# Patient Record
Sex: FEMALE | Race: OTHER | NOT HISPANIC OR LATINO | Employment: PART TIME | URBAN - METROPOLITAN AREA
[De-identification: names, ages, dates, MRNs, and addresses within clinical notes are randomized per-mention and may not be internally consistent; named-entity substitution may affect disease eponyms.]

---

## 2017-02-13 ENCOUNTER — HOSPITAL ENCOUNTER (OUTPATIENT)
Dept: MAMMOGRAPHY | Facility: HOSPITAL | Age: 57
Discharge: HOME | End: 2017-02-13
Payer: COMMERCIAL

## 2017-02-13 DIAGNOSIS — Z12.31 OTHER SCREENING MAMMOGRAM: ICD-10-CM

## 2017-02-13 PROCEDURE — G0202 SCR MAMMO BI INCL CAD: HCPCS

## 2018-02-28 ENCOUNTER — TRANSCRIBE ORDERS (OUTPATIENT)
Dept: ADMINISTRATIVE | Facility: HOSPITAL | Age: 58
End: 2018-02-28

## 2018-02-28 DIAGNOSIS — I25.10 ATHEROSCLEROSIS OF NATIVE CORONARY ARTERY WITHOUT ANGINA PECTORIS, UNSPECIFIED WHETHER NATIVE OR TRANSPLANTED HEART: Primary | ICD-10-CM

## 2018-02-28 DIAGNOSIS — Z12.39 SCREENING BREAST EXAMINATION: ICD-10-CM

## 2018-04-18 ENCOUNTER — HOSPITAL ENCOUNTER (OUTPATIENT)
Dept: MAMMOGRAPHY | Facility: HOSPITAL | Age: 58
Discharge: HOME | End: 2018-04-18
Payer: COMMERCIAL

## 2018-04-18 DIAGNOSIS — Z12.39 SCREENING BREAST EXAMINATION: ICD-10-CM

## 2018-04-18 PROCEDURE — 77067 SCR MAMMO BI INCL CAD: CPT

## 2018-04-23 ENCOUNTER — HOSPITAL ENCOUNTER (OUTPATIENT)
Dept: ULTRASOUND IMAGING | Facility: CLINIC | Age: 58
Discharge: HOME | End: 2018-04-23

## 2018-04-23 ENCOUNTER — HOSPITAL ENCOUNTER (OUTPATIENT)
Dept: MAMMOGRAPHY | Facility: CLINIC | Age: 58
Discharge: HOME | End: 2018-04-23

## 2018-04-23 DIAGNOSIS — R92.8 ABNORMAL MAMMOGRAM: ICD-10-CM

## 2018-04-23 PROCEDURE — G0279 TOMOSYNTHESIS, MAMMO: HCPCS

## 2018-04-23 PROCEDURE — 77065 DX MAMMO INCL CAD UNI: CPT

## 2018-09-04 ENCOUNTER — HOSPITAL ENCOUNTER (OUTPATIENT)
Dept: NON INVASIVE DIAGNOSTICS | Facility: CLINIC | Age: 58
Discharge: HOME | End: 2018-09-04
Payer: COMMERCIAL

## 2018-09-04 DIAGNOSIS — I25.10 ATHEROSCLEROSIS OF NATIVE CORONARY ARTERY WITHOUT ANGINA PECTORIS, UNSPECIFIED WHETHER NATIVE OR TRANSPLANTED HEART: ICD-10-CM

## 2018-09-04 PROCEDURE — 93306 TTE W/DOPPLER COMPLETE: CPT

## 2018-09-04 PROCEDURE — 93306 TTE W/DOPPLER COMPLETE: CPT | Performed by: INTERNAL MEDICINE

## 2018-09-11 ENCOUNTER — OFFICE VISIT (OUTPATIENT)
Dept: FAMILY MEDICINE CLINIC | Facility: CLINIC | Age: 58
End: 2018-09-11
Payer: COMMERCIAL

## 2018-09-11 VITALS
HEIGHT: 60 IN | TEMPERATURE: 98.9 F | RESPIRATION RATE: 16 BRPM | OXYGEN SATURATION: 95 % | BODY MASS INDEX: 30.63 KG/M2 | SYSTOLIC BLOOD PRESSURE: 118 MMHG | DIASTOLIC BLOOD PRESSURE: 78 MMHG | HEART RATE: 62 BPM | WEIGHT: 156 LBS

## 2018-09-11 DIAGNOSIS — Z00.00 ROUTINE MEDICAL EXAM: Primary | ICD-10-CM

## 2018-09-11 DIAGNOSIS — Z28.21 IMMUNIZATION REFUSED: ICD-10-CM

## 2018-09-11 PROCEDURE — 99396 PREV VISIT EST AGE 40-64: CPT | Performed by: FAMILY MEDICINE

## 2018-09-11 RX ORDER — METOPROLOL TARTRATE 50 MG/1
50 TABLET, FILM COATED ORAL 2 TIMES DAILY WITH MEALS
Refills: 0 | COMMUNITY
Start: 2018-06-13 | End: 2019-02-14 | Stop reason: SDUPTHER

## 2018-09-11 RX ORDER — LISINOPRIL AND HYDROCHLOROTHIAZIDE 20; 12.5 MG/1; MG/1
1 TABLET ORAL DAILY
Refills: 0 | COMMUNITY
Start: 2018-06-13 | End: 2019-02-14 | Stop reason: SDUPTHER

## 2018-09-11 RX ORDER — CHOLECALCIFEROL (VITAMIN D3) 50 MCG
1 TABLET ORAL DAILY
COMMUNITY
Start: 2018-01-24 | End: 2022-04-06 | Stop reason: SDUPTHER

## 2018-09-11 RX ORDER — ASPIRIN 81 MG/1
81 TABLET, CHEWABLE ORAL DAILY
COMMUNITY
Start: 2009-10-20

## 2018-09-11 RX ORDER — LEVOTHYROXINE SODIUM 0.2 MG/1
200 TABLET ORAL DAILY
Refills: 0 | COMMUNITY
Start: 2018-06-13 | End: 2019-02-06 | Stop reason: SDUPTHER

## 2018-09-11 RX ORDER — LEVOTHYROXINE SODIUM 0.03 MG/1
25 TABLET ORAL DAILY
Refills: 0 | COMMUNITY
Start: 2018-06-13 | End: 2019-02-06 | Stop reason: SDUPTHER

## 2018-09-11 RX ORDER — ATORVASTATIN CALCIUM 40 MG/1
40 TABLET, FILM COATED ORAL DAILY
COMMUNITY
End: 2019-02-14 | Stop reason: SDUPTHER

## 2018-09-11 NOTE — PATIENT INSTRUCTIONS

## 2018-09-11 NOTE — PROGRESS NOTES
FAMILY PRACTICE HEALTH MAINTENANCE OFFICE VISIT  [facility] - [place] [facility] [place]    NAME: Lavelle Arroyo  AGE: 59 y o  SEX: female  : 1960     DATE: 2018    Assessment and Plan     Problem List Items Addressed This Visit     None      Visit Diagnoses     Routine medical exam    -  Primary    We discussed with the patient increase the activity 30 minutes a day 5 days a week discuss sunscreen will hydration    BMI 30 0-30 9,adult        Lifestyle modification including portion control and increase activity discussed with the patient    Immunization refused        The patient declined head Tdap flu shot and shingirix secondary to no medical coverage number            · Patient Counseling:   · Nutrition: Stressed importance of a well balanced diet, moderation of sodium/saturated fat, caloric balance and sufficient intake of fiber  · Exercise: Stressed the importance of regular exercise with a goal of 150 minutes per week  · Dental Health: Discussed daily flossing and brushing and regular dental visits     · Immunizations reviewed patient is due for Tdap and flu shot, shingirix also  · Discussed benefits of screening up to date with mammogram and colonoscopy  · Discussed the patient's BMI with her  The BMI is above average; BMI management plan is completed     No Follow-up on file          Chief Complaint     Chief Complaint   Patient presents with    Physical Exam     yearly physical exam        History of Present Illness     Patient here for her annual exam patient deny any chest pain short of breath no palpitation no headache no blurred vision no weakness or lateralized of the symptom no abdomen pain nausea vomiting or diarrhea no urinary problem no weight change and no rash patient does do exercise occasionally she does watch for her diet the low carb and low salt diet she is up-to-date with her mammogram and Pap smear and colonoscopy was done in  patient also does see by Ophthalmology no glaucoma or cataract        Well Adult Physical   Patient here for a comprehensive physical exam       Diet and Physical Activity  Diet: low carbohydrate diet and low salt diet  Weight concerns: Patient has class 1 obesity (BMI 30-34 9) and Patient has class 2 obesity (BMI 35 0-39  9)  Exercise: occasionally      Depression Screen  PHQ-9 Depression Screening    PHQ-9:    Frequency of the following problems over the past two weeks:       Little interest or pleasure in doing things:  0 - not at all  Feeling down, depressed, or hopeless:  0 - not at all  PHQ-2 Score:  0          General Health  Hearing: Normal:  bilateral  Vision: wears contacts  Dental: regular dental visits    Reproductive Health  G2L2,postmenapousal      The following portions of the patient's history were reviewed and updated as appropriate: allergies, current medications, past family history, past medical history, past social history, past surgical history and problem list     Review of Systems     Review of Systems   Constitutional: Negative for fatigue and fever  HENT: Negative for ear pain, sinus pain, sinus pressure and sore throat  Eyes: Negative for pain and redness  Respiratory: Negative for cough, chest tightness and shortness of breath  Cardiovascular: Negative for chest pain, palpitations and leg swelling  Gastrointestinal: Negative for abdominal pain, blood in stool, constipation, diarrhea and nausea  Genitourinary: Negative for flank pain, frequency and hematuria  Musculoskeletal: Negative for back pain and joint swelling  Skin: Negative for rash  Neurological: Negative for dizziness, numbness and headaches  Hematological: Does not bruise/bleed easily         Past Medical History     Past Medical History:   Diagnosis Date    Anemia     CAD (coronary artery disease)     Hyperlipidemia     Hypertension     Myocardial infarction ([facility] [address] ) 03/26/2002    Thyroid disease        Past Surgical History     Past Surgical History:   Procedure Laterality Date    COLONOSCOPY  2013    CORONARY ARTERY BYPASS GRAFT  2003       Social History     Social History     Social History    Marital status:      Spouse name: N/A    Number of children: N/A    Years of education: N/A     Social History Main Topics    Smoking status: Former Smoker     Packs/day: 2 00     Years: 22 00     Quit date: 1/1/1993    Smokeless tobacco: Never Used      Comment: no passive smoke exposure    Alcohol use No    Drug use: No    Sexual activity: Not Asked     Other Topics Concern    None     Social History Narrative    None       Family History     Family History   Problem Relation Age of Onset    Hypertension Mother     Stroke Mother         3 strokes    Heart attack Father        Current Medications       Current Outpatient Prescriptions:     aspirin 81 mg chewable tablet, Chew 81 mg daily, Disp: , Rfl:     atorvastatin (LIPITOR) 40 mg tablet, Take 40 mg by mouth daily, Disp: , Rfl:     Cholecalciferol (VITAMIN D) 2000 units tablet, Take 1 tablet by mouth daily, Disp: , Rfl:     levothyroxine 200 mcg tablet, Take 200 mcg by mouth daily, Disp: , Rfl: 0    levothyroxine 25 mcg tablet, Take 25 mcg by mouth daily, Disp: , Rfl: 0    lisinopril-hydrochlorothiazide (PRINZIDE,ZESTORETIC) 20-12 5 MG per tablet, Take 1 tablet by mouth daily, Disp: , Rfl: 0    metoprolol tartrate (LOPRESSOR) 50 mg tablet, Take 50 mg by mouth 2 (two) times a day with meals, Disp: , Rfl: 0     Allergies     Allergies   Allergen Reactions    No Active Allergies        Objective     /78 (BP Location: Left arm, Patient Position: Sitting, Cuff Size: Standard)   Pulse 62   Temp 98 9 °F (37 2 °C) (Oral)   Resp 16   Ht 5' (1 524 m)   Wt 70 8 kg (156 lb)   SpO2 95%   BMI 30 47 kg/m²      Physical Exam   Constitutional: She is oriented to person, place, and time  She appears well-developed and well-nourished     HENT: Head: Normocephalic  Right Ear: External ear normal    Left Ear: External ear normal    Eyes: Conjunctivae and EOM are normal  Right eye exhibits no discharge  Left eye exhibits no discharge  Neck: No JVD present  Cardiovascular: Normal rate, regular rhythm and normal heart sounds  Exam reveals no gallop  No murmur heard  Pulmonary/Chest: Effort normal  No respiratory distress  She has no wheezes  She has no rales  She exhibits no tenderness  Abdominal: She exhibits no mass  There is no tenderness  There is no rebound  Musculoskeletal: She exhibits no edema or tenderness  Neurological: She is alert and oriented to person, place, and time  Skin: No rash noted  No erythema  Health Maintenance     There are no preventive care reminders to display for this patient  There is no immunization history on file for this patient      Rajiv Dinh MD  [address] [place] [address]

## 2019-01-03 ENCOUNTER — TELEPHONE (OUTPATIENT)
Dept: OTHER | Facility: OTHER | Age: 59
End: 2019-01-03

## 2019-01-04 NOTE — TELEPHONE ENCOUNTER
Patient left message with answering service for a refill of all her medications left message on her ebonie need clarification

## 2019-01-22 DIAGNOSIS — E78.2 MIXED HYPERLIPIDEMIA: ICD-10-CM

## 2019-01-22 DIAGNOSIS — R73.01 IFG (IMPAIRED FASTING GLUCOSE): ICD-10-CM

## 2019-01-22 DIAGNOSIS — I10 HYPERTENSION, UNSPECIFIED TYPE: ICD-10-CM

## 2019-01-22 DIAGNOSIS — E78.5 DYSLIPIDEMIA: ICD-10-CM

## 2019-01-22 DIAGNOSIS — E03.9 HYPOTHYROIDISM, UNSPECIFIED TYPE: Primary | ICD-10-CM

## 2019-02-05 ENCOUNTER — APPOINTMENT (OUTPATIENT)
Dept: LAB | Facility: HOSPITAL | Age: 59
End: 2019-02-05
Payer: COMMERCIAL

## 2019-02-05 ENCOUNTER — TELEPHONE (OUTPATIENT)
Dept: FAMILY MEDICINE CLINIC | Facility: CLINIC | Age: 59
End: 2019-02-05

## 2019-02-05 DIAGNOSIS — E03.9 HYPOTHYROIDISM, UNSPECIFIED TYPE: ICD-10-CM

## 2019-02-05 DIAGNOSIS — E78.5 DYSLIPIDEMIA: ICD-10-CM

## 2019-02-05 DIAGNOSIS — I10 HYPERTENSION, UNSPECIFIED TYPE: ICD-10-CM

## 2019-02-05 DIAGNOSIS — E78.2 MIXED HYPERLIPIDEMIA: ICD-10-CM

## 2019-02-05 DIAGNOSIS — R73.01 IFG (IMPAIRED FASTING GLUCOSE): ICD-10-CM

## 2019-02-05 LAB
ALBUMIN SERPL BCP-MCNC: 3.9 G/DL (ref 3.5–5)
ALP SERPL-CCNC: 72 U/L (ref 46–116)
ALT SERPL W P-5'-P-CCNC: 29 U/L (ref 12–78)
ANION GAP SERPL CALCULATED.3IONS-SCNC: 9 MMOL/L (ref 4–13)
AST SERPL W P-5'-P-CCNC: 16 U/L (ref 5–45)
BASOPHILS # BLD AUTO: 0.04 THOUSANDS/ΜL (ref 0–0.1)
BASOPHILS NFR BLD AUTO: 1 % (ref 0–1)
BILIRUB SERPL-MCNC: 0.66 MG/DL (ref 0.2–1)
BUN SERPL-MCNC: 16 MG/DL (ref 5–25)
CALCIUM SERPL-MCNC: 9.2 MG/DL (ref 8.3–10.1)
CHLORIDE SERPL-SCNC: 103 MMOL/L (ref 100–108)
CHOLEST SERPL-MCNC: 178 MG/DL (ref 50–200)
CO2 SERPL-SCNC: 27 MMOL/L (ref 21–32)
CREAT SERPL-MCNC: 0.78 MG/DL (ref 0.6–1.3)
EOSINOPHIL # BLD AUTO: 0.18 THOUSAND/ΜL (ref 0–0.61)
EOSINOPHIL NFR BLD AUTO: 3 % (ref 0–6)
ERYTHROCYTE [DISTWIDTH] IN BLOOD BY AUTOMATED COUNT: 11.7 % (ref 11.6–15.1)
GFR SERPL CREATININE-BSD FRML MDRD: 83 ML/MIN/1.73SQ M
GLUCOSE P FAST SERPL-MCNC: 111 MG/DL (ref 65–99)
HCT VFR BLD AUTO: 40.2 % (ref 34.8–46.1)
HDLC SERPL-MCNC: 51 MG/DL (ref 40–60)
HGB BLD-MCNC: 13.3 G/DL (ref 11.5–15.4)
IMM GRANULOCYTES # BLD AUTO: 0.03 THOUSAND/UL (ref 0–0.2)
IMM GRANULOCYTES NFR BLD AUTO: 1 % (ref 0–2)
LDLC SERPL CALC-MCNC: 112 MG/DL (ref 0–100)
LYMPHOCYTES # BLD AUTO: 1.45 THOUSANDS/ΜL (ref 0.6–4.47)
LYMPHOCYTES NFR BLD AUTO: 24 % (ref 14–44)
MCH RBC QN AUTO: 31.6 PG (ref 26.8–34.3)
MCHC RBC AUTO-ENTMCNC: 33.1 G/DL (ref 31.4–37.4)
MCV RBC AUTO: 96 FL (ref 82–98)
MONOCYTES # BLD AUTO: 0.55 THOUSAND/ΜL (ref 0.17–1.22)
MONOCYTES NFR BLD AUTO: 9 % (ref 4–12)
NEUTROPHILS # BLD AUTO: 3.9 THOUSANDS/ΜL (ref 1.85–7.62)
NEUTS SEG NFR BLD AUTO: 62 % (ref 43–75)
NONHDLC SERPL-MCNC: 127 MG/DL
NRBC BLD AUTO-RTO: 0 /100 WBCS
PLATELET # BLD AUTO: 322 THOUSANDS/UL (ref 149–390)
PMV BLD AUTO: 10.2 FL (ref 8.9–12.7)
POTASSIUM SERPL-SCNC: 3.8 MMOL/L (ref 3.5–5.3)
PROT SERPL-MCNC: 7.6 G/DL (ref 6.4–8.2)
RBC # BLD AUTO: 4.21 MILLION/UL (ref 3.81–5.12)
SODIUM SERPL-SCNC: 139 MMOL/L (ref 136–145)
T4 FREE SERPL-MCNC: 0.9 NG/DL (ref 0.76–1.46)
TRIGL SERPL-MCNC: 75 MG/DL
TSH SERPL DL<=0.05 MIU/L-ACNC: 43.09 UIU/ML (ref 0.36–3.74)
WBC # BLD AUTO: 6.15 THOUSAND/UL (ref 4.31–10.16)

## 2019-02-05 PROCEDURE — 85025 COMPLETE CBC W/AUTO DIFF WBC: CPT

## 2019-02-05 PROCEDURE — 80061 LIPID PANEL: CPT

## 2019-02-05 PROCEDURE — 80053 COMPREHEN METABOLIC PANEL: CPT

## 2019-02-05 PROCEDURE — 84439 ASSAY OF FREE THYROXINE: CPT

## 2019-02-05 PROCEDURE — 36415 COLL VENOUS BLD VENIPUNCTURE: CPT

## 2019-02-05 PROCEDURE — 84443 ASSAY THYROID STIM HORMONE: CPT

## 2019-02-05 NOTE — TELEPHONE ENCOUNTER
----- Message from Lety Christian MD sent at 2/5/2019  1:06 PM EST -----  Patient TSH is not controlled is she taking her Levothyroxine 200meq and Levothyroxine 25 meq

## 2019-02-05 NOTE — TELEPHONE ENCOUNTER
Patient states  She lost her insurance back in October so she was taking her medication about every other day to every 2 days she was taking 225 mcg states the last time she took them was mid Jan and when she called for refills she was told she couldn't get refills until she gets the labs done

## 2019-02-06 DIAGNOSIS — E03.9 HYPOTHYROIDISM, UNSPECIFIED TYPE: Primary | ICD-10-CM

## 2019-02-06 RX ORDER — LEVOTHYROXINE SODIUM 0.03 MG/1
25 TABLET ORAL DAILY
Qty: 30 TABLET | Refills: 2 | Status: SHIPPED | OUTPATIENT
Start: 2019-02-06 | End: 2019-02-14 | Stop reason: SDUPTHER

## 2019-02-06 RX ORDER — LEVOTHYROXINE SODIUM 0.2 MG/1
200 TABLET ORAL DAILY
Qty: 30 TABLET | Refills: 1 | Status: SHIPPED | OUTPATIENT
Start: 2019-02-06 | End: 2019-02-14 | Stop reason: SDUPTHER

## 2019-02-12 ENCOUNTER — OFFICE VISIT (OUTPATIENT)
Dept: FAMILY MEDICINE CLINIC | Facility: CLINIC | Age: 59
End: 2019-02-12
Payer: COMMERCIAL

## 2019-02-12 VITALS
SYSTOLIC BLOOD PRESSURE: 90 MMHG | DIASTOLIC BLOOD PRESSURE: 60 MMHG | HEART RATE: 62 BPM | HEIGHT: 60 IN | WEIGHT: 159 LBS | TEMPERATURE: 99 F | OXYGEN SATURATION: 97 % | BODY MASS INDEX: 31.22 KG/M2

## 2019-02-12 DIAGNOSIS — Z00.01 ENCOUNTER FOR WELL ADULT EXAM WITH ABNORMAL FINDINGS: Primary | ICD-10-CM

## 2019-02-12 DIAGNOSIS — Z23 NEED FOR TDAP VACCINATION: ICD-10-CM

## 2019-02-12 DIAGNOSIS — R73.01 IMPAIRED FASTING GLUCOSE: ICD-10-CM

## 2019-02-12 DIAGNOSIS — Z12.31 SCREENING MAMMOGRAM, ENCOUNTER FOR: ICD-10-CM

## 2019-02-12 DIAGNOSIS — Z23 NEED FOR PNEUMOCOCCAL VACCINATION: ICD-10-CM

## 2019-02-12 DIAGNOSIS — E66.9 OBESITY (BMI 30.0-34.9): ICD-10-CM

## 2019-02-12 DIAGNOSIS — Z12.11 SCREENING FOR MALIGNANT NEOPLASM OF COLON: ICD-10-CM

## 2019-02-12 DIAGNOSIS — E78.2 MIXED HYPERLIPIDEMIA: ICD-10-CM

## 2019-02-12 DIAGNOSIS — E03.9 ACQUIRED HYPOTHYROIDISM: ICD-10-CM

## 2019-02-12 DIAGNOSIS — Z11.59 NEED FOR HEPATITIS C SCREENING TEST: ICD-10-CM

## 2019-02-12 DIAGNOSIS — Z23 NEED FOR INFLUENZA VACCINATION: ICD-10-CM

## 2019-02-12 PROBLEM — E66.811 OBESITY (BMI 30.0-34.9): Status: ACTIVE | Noted: 2019-02-12

## 2019-02-12 PROBLEM — Z95.1 POSTSURGICAL AORTOCORONARY BYPASS STATUS: Status: ACTIVE | Noted: 2019-02-12

## 2019-02-12 PROCEDURE — 90732 PPSV23 VACC 2 YRS+ SUBQ/IM: CPT | Performed by: FAMILY MEDICINE

## 2019-02-12 PROCEDURE — 90471 IMMUNIZATION ADMIN: CPT | Performed by: FAMILY MEDICINE

## 2019-02-12 PROCEDURE — 99396 PREV VISIT EST AGE 40-64: CPT | Performed by: FAMILY MEDICINE

## 2019-02-12 PROCEDURE — 90715 TDAP VACCINE 7 YRS/> IM: CPT | Performed by: FAMILY MEDICINE

## 2019-02-12 PROCEDURE — 90472 IMMUNIZATION ADMIN EACH ADD: CPT | Performed by: FAMILY MEDICINE

## 2019-02-12 NOTE — PROGRESS NOTES
[facility] HEALTH MAINTENANCE OFFICE VISIT  [facility] [place] [facility]    NAME: Otilia Powell  AGE: 59 y o  SEX: female  : 1960     DATE: 2019    Assessment and Plan     Problem List Items Addressed This Visit        Endocrine    Hypothyroidism     Uncontrolled the chronic asymptomatic secondary not taking the medication regularly patient was out of insurance and she has been not taking her medication every day the we will restart the patient on levothyroxine 200 microgram levothyroxine 25 microgram proper use of medication discussed with the patient will recheck TSH level in 6 week         Relevant Orders    TSH, 3rd generation    Impaired fasting glucose     Chronic asymptomatic uncontrolled we discussed with the patient important lose weight and the follow-up with the low carb diet            Other    Hyperlipidemia     Chronic ,fair control on current Atorvastatin 40 mg po/day  Advised to maintain a low-fat low-cholesterol diet consult regarding potential comorbidity including cardiovascular disease consult regarding important weight loss         Obesity (BMI 30 0-34  9)     The BMI is above average  BMI counseling and education was provided to the patient  Nutrition recommendations include reducing portion sizes, decreasing overall calorie intake, 3-5 servings of fruits/vegetables daily, reducing fast food intake, consuming healthier snacks, decreasing soda and/or juice intake, moderation in carbohydrate intake and reducing intake of saturated fat and trans fat  Exercise recommendations include moderate aerobic physical activity for 150 minutes/week, exercising 3-5 times per week and joining a gym           Encounter for well adult exam with abnormal findings - Primary     Advice and education were given regarding nutrition, aerobic exercises, weight bearing exercises, cardiovascular risk reduction, fall risk reduction, and age appropriate supplements  The patient was counseled regarding instructions for management, risk factor reductions, prognosis, risks and benefits of treatment options, patient and family education, and importance of compliance with treatment  Other Visit Diagnoses     Need for influenza vaccination        Relevant Orders    SYRINGE/SINGLE-DOSE VIAL: influenza vaccine, [id number], quadrivalent, 0 5 mL, preservative-free, for patients 3+ yr (FLUZONE)    Need for pneumococcal vaccination        Relevant Orders    Pneumococcal Polysaccharide Vaccine 23-Valent =>1yo SQ IM (Completed)    Need for Tdap vaccination        Relevant Orders    TDAP VACCINE GREATER THAN OR EQUAL TO 6YO IM (Completed)    Screening mammogram, encounter for        Relevant Orders    Mammo screening bilateral w cad    Need for hepatitis C screening test        Relevant Orders    Hepatitis C antibody    Screening for malignant neoplasm of colon        Relevant Orders    Occult Blood, Fecal Immunochemical            · Patient Counseling:   · Nutrition: Stressed importance of a well balanced diet, moderation of sodium/saturated fat, caloric balance and sufficient intake of fiber  · Exercise: Stressed the importance of regular exercise with a goal of 150 minutes per week  · Dental Health: Discussed daily flossing and brushing and regular dental visits     · Immunizations reviewed patient due for Tdap patient [place] Pneumovax 23 she will have the today possible side effect discussed with the patient she declined flu shot  · Discussed benefits of screening patient is due screening for colon cancer she declined colonoscopy we discussed with the fit test with the patient she is agree patient is due for mammogram will order it also she will be schedule appointment for Pap smear patient up-to-date with the blood work screen for hyperlipidemia and the diabetic  BMI Counseling: Body mass index is 31 05 kg/m²  Discussed with patient's BMI with her  Chief Complaint     Chief Complaint   Patient presents with    Physical Exam       History of Present Illness     Patient here for her annual physical exam and discussed the blood work result patient today deny any chest pain short of breath no palpitation no headache no blurred vision no weakness or lateralized of the symptom no abdomen pain nausea vomiting or diarrhea no renal problem no rash no fever no change in the weight and no change in the mood deny smoking she does do exercise infrequently she does do regular diet and patient did not have her Pap smear for more than 30 years and the no colonoscopy done no sleeping problem  Recent blood work discussed with the patient show thyroid is uncontrolled patient was without insurance for almost 1 year and she has not been taking her thyroid medication regularly      Well Adult Physical   Patient here for a comprehensive physical exam       Diet and Physical Activity  Diet: Regular diet  Weight concerns: Patient has class 1 obesity (BMI 30-34  9)  Exercise: infrequently      Depression Screen  PHQ-9 Depression Screening    PHQ-9:    Frequency of the following problems over the past two weeks:       Little interest or pleasure in doing things:  0 - not at all  Feeling down, depressed, or hopeless:  0 - not at all  PHQ-2 Score:  0          General Health  Hearing: Normal:  bilateral  Vision: most recent eye exam <1 year  Dental: regular dental visits    Reproductive Health  Last pap smear more than 3 y ago   Post menapouse      The following portions of the patient's history were reviewed and updated as appropriate: allergies, current medications, past family history, past medical history, past social history, past surgical history and problem list     Review of Systems     Review of Systems   Constitutional: Negative for fatigue and fever  HENT: Negative for ear pain, sinus pressure, sinus pain and sore throat  Eyes: Negative for pain and redness  Respiratory: Negative for cough, chest tightness and shortness of breath  Cardiovascular: Negative for chest pain, palpitations and leg swelling  Gastrointestinal: Negative for abdominal pain, blood in stool, constipation, diarrhea and nausea  Genitourinary: Negative for flank pain, frequency and hematuria  Musculoskeletal: Negative for back pain and joint swelling  Skin: Negative for rash  Neurological: Negative for dizziness, numbness and headaches  Hematological: Does not bruise/bleed easily  Psychiatric/Behavioral: Negative for agitation and behavioral problems         Past Medical History     Past Medical History:   Diagnosis Date    Acute MI ([place] [address] ) 2012    Anemia     CAD (coronary artery disease)     Hyperlipidemia     Hypertension     Myocardial infarction ([place] [address] ) 2002    Thyroid disease     Ventricular fibrillation ([place] [address] ) 2012       Past Surgical History     Past Surgical History:   Procedure Laterality Date    COLONOSCOPY      CORONARY ARTERY BYPASS GRAFT         Social History     Social History     Socioeconomic History    Marital status:      Spouse name: None    Number of children: None    Years of education: None    Highest education level: None   Occupational History    None   Social Needs    Financial resource strain: None    Food insecurity:     Worry: None     Inability: None    Transportation needs:     Medical: None     Non-medical: None   Tobacco Use    Smoking status: Former Smoker     Packs/day: 2 00     Years: 22 00     Pack years: 44 00     Last attempt to quit: 1993     Years since quittin 1    Smokeless tobacco: Former User    Tobacco comment: no passive smoke exposure   Substance and Sexual Activity    Alcohol use: No    Drug use: No    Sexual activity: Not Currently   Lifestyle    Physical activity:     Days per week: None     Minutes per session: None    Stress: None   Relationships    Social connections: Talks on phone: None     Gets together: None     Attends [employer] service: None     Active member of club or organization: None     Attends meetings of clubs or organizations: None     Relationship status: None    Intimate partner violence:     Fear of current or ex partner: None     Emotionally abused: None     Physically abused: None     Forced sexual activity: None   Other Topics Concern    None   Social History Narrative    None       Family History     Family History   Problem Relation Age of Onset    Hypertension Mother     Stroke Mother         3 strokes    Heart attack Father        Current Medications       Current Outpatient Medications:     aspirin 81 mg chewable tablet, Chew 81 mg daily, Disp: , Rfl:     atorvastatin (LIPITOR) 40 mg tablet, Take 40 mg by mouth daily, Disp: , Rfl:     Cholecalciferol (VITAMIN D) 2000 units tablet, Take 1 tablet by mouth daily, Disp: , Rfl:     levothyroxine 200 mcg tablet, Take 1 tablet (200 mcg total) by mouth daily, Disp: 30 tablet, Rfl: 1    levothyroxine 25 mcg tablet, Take 1 tablet (25 mcg total) by mouth daily, Disp: 30 tablet, Rfl: 2    lisinopril-hydrochlorothiazide (PRINZIDE,ZESTORETIC) 20-12 5 MG per tablet, Take 1 tablet by mouth daily, Disp: , Rfl: 0    metoprolol tartrate (LOPRESSOR) 50 mg tablet, Take 50 mg by mouth 2 (two) times a day with meals, Disp: , Rfl: 0     Allergies     Allergies   Allergen Reactions    No Active Allergies        Objective     BP 90/60   Pulse 62   Temp 99 °F (37 2 °C) (Oral)   Ht 5' (1 524 m)   Wt 72 1 kg (159 lb)   SpO2 97%   BMI 31 05 kg/m²      Physical Exam   Constitutional: She is oriented to person, place, and time  She appears well-developed and well-nourished  HENT:   Head: Normocephalic  Right Ear: External ear normal    Left Ear: External ear normal    Eyes: Conjunctivae and EOM are normal  Right eye exhibits no discharge  Left eye exhibits no discharge  Neck: No JVD present  Cardiovascular: Normal rate, regular rhythm and normal heart sounds  Exam reveals no gallop  No murmur heard  Pulmonary/Chest: Effort normal  No respiratory distress  She has no wheezes  She has no rales  She exhibits no tenderness  Abdominal: She exhibits no mass  There is no tenderness  There is no rebound  Musculoskeletal: She exhibits no edema or tenderness  Neurological: She is alert and oriented to person, place, and time  Skin: No rash noted  No erythema  Psychiatric: She has a normal mood and affect  Her behavior is normal            Health Maintenance     Health Maintenance   Topic Date Due    Hepatitis C Screening  1960    CRC Screening: Colonoscopy  1960    Pneumococcal PPSV23 Medium Risk Adult (1 of 1 - PPSV23) 07/06/1977    DTaP,Tdap,and Td Vaccines (1 - Tdap) 07/06/1979    INFLUENZA VACCINE  02/12/2020 (Originally 7/1/2018)    Depression Screening PHQ  09/11/2019    BMI: Followup Plan  09/11/2019    BMI: Adult  02/12/2020    HEPATITIS B VACCINES  Aged Out     Immunization History   Administered Date(s) Administered    Pneumococcal Polysaccharide PPV23 02/12/2019    Tdap 02/12/2019       Zay Ceja MD  [place] [facility]  BMI Counseling: Body mass index is 31 05 kg/m²  Discussed the patient's BMI with her  The BMI is above average  BMI counseling and education was provided to the patient  Nutrition recommendations include reducing portion sizes, decreasing overall calorie intake, 3-5 servings of fruits/vegetables daily, reducing fast food intake, consuming healthier snacks, decreasing soda and/or juice intake, moderation in carbohydrate intake and reducing intake of cholesterol  Exercise recommendations include moderate aerobic physical activity for 150 minutes/week

## 2019-02-12 NOTE — ASSESSMENT & PLAN NOTE
Chronic asymptomatic uncontrolled we discussed with the patient important lose weight and the follow-up with the low carb diet

## 2019-02-12 NOTE — ASSESSMENT & PLAN NOTE
Uncontrolled the chronic asymptomatic secondary not taking the medication regularly patient was out of insurance and she has been not taking her medication every day the we will restart the patient on levothyroxine 200 microgram levothyroxine 25 microgram proper use of medication discussed with the patient will recheck TSH level in 6 week

## 2019-02-12 NOTE — PATIENT INSTRUCTIONS
Obesity   AMBULATORY CARE:   Obesity  is when your body mass index (BMI) is greater than 30  Your healthcare provider will use your height and weight to measure your BMI  The risks of obesity include  many health problems, such as injuries or physical disability  You may need tests to check for the following:  · Diabetes     · High blood pressure or high cholesterol     · Heart disease     · Gallbladder or liver disease     · Cancer of the colon, breast, prostate, liver, or kidney     · Sleep apnea     · Arthritis or gout  Seek care immediately if:   · You have a severe headache, confusion, or difficulty speaking  · You have weakness on one side of your body  · You have chest pain, sweating, or shortness of breath  Contact your healthcare provider if:   · You have symptoms of gallbladder or liver disease, such as pain in your upper abdomen  · You have knee or hip pain and discomfort while walking  · You have symptoms of diabetes, such as intense hunger and thirst, and frequent urination  · You have symptoms of sleep apnea, such as snoring or daytime sleepiness  · You have questions or concerns about your condition or care  Treatment for obesity  focuses on helping you lose weight to improve your health  Even a small decrease in BMI can reduce the risk for many health problems  Your healthcare provider will help you set a weight-loss goal   · Lifestyle changes  are the first step in treating obesity  These include making healthy food choices and getting regular physical activity  Your healthcare provider may suggest a weight-loss program that involves coaching, education, and therapy  · Medicine  may help you lose weight when it is used with a healthy diet and physical activity  · Surgery  can help you lose weight if you are very obese and have other health problems  There are several types of weight-loss surgery  Ask your healthcare provider for more information    Be successful losing weight:   · Set small, realistic goals  An example of a small goal is to walk for 20 minutes 5 days a week  Anther goal is to lose 5% of your body weight  · Tell friends, family members, and coworkers about your goals  and ask for their support  Ask a friend to lose weight with you, or join a weight-loss support group  · Identify foods or triggers that may cause you to overeat , and find ways to avoid them  Remove tempting high-calorie foods from your home and workplace  Place a bowl of fresh fruit on your kitchen counter  If stress causes you to eat, then find other ways to cope with stress  · Keep a diary to track what you eat and drink  Also write down how many minutes of physical activity you do each day  Weigh yourself once a week and record it in your diary  Eating changes: You will need to eat 500 to 1,000 fewer calories each day than you currently eat to lose 1 to 2 pounds a week  The following changes will help you cut calories:  · Eat smaller portions  Use small plates, no larger than 9 inches in diameter  Fill your plate half full of fruits and vegetables  Measure your food using measuring cups until you know what a serving size looks like  · Eat 3 meals and 1 or 2 snacks each day  Plan your meals in advance  Eric Han and eat at home most of the time  Eat slowly  · Eat fruits and vegetables at every meal   They are low in calories and high in fiber, which makes you feel full  Do not add butter, margarine, or cream sauce to vegetables  Use herbs to season steamed vegetables  · Eat less fat and fewer fried foods  Eat more baked or grilled chicken and fish  These protein sources are lower in calories and fat than red meat  Limit fast food  Dress your salads with olive oil and vinegar instead of bottled dressing  · Limit the amount of sugar you eat  Do not drink sugary beverages  Limit alcohol  Activity changes:  Physical activity is good for your body in many ways   It helps you burn calories and build strong muscles  It decreases stress and depression, and improves your mood  It can also help you sleep better  Talk to your healthcare provider before you begin an exercise program   · Exercise for at least 30 minutes 5 days a week  Start slowly  Set aside time each day for physical activity that you enjoy and that is convenient for you  It is best to do both weight training and an activity that increases your heart rate, such as walking, bicycling, or swimming  · Find ways to be more active  Do yard work and housecleaning  Walk up the stairs instead of using elevators  Spend your leisure time going to events that require walking, such as outdoor festivals or fairs  This extra physical activity can help you lose weight and keep it off  Follow up with your healthcare provider as directed: You may need to meet with a dietitian  Write down your questions so you remember to ask them during your visits  © 2017 [employer] [employer] Information is for End User's use only and may not be sold, redistributed or otherwise used for commercial purposes  All illustrations and images included in CareNotes® are the copyrighted property of A D A M , Inc  or Marbin Garcia  The above information is an  only  It is not intended as medical advice for individual conditions or treatments  Talk to your doctor, nurse or pharmacist before following any medical regimen to see if it is safe and effective for you

## 2019-02-12 NOTE — ASSESSMENT & PLAN NOTE
Chronic ,fair control on current Atorvastatin 40 mg po/day  Advised to maintain a low-fat low-cholesterol diet consult regarding potential comorbidity including cardiovascular disease consult regarding important weight loss

## 2019-02-14 DIAGNOSIS — I10 ESSENTIAL HYPERTENSION: Primary | ICD-10-CM

## 2019-02-14 DIAGNOSIS — E03.9 HYPOTHYROIDISM, UNSPECIFIED TYPE: ICD-10-CM

## 2019-02-14 DIAGNOSIS — E78.5 HYPERLIPIDEMIA, UNSPECIFIED HYPERLIPIDEMIA TYPE: ICD-10-CM

## 2019-02-15 RX ORDER — METOPROLOL TARTRATE 50 MG/1
50 TABLET, FILM COATED ORAL 2 TIMES DAILY WITH MEALS
Qty: 60 TABLET | Refills: 2 | Status: SHIPPED | OUTPATIENT
Start: 2019-02-15 | End: 2019-04-30 | Stop reason: SDUPTHER

## 2019-02-15 RX ORDER — LEVOTHYROXINE SODIUM 0.2 MG/1
200 TABLET ORAL DAILY
Qty: 30 TABLET | Refills: 2 | Status: SHIPPED | OUTPATIENT
Start: 2019-02-15 | End: 2019-05-01 | Stop reason: SDUPTHER

## 2019-02-15 RX ORDER — LISINOPRIL AND HYDROCHLOROTHIAZIDE 20; 12.5 MG/1; MG/1
1 TABLET ORAL DAILY
Qty: 30 TABLET | Refills: 2 | Status: SHIPPED | OUTPATIENT
Start: 2019-02-15 | End: 2022-04-06 | Stop reason: ALTCHOICE

## 2019-02-15 RX ORDER — ATORVASTATIN CALCIUM 40 MG/1
40 TABLET, FILM COATED ORAL DAILY
Qty: 30 TABLET | Refills: 2 | Status: SHIPPED | OUTPATIENT
Start: 2019-02-15 | End: 2019-04-05 | Stop reason: SDUPTHER

## 2019-02-15 RX ORDER — LEVOTHYROXINE SODIUM 0.03 MG/1
25 TABLET ORAL DAILY
Qty: 30 TABLET | Refills: 2 | Status: SHIPPED | OUTPATIENT
Start: 2019-02-15 | End: 2019-04-30 | Stop reason: SDUPTHER

## 2019-02-18 DIAGNOSIS — Z12.11 SCREENING FOR MALIGNANT NEOPLASM OF COLON: Primary | ICD-10-CM

## 2019-02-26 ENCOUNTER — APPOINTMENT (OUTPATIENT)
Dept: LAB | Facility: HOSPITAL | Age: 59
End: 2019-02-26
Payer: COMMERCIAL

## 2019-02-26 DIAGNOSIS — Z12.11 SCREENING FOR MALIGNANT NEOPLASM OF COLON: ICD-10-CM

## 2019-02-26 DIAGNOSIS — Z11.59 NEED FOR HEPATITIS C SCREENING TEST: ICD-10-CM

## 2019-02-26 DIAGNOSIS — E03.9 ACQUIRED HYPOTHYROIDISM: ICD-10-CM

## 2019-02-26 LAB
HEMOCCULT STL QL IA: NEGATIVE
TSH SERPL DL<=0.05 MIU/L-ACNC: 0.77 UIU/ML (ref 0.36–3.74)

## 2019-02-26 PROCEDURE — 84443 ASSAY THYROID STIM HORMONE: CPT

## 2019-02-26 PROCEDURE — G0328 FECAL BLOOD SCRN IMMUNOASSAY: HCPCS

## 2019-02-26 PROCEDURE — 36415 COLL VENOUS BLD VENIPUNCTURE: CPT

## 2019-02-26 PROCEDURE — 86803 HEPATITIS C AB TEST: CPT

## 2019-02-27 ENCOUNTER — TELEPHONE (OUTPATIENT)
Dept: FAMILY MEDICINE CLINIC | Facility: CLINIC | Age: 59
End: 2019-02-27

## 2019-02-27 LAB — HCV AB SER QL: NORMAL

## 2019-02-27 NOTE — TELEPHONE ENCOUNTER
Patient notified     TSH in now is normal continue current dose of the levothyroxine and recommend to recheck it in 4 months will give slip at next visit to have done 06/26/2019

## 2019-04-05 DIAGNOSIS — E78.5 HYPERLIPIDEMIA, UNSPECIFIED HYPERLIPIDEMIA TYPE: ICD-10-CM

## 2019-04-05 RX ORDER — ATORVASTATIN CALCIUM 40 MG/1
40 TABLET, FILM COATED ORAL DAILY
Qty: 30 TABLET | Refills: 2 | Status: SHIPPED | OUTPATIENT
Start: 2019-04-05 | End: 2019-08-01 | Stop reason: SDUPTHER

## 2019-04-30 DIAGNOSIS — I10 ESSENTIAL HYPERTENSION: ICD-10-CM

## 2019-04-30 DIAGNOSIS — E03.9 HYPOTHYROIDISM, UNSPECIFIED TYPE: ICD-10-CM

## 2019-04-30 RX ORDER — LEVOTHYROXINE SODIUM 0.03 MG/1
25 TABLET ORAL DAILY
Qty: 90 TABLET | Refills: 1 | Status: SHIPPED | OUTPATIENT
Start: 2019-04-30 | End: 2022-03-22 | Stop reason: DRUGHIGH

## 2019-04-30 RX ORDER — METOPROLOL TARTRATE 50 MG/1
50 TABLET, FILM COATED ORAL 2 TIMES DAILY WITH MEALS
Qty: 180 TABLET | Refills: 1 | Status: SHIPPED | OUTPATIENT
Start: 2019-04-30 | End: 2019-11-14 | Stop reason: SDUPTHER

## 2019-05-01 DIAGNOSIS — E03.9 HYPOTHYROIDISM, UNSPECIFIED TYPE: ICD-10-CM

## 2019-05-01 DIAGNOSIS — E03.9 ACQUIRED HYPOTHYROIDISM: Primary | ICD-10-CM

## 2019-05-01 RX ORDER — LEVOTHYROXINE SODIUM 0.2 MG/1
200 TABLET ORAL DAILY
Qty: 30 TABLET | Refills: 2 | Status: SHIPPED | OUTPATIENT
Start: 2019-05-01 | End: 2019-08-06 | Stop reason: SDUPTHER

## 2019-05-20 ENCOUNTER — OFFICE VISIT (OUTPATIENT)
Dept: FAMILY MEDICINE CLINIC | Facility: CLINIC | Age: 59
End: 2019-05-20
Payer: COMMERCIAL

## 2019-05-20 ENCOUNTER — TELEPHONE (OUTPATIENT)
Dept: FAMILY MEDICINE CLINIC | Facility: CLINIC | Age: 59
End: 2019-05-20

## 2019-05-20 VITALS
OXYGEN SATURATION: 96 % | SYSTOLIC BLOOD PRESSURE: 110 MMHG | DIASTOLIC BLOOD PRESSURE: 80 MMHG | WEIGHT: 149 LBS | TEMPERATURE: 98.5 F | BODY MASS INDEX: 29.25 KG/M2 | HEIGHT: 60 IN | HEART RATE: 57 BPM

## 2019-05-20 DIAGNOSIS — F43.22 ADJUSTMENT DISORDER WITH ANXIETY: Primary | ICD-10-CM

## 2019-05-20 PROCEDURE — 1036F TOBACCO NON-USER: CPT | Performed by: FAMILY MEDICINE

## 2019-05-20 PROCEDURE — 3008F BODY MASS INDEX DOCD: CPT | Performed by: FAMILY MEDICINE

## 2019-05-20 PROCEDURE — 99214 OFFICE O/P EST MOD 30 MIN: CPT | Performed by: FAMILY MEDICINE

## 2019-05-20 RX ORDER — SERTRALINE HYDROCHLORIDE 25 MG/1
25 TABLET, FILM COATED ORAL DAILY
Qty: 30 TABLET | Refills: 2 | Status: SHIPPED | OUTPATIENT
Start: 2019-05-20 | End: 2019-08-05 | Stop reason: SDUPTHER

## 2019-08-01 DIAGNOSIS — E78.5 HYPERLIPIDEMIA, UNSPECIFIED HYPERLIPIDEMIA TYPE: ICD-10-CM

## 2019-08-01 RX ORDER — ATORVASTATIN CALCIUM 40 MG/1
40 TABLET, FILM COATED ORAL DAILY
Qty: 90 TABLET | Refills: 0 | Status: SHIPPED | OUTPATIENT
Start: 2019-08-01 | End: 2022-04-06 | Stop reason: SDUPTHER

## 2019-08-05 DIAGNOSIS — F43.22 ADJUSTMENT DISORDER WITH ANXIETY: ICD-10-CM

## 2019-08-05 RX ORDER — SERTRALINE HYDROCHLORIDE 25 MG/1
25 TABLET, FILM COATED ORAL DAILY
Qty: 30 TABLET | Refills: 2 | Status: SHIPPED | OUTPATIENT
Start: 2019-08-05 | End: 2019-08-07 | Stop reason: SDUPTHER

## 2019-08-06 DIAGNOSIS — E03.9 ACQUIRED HYPOTHYROIDISM: ICD-10-CM

## 2019-08-06 RX ORDER — LEVOTHYROXINE SODIUM 0.2 MG/1
200 TABLET ORAL DAILY
Qty: 30 TABLET | Refills: 2 | Status: SHIPPED | OUTPATIENT
Start: 2019-08-06 | End: 2019-10-30 | Stop reason: SDUPTHER

## 2019-08-07 DIAGNOSIS — F43.22 ADJUSTMENT DISORDER WITH ANXIETY: ICD-10-CM

## 2019-08-07 RX ORDER — SERTRALINE HYDROCHLORIDE 25 MG/1
25 TABLET, FILM COATED ORAL DAILY
Qty: 90 TABLET | Refills: 1 | Status: SHIPPED | OUTPATIENT
Start: 2019-08-07 | End: 2022-04-06

## 2019-09-17 ENCOUNTER — TELEPHONE (OUTPATIENT)
Dept: FAMILY MEDICINE CLINIC | Facility: CLINIC | Age: 59
End: 2019-09-17

## 2019-09-17 DIAGNOSIS — R73.01 IFG (IMPAIRED FASTING GLUCOSE): ICD-10-CM

## 2019-09-17 DIAGNOSIS — E55.9 VITAMIN D DEFICIENCY: ICD-10-CM

## 2019-09-17 DIAGNOSIS — I10 ESSENTIAL HYPERTENSION: ICD-10-CM

## 2019-09-17 DIAGNOSIS — E78.5 HYPERLIPIDEMIA, UNSPECIFIED HYPERLIPIDEMIA TYPE: ICD-10-CM

## 2019-09-17 DIAGNOSIS — E78.2 MIXED HYPERLIPIDEMIA: ICD-10-CM

## 2019-09-17 DIAGNOSIS — E03.9 ACQUIRED HYPOTHYROIDISM: Primary | ICD-10-CM

## 2019-09-17 DIAGNOSIS — E03.9 HYPOTHYROIDISM, UNSPECIFIED TYPE: ICD-10-CM

## 2019-09-17 NOTE — LETTER
September 17, 2019     Gonzalo Falcon   [address]  [address] [facility]      Dear Ms Briceño: In addition to helping you feel better when you are sick, we are interested in preventing illness and injury in the first place  In the spirit of maintaining your good health, our system indicates that you are due for the following:      Routine OV with labs     Please call us to make an appointment at your earliest convenience  I look forward to seeing you soon        Sincerely,         Rajiv Dinh MD

## 2019-10-30 DIAGNOSIS — E03.9 ACQUIRED HYPOTHYROIDISM: ICD-10-CM

## 2019-10-30 RX ORDER — LEVOTHYROXINE SODIUM 0.2 MG/1
200 TABLET ORAL DAILY
Qty: 30 TABLET | Refills: 0 | Status: SHIPPED | OUTPATIENT
Start: 2019-10-30 | End: 2022-03-22 | Stop reason: DRUGHIGH

## 2019-11-14 DIAGNOSIS — I10 ESSENTIAL HYPERTENSION: ICD-10-CM

## 2019-11-14 RX ORDER — METOPROLOL TARTRATE 50 MG/1
50 TABLET, FILM COATED ORAL 2 TIMES DAILY WITH MEALS
Qty: 180 TABLET | Refills: 0 | Status: SHIPPED | OUTPATIENT
Start: 2019-11-14 | End: 2022-04-06 | Stop reason: ALTCHOICE

## 2020-06-30 ENCOUNTER — TELEPHONE (OUTPATIENT)
Dept: FAMILY MEDICINE CLINIC | Facility: CLINIC | Age: 60
End: 2020-06-30

## 2020-06-30 NOTE — LETTER
June 30, 2020     [address]  [address] [place]      Dear Ms Briceño: In addition to helping you feel better when you are sick, we are interested in preventing illness and injury in the first place  In the spirit of maintaining your good health, our system indicates that you are due for the following:    Health Maintenance Due   Topic Date Due    HIV Screening  07/06/1973    BMI: Adult  07/06/1976    Cervical Cancer Screening  07/06/1979    MAMMOGRAM  04/23/2019    Depression Screening PHQ  02/12/2020    Annual Physical  02/12/2020    CRC Screening: FOBTx3/FIT  02/26/2020       Please call us to make an appointment at your earliest convenience  I look forward to seeing you soon          Sincerely,      Florinda Garcia MD

## 2020-07-01 NOTE — TELEPHONE ENCOUNTER
Patient has no insurance at this time starts work 7/6/20 8am-6pm once she gets insurance she will contact us to set up appointment

## 2020-09-11 ENCOUNTER — TELEPHONE (OUTPATIENT)
Dept: FAMILY MEDICINE CLINIC | Facility: CLINIC | Age: 60
End: 2020-09-11

## 2020-12-15 ENCOUNTER — TELEPHONE (OUTPATIENT)
Dept: FAMILY MEDICINE CLINIC | Facility: CLINIC | Age: 60
End: 2020-12-15

## 2021-01-22 ENCOUNTER — TELEPHONE (OUTPATIENT)
Dept: FAMILY MEDICINE CLINIC | Facility: CLINIC | Age: 61
End: 2021-01-22

## 2021-02-19 ENCOUNTER — TELEPHONE (OUTPATIENT)
Dept: FAMILY MEDICINE CLINIC | Facility: CLINIC | Age: 61
End: 2021-02-19

## 2022-03-11 ENCOUNTER — RA CDI HCC (OUTPATIENT)
Dept: OTHER | Facility: HOSPITAL | Age: 62
End: 2022-03-11

## 2022-03-11 NOTE — PROGRESS NOTES
[facility]  coding opportunities       Chart reviewed, no opportunity found: CHART REVIEWED, NO OPPORTUNITY FOUND        Patients Insurance        Commercial Insurance: [address] [place] [address]

## 2022-03-18 ENCOUNTER — OFFICE VISIT (OUTPATIENT)
Dept: FAMILY MEDICINE CLINIC | Facility: CLINIC | Age: 62
End: 2022-03-18
Payer: MEDICARE

## 2022-03-18 VITALS
HEART RATE: 77 BPM | DIASTOLIC BLOOD PRESSURE: 84 MMHG | RESPIRATION RATE: 16 BRPM | HEIGHT: 60 IN | BODY MASS INDEX: 31.18 KG/M2 | TEMPERATURE: 99.7 F | WEIGHT: 158.8 LBS | SYSTOLIC BLOOD PRESSURE: 140 MMHG | OXYGEN SATURATION: 97 %

## 2022-03-18 DIAGNOSIS — E66.09 CLASS 1 OBESITY DUE TO EXCESS CALORIES WITH SERIOUS COMORBIDITY AND BODY MASS INDEX (BMI) OF 31.0 TO 31.9 IN ADULT: ICD-10-CM

## 2022-03-18 DIAGNOSIS — Z13.820 SCREENING FOR OSTEOPOROSIS: ICD-10-CM

## 2022-03-18 DIAGNOSIS — E78.2 MIXED HYPERLIPIDEMIA: ICD-10-CM

## 2022-03-18 DIAGNOSIS — I25.10 CHRONIC CORONARY ARTERY DISEASE: ICD-10-CM

## 2022-03-18 DIAGNOSIS — Z12.31 ENCOUNTER FOR SCREENING MAMMOGRAM FOR BREAST CANCER: ICD-10-CM

## 2022-03-18 DIAGNOSIS — Z00.01 ENCOUNTER FOR WELL ADULT EXAM WITH ABNORMAL FINDINGS: Primary | ICD-10-CM

## 2022-03-18 DIAGNOSIS — Z12.11 SCREENING FOR COLON CANCER: ICD-10-CM

## 2022-03-18 DIAGNOSIS — Z78.0 POST-MENOPAUSAL: ICD-10-CM

## 2022-03-18 DIAGNOSIS — E55.9 VITAMIN D DEFICIENCY: ICD-10-CM

## 2022-03-18 DIAGNOSIS — I10 PRIMARY HYPERTENSION: ICD-10-CM

## 2022-03-18 DIAGNOSIS — E03.9 ACQUIRED HYPOTHYROIDISM: ICD-10-CM

## 2022-03-18 DIAGNOSIS — R73.01 IMPAIRED FASTING GLUCOSE: ICD-10-CM

## 2022-03-18 PROCEDURE — 99386 PREV VISIT NEW AGE 40-64: CPT | Performed by: FAMILY MEDICINE

## 2022-03-18 RX ORDER — FOLIC ACID/MULTIVIT,IRON,MINER .4-18-35
1 TABLET,CHEWABLE ORAL DAILY
COMMUNITY

## 2022-03-18 RX ORDER — MULTIVIT WITH MINERALS/LUTEIN
1000 TABLET ORAL DAILY
COMMUNITY

## 2022-03-18 NOTE — PATIENT INSTRUCTIONS

## 2022-03-18 NOTE — PROGRESS NOTES
[address] [place] [address] PRIMARY CARE [facility]    NAME: Tanika Mercer  AGE: 59 y o  SEX: female  : 1960     DATE: 3/20/2022     Assessment and Plan:     Problem List Items Addressed This Visit        Endocrine    Hypothyroidism    Relevant Orders    CBC and differential    Comprehensive metabolic panel    Lipid Panel with Direct LDL reflex    TSH, 3rd generation with Free T4 reflex    Vitamin D 25 hydroxy    Impaired fasting glucose    Relevant Orders    CBC and differential    Comprehensive metabolic panel    Lipid Panel with Direct LDL reflex    TSH, 3rd generation with Free T4 reflex    Vitamin D 25 hydroxy       Cardiovascular and Mediastinum    Chronic coronary artery disease    Relevant Orders    Echo complete w/ contrast if indicated    Hypertension    Relevant Orders    CBC and differential    Comprehensive metabolic panel    Lipid Panel with Direct LDL reflex    TSH, 3rd generation with Free T4 reflex    Vitamin D 25 hydroxy       Other    Hyperlipidemia    Relevant Orders    CBC and differential    Comprehensive metabolic panel    Lipid Panel with Direct LDL reflex    TSH, 3rd generation with Free T4 reflex    Vitamin D 25 hydroxy    Vitamin D deficiency    Relevant Orders    CBC and differential    Comprehensive metabolic panel    Lipid Panel with Direct LDL reflex    TSH, 3rd generation with Free T4 reflex    Vitamin D 25 hydroxy    Class 1 obesity due to excess calories with serious comorbidity and body mass index (BMI) of 31 0 to 31 9 in adult     The BMI is above average  BMI counseling and education was provided to the patient   Nutrition recommendations include reducing portion sizes, decreasing overall calorie intake, 3-5 servings of fruits/vegetables daily, reducing fast food intake, consuming healthier snacks, decreasing soda and/or juice intake, moderation in carbohydrate intake and reducing intake of saturated fat and trans fat  Exercise recommendations include moderate aerobic physical activity for 150 minutes/week, exercising 3-5 times per week and joining a gym  Relevant Orders    CBC and differential    Comprehensive metabolic panel    Lipid Panel with Direct LDL reflex    TSH, 3rd generation with Free T4 reflex    Vitamin D 25 hydroxy    Encounter for well adult exam with abnormal findings - Primary     Advice and education were given regarding nutrition, aerobic exercises, weight bearing exercises, cardiovascular risk reduction, fall risk reduction, and age appropriate supplements  The patient was counseled regarding instructions for management, risk factor reductions, prognosis, risks and benefits of treatment options, patient and family education, and importance of compliance with treatment  A patient above age 48 post menopause and obesity multiple risk factor for osteoporosis discussed the DEXA scan she is agree also she is due for screening for breast cancer we order mammogram I discussed the patient colon cancer screening she will hold on the colonoscopy for now we discussed alternative including Cologuard and she agree             Other Visit Diagnoses     Encounter for screening mammogram for breast cancer        Relevant Orders    Mammo screening bilateral w cad    Screening for colon cancer        Relevant Orders    Cologuard    Post-menopausal        Relevant Orders    DXA bone density spine hip and pelvis    Screening for osteoporosis        Relevant Orders    DXA bone density spine hip and pelvis          Immunizations and preventive care screenings were discussed with patient today  Appropriate education was printed on patient's after visit summary  Counseling:  Alcohol/drug use: discussed moderation in alcohol intake, the recommendations for healthy alcohol use, and avoidance of illicit drug use  Dental Health: discussed importance of regular tooth brushing, flossing, and dental visits    Injury prevention: discussed safety/seat belts, safety helmets, smoke detectors, carbon dioxide detectors, and smoking near bedding or upholstery  Sexual health: discussed sexually transmitted diseases, partner selection, use of condoms, avoidance of unintended pregnancy, and contraceptive alternatives  · Exercise: the importance of regular exercise/physical activity was discussed  Recommend exercise 3-5 times per week for at least 30 minutes  BMI Counseling: Body mass index is 31 01 kg/m²  The BMI is above normal  Nutrition recommendations include decreasing portion sizes, decreasing fast food intake and limiting drinks that contain sugar  Exercise recommendations include exercising 3-5 times per week  No pharmacotherapy was ordered  Rationale for BMI follow-up plan is due to patient being overweight or obese  Depression Screening and Follow-up Plan: Patient was screened for depression during today's encounter  They screened negative with a PHQ-2 score of 0  No follow-ups on file  Chief Complaint:     Chief Complaint   Patient presents with    Physical Exam     annual visit       History of Present Illness:     Adult Annual Physical   Patient here for a comprehensive physical exam  The patient reports no problems  Patient here to reestablish care and she used to be our patient last seen 2019 she lost her insurance and now she get insurance back and looked like to establish care with us    Diet and Physical Activity  · Diet/Nutrition:  no special diet  · Exercise: 3-4 times a week on average  Depression Screening  PHQ-2/9 Depression Screening    Little interest or pleasure in doing things: 0 - not at all  Feeling down, depressed, or hopeless: 0 - not at all  PHQ-2 Score: 0  PHQ-2 Interpretation: Negative depression screen       General Health  · Sleep: sleeps well  · Hearing: normal - bilateral   · Vision: wears contacts  · Dental: no dental visits for >1 year         /GYN Health  · Patient is: postmenopausal       Review of Systems:     Review of Systems   Constitutional: Negative for activity change, appetite change, fatigue and fever  HENT: Negative for congestion, ear pain, sinus pressure, sinus pain and sore throat  Eyes: Negative for pain, discharge, redness and itching  Respiratory: Negative for cough, chest tightness, shortness of breath and stridor  Cardiovascular: Negative for chest pain, palpitations and leg swelling  Gastrointestinal: Negative for abdominal pain, blood in stool, constipation, diarrhea and nausea  Genitourinary: Negative for dysuria, flank pain, frequency and hematuria  Musculoskeletal: Negative for back pain, joint swelling and neck pain  Skin: Negative for pallor and rash  Neurological: Negative for dizziness, tremors, weakness, numbness and headaches  Hematological: Does not bruise/bleed easily        Past Medical History:     Past Medical History:   Diagnosis Date    Acute MI ([place] [id number] ) 2012    Anemia     CAD (coronary artery disease)     Hyperlipidemia     Hypertension     Myocardial infarction ([place] [address] ) 2002    Thyroid disease     Ventricular fibrillation ([place] [address] ) 2012      Past Surgical History:     Past Surgical History:   Procedure Laterality Date    COLONOSCOPY  2013    CORONARY ARTERY BYPASS GRAFT        Social History:     Social History     Socioeconomic History    Marital status:      Spouse name: None    Number of children: None    Years of education: None    Highest education level: None   Occupational History    Occupation:  worker ;  to infant ages     Employer: OTHER     Comment: parttime    Tobacco Use    Smoking status: Former Smoker     Packs/day: 2 00     Years: 22 00     Pack years: 44 00     Quit date: 1993     Years since quittin 2    Smokeless tobacco: Former User    Tobacco comment: no passive smoke exposure   Vaping Use    Vaping Use: Never used Substance and Sexual Activity    Alcohol use: No    Drug use: No    Sexual activity: Not Currently   Other Topics Concern    None   Social History Narrative    None     Social Determinants of Health     Financial Resource Strain: Low Risk     Difficulty of Paying Living Expenses: Not very hard   Food Insecurity: No Food Insecurity    Worried About Running Out of Food in the Last Year: Never true    Melo of Food in the Last Year: Never true   Transportation Needs: No Transportation Needs    Lack of Transportation (Medical): No    Lack of Transportation (Non-Medical): No   Physical Activity: Insufficiently Active    Days of Exercise per Week: 3 days    Minutes of Exercise per Session: 30 min   Stress: No Stress Concern Present    Feeling of Stress : Not at all   Social Connections: Moderately Integrated    Frequency of Communication with Friends and Family: More than three times a week    Frequency of Social Gatherings with Friends and Family: More than three times a week    Attends Catholic Services: More than 4 times per year    Active Member of [employer] or Organizations:  Yes    Attends Club or Organization Meetings: More than 4 times per year    Marital Status:    Intimate Partner Violence: Not At Risk    Fear of Current or Ex-Partner: No    Emotionally Abused: No    Physically Abused: No    Sexually Abused: No   Housing Stability: Unknown    Unable to Pay for Housing in the Last Year: No    Number of [place] in the Last Year: Not on file    Unstable Housing in the Last Year: No      Family History:     Family History   Problem Relation Age of Onset    Hypertension Mother     Stroke Mother         3 strokes    Heart attack Father       Current Medications:     Current Outpatient Medications   Medication Sig Dispense Refill    Ascorbic Acid (vitamin C) 1000 MG tablet Take 1,000 mg by mouth daily      aspirin 81 mg chewable tablet Chew 81 mg daily      Cholecalciferol (VITAMIN D) 2000 units tablet Take 1 tablet by mouth daily      multivitamin-iron-minerals-folic acid (CENTRUM) chewable tablet Chew 1 tablet daily      atorvastatin (LIPITOR) 40 mg tablet Take 1 tablet (40 mg total) by mouth daily (Patient not taking: Reported on 3/18/2022 ) 90 tablet 0    levothyroxine 200 mcg tablet Take 1 tablet (200 mcg total) by mouth daily (Patient not taking: Reported on 3/18/2022 ) 30 tablet 0    levothyroxine 25 mcg tablet Take 1 tablet (25 mcg total) by mouth daily (Patient not taking: Reported on 3/18/2022 ) 90 tablet 1    lisinopril-hydrochlorothiazide (PRINZIDE,ZESTORETIC) 20-12 5 MG per tablet Take 1 tablet by mouth daily (Patient not taking: Reported on 3/18/2022 ) 30 tablet 2    metoprolol tartrate (LOPRESSOR) 50 mg tablet Take 1 tablet (50 mg total) by mouth 2 (two) times a day with meals (Patient not taking: Reported on 3/18/2022 ) 180 tablet 0    sertraline (ZOLOFT) 25 mg tablet Take 1 tablet (25 mg total) by mouth daily for 30 days 90 tablet 1     No current facility-administered medications for this visit  Allergies: Allergies   Allergen Reactions    Cat Hair Extract Sneezing      Physical Exam:     /84 (BP Location: Left arm, Patient Position: Sitting, Cuff Size: Large)   Pulse 77   Temp 99 7 °F (37 6 °C) (Tympanic)   Resp 16   Ht 5' (1 524 m)   Wt 72 kg (158 lb 12 8 oz)   LMP  (LMP Unknown)   SpO2 97%   BMI 31 01 kg/m²     Physical Exam  Constitutional:       General: She is not in acute distress  Appearance: She is well-developed and normal weight  She is not toxic-appearing or diaphoretic  HENT:      Head: Normocephalic and atraumatic  Right Ear: Tympanic membrane, ear canal and external ear normal  There is no impacted cerumen  Left Ear: Tympanic membrane, ear canal and external ear normal  There is no impacted cerumen  Nose: Nose normal  No congestion or rhinorrhea        Mouth/Throat:      Mouth: Mucous membranes are moist       Pharynx: Oropharynx is clear  No oropharyngeal exudate or posterior oropharyngeal erythema  Eyes:      General: No scleral icterus  Right eye: No discharge  Left eye: No discharge  Conjunctiva/sclera: Conjunctivae normal       Pupils: Pupils are equal, round, and reactive to light  Neck:      Thyroid: No thyromegaly  Cardiovascular:      Rate and Rhythm: Normal rate and regular rhythm  Pulses: Normal pulses  Heart sounds: Normal heart sounds  No murmur heard  No friction rub  Pulmonary:      Effort: Pulmonary effort is normal  No respiratory distress  Breath sounds: Normal breath sounds  No wheezing or rales  Chest:      Chest wall: No tenderness  Abdominal:      General: There is no distension  Palpations: Abdomen is soft  There is no mass  Tenderness: There is no abdominal tenderness  Hernia: No hernia is present  There is no hernia in the left inguinal area  Genitourinary:     Labia:         Right: No rash  Left: No rash  Vagina: No foreign body  No vaginal discharge, tenderness or bleeding  Cervix: No cervical motion tenderness  Adnexa:         Right: No mass or tenderness  Left: No mass or tenderness  Musculoskeletal:         General: Normal range of motion  Cervical back: Normal range of motion  No rigidity  Lymphadenopathy:      Cervical: No cervical adenopathy  Skin:     General: Skin is warm  Coloration: Skin is not pale  Findings: No erythema or rash  Neurological:      Mental Status: She is alert and oriented to person, place, and time  Sensory: No sensory deficit  Motor: No weakness or abnormal muscle tone        Gait: Gait normal       Deep Tendon Reflexes: Reflexes normal    Psychiatric:         Mood and Affect: Mood normal          Behavior: Behavior normal           Radha Osei MD  [address] [place] [address]

## 2022-03-20 NOTE — ASSESSMENT & PLAN NOTE
Advice and education were given regarding nutrition, aerobic exercises, weight bearing exercises, cardiovascular risk reduction, fall risk reduction, and age appropriate supplements  The patient was counseled regarding instructions for management, risk factor reductions, prognosis, risks and benefits of treatment options, patient and family education, and importance of compliance with treatment     A patient above age 48 post menopause and obesity multiple risk factor for osteoporosis discussed the DEXA scan she is agree also she is due for screening for breast cancer we order mammogram I discussed the patient colon cancer screening she will hold on the colonoscopy for now we discussed alternative including Cologuard and she agree

## 2022-03-21 ENCOUNTER — APPOINTMENT (OUTPATIENT)
Dept: LAB | Facility: HOSPITAL | Age: 62
End: 2022-03-21
Payer: MEDICARE

## 2022-03-21 DIAGNOSIS — E78.2 MIXED HYPERLIPIDEMIA: ICD-10-CM

## 2022-03-21 DIAGNOSIS — E55.9 VITAMIN D DEFICIENCY: ICD-10-CM

## 2022-03-21 DIAGNOSIS — E66.09 CLASS 1 OBESITY DUE TO EXCESS CALORIES WITH SERIOUS COMORBIDITY AND BODY MASS INDEX (BMI) OF 31.0 TO 31.9 IN ADULT: ICD-10-CM

## 2022-03-21 DIAGNOSIS — R73.01 IMPAIRED FASTING GLUCOSE: ICD-10-CM

## 2022-03-21 DIAGNOSIS — E03.9 ACQUIRED HYPOTHYROIDISM: ICD-10-CM

## 2022-03-21 DIAGNOSIS — I10 PRIMARY HYPERTENSION: ICD-10-CM

## 2022-03-21 LAB
25(OH)D3 SERPL-MCNC: 27 NG/ML (ref 30–100)
ALBUMIN SERPL BCP-MCNC: 4.1 G/DL (ref 3.5–5)
ALP SERPL-CCNC: 71 U/L (ref 46–116)
ALT SERPL W P-5'-P-CCNC: 35 U/L (ref 12–78)
ANION GAP SERPL CALCULATED.3IONS-SCNC: 11 MMOL/L (ref 4–13)
AST SERPL W P-5'-P-CCNC: 20 U/L (ref 5–45)
BASOPHILS # BLD AUTO: 0.05 THOUSANDS/ΜL (ref 0–0.1)
BASOPHILS NFR BLD AUTO: 1 % (ref 0–1)
BILIRUB SERPL-MCNC: 0.29 MG/DL (ref 0.2–1)
BUN SERPL-MCNC: 14 MG/DL (ref 5–25)
CALCIUM SERPL-MCNC: 9.3 MG/DL (ref 8.3–10.1)
CHLORIDE SERPL-SCNC: 108 MMOL/L (ref 100–108)
CHOLEST SERPL-MCNC: 261 MG/DL
CO2 SERPL-SCNC: 26 MMOL/L (ref 21–32)
CREAT SERPL-MCNC: 0.66 MG/DL (ref 0.6–1.3)
EOSINOPHIL # BLD AUTO: 0.22 THOUSAND/ΜL (ref 0–0.61)
EOSINOPHIL NFR BLD AUTO: 4 % (ref 0–6)
ERYTHROCYTE [DISTWIDTH] IN BLOOD BY AUTOMATED COUNT: 12 % (ref 11.6–15.1)
GFR SERPL CREATININE-BSD FRML MDRD: 94 ML/MIN/1.73SQ M
GLUCOSE P FAST SERPL-MCNC: 115 MG/DL (ref 65–99)
HCT VFR BLD AUTO: 38.2 % (ref 34.8–46.1)
HDLC SERPL-MCNC: 50 MG/DL
HGB BLD-MCNC: 13.4 G/DL (ref 11.5–15.4)
IMM GRANULOCYTES # BLD AUTO: 0.03 THOUSAND/UL (ref 0–0.2)
IMM GRANULOCYTES NFR BLD AUTO: 1 % (ref 0–2)
LDLC SERPL CALC-MCNC: 192 MG/DL (ref 0–100)
LYMPHOCYTES # BLD AUTO: 1.34 THOUSANDS/ΜL (ref 0.6–4.47)
LYMPHOCYTES NFR BLD AUTO: 24 % (ref 14–44)
MCH RBC QN AUTO: 32.6 PG (ref 26.8–34.3)
MCHC RBC AUTO-ENTMCNC: 35.1 G/DL (ref 31.4–37.4)
MCV RBC AUTO: 93 FL (ref 82–98)
MONOCYTES # BLD AUTO: 0.46 THOUSAND/ΜL (ref 0.17–1.22)
MONOCYTES NFR BLD AUTO: 8 % (ref 4–12)
NEUTROPHILS # BLD AUTO: 3.58 THOUSANDS/ΜL (ref 1.85–7.62)
NEUTS SEG NFR BLD AUTO: 62 % (ref 43–75)
NRBC BLD AUTO-RTO: 0 /100 WBCS
PLATELET # BLD AUTO: 293 THOUSANDS/UL (ref 149–390)
PMV BLD AUTO: 9.7 FL (ref 8.9–12.7)
POTASSIUM SERPL-SCNC: 4.4 MMOL/L (ref 3.5–5.3)
PROT SERPL-MCNC: 7.9 G/DL (ref 6.4–8.2)
RBC # BLD AUTO: 4.11 MILLION/UL (ref 3.81–5.12)
SODIUM SERPL-SCNC: 145 MMOL/L (ref 136–145)
T4 FREE SERPL-MCNC: 0.51 NG/DL (ref 0.76–1.46)
TRIGL SERPL-MCNC: 93 MG/DL
TSH SERPL DL<=0.05 MIU/L-ACNC: 58.02 UIU/ML (ref 0.36–3.74)
WBC # BLD AUTO: 5.68 THOUSAND/UL (ref 4.31–10.16)

## 2022-03-21 PROCEDURE — 82306 VITAMIN D 25 HYDROXY: CPT

## 2022-03-21 PROCEDURE — 36415 COLL VENOUS BLD VENIPUNCTURE: CPT

## 2022-03-21 PROCEDURE — 85025 COMPLETE CBC W/AUTO DIFF WBC: CPT

## 2022-03-21 PROCEDURE — 84439 ASSAY OF FREE THYROXINE: CPT

## 2022-03-21 PROCEDURE — 80053 COMPREHEN METABOLIC PANEL: CPT

## 2022-03-21 PROCEDURE — 84443 ASSAY THYROID STIM HORMONE: CPT

## 2022-03-21 PROCEDURE — 80061 LIPID PANEL: CPT

## 2022-03-22 ENCOUNTER — TELEPHONE (OUTPATIENT)
Dept: FAMILY MEDICINE CLINIC | Facility: CLINIC | Age: 62
End: 2022-03-22

## 2022-03-22 DIAGNOSIS — E03.9 ACQUIRED HYPOTHYROIDISM: Primary | ICD-10-CM

## 2022-03-22 RX ORDER — LEVOTHYROXINE SODIUM 0.1 MG/1
100 TABLET ORAL DAILY
Qty: 30 TABLET | Refills: 2 | Status: SHIPPED | OUTPATIENT
Start: 2022-03-22 | End: 2022-05-02 | Stop reason: SDUPTHER

## 2022-03-22 NOTE — TELEPHONE ENCOUNTER
----- Message from Zay Ceja MD sent at 3/21/2022  7:28 PM EDT -----  Patient currently not taking med just restart her insurance   To stop Levothyroxine 200 q and 25 meq from med list  To start Levothyroxine 100 meq po/day  To contact patient

## 2022-03-22 NOTE — TELEPHONE ENCOUNTER
left message for patient per dr Josephine John and took 200 and 25mcg of levothyroxine off med list and sent levothyroxine 100mcg to pharmacy

## 2022-04-04 LAB — COLOGUARD RESULT REPORTABLE: NEGATIVE

## 2022-04-06 ENCOUNTER — OFFICE VISIT (OUTPATIENT)
Dept: FAMILY MEDICINE CLINIC | Facility: CLINIC | Age: 62
End: 2022-04-06
Payer: MEDICARE

## 2022-04-06 VITALS
HEIGHT: 60 IN | OXYGEN SATURATION: 96 % | TEMPERATURE: 99.2 F | BODY MASS INDEX: 31.41 KG/M2 | HEART RATE: 74 BPM | SYSTOLIC BLOOD PRESSURE: 130 MMHG | DIASTOLIC BLOOD PRESSURE: 80 MMHG | WEIGHT: 160 LBS

## 2022-04-06 DIAGNOSIS — E78.2 MIXED HYPERLIPIDEMIA: Primary | ICD-10-CM

## 2022-04-06 DIAGNOSIS — E03.9 ACQUIRED HYPOTHYROIDISM: ICD-10-CM

## 2022-04-06 DIAGNOSIS — E55.9 VITAMIN D DEFICIENCY: ICD-10-CM

## 2022-04-06 DIAGNOSIS — R73.01 IMPAIRED FASTING GLUCOSE: ICD-10-CM

## 2022-04-06 PROCEDURE — 99214 OFFICE O/P EST MOD 30 MIN: CPT | Performed by: FAMILY MEDICINE

## 2022-04-06 RX ORDER — ATORVASTATIN CALCIUM 40 MG/1
40 TABLET, FILM COATED ORAL DAILY
Qty: 90 TABLET | Refills: 0 | Status: SHIPPED | OUTPATIENT
Start: 2022-04-06 | End: 2022-05-02

## 2022-04-06 RX ORDER — CHOLECALCIFEROL (VITAMIN D3) 50 MCG
2000 TABLET ORAL DAILY
Qty: 30 TABLET | Refills: 2 | Status: SHIPPED | OUTPATIENT
Start: 2022-04-06 | End: 2022-07-05

## 2022-04-06 NOTE — PROGRESS NOTES
Subjective:   Chief Complaint   Patient presents with    Follow-up     chronic conditions        Patient ID: Minna Gonsalez is a 59 y o  female  Patient here follow-up with a chronic condition including hypothyroidism impaired fasting glucos and hyperlipidemia the patient has not been taking medication secondary did not have a insurance recently she get new insurance and she reestablish her care the recent blood work review with the patient      The following portions of the patient's history were reviewed and updated as appropriate: allergies, current medications, past family history, past medical history, past social history, past surgical history and problem list     Review of Systems   Constitutional: Negative for activity change, appetite change, fatigue and fever  HENT: Negative for congestion, ear pain, sinus pressure, sinus pain and sore throat  Eyes: Negative for pain, discharge, redness and itching  Respiratory: Negative for cough, chest tightness, shortness of breath and stridor  Cardiovascular: Negative for chest pain, palpitations and leg swelling  Gastrointestinal: Negative for abdominal pain, blood in stool, constipation, diarrhea and nausea  Genitourinary: Negative for dysuria, flank pain, frequency and hematuria  Musculoskeletal: Negative for back pain, joint swelling and neck pain  Skin: Negative for pallor and rash  Neurological: Negative for dizziness, tremors, weakness, numbness and headaches  Hematological: Does not bruise/bleed easily  Objective:  Vitals:    04/06/22 1321   BP: 130/80   Pulse: 74   Temp: 99 2 °F (37 3 °C)   TempSrc: Tympanic   SpO2: 96%   Weight: 72 6 kg (160 lb)   Height: 5' (1 524 m)      Physical Exam  Vitals and nursing note reviewed  Constitutional:       General: She is not in acute distress  Appearance: She is well-developed  She is not diaphoretic  HENT:      Head: Normocephalic        Right Ear: Tympanic membrane, ear canal and external ear normal       Left Ear: Tympanic membrane, ear canal and external ear normal       Nose: Nose normal  No congestion or rhinorrhea  Mouth/Throat:      Mouth: Mucous membranes are moist       Pharynx: Oropharynx is clear  No oropharyngeal exudate or posterior oropharyngeal erythema  Eyes:      General:         Right eye: No discharge  Left eye: No discharge  Conjunctiva/sclera: Conjunctivae normal       Pupils: Pupils are equal, round, and reactive to light  Neck:      Vascular: No JVD  Cardiovascular:      Rate and Rhythm: Normal rate and regular rhythm  Heart sounds: Normal heart sounds  No murmur heard  No gallop  Pulmonary:      Effort: Pulmonary effort is normal  No respiratory distress  Breath sounds: Normal breath sounds  No stridor  No wheezing or rales  Chest:      Chest wall: No tenderness  Abdominal:      General: There is no distension  Palpations: Abdomen is soft  There is no mass  Tenderness: There is no abdominal tenderness  There is no rebound  Musculoskeletal:         General: No tenderness  Cervical back: Normal range of motion and neck supple  Lymphadenopathy:      Cervical: No cervical adenopathy  Skin:     General: Skin is warm  Findings: No erythema or rash  Neurological:      Mental Status: She is alert and oriented to person, place, and time  Motor: No weakness        Gait: Gait normal            Assessment/Plan:    Hyperlipidemia  Chronic uncontrolled patient has history of coronary artery disease recommend the a to start atorvastatin 40 mg once a day proper use and possible side effect discussed the with the patient low fat diet review with the patient    Hypothyroidism  A chronic asymptomatic uncontrolled secondary patient has not been taking her medication patient started on levothyroxine 100 mcg once a day proper use discussed the patient recommend to check TSH in 6 week    Impaired fasting glucose  Chronic asymptomatic uncontrolled encouraged patient to watch for the low carb diet and important lose weight    Vitamin D deficiency  Finding on recent blood work a low vitamin-D recommend to start vitamin-D 2000 International Units once a day vitamin-D rich diet review with the patient       Diagnoses and all orders for this visit:    Mixed hyperlipidemia  -     atorvastatin (LIPITOR) 40 mg tablet; Take 1 tablet (40 mg total) by mouth daily    Vitamin D deficiency  -     Cholecalciferol (Vitamin D) 50 MCG (2000 UT) tablet; Take 1 tablet (2,000 Units total) by mouth daily    Acquired hypothyroidism  -     TSH, 3rd generation with Free T4 reflex;  Future    Impaired fasting glucose

## 2022-04-07 NOTE — ASSESSMENT & PLAN NOTE
Finding on recent blood work a low vitamin-D recommend to start vitamin-D 2000 International Units once a day vitamin-D rich diet review with the patient

## 2022-04-07 NOTE — ASSESSMENT & PLAN NOTE
Chronic asymptomatic uncontrolled encouraged patient to watch for the low carb diet and important lose weight

## 2022-04-07 NOTE — ASSESSMENT & PLAN NOTE
Chronic uncontrolled patient has history of coronary artery disease recommend the a to start atorvastatin 40 mg once a day proper use and possible side effect discussed the with the patient low fat diet review with the patient

## 2022-04-07 NOTE — ASSESSMENT & PLAN NOTE
A chronic asymptomatic uncontrolled secondary patient has not been taking her medication patient started on levothyroxine 100 mcg once a day proper use discussed the patient recommend to check TSH in 6 week

## 2022-04-13 ENCOUNTER — HOSPITAL ENCOUNTER (OUTPATIENT)
Dept: MAMMOGRAPHY | Facility: MEDICAL CENTER | Age: 62
Discharge: HOME | End: 2022-04-13
Payer: MEDICARE

## 2022-04-13 ENCOUNTER — HOSPITAL ENCOUNTER (OUTPATIENT)
Dept: BONE DENSITY | Facility: MEDICAL CENTER | Age: 62
Discharge: HOME | End: 2022-04-13
Payer: MEDICARE

## 2022-04-13 VITALS — WEIGHT: 160 LBS | BODY MASS INDEX: 30.21 KG/M2 | HEIGHT: 61 IN

## 2022-04-13 DIAGNOSIS — Z13.820 SCREENING FOR OSTEOPOROSIS: ICD-10-CM

## 2022-04-13 DIAGNOSIS — Z12.31 ENCOUNTER FOR SCREENING MAMMOGRAM FOR BREAST CANCER: ICD-10-CM

## 2022-04-13 DIAGNOSIS — Z78.0 POST-MENOPAUSAL: ICD-10-CM

## 2022-04-13 PROCEDURE — 77080 DXA BONE DENSITY AXIAL: CPT

## 2022-04-13 PROCEDURE — 77063 BREAST TOMOSYNTHESIS BI: CPT

## 2022-04-13 PROCEDURE — 77067 SCR MAMMO BI INCL CAD: CPT

## 2022-04-19 ENCOUNTER — TELEPHONE (OUTPATIENT)
Dept: FAMILY MEDICINE CLINIC | Facility: CLINIC | Age: 62
End: 2022-04-19

## 2022-04-19 DIAGNOSIS — Z12.31 ENCOUNTER FOR SCREENING MAMMOGRAM FOR BREAST CANCER: Primary | ICD-10-CM

## 2022-04-19 NOTE — TELEPHONE ENCOUNTER
----- Message from Bonny Gonzalez MD sent at 4/15/2022 10:08 AM EDT -----  Normal mammogram repeat in 1 year

## 2022-04-22 NOTE — TELEPHONE ENCOUNTER
Changed [facility] to [facility] [place] [address] per patient request

## 2022-05-02 DIAGNOSIS — E03.9 ACQUIRED HYPOTHYROIDISM: ICD-10-CM

## 2022-05-02 DIAGNOSIS — E78.2 MIXED HYPERLIPIDEMIA: ICD-10-CM

## 2022-05-02 RX ORDER — LEVOTHYROXINE SODIUM 0.1 MG/1
100 TABLET ORAL DAILY
Qty: 30 TABLET | Refills: 2 | Status: SHIPPED | OUTPATIENT
Start: 2022-05-02 | End: 2022-07-25

## 2022-05-02 RX ORDER — ATORVASTATIN CALCIUM 40 MG/1
TABLET, FILM COATED ORAL
Qty: 90 TABLET | Refills: 0 | Status: SHIPPED | OUTPATIENT
Start: 2022-05-02 | End: 2022-07-27

## 2022-05-27 ENCOUNTER — HOSPITAL ENCOUNTER (OUTPATIENT)
Dept: NON INVASIVE DIAGNOSTICS | Facility: HOSPITAL | Age: 62
Discharge: HOME | End: 2022-05-27
Payer: MEDICARE

## 2022-05-27 VITALS
HEART RATE: 72 BPM | WEIGHT: 160 LBS | HEIGHT: 60 IN | DIASTOLIC BLOOD PRESSURE: 80 MMHG | BODY MASS INDEX: 31.41 KG/M2 | SYSTOLIC BLOOD PRESSURE: 130 MMHG

## 2022-05-27 DIAGNOSIS — I25.10 CHRONIC CORONARY ARTERY DISEASE: ICD-10-CM

## 2022-05-27 LAB
AORTIC ROOT: 3.3 CM
AORTIC VALVE MEAN VELOCITY: 7.3 M/S
APICAL FOUR CHAMBER EJECTION FRACTION: 53 %
ASCENDING AORTA: 3.4 CM
AV LVOT MEAN GRADIENT: 2 MMHG
AV LVOT PEAK GRADIENT: 4 MMHG
AV MEAN GRADIENT: 3 MMHG
AV PEAK GRADIENT: 6 MMHG
AV VELOCITY RATIO: 0.77
DOP CALC AO PEAK VEL: 1.23 M/S
DOP CALC AO VTI: 25.17 CM
DOP CALC LVOT PEAK VEL VTI: 19.93 CM
DOP CALC LVOT PEAK VEL: 0.95 M/S
E WAVE DECELERATION TIME: 255 MS
FRACTIONAL SHORTENING: 29 % (ref 28–44)
INTERVENTRICULAR SEPTUM IN DIASTOLE (PARASTERNAL SHORT AXIS VIEW): 1.4 CM
INTERVENTRICULAR SEPTUM: 1.4 CM (ref 0.6–1.1)
LAAS-AP2: 19.4 CM2
LAAS-AP4: 15 CM2
LEFT ATRIUM SIZE: 3.7 CM
LEFT INTERNAL DIMENSION IN SYSTOLE: 2.7 CM (ref 2.1–4)
LEFT VENTRICULAR INTERNAL DIMENSION IN DIASTOLE: 3.8 CM (ref 3.5–6)
LEFT VENTRICULAR POSTERIOR WALL IN END DIASTOLE: 1.2 CM
LEFT VENTRICULAR STROKE VOLUME: 36 ML
LVSV (TEICH): 36 ML
MV E'TISSUE VEL-LAT: 8 CM/S
MV E'TISSUE VEL-SEP: 5 CM/S
MV PEAK A VEL: 0.84 M/S
MV PEAK E VEL: 57 CM/S
MV STENOSIS PRESSURE HALF TIME: 74 MS
MV VALVE AREA P 1/2 METHOD: 2.97 CM2
RIGHT ATRIAL 2D VOLUME: 28 ML
RIGHT ATRIUM AREA SYSTOLE A4C: 12.5 CM2
RIGHT VENTRICLE ID DIMENSION: 3.3 CM
SL CV LEFT ATRIUM LENGTH A2C: 5.5 CM
SL CV LV EF: 52
SL CV PED ECHO LEFT VENTRICLE DIASTOLIC VOLUME (MOD BIPLANE) 2D: 62 ML
SL CV PED ECHO LEFT VENTRICLE SYSTOLIC VOLUME (MOD BIPLANE) 2D: 26 ML
TR MAX PG: 16 MMHG
TR PEAK VELOCITY: 2 M/S
TRICUSPID VALVE PEAK REGURGITATION VELOCITY: 1.97 M/S

## 2022-05-27 PROCEDURE — 93306 TTE W/DOPPLER COMPLETE: CPT

## 2022-06-01 ENCOUNTER — OFFICE VISIT (OUTPATIENT)
Dept: FAMILY MEDICINE CLINIC | Facility: CLINIC | Age: 62
End: 2022-06-01
Payer: MEDICARE

## 2022-06-01 VITALS
SYSTOLIC BLOOD PRESSURE: 130 MMHG | TEMPERATURE: 99.2 F | OXYGEN SATURATION: 98 % | HEIGHT: 61 IN | DIASTOLIC BLOOD PRESSURE: 80 MMHG | BODY MASS INDEX: 29.45 KG/M2 | WEIGHT: 156 LBS | HEART RATE: 68 BPM

## 2022-06-01 DIAGNOSIS — I10 PRIMARY HYPERTENSION: ICD-10-CM

## 2022-06-01 DIAGNOSIS — I25.10 CHRONIC CORONARY ARTERY DISEASE: ICD-10-CM

## 2022-06-01 DIAGNOSIS — E78.2 MIXED HYPERLIPIDEMIA: ICD-10-CM

## 2022-06-01 DIAGNOSIS — I51.7 LVH (LEFT VENTRICULAR HYPERTROPHY): Primary | ICD-10-CM

## 2022-06-01 PROCEDURE — 99214 OFFICE O/P EST MOD 30 MIN: CPT | Performed by: FAMILY MEDICINE

## 2022-06-01 RX ORDER — METOPROLOL SUCCINATE 25 MG/1
25 TABLET, EXTENDED RELEASE ORAL DAILY
Qty: 30 TABLET | Refills: 2 | Status: SHIPPED | OUTPATIENT
Start: 2022-06-01 | End: 2022-06-27

## 2022-06-01 NOTE — ASSESSMENT & PLAN NOTE
A chronic asymptomatic patient already on statin start her today on beta-blocker she is on aspirin her ejection fraction is normal recommend to the patient to follow up with the Cardiology

## 2022-06-01 NOTE — ASSESSMENT & PLAN NOTE
Finding on recent echocardiogram patient with history of the hypertension history of coronary artery disease recommend to start patient on beta-blocker the atenolol 25 milligram once a day proper use and possible side effect discussed the patient

## 2022-06-01 NOTE — PROGRESS NOTES
Subjective:   Chief Complaint   Patient presents with    Follow-up     Chronic conditions        Patient ID: Davina Carlos is a 59 y o  female  Patient's history of for coronary artery disease status post stent the patient been out of medication for while secondary to no insurance recently the restart her medication patient had echocardiogram recently result review with the patient M her medication review with her      The following portions of the patient's history were reviewed and updated as appropriate: allergies, current medications, past family history, past medical history, past social history, past surgical history and problem list     Review of Systems   Constitutional: Negative for activity change, appetite change, fatigue and fever  HENT: Negative for congestion, ear pain, sinus pressure, sinus pain and sore throat  Eyes: Negative for pain, discharge, redness and itching  Respiratory: Negative for cough, chest tightness, shortness of breath and stridor  Cardiovascular: Negative for chest pain, palpitations and leg swelling  Gastrointestinal: Negative for abdominal pain, blood in stool, constipation, diarrhea and nausea  Genitourinary: Negative for dysuria, flank pain, frequency and hematuria  Musculoskeletal: Negative for back pain, joint swelling and neck pain  Skin: Negative for pallor and rash  Neurological: Negative for dizziness, tremors, weakness, numbness and headaches  Hematological: Does not bruise/bleed easily  Objective:  Vitals:    06/01/22 1310   BP: 130/80   Pulse: 68   Temp: 99 2 °F (37 3 °C)   TempSrc: Tympanic   SpO2: 98%   Weight: 70 8 kg (156 lb)   Height: 5' 0 5" (1 537 m)      Physical Exam  Vitals and nursing note reviewed  Constitutional:       General: She is not in acute distress  Appearance: She is well-developed  HENT:      Head: Normocephalic and atraumatic        Mouth/Throat:      Pharynx: No oropharyngeal exudate or posterior oropharyngeal erythema  Eyes:      General:         Right eye: No discharge  Left eye: No discharge  Neck:      Vascular: No JVD  Cardiovascular:      Rate and Rhythm: Normal rate and regular rhythm  Heart sounds: Normal heart sounds  No murmur heard  No friction rub  No gallop  Pulmonary:      Effort: Pulmonary effort is normal       Breath sounds: Normal breath sounds  Abdominal:      General: Bowel sounds are normal       Palpations: Abdomen is soft  Tenderness: There is no abdominal tenderness  Neurological:      Mental Status: She is oriented to person, place, and time  Assessment/Plan:    LVH (left ventricular hypertrophy)  Finding on recent echocardiogram patient with history of the hypertension history of coronary artery disease recommend to start patient on beta-blocker the atenolol 25 milligram once a day proper use and possible side effect discussed the patient    Chronic coronary artery disease  A chronic asymptomatic patient already on statin start her today on beta-blocker she is on aspirin her ejection fraction is normal recommend to the patient to follow up with the Cardiology       Diagnoses and all orders for this visit:    LVH (left ventricular hypertrophy)  -     metoprolol succinate (Toprol XL) 25 mg 24 hr tablet; Take 1 tablet (25 mg total) by mouth daily  -     Ambulatory Referral to Cardiology; Future    Chronic coronary artery disease  -     Ambulatory Referral to Cardiology; Future  -     Comprehensive metabolic panel; Future  -     Lipid Panel with Direct LDL reflex; Future    Mixed hyperlipidemia  -     Comprehensive metabolic panel; Future  -     Lipid Panel with Direct LDL reflex; Future    Primary hypertension  -     Comprehensive metabolic panel; Future  -     Lipid Panel with Direct LDL reflex;  Future

## 2022-07-04 DIAGNOSIS — E55.9 VITAMIN D DEFICIENCY: ICD-10-CM

## 2022-07-05 RX ORDER — CHOLECALCIFEROL (VITAMIN D3) 125 MCG
CAPSULE ORAL
Qty: 30 TABLET | Refills: 2 | Status: SHIPPED | OUTPATIENT
Start: 2022-07-05 | End: 2022-07-29

## 2022-07-08 ENCOUNTER — CONSULT (OUTPATIENT)
Dept: CARDIOLOGY CLINIC | Facility: CLINIC | Age: 62
End: 2022-07-08
Payer: MEDICARE

## 2022-07-08 VITALS
HEART RATE: 62 BPM | WEIGHT: 156.4 LBS | BODY MASS INDEX: 30.7 KG/M2 | SYSTOLIC BLOOD PRESSURE: 146 MMHG | DIASTOLIC BLOOD PRESSURE: 90 MMHG | HEIGHT: 60 IN

## 2022-07-08 DIAGNOSIS — I25.10 ATHEROSCLEROSIS OF NATIVE CORONARY ARTERY OF NATIVE HEART WITHOUT ANGINA PECTORIS: ICD-10-CM

## 2022-07-08 DIAGNOSIS — I25.10 CHRONIC CORONARY ARTERY DISEASE: ICD-10-CM

## 2022-07-08 DIAGNOSIS — Z95.1 POSTSURGICAL AORTOCORONARY BYPASS STATUS: ICD-10-CM

## 2022-07-08 DIAGNOSIS — I25.10 ATHEROSCLEROSIS OF NATIVE CORONARY ARTERY OF NATIVE HEART WITHOUT ANGINA PECTORIS: Primary | ICD-10-CM

## 2022-07-08 DIAGNOSIS — I51.7 LVH (LEFT VENTRICULAR HYPERTROPHY): Primary | ICD-10-CM

## 2022-07-08 PROCEDURE — 99244 OFF/OP CNSLTJ NEW/EST MOD 40: CPT | Performed by: INTERNAL MEDICINE

## 2022-07-08 PROCEDURE — 93000 ELECTROCARDIOGRAM COMPLETE: CPT | Performed by: INTERNAL MEDICINE

## 2022-07-08 RX ORDER — BLOOD PRESSURE TEST KIT
KIT MISCELLANEOUS DAILY
Qty: 1 KIT | Refills: 0 | Status: SHIPPED | OUTPATIENT
Start: 2022-07-08

## 2022-07-08 RX ORDER — BENAZEPRIL HYDROCHLORIDE AND HYDROCHLOROTHIAZIDE 20; 12.5 MG/1; MG/1
1 TABLET ORAL DAILY
Qty: 90 TABLET | Refills: 3 | Status: SHIPPED | OUTPATIENT
Start: 2022-07-08

## 2022-07-08 NOTE — ASSESSMENT & PLAN NOTE
Ms  Victor M Kruger is establishing care with us regarding her cardiac comorbidities which include history of single-vessel bypass surgery in 2001  It is noted that she had been of of medicines for some time due to lack of insurance before reestablishing in the few months back  She previously saw a cardiologist at [facility] in 2015  At that time she was also on ACE-inhibitor diuretic combination pill in addition to metoprolol which was at higher dose  Her blood pressure today is elevated  Her examination benign  She had cardiac tests done in 2015 were overall       --at this time I am adding benazepril HCTZ 20-12 5 mg once daily to her regimen  I am advising to continue other medications including metoprolol succinate at 25 mg daily, atorvastatin 40 mg daily and aspirin 81 mg daily  -- I do want her to get a basic metabolic panel in about 2 weeks after initiating benazepril to monitor renal function  -- she is encouraged to continue normal activities, exercise regularly, maintain healthy weight and adopt healthy eating habits  -- I would like to see her back in 3 months time wants to ensure that blood pressure is well controlled  We will consider doing an echocardiogram a to that visit  -- I advised her to consider investing in a blood pressure monitor and monitor her vital signs at home  Will reinforce this at next visit  HEALTHY LIFESTYLE RECOMMENDATIONS         Lifestyle recommendations  SMOKING CESSATION-- Use pharmacological and behavioural strategies to help patients quit smoking  Avoid passive smoking  HEALTHY DIET-- Diet high in vegetables, fruit, and wholegrains  Limit saturated fat to <10% of total intake  Limit alcohol to <100 g/week or 15 g/day    PHYSICAL ACTIVITY-- 30 - 60 min moderate physical activity most days, but even irregular activity is beneficial   HEALTHY WEIGHT-- Obtain and maintain a healthy weight (<25 kg/m2), or reduce weight through recommended energy intake and increased  physical activity  OTHER-- Take medications as prescribed  Healthy diet characteristics  Choose a dietary pattern that emphasizes intake of vegetables, fruit, whole grains, low-fat dairy products, poultry, fish, legumes, nontropical vegetable oils, and nuts and limits the intake of sugar-sweetened beverages and red meats:     1  Increase consumption of fruits and vegetables (>_200 g each per day)  2  35-45 g of fibre per day, preferably from wholegrains  3  Moderate consumption of nuts (30 g per day, unsalted)  4  Consume fish at least twice a week, one of which should be an oily fish   5  Limited lean meat, low-fat dairy products, and liquid vegetable oils  6  Saturated FAs should account for less than 5-10% of total energy intake; replace with polyunsaturated fats  7  As little intake of trans unsaturated fats as possible, preferably no intake from processed food, and <1% of total energy intake  <_56 g of salt per day  8  If alcohol is consumed, limiting intake to <_100 g/week or <15 g/day is recommended  9  Avoid energy-dense foods such as sugar-sweetened soft drinks  Recommendation for Physical Activity   All adults should avoid inactivity:    Some physical activity is better than none, and any amount of physical activity results in some health benefits  For substantial health benefits:    150 min per week of moderate-intensity aerobic activity, or    75 min per week of vigorous-intensity aerobic physical activity, or    an equivalent combination of moderate- and vigorous-intensity aerobic activity   Activity episodes should be at least 10 min in duration and spread throughout the week  Combine with moderate- or high-intensity muscle-strengthening activities that involve all major muscle groups on 2 or more days per week  Four principles of weight loss  1  Decrease total energy intake (TEI)  2  Maintain a balanced deficit diet  3  Increase physical activity  4  Adjust energy balance to prevent weight regain

## 2022-07-08 NOTE — PATIENT INSTRUCTIONS
CARDIOLOGY ASSESSMENT & PLAN:   Diagnosis ICD-10-CM Associated Orders   1  LVH (left ventricular hypertrophy)  I51 7 Ambulatory Referral to Cardiology     POCT ECG   2  Chronic coronary artery disease  I25 10 Ambulatory Referral to Cardiology     POCT ECG   3  Atherosclerosis of native coronary artery of native heart without angina pectoris  I25 10 benazepril-hydrochlorthiazide (LOTENSIN HCT) 20-12 5 MG per tablet   4  Postsurgical aortocoronary bypass status  Z95 1      Chronic coronary artery disease  Ms Autumn Velasquez is establishing care with us regarding her cardiac comorbidities which include history of single-vessel bypass surgery in 2001  It is noted that she had been of of medicines for some time due to lack of insurance before reestablishing in the few months back  She previously saw a cardiologist at [facility] in 2015  At that time she was also on ACE-inhibitor diuretic combination pill in addition to metoprolol which was at higher dose  Her blood pressure today is elevated  Her examination benign  She had cardiac tests done in 2015 were overall       --at this time I am adding benazepril HCTZ 20-12 5 mg once daily to her regimen  I am advising to continue other medications including metoprolol succinate at 25 mg daily, atorvastatin 40 mg daily and aspirin 81 mg daily  -- I do want her to get a basic metabolic panel in about 2 weeks after initiating benazepril to monitor renal function  -- she is encouraged to continue normal activities, exercise regularly, maintain healthy weight and adopt healthy eating habits  -- I would like to see her back in 3 months time wants to ensure that blood pressure is well controlled  We will consider doing an echocardiogram a to that visit  -- I advised her to consider investing in a blood pressure monitor and monitor her vital signs at home  Will reinforce this at next visit      HEALTHY LIFESTYLE RECOMMENDATIONS         Lifestyle recommendations  SMOKING CESSATION-- Use pharmacological and behavioural strategies to help patients quit smoking  Avoid passive smoking  HEALTHY DIET-- Diet high in vegetables, fruit, and wholegrains  Limit saturated fat to <10% of total intake  Limit alcohol to <100 g/week or 15 g/day  PHYSICAL ACTIVITY-- 30 - 60 min moderate physical activity most days, but even irregular activity is beneficial   HEALTHY WEIGHT-- Obtain and maintain a healthy weight (<25 kg/m2), or reduce weight through recommended energy intake and increased  physical activity  OTHER-- Take medications as prescribed  Healthy diet characteristics  Choose a dietary pattern that emphasizes intake of vegetables, fruit, whole grains, low-fat dairy products, poultry, fish, legumes, nontropical vegetable oils, and nuts and limits the intake of sugar-sweetened beverages and red meats:     1  Increase consumption of fruits and vegetables (>_200 g each per day)  2  35-45 g of fibre per day, preferably from wholegrains  3  Moderate consumption of nuts (30 g per day, unsalted)  4  Consume fish at least twice a week, one of which should be an oily fish   5  Limited lean meat, low-fat dairy products, and liquid vegetable oils  6  Saturated FAs should account for less than 5-10% of total energy intake; replace with polyunsaturated fats  7  As little intake of trans unsaturated fats as possible, preferably no intake from processed food, and <1% of total energy intake  <_56 g of salt per day  8  If alcohol is consumed, limiting intake to <_100 g/week or <15 g/day is recommended  9  Avoid energy-dense foods such as sugar-sweetened soft drinks  Recommendation for Physical Activity   All adults should avoid inactivity:    Some physical activity is better than none, and any amount of physical activity results in some health benefits       For substantial health benefits:    150 min per week of moderate-intensity aerobic activity, or    75 min per week of vigorous-intensity aerobic physical activity, or    an equivalent combination of moderate- and vigorous-intensity aerobic activity  Activity episodes should be at least 10 min in duration and spread throughout the week  Combine with moderate- or high-intensity muscle-strengthening activities that involve all major muscle groups on 2 or more days per week  Four principles of weight loss  1  Decrease total energy intake (TEI)  2  Maintain a balanced deficit diet  3  Increase physical activity  4   Adjust energy balance to prevent weight regain

## 2022-07-08 NOTE — PROGRESS NOTES
[facility]  [address] [facility], [place] [address] [address]  Phone#  [phone]  Fax#  [phone]  *-*-*-*-*-*-*-*-*-*-*-*-*-*-*-*-*-*-*-*-*-*-*-*-*-*-*-*-*-*-*-*-*-*-*-*-*-*-*-*-*-*-*-*-*-*-*-*-*-*-*-*-*-*  ENCOUNTER DATE: 07/08/22 11:33 AM  PATIENT NAME: Philomena Adams   1960    [id number]  Age: 57 y o  Sex: female  AUTHOR: Glen Flower MD  [facility] PHYSICIAN: Vandana Bartlett MD  REFERRING PHYSICIAN: Vandana Bartlett MD  [address] [facility],  [address] [address] Ida Riggs MD   *-*-*-*-*-*-*-*-*-*-*-*-*-*-*-*-*-*-*-*-*-*-*-*-*-*-*-*-*-*-*-*-*-*-*-*-*-*-*-*-*-*-*-*-*-*-*-*-*-*-*-*-*-*-  REASON FOR REFERRAL:  Establishment of care for cardiac comorbidities    *-*-*-*-*-*-*-*-*-*-*-*-*-*-*-*-*-*-*-*-*-*-*-*-*-*-*-*-*-*-*-*-*-*-*-*-*-*-*-*-*-*-*-*-*-*-*-*-*-*-*-*-*-*-  CARDIOLOGY ASSESSMENT & PLAN:   Diagnosis ICD-10-CM Associated Orders   1  LVH (left ventricular hypertrophy)  I51 7 Ambulatory Referral to Cardiology     POCT ECG   2  Chronic coronary artery disease  I25 10 Ambulatory Referral to Cardiology     POCT ECG   3  Atherosclerosis of native coronary artery of native heart without angina pectoris  I25 10 benazepril-hydrochlorthiazide (LOTENSIN HCT) 20-12 5 MG per tablet   4  Postsurgical aortocoronary bypass status  Z95 1      Chronic coronary artery disease  Ms Philomena Adams is establishing care with us regarding her cardiac comorbidities which include history of single-vessel bypass surgery in 2001  It is noted that she had been of of medicines for some time due to lack of insurance before reestablishing in the few months back  She previously saw a cardiologist at [facility] in 2015  At that time she was also on ACE-inhibitor diuretic combination pill in addition to metoprolol which was at higher dose  Her blood pressure today is elevated  Her examination benign    She had cardiac tests done in 2015 were overall       --at this time I am adding benazepril HCTZ 20-12 5 mg once daily to her regimen  I am advising to continue other medications including metoprolol succinate at 25 mg daily, atorvastatin 40 mg daily and aspirin 81 mg daily  -- I do want her to get a basic metabolic panel in about 2 weeks after initiating benazepril to monitor renal function  -- she is encouraged to continue normal activities, exercise regularly, maintain healthy weight and adopt healthy eating habits  -- I would like to see her back in 3 months time wants to ensure that blood pressure is well controlled  We will consider doing an echocardiogram a to that visit  -- I advised her to consider investing in a blood pressure monitor and monitor her vital signs at home  Will reinforce this at next visit  HEALTHY LIFESTYLE RECOMMENDATIONS         Lifestyle recommendations  SMOKING CESSATION-- Use pharmacological and behavioural strategies to help patients quit smoking  Avoid passive smoking  HEALTHY DIET-- Diet high in vegetables, fruit, and wholegrains  Limit saturated fat to <10% of total intake  Limit alcohol to <100 g/week or 15 g/day  PHYSICAL ACTIVITY-- 30 - 60 min moderate physical activity most days, but even irregular activity is beneficial   HEALTHY WEIGHT-- Obtain and maintain a healthy weight (<25 kg/m2), or reduce weight through recommended energy intake and increased  physical activity  OTHER-- Take medications as prescribed  Healthy diet characteristics  Choose a dietary pattern that emphasizes intake of vegetables, fruit, whole grains, low-fat dairy products, poultry, fish, legumes, nontropical vegetable oils, and nuts and limits the intake of sugar-sweetened beverages and red meats:     1  Increase consumption of fruits and vegetables (>_200 g each per day)  2  35-45 g of fibre per day, preferably from wholegrains  3  Moderate consumption of nuts (30 g per day, unsalted)    4  Consume fish at least twice a week, one of which should be an oily fish 5  Limited lean meat, low-fat dairy products, and liquid vegetable oils  6  Saturated FAs should account for less than 5-10% of total energy intake; replace with polyunsaturated fats  7  As little intake of trans unsaturated fats as possible, preferably no intake from processed food, and <1% of total energy intake  <_56 g of salt per day  8  If alcohol is consumed, limiting intake to <_100 g/week or <15 g/day is recommended  9  Avoid energy-dense foods such as sugar-sweetened soft drinks  Recommendation for Physical Activity   All adults should avoid inactivity:    Some physical activity is better than none, and any amount of physical activity results in some health benefits  For substantial health benefits:    150 min per week of moderate-intensity aerobic activity, or    75 min per week of vigorous-intensity aerobic physical activity, or    an equivalent combination of moderate- and vigorous-intensity aerobic activity   Activity episodes should be at least 10 min in duration and spread throughout the week  Combine with moderate- or high-intensity muscle-strengthening activities that involve all major muscle groups on 2 or more days per week  Four principles of weight loss  1  Decrease total energy intake (TEI)  2  Maintain a balanced deficit diet  3  Increase physical activity  4  Adjust energy balance to prevent weight regain      *-*-*-*-*-*-*-*-*-*-*-*-*-*-*-*-*-*-*-*-*-*-*-*-*-*-*-*-*-*-*-*-*-*-*-*-*-*-*-*-*-*-*-*-*-*-*-*-*-*-*-*-*-*-  CURRENT ECG:  Results for orders placed or performed in visit on 07/08/22   POCT ECG    Narrative    Sinus rhythm with nonspecific ST-T wave abnormalities  No evidence of prior infarction, or chamber enlargement or hypertrophy  HR 62 beats per minute  Normal axis and intervals       *-*-*-*-*-*-*-*-*-*-*-*-*-*-*-*-*-*-*-*-*-*-*-*-*-*-*-*-*-*-*-*-*-*-*-*-*-*-*-*-*-*-*-*-*-*-*-*-*-*-*-*-*-*-  HISTORY OF PRESENT ILLNESS:  Patient is a pleasant 25-year-old female of [place] background  Her medical history is significant for:  1  Coronary artery disease with history of CABG in 2001 of unspecified vessel  2  Primary hypertension  3  Dyslipidemia  4  Hypothyroidism  5  Anemia  6  Hypertensive heart disease  7  Adjustment disorder with anxiety  8  Mild obesity    Patient previously [facility] with cardi[facility]reen [facility] and was last seen by him in December 2015  Over the years she had lost her medical insurance so she was not following with specialist care  Now she has recently regained insurance and she is establishing care with [facility]  Now from a symptom perspective she reports that she has been overall well over the years  She has had no chest pains or shortness of breath or dizziness or lightheadedness or palpitations or passing out or near passing out experience  Though she does not exercise regularly she does walk regularly  She has not experienced any decline in her exercise tolerance  She has had no recent hospitalizations or other illnesses  She used to smoke in her younger years but quit over 25 years back  Day she used to drink significantly in the past but quit along with smoking  Family history significant for coronary artery disease in her dad in his 46s and passed away at age 61 years  Mother had history of hypertension hypertensive heart disease and she passed away at age 71  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  Her cardiac medications include metoprolol succinate 25 mg daily, atorvastatin 40 mg daily, aspirin 81 mg daily  Her last blood work is from March 2022 and indicates normal renal function and electrolytes  Her lipids were abnormal with total cholesterol 261, triglyceride 93, HDL 50, calculated   Her vitamin-D level was low at 27 0  Her TSH was elevated to 3 in March 2022, free T4 was 0 51    Last hemoglobin A1c was several years back and it was normal     Her last cardiac evaluation was nuclear stress test in 2015 at [place] both which demonstrated normal myocardial perfusion scan and normal left ventricular function  Ejection fraction was normal   She had an echocardiogram in December 2015 that also demonstrated normal left ventricular size and function, normal right ventricular size and systolic function, LVEF 91%, normal left and right atrial size, normal aortic mitral and tricuspid valve with no significant stenosis or regurgitation, no pulmonary hypertension, no pericardial effusion and normal by diastolic function      *-*-*-*-*-*-*-*-*-*-*-*-*-*-*-*-*-*-*-*-*-*-*-*-*-*-*-*-*-*-*-*-*-*-*-*-*-*-*-*-*-*-*-*-*-*-*-*-*-*-*-*-*-*  PAST MEDICAL HISTORY:  Past Medical History:   Diagnosis Date    Acute MI ([facility] ) 8/12/2012    Anemia     CAD (coronary artery disease)     Hyperlipidemia     Hypertension     Myocardial infarction ([facility] ) 03/26/2002    Thyroid disease     Ventricular fibrillation ([facility] ) 8/12/2012    PAST SURGICAL HISTORY:   Past Surgical History:   Procedure Laterality Date    COLONOSCOPY 2013    CORONARY ARTERY BYPASS GRAFT  2003         FAMILY HISTORY:  Family History   Problem Relation Age of Onset    Hypertension Mother     Stroke Mother         3 strokes    Heart attack Father     No Known Problems Sister     No Known Problems Daughter     No Known Problems Maternal Grandmother     No Known Problems Maternal Grandfather     No Known Problems Paternal Grandmother     No Known Problems Paternal Grandfather     No Known Problems Maternal Aunt     No Known Problems Maternal Aunt     No Known Problems Maternal Aunt     No Known Problems Paternal Aunt     No Known Problems Paternal Aunt     SOCIAL HISTORY:  Social History     Tobacco Use   Smoking Status Former Smoker    Packs/day: 0 25    Years: 22 00    Pack years: 5 50    Types: Cigarettes    Start date: 4/6/1971   [facility] Quit date: 1/1/1993    Years since quitting: 29 5   Smokeless Tobacco Never Used   Tobacco Comment    no passive smoke exposure      Social History     Substance and Sexual Activity   Alcohol Use No     Social History     Substance and Sexual Activity   Drug Use No    [id number]     *-*-*-*-*-*-*-*-*-*-*-*-*-*-*-*-*-*-*-*-*-*-*-*-*-*-*-*-*-*-*-*-*-*-*-*-*-*-*-*-*-*-*-*-*-*-*-*-*-*-*-*-*-*  ALLERGIES:  Allergies   Allergen Reactions    Cat Hair Extract Sneezing    CURRENT SCHEDULED MEDICATIONS:    Current Outpatient Medications:     aspirin 81 mg chewable tablet, Chew 81 mg daily, Disp: , Rfl:     atorvastatin (LIPITOR) 40 mg tablet, TAKE 1 TABLET BY MOUTH EVERY DAY, Disp: 90 tablet, Rfl: 0    benazepril-hydrochlorthiazide (LOTENSIN HCT) 20-12 5 MG per tablet, Take 1 tablet by mouth daily, Disp: 90 tablet, Rfl: 3    Cholecalciferol (Vitamin D3) 50 MCG (2000 UT) TABS, TAKE 1 TABLET BY MOUTH EVERY DAY, Disp: 30 tablet, Rfl: 2    levothyroxine 100 mcg tablet, Take 1 tablet (100 mcg total) by mouth daily, Disp: 30 tablet, Rfl: 2    metoprolol succinate (TOPROL-XL) 25 mg 24 hr tablet, TAKE 1 TABLET (25 MG TOTAL) BY MOUTH DAILY  , Disp: 90 tablet, Rfl: 0    multivitamin-iron-minerals-folic acid (CENTRUM) chewable tablet, Chew 1 tablet daily, Disp: , Rfl:     Ascorbic Acid (vitamin C) 1000 MG tablet, Take 1,000 mg by mouth daily (Patient not taking: Reported on 7/8/2022), Disp: , Rfl:      *-*-*-*-*-*-*-*-*-*-*-*-*-*-*-*-*-*-*-*-*-*-*-*-*-*-*-*-*-*-*-*-*-*-*-*-*-*-*-*-*-*-*-*-*-*-*-*-*-*-*-*-*-*  REVIEW OF SYMPTOMS:    Positive for:  As noted above in HPI  Negative for: All remaining as reviewed below and in HPI   SYSTEM SYMPTOMS REVIEWED:  General--weight change, fever, night sweats  Respiratoryl-- Wheezing, shortness of breath, cough, URI symptoms, sputum, blood  Cardiovascular--chest pain, syncope, dyspnea on exertion, edema, decline in exercise tolerance, claudication   Gastrointestinal--persistent vomiting, diarrhea, abdominal distention, blood in stool Muscular or skeletal--joint pain or swelling   Neurologic--headaches, syncope, abnormal movement  Hematologic--history of easy bruising and bleeding   Endocrine--thyroid enlargement, heat or cold intolerance, polyuria   Psychiatric--anxiety, depression      *-*-*-*-*-*-*-*-*-*-*-*-*-*-*-*-*-*-*-*-*-*-*-*-*-*-*-*-*-*-*-*-*-*-*-*-*-*-*-*-*-*-*-*-*-*-*-*-*-*-*-*-*-*  CURRENT OUTPATIENT MEDICATIONS:     Current Outpatient Medications:     aspirin 81 mg chewable tablet, Chew 81 mg daily, Disp: , Rfl:     atorvastatin (LIPITOR) 40 mg tablet, TAKE 1 TABLET BY MOUTH EVERY DAY, Disp: 90 tablet, Rfl: 0    benazepril-hydrochlorthiazide (LOTENSIN HCT) 20-12 5 MG per tablet, Take 1 tablet by mouth daily, Disp: 90 tablet, Rfl: 3    Cholecalciferol (Vitamin D3) 50 MCG (2000 UT) TABS, TAKE 1 TABLET BY MOUTH EVERY DAY, Disp: 30 tablet, Rfl: 2    levothyroxine 100 mcg tablet, Take 1 tablet (100 mcg total) by mouth daily, Disp: 30 tablet, Rfl: 2    metoprolol succinate (TOPROL-XL) 25 mg 24 hr tablet, TAKE 1 TABLET (25 MG TOTAL) BY MOUTH DAILY  , Disp: 90 tablet, Rfl: 0    multivitamin-iron-minerals-folic acid (CENTRUM) chewable tablet, Chew 1 tablet daily, Disp: , Rfl:     Ascorbic Acid (vitamin C) 1000 MG tablet, Take 1,000 mg by mouth daily (Patient not taking: Reported on 7/8/2022), Disp: , Rfl:     *-*-*-*-*-*-*-*-*-*-*-*-*-*-*-*-*-*-*-*-*-*-*-*-*-*-*-*-*-*-*-*-*-*-*-*-*-*-*-*-*-*-*-*-*-*-*-*-*-*-*-*-*-*  VITAL SIGNS:  Vitals:    07/08/22 1101   BP: 146/90   Pulse: 62   Weight: 70 9 kg (156 lb 6 4 oz)   Height: 5' (1 524 m)       BMI: Body mass index is 30 54 kg/m²      WEIGHTS:   Wt Readings from Last 25 Encounters:   07/08/22 70 9 kg (156 lb 6 4 oz)   06/01/22 70 8 kg (156 lb)   05/27/22 72 6 kg (160 lb)   04/13/22 72 6 kg (160 lb)   04/06/22 72 6 kg (160 lb)   03/18/22 72 kg (158 lb 12 8 oz)   05/20/19 67 6 kg (149 lb)   02/12/19 72 1 kg (159 lb)   09/11/18 70 8 kg (156 lb) *-*-*-*-*-*-*-*-*-*-*-*-*-*-*-*-*-*-*-*-*-*-*-*-*-*-*-*-*-*-*-*-*-*-*-*-*-*-*-*-*-*-*-*-*-*-*-*-*-*-*-*-*-*-  PHYSICAL EXAM:  General Appearance:    Alert, cooperative, no distress, appears stated age, slightly overweight   Head, Eyes, ENT:    No obvious abnormality, moist mucous mebranes  Neck:   Supple, no carotid bruit or JVD   Back:     Symmetric, no curvature  Lungs:     Respirations unlabored  Clear to auscultation bilaterally,    Chest wall:    No tenderness or deformity   Heart:    Regular rate and rhythm, S1 and S2 normal, no murmur, rub  or gallop  Abdomen:     Soft, non-tender, No obvious masses, or organomegaly   Extremities:   Extremities warm, no cyanosis or edema    Skin:   no venostatic changes in lower extremities  Normal skin color, texture, and turgor  No rashes or lesions     *-*-*-*-*-*-*-*-*-*-*-*-*-*-*-*-*-*-*-*-*-*-*-*-*-*-*-*-*-*-*-*-*-*-*-*-*-*-*-*-*-*-*-*-*-*-*-*-*-*-*-*-*-*-  LABORATORY DATA: I have personally reviewed the available laboratory data          Potassium   Date Value Ref Range Status   03/21/2022 4 4 3 5 - 5 3 mmol/L Final   02/05/2019 3 8 3 5 - 5 3 mmol/L Final   06/01/2016 4 3 3 5 - 5 3 mmol/L Final   12/21/2015 3 7 3 5 - 5 3 mmol/L Final   03/16/2015 3 9 3 6 - 5 0 mmol/L Final   06/04/2014 3 9 3 6 - 5 0 mmol/L Final     Chloride   Date Value Ref Range Status   03/21/2022 108 100 - 108 mmol/L Final   02/05/2019 103 100 - 108 mmol/L Final   06/01/2016 105 100 - 108 mmol/L Final   12/21/2015 104 98 - 108 mmol/L Final   03/16/2015 103 101 - 111 mmol/L Final   06/04/2014 103 101 - 111 mmol/L Final     CO2   Date Value Ref Range Status   03/21/2022 26 21 - 32 mmol/L Final   02/05/2019 27 21 - 32 mmol/L Final   06/01/2016 27 21 - 32 mmol/L Final   12/21/2015 28 2 21 0 - 32 0 mmol/L Final   03/16/2015 27 21 - 31 mmol/L Final   06/04/2014 28 21 - 31 mmol/L Final     Anion Gap   Date Value Ref Range Status   12/21/2015 8 4 - 13 mmol/L Final   03/16/2015 9 4 - 13 mmol/L Final   06/04/2014 9 4 - 13 mmol/L Final     BUN   Date Value Ref Range Status   03/21/2022 14 5 - 25 mg/dL Final   02/05/2019 16 5 - 25 mg/dL Final   06/01/2016 9 5 - 25 mg/dL Final   12/21/2015 11 5 - 25 mg/dL Final   03/16/2015 12 5 - 27 mg/dL Final   06/04/2014 11 5 - 27 mg/dL Final     Creatinine   Date Value Ref Range Status   03/21/2022 0 66 0 60 - 1 30 mg/dL Final     Comment:     Standardized to IDMS reference method   02/05/2019 0 78 0 60 - 1 30 mg/dL Final     Comment:     Standardized to IDMS reference method   06/01/2016 0 63 0 60 - 1 30 mg/dL Final     Comment:     Standardized to IDMS reference method   12/21/2015 0 65 0 60 - 1 30 mg/dL Final     Comment:     Standardized to IDMS reference method   03/16/2015 0 69 0 60 - 1 30 mg/dL Final     Comment:     Standardized to IDMS reference method   06/04/2014 0 54 (L) 0 60 - 1 30 mg/dL Final     Comment:     Standardized to IDMS reference method     eGFR   Date Value Ref Range Status   03/21/2022 94 ml/min/1 73sq m Final   02/05/2019 83 ml/min/1 73sq m Final   06/01/2016 >60 0 ml/min/1 73sq m Final     Glucose   Date Value Ref Range Status   12/21/2015 119 65 - 140 mg/dL Final     Comment:     If patient is fasting, the ADA then defines impaired fasting glucose as  >100 mg/dl and diabetes as  >or equal to 126 mg/dl  03/16/2015 97 65 - 140 mg/dL Final     Comment:     If patient is fasting, the ADA then defines impaired fasting glucose as  >100 mg/dl and diabetes as  >or equal to 126 mg/dl  06/04/2014 111 65 - 140 mg/dL Final     Comment:     If patient is fasting, the ADA then defines impaired fasting glucose as  >100 mg/dl and diabetes as  >or equal to 126 mg/dl         Calcium   Date Value Ref Range Status   03/21/2022 9 3 8 3 - 10 1 mg/dL Final   02/05/2019 9 2 8 3 - 10 1 mg/dL Final   06/01/2016 9 2 8 3 - 10 1 mg/dL Final   12/21/2015 8 7 8 3 - 10 1 mg/dL Final   03/16/2015 9 3 8 3 - 10 1 mg/dL Final   06/04/2014 9 6 8 3 - 10 1 mg/dL Final AST   Date Value Ref Range Status   03/21/2022 20 5 - 45 U/L Final     Comment:     Specimen collection should occur prior to Sulfasalazine administration due to the potential for falsely depressed results  02/05/2019 16 5 - 45 U/L Final     Comment:       Specimen collection should occur prior to Sulfasalazine administration due to the potential for falsely depressed results  07/05/2016 19 5 - 45 U/L Final   12/21/2015 16 5 - 45 U/L Final   03/16/2015 20 10 - 42 U/L Final   06/04/2014 17 10 - 42 U/L Final     ALT   Date Value Ref Range Status   03/21/2022 35 12 - 78 U/L Final     Comment:     Specimen collection should occur prior to Sulfasalazine administration due to the potential for falsely depressed results  02/05/2019 29 12 - 78 U/L Final     Comment:       Specimen collection should occur prior to Sulfasalazine administration due to the potential for falsely depressed results      07/05/2016 35 12 - 78 U/L Final   12/21/2015 32 12 - 78 U/L Final   03/16/2015 34 6 - 78 U/L Final   06/04/2014 32 6 - 78 U/L Final     Alkaline Phosphatase   Date Value Ref Range Status   03/21/2022 71 46 - 116 U/L Final   02/05/2019 72 46 - 116 U/L Final   07/05/2016 88 46 - 116 U/L Final   12/21/2015 86 46 - 116 U/L Final   03/16/2015 90 46 - 116 U/L Final     Comment:     New Reference Range   06/04/2014 104 42 - 121 U/L Final     Total Protein   Date Value Ref Range Status   12/21/2015 7 7 6 4 - 8 2 g/dL Final   03/16/2015 8 2 6 4 - 8 2 g/dL Final   06/04/2014 7 9 6 4 - 8 2 g/dL Final     Total Bilirubin   Date Value Ref Range Status   12/21/2015 0 58 0 20 - 1 00 mg/dL Final   03/16/2015 0 7 0 2 - 1 0 mg/dL Final   06/04/2014 0 5 0 2 - 1 0 mg/dL Final     WBC   Date Value Ref Range Status   03/21/2022 5 68 4 31 - 10 16 Thousand/uL Final   02/05/2019 6 15 4 31 - 10 16 Thousand/uL Final   06/01/2016 5 82 4 31 - 10 16 Thousand/uL Final   12/21/2015 5 57 4 31 - 10 16 Thousand/uL Final   06/04/2014 4 83 4 31 - 10 16 Thousand/uL Final     Hemoglobin   Date Value Ref Range Status   03/21/2022 13 4 11 5 - 15 4 g/dL Final   02/05/2019 13 3 11 5 - 15 4 g/dL Final   06/01/2016 12 9 11 5 - 15 4 g/dL Final   12/21/2015 13 2 11 5 - 15 4 g/dL Final   06/04/2014 13 5 11 5 - 15 4 g/dL Final     Platelets   Date Value Ref Range Status   03/21/2022 293 149 - 390 Thousands/uL Final   02/05/2019 322 149 - 390 Thousands/uL Final   06/01/2016 314 149 - 390 Thousands/uL Final   12/21/2015 306 149 - 390 Thousand/uL Final     Comment:     The above 10 analytes were performed by J[facility]  [address] [place] [id number]     06/04/2014 285 149 - 390 Thousand/uL Final     Comment:     The above 10 analytes were performed by T[facility]  [address] [place] [id number]       No results found for: PT, PTT, INR  No results found for: CKMB, DIGOXIN  No results found for: TSH  Cholesterol   Date Value Ref Range Status   12/21/2015 154 mg/dL Final     Comment:     CHOLESTEROL:       Desirable        <200 mg/dl       Borderline High  200-239 mg/dl       High             >239 mg/dl  ____________________________________       HDL   Date Value Ref Range Status   12/21/2015 54 mg/dL Final     Comment:     HDL:       High       >59 mg/dl       Low        <41 mg/dl  ______________________________       HDL, Direct   Date Value Ref Range Status   03/21/2022 50 >=50 mg/dL Final     Comment:     Specimen collection should occur prior to Metamizole administration due to the potential for falsley depressed results       Triglycerides   Date Value Ref Range Status   03/21/2022 93 See Comment mg/dL Final     Comment:     Triglyceride:     0-9Y            <75mg/dL     10Y-17Y         <90 mg/dL       >=18Y     Normal          <150 mg/dL     Borderline High 150-199 mg/dL     High            200-499 mg/dL        Very High       >499 mg/dL    Specimen collection should occur prior to N-Acetylcysteine or Metamizole administration due to the potential for falsely depressed results  2015 101 mg/dL Final     Comment:     TRIGLYCERIDE:       Normal              <150 mg/dl       Borderline High    150-199 mg/dl       High               200-499 mg/dl       Very High          >499 mg/dl  _______________________________________        Hemoglobin A1C   Date Value Ref Range Status   2018 5 6 <5 7 % Final     Comment:     Reference Range  Non-diabetic                     <5 7  Pre-diabetic                     5 7-6 4  Diabetic                         >=6 5  ADA target for diabetic control  <=7     No results found for: Kacie Rm, GRAMSTAIN, URINECX, WOUNDCULT, BODYFLUIDCUL, MRSACULTURE, INFLUAPCR, INFLUBPCR, RSVPCR, LEGIONELLAUR, CDIFFTOXINB    *-*-*-*-*-*-*-*-*-*-*-*-*-*-*-*-*-*-*-*-*-*-*-*-*-*-*-*-*-*-*-*-*-*-*-*-*-*-*-*-*-*-*-*-*-*-*-*-*-*-*-*-*-*-  RADIOLOGY RESULTS:  No results found  *-*-*-*-*-*-*-*-*-*-*-*-*-*-*-*-*-*-*-*-*-*-*-*-*-*-*-*-*-*-*-*-*-*-*-*-*-*-*-*-*-*-*-*-*-*-*-*-*-*-*-*-*-*-  LAST ECHOCARDIOGRAM AND OTHER CARDIOLOGY RESULTS:  Results for orders placed during the hospital encounter of 18    Echo complete with contrast if indicated    Narrative  Pernell Burch [address]  [place], [address]  [phone]    Transthoracic Echocardiogram  2D, M-mode, Doppler, and Color Doppler    Study date:  04-Sep-2018    Patient: Sesar Cough  MR number: [id number]  Account number: [de-identified]  : 1960  Age: 59 years  Gender: Female  Status: Outpatient  Location: [facility]  Height: 60 in  Weight: 150 lb  BP: 120/ 68 mmHg    Indications: Known coronary artery disease      Diagnoses: I25 83 - Coronary atherosclerosis due to lipid rich plaque    Sonographer:  FRANCESCA Montaño  Primary Physician:  Katya Juarez  Referring Physician:  Katya Juarez  Group:  [facility]  Interpreting Physician:  Joaquim Pettit DO    SUMMARY    LEFT VENTRICLE:  Systolic function was normal  Ejection fraction was estimated to be 55 %  There were no regional wall motion abnormalities  Wall thickness was mildly increased  Doppler parameters were consistent with abnormal left ventricular relaxation (grade 1 diastolic dysfunction)  MITRAL VALVE:  There was mild annular calcification  HISTORY: PRIOR HISTORY: hypothyroid  Risk factors: hypertension and medication-treated hypercholesterolemia  PRIOR PROCEDURES: Stent  Coronary bypass  PROCEDURE: The study was performed in the [address] [place] [address]  This was a routine study  The transthoracic approach was used  The study included complete 2D imaging, M-mode, complete spectral Doppler, and color Doppler  The  heart rate was 53 bpm, at the start of the study  Image quality was adequate  LEFT VENTRICLE: Size was normal  Systolic function was normal  Ejection fraction was estimated to be 55 %  There were no regional wall motion abnormalities  Wall thickness was mildly increased  DOPPLER: Doppler parameters were consistent  with abnormal left ventricular relaxation (grade 1 diastolic dysfunction)  RIGHT VENTRICLE: The size was normal  Systolic function was normal  Wall thickness was normal     LEFT ATRIUM: Size was normal     RIGHT ATRIUM: Size was normal     MITRAL VALVE: There was mild annular calcification  DOPPLER: There was no evidence for stenosis  There was no regurgitation  AORTIC VALVE: The valve was trileaflet  Leaflets exhibited normal thickness and normal cuspal separation  DOPPLER: Transaortic velocity was within the normal range  There was no evidence for stenosis  There was no regurgitation  TRICUSPID VALVE: The valve structure was normal  There was normal leaflet separation  DOPPLER: The transtricuspid velocity was within the normal range  There was no evidence for stenosis  There was no regurgitation  PULMONIC VALVE: Leaflets exhibited normal thickness, no calcification, and normal cuspal separation  DOPPLER: The transpulmonic velocity was within the normal range  There was no regurgitation  PERICARDIUM: There was no pericardial effusion  The pericardium was normal in appearance  AORTA: The root exhibited normal size  SYSTEMIC VEINS: IVC: The inferior vena cava was normal in size and course  Respirophasic changes were normal     PULMONARY ARTERY: DOPPLER: The tricuspid jet envelope definition was inadequate for estimation of RV systolic pressure  SYSTEM MEASUREMENT TABLES    2D  %FS: 29 8 %  Ao Diam: 3 4 cm  EDV(Teich): 73 7 ml  EF(Teich): 57 4 %  ESV(Teich): 31 4 ml  IVSd: 1 2 cm  LA Area: 12 9 cm2  LA Diam: 3 5 cm  LVEDV MOD A4C: 55 ml  LVEF MOD A4C: 63 6 %  LVESV MOD A4C: 20 ml  LVIDd: 4 1 cm  LVIDs: 2 9 cm  LVLd A4C: 7 2 cm  LVLs A4C: 6 4 cm  LVPWd: 1 2 cm  RA Area: 12 9 cm2  RVIDd: 2 5 cm  SV MOD A4C: 34 9 ml  SV(Teich): 42 3 ml    MM  TAPSE: 1 7 cm    PW  E': 0 1 m/s  E/E': 10 7  MV A Rogerio: 0 7 m/s  MV Dec Briscoe: 2 9 m/s2  MV DecT: 234 2 ms  MV E Rogerio: 0 7 m/s  MV E/A Ratio: 0 9  MV PHT: 67 9 ms  MVA By PHT: 3 2 cm2    [facility] Accredited Echocardiography Laboratory    Prepared and electronically signed by    Shirley Sams DO  Signed 04-Sep-2018 17:02:35    No results found for this or any previous visit  No results found for this or any previous visit  No results found for this or any previous visit  *-*-*-*-*-*-*-*-*-*-*-*-*-*-*-*-*-*-*-*-*-*-*-*-*-*-*-*-*-*-*-*-*-*-*-*-*-*-*-*-*-*-*-*-*-*-*-*-*-*-*-*-*-*-  RADIOLOGY RESULTS:  No results found  *-*-*-*-*-*-*-*-*-*-*-*-*-*-*-*-*-*-*-*-*-*-*-*-*-*-*-*-*-*-*-*-*-*-*-*-*-*-*-*-*-*-*-*-*-*-*-*-*-*-*-*-*-*-  ECHOCARDIOGRAM AND OTHER CARDIOLOGY RESULTS:  Results for orders placed during the hospital encounter of 09/04/18    Echo complete with contrast if indicated    Narrative  [address] [place] [address]    [place], [address]  [phone]    Transthoracic Echocardiogram  2D, M-mode, Doppler, and Color Doppler    Study date:  04-Sep-2018    Patient: Marty Perez  MR number: [id number]  Account number: [de-identified]  : 1960  Age: 59 years  Gender: Female  Status: Outpatient  Location: [address] [place] [address]  Height: 60 in  Weight: 150 lb  BP: 120/ 68 mmHg    Indications: Known coronary artery disease  Diagnoses: I25 83 - Coronary atherosclerosis due to lipid rich plaque    Sonographer:  FRANCESCA Harden  Primary Physician:  Coleman Avina  Referring Physician:  Coleman Avina  Group:  [facility]  Interpreting Physician:  Lety Hendrix DO    SUMMARY    LEFT VENTRICLE:  Systolic function was normal  Ejection fraction was estimated to be 55 %  There were no regional wall motion abnormalities  Wall thickness was mildly increased  Doppler parameters were consistent with abnormal left ventricular relaxation (grade 1 diastolic dysfunction)  MITRAL VALVE:  There was mild annular calcification  HISTORY: PRIOR HISTORY: hypothyroid  Risk factors: hypertension and medication-treated hypercholesterolemia  PRIOR PROCEDURES: Stent  Coronary bypass  PROCEDURE: The study was performed in the [address] [place] [address]  This was a routine study  The transthoracic approach was used  The study included complete 2D imaging, M-mode, complete spectral Doppler, and color Doppler  The  heart rate was 53 bpm, at the start of the study  Image quality was adequate  LEFT VENTRICLE: Size was normal  Systolic function was normal  Ejection fraction was estimated to be 55 %  There were no regional wall motion abnormalities  Wall thickness was mildly increased  DOPPLER: Doppler parameters were consistent  with abnormal left ventricular relaxation (grade 1 diastolic dysfunction)      RIGHT VENTRICLE: The size was normal  Systolic function was normal  Wall thickness was normal     LEFT ATRIUM: Size was normal     RIGHT ATRIUM: Size was normal     MITRAL VALVE: There was mild annular calcification  DOPPLER: There was no evidence for stenosis  There was no regurgitation  AORTIC VALVE: The valve was trileaflet  Leaflets exhibited normal thickness and normal cuspal separation  DOPPLER: Transaortic velocity was within the normal range  There was no evidence for stenosis  There was no regurgitation  TRICUSPID VALVE: The valve structure was normal  There was normal leaflet separation  DOPPLER: The transtricuspid velocity was within the normal range  There was no evidence for stenosis  There was no regurgitation  PULMONIC VALVE: Leaflets exhibited normal thickness, no calcification, and normal cuspal separation  DOPPLER: The transpulmonic velocity was within the normal range  There was no regurgitation  PERICARDIUM: There was no pericardial effusion  The pericardium was normal in appearance  AORTA: The root exhibited normal size  SYSTEMIC VEINS: IVC: The inferior vena cava was normal in size and course  Respirophasic changes were normal     PULMONARY ARTERY: DOPPLER: The tricuspid jet envelope definition was inadequate for estimation of RV systolic pressure  SYSTEM MEASUREMENT TABLES    2D  %FS: 29 8 %  Ao Diam: 3 4 cm  EDV(Teich): 73 7 ml  EF(Teich): 57 4 %  ESV(Teich): 31 4 ml  IVSd: 1 2 cm  LA Area: 12 9 cm2  LA Diam: 3 5 cm  LVEDV MOD A4C: 55 ml  LVEF MOD A4C: 63 6 %  LVESV MOD A4C: 20 ml  LVIDd: 4 1 cm  LVIDs: 2 9 cm  LVLd A4C: 7 2 cm  LVLs A4C: 6 4 cm  LVPWd: 1 2 cm  RA Area: 12 9 cm2  RVIDd: 2 5 cm  SV MOD A4C: 34 9 ml  SV(Teich): 42 3 ml    MM  TAPSE: 1 7 cm    PW  E': 0 1 m/s  E/E': 10 7  MV A Rogerio: 0 7 m/s  MV Dec [place]ler: 2 9 m/s2  MV DecT: 234 2 ms  MV E Rogerio: 0 7 m/s  MV E/A Ratio: 0 9  MV PHT: 67 9 ms  MVA By PHT: 3 2 cm2    [facility] Accredited Echocardiography Laboratory    Prepared and electronically signed by    Tonja Strong DO  Signed 04-Sep-2018 17:02:35    No results found for this or any previous visit      No results found for this or any previous visit  No results found for this or any previous visit         *-*-*-*-*-*-*-*-*-*-*-*-*-*-*-*-*-*-*-*-*-*-*-*-*-*-*-*-*-*-*-*-*-*-*-*-*-*-*-*-*-*-*-*-*-*-*-*-*-*-*-*-*-*-  SIGNATURES:   @MSU@   Sana Steven MD     CC:   MD Jaclyn Moseley MD

## 2022-07-27 DIAGNOSIS — I51.7 LVH (LEFT VENTRICULAR HYPERTROPHY): ICD-10-CM

## 2022-07-27 DIAGNOSIS — E78.2 MIXED HYPERLIPIDEMIA: ICD-10-CM

## 2022-07-27 RX ORDER — METOPROLOL SUCCINATE 25 MG/1
25 TABLET, EXTENDED RELEASE ORAL DAILY
Qty: 90 TABLET | Refills: 0 | Status: SHIPPED | OUTPATIENT
Start: 2022-07-27 | End: 2022-08-03 | Stop reason: SDUPTHER

## 2022-07-27 RX ORDER — ATORVASTATIN CALCIUM 40 MG/1
TABLET, FILM COATED ORAL
Qty: 90 TABLET | Refills: 0 | Status: SHIPPED | OUTPATIENT
Start: 2022-07-27

## 2022-08-03 ENCOUNTER — OFFICE VISIT (OUTPATIENT)
Dept: FAMILY MEDICINE CLINIC | Facility: CLINIC | Age: 62
End: 2022-08-03
Payer: MEDICARE

## 2022-08-03 VITALS
HEART RATE: 65 BPM | OXYGEN SATURATION: 97 % | DIASTOLIC BLOOD PRESSURE: 60 MMHG | SYSTOLIC BLOOD PRESSURE: 100 MMHG | TEMPERATURE: 99.2 F | WEIGHT: 155 LBS | BODY MASS INDEX: 30.43 KG/M2 | HEIGHT: 60 IN

## 2022-08-03 DIAGNOSIS — I51.7 LVH (LEFT VENTRICULAR HYPERTROPHY): ICD-10-CM

## 2022-08-03 DIAGNOSIS — I10 PRIMARY HYPERTENSION: ICD-10-CM

## 2022-08-03 DIAGNOSIS — E78.2 MIXED HYPERLIPIDEMIA: Primary | ICD-10-CM

## 2022-08-03 DIAGNOSIS — R73.01 IMPAIRED FASTING GLUCOSE: ICD-10-CM

## 2022-08-03 PROCEDURE — 99214 OFFICE O/P EST MOD 30 MIN: CPT | Performed by: FAMILY MEDICINE

## 2022-08-03 RX ORDER — METOPROLOL SUCCINATE 25 MG/1
12.5 TABLET, EXTENDED RELEASE ORAL DAILY
Qty: 90 TABLET | Refills: 0
Start: 2022-08-03

## 2022-08-04 NOTE — ASSESSMENT & PLAN NOTE
Chronic asymptomatic encouraged patient to continue with the low carb diet and discussed the important lose weight

## 2022-08-04 NOTE — ASSESSMENT & PLAN NOTE
Chronic asymptomatic blood pressure 100/60 per patient her blood pressure at home and has been running low and sometimes she feel dizzy and tired  The plan continue the been sick will hydrochlorothiazide 20-12 5 mg once a day decrease the metoprolol succinate 25 mg to half tablet a day recommend patient to continue monitor blood pressure if persistent to be low to call the office

## 2022-08-04 NOTE — PROGRESS NOTES
Subjective:   Chief Complaint   Patient presents with    Follow-up     Chronic conditions        Patient ID: Philomena Adams is a 57 y o  female  Patient here follow-up with a chronic condition patient compliant with the medication tolerated well without side effect the recently been evaluated by Cardiology a restart the been  HCT and the patient was taking metoprolol succinate 25 mg once a day patient blood pressure has been running low and sometimes she feel dizzy and tired forgot to do her blood work      The following portions of the patient's history were reviewed and updated as appropriate: allergies, current medications, past family history, past medical history, past social history, past surgical history and problem list     Review of Systems   Constitutional: Negative for chills and fever  HENT: Negative for ear pain and sore throat  Eyes: Negative for pain and visual disturbance  Respiratory: Negative for cough and shortness of breath  Cardiovascular: Negative for chest pain and palpitations  Gastrointestinal: Negative for abdominal pain and vomiting  Genitourinary: Negative for dysuria and hematuria  Musculoskeletal: Negative for arthralgias and back pain  Skin: Negative for color change and rash  Neurological: Negative for seizures and syncope  All other systems reviewed and are negative  Objective:  Vitals:    08/03/22 1310   BP: 100/60   Pulse: 65   Temp: 99 2 °F (37 3 °C)   TempSrc: Tympanic   SpO2: 97%   Weight: 70 3 kg (155 lb)   Height: 4' 11 75" (1 518 m)      Physical Exam  Vitals and nursing note reviewed  Constitutional:       General: She is not in acute distress  Appearance: She is well-developed  HENT:      Head: Normocephalic and atraumatic  Nose: Nose normal       Mouth/Throat:      Pharynx: Oropharynx is clear  Eyes:      Conjunctiva/sclera: Conjunctivae normal    Neck:      Vascular: No JVD     Cardiovascular:      Rate and Rhythm: Normal rate and regular rhythm  Heart sounds: Normal heart sounds  No murmur heard  No friction rub  No gallop  Pulmonary:      Effort: Pulmonary effort is normal       Breath sounds: Normal breath sounds  Abdominal:      General: Bowel sounds are normal       Palpations: Abdomen is soft  Tenderness: There is no abdominal tenderness  Musculoskeletal:      Right lower leg: No edema  Left lower leg: No edema  Skin:     Findings: No erythema or rash  Neurological:      Mental Status: She is oriented to person, place, and time  Assessment/Plan:    Hyperlipidemia  A chronic asymptomatic patient previous lipid panel was uncontrolled secondary not taking the medication start her on atorvastatin tolerated well without side effect forgot to do her blood work recommend to continue current medication will follow-up with the result of low blood work of the uncontrolled we adjust the dose the continue low-fat diet    Hypertension  Chronic asymptomatic blood pressure 100/60 per patient her blood pressure at home and has been running low and sometimes she feel dizzy and tired  The plan continue the been sick will hydrochlorothiazide 20-12 5 mg once a day decrease the metoprolol succinate 25 mg to half tablet a day recommend patient to continue monitor blood pressure if persistent to be low to call the office    LVH (left ventricular hypertrophy)  Chronic asymptomatic will decrease metoprolol 25 mg to half tablet a day secondary to low blood pressure and patient feeling fatigue    Impaired fasting glucose  Chronic asymptomatic encouraged patient to continue with the low carb diet and discussed the important lose weight       Diagnoses and all orders for this visit:    Mixed hyperlipidemia    LVH (left ventricular hypertrophy)  -     metoprolol succinate (TOPROL-XL) 25 mg 24 hr tablet;  Take 0 5 tablets (12 5 mg total) by mouth daily    Primary hypertension    Impaired fasting glucose

## 2022-08-04 NOTE — ASSESSMENT & PLAN NOTE
A chronic asymptomatic patient previous lipid panel was uncontrolled secondary not taking the medication start her on atorvastatin tolerated well without side effect forgot to do her blood work recommend to continue current medication will follow-up with the result of low blood work of the uncontrolled we adjust the dose the continue low-fat diet

## 2022-08-04 NOTE — ASSESSMENT & PLAN NOTE
Chronic asymptomatic will decrease metoprolol 25 mg to half tablet a day secondary to low blood pressure and patient feeling fatigue

## 2022-08-08 ENCOUNTER — APPOINTMENT (OUTPATIENT)
Dept: LAB | Facility: HOSPITAL | Age: 62
End: 2022-08-08
Payer: MEDICARE

## 2022-08-08 DIAGNOSIS — E03.9 ACQUIRED HYPOTHYROIDISM: ICD-10-CM

## 2022-08-08 DIAGNOSIS — E78.2 MIXED HYPERLIPIDEMIA: ICD-10-CM

## 2022-08-08 DIAGNOSIS — I10 PRIMARY HYPERTENSION: ICD-10-CM

## 2022-08-08 DIAGNOSIS — I25.10 CHRONIC CORONARY ARTERY DISEASE: ICD-10-CM

## 2022-08-08 LAB
ALBUMIN SERPL BCP-MCNC: 3.9 G/DL (ref 3.5–5)
ALP SERPL-CCNC: 80 U/L (ref 46–116)
ALT SERPL W P-5'-P-CCNC: 31 U/L (ref 12–78)
ANION GAP SERPL CALCULATED.3IONS-SCNC: 10 MMOL/L (ref 4–13)
AST SERPL W P-5'-P-CCNC: 16 U/L (ref 5–45)
BILIRUB SERPL-MCNC: 0.42 MG/DL (ref 0.2–1)
BUN SERPL-MCNC: 13 MG/DL (ref 5–25)
CALCIUM SERPL-MCNC: 9.7 MG/DL (ref 8.3–10.1)
CHLORIDE SERPL-SCNC: 106 MMOL/L (ref 96–108)
CHOLEST SERPL-MCNC: 211 MG/DL
CO2 SERPL-SCNC: 26 MMOL/L (ref 21–32)
CREAT SERPL-MCNC: 0.63 MG/DL (ref 0.6–1.3)
GFR SERPL CREATININE-BSD FRML MDRD: 95 ML/MIN/1.73SQ M
GLUCOSE P FAST SERPL-MCNC: 107 MG/DL (ref 65–99)
HDLC SERPL-MCNC: 57 MG/DL
LDLC SERPL CALC-MCNC: 132 MG/DL (ref 0–100)
POTASSIUM SERPL-SCNC: 4.1 MMOL/L (ref 3.5–5.3)
PROT SERPL-MCNC: 7.7 G/DL (ref 6.4–8.4)
SODIUM SERPL-SCNC: 142 MMOL/L (ref 135–147)
T4 FREE SERPL-MCNC: 0.71 NG/DL (ref 0.76–1.46)
TRIGL SERPL-MCNC: 108 MG/DL
TSH SERPL DL<=0.05 MIU/L-ACNC: 32.97 UIU/ML (ref 0.45–4.5)

## 2022-08-08 PROCEDURE — 80061 LIPID PANEL: CPT

## 2022-08-08 PROCEDURE — 36415 COLL VENOUS BLD VENIPUNCTURE: CPT

## 2022-08-08 PROCEDURE — 84439 ASSAY OF FREE THYROXINE: CPT

## 2022-08-08 PROCEDURE — 80053 COMPREHEN METABOLIC PANEL: CPT

## 2022-08-08 PROCEDURE — 84443 ASSAY THYROID STIM HORMONE: CPT

## 2022-08-10 ENCOUNTER — TELEPHONE (OUTPATIENT)
Dept: FAMILY MEDICINE CLINIC | Facility: CLINIC | Age: 62
End: 2022-08-10

## 2022-08-10 NOTE — TELEPHONE ENCOUNTER
----- Message from Sae Jamison MA sent at 8/9/2022  1:30 PM EDT -----  Left message to call the office for lab results

## 2022-08-10 NOTE — TELEPHONE ENCOUNTER
To increase the levothyroxine to 125 mcg once a day recheck TSH in 8 week  Improve in the lipid panel still and control continue atorvastatin 40 mg once a day started Zetia 10 mg once a day    PER DR URBNIA NOTE,

## 2022-10-22 DIAGNOSIS — E03.9 ACQUIRED HYPOTHYROIDISM: ICD-10-CM

## 2022-10-24 RX ORDER — LEVOTHYROXINE SODIUM 0.1 MG/1
TABLET ORAL
Qty: 90 TABLET | Refills: 0 | Status: SHIPPED | OUTPATIENT
Start: 2022-10-24

## 2022-12-10 DIAGNOSIS — E78.2 MIXED HYPERLIPIDEMIA: ICD-10-CM

## 2022-12-12 DIAGNOSIS — I51.7 LVH (LEFT VENTRICULAR HYPERTROPHY): ICD-10-CM

## 2022-12-12 RX ORDER — METOPROLOL SUCCINATE 25 MG/1
TABLET, EXTENDED RELEASE ORAL
Qty: 90 TABLET | Refills: 0 | Status: SHIPPED | OUTPATIENT
Start: 2022-12-12

## 2022-12-12 RX ORDER — ATORVASTATIN CALCIUM 40 MG/1
TABLET, FILM COATED ORAL
Qty: 90 TABLET | Refills: 0 | Status: SHIPPED | OUTPATIENT
Start: 2022-12-12

## 2022-12-16 ENCOUNTER — TELEPHONE (OUTPATIENT)
Dept: OTHER | Facility: OTHER | Age: 62
End: 2022-12-16

## 2022-12-16 NOTE — TELEPHONE ENCOUNTER
Patient called to reschedule her appointment at [phone] today due to icy roads  Appointment changed to 12/19/22 at 1500

## 2022-12-19 ENCOUNTER — OFFICE VISIT (OUTPATIENT)
Dept: FAMILY MEDICINE CLINIC | Facility: CLINIC | Age: 62
End: 2022-12-19

## 2022-12-19 VITALS
BODY MASS INDEX: 31.02 KG/M2 | DIASTOLIC BLOOD PRESSURE: 68 MMHG | WEIGHT: 158 LBS | HEART RATE: 63 BPM | SYSTOLIC BLOOD PRESSURE: 118 MMHG | RESPIRATION RATE: 16 BRPM | HEIGHT: 60 IN | TEMPERATURE: 99.6 F | OXYGEN SATURATION: 98 %

## 2022-12-19 DIAGNOSIS — E55.9 VITAMIN D DEFICIENCY: ICD-10-CM

## 2022-12-19 DIAGNOSIS — R73.01 IMPAIRED FASTING GLUCOSE: Primary | ICD-10-CM

## 2022-12-19 DIAGNOSIS — I10 PRIMARY HYPERTENSION: ICD-10-CM

## 2022-12-19 DIAGNOSIS — E03.9 ACQUIRED HYPOTHYROIDISM: ICD-10-CM

## 2022-12-19 NOTE — PROGRESS NOTES
Name: Wilber Chou      : 1960      MRN: [id number]  Encounter Provider: Gregorio Christensen MD  Encounter Date: 2022   Encounter department: [place] [address]  Impaired fasting glucose  Assessment & Plan:  Chronic asymptomatic encourage patient to continue with a low-carb die    Orders:  -     CBC and differential; Future  -     Comprehensive metabolic panel; Future  -     Lipid Panel with Direct LDL reflex; Future  -     TSH, 3rd generation with Free T4 reflex; Future  -     Vitamin D 25 hydroxy; Future    2  Vitamin D deficiency  Assessment & Plan:  tChronic asymptomatic continue vitamin D supplements vitamin D rich diet reviewed with the patient    Orders:  -     CBC and differential; Future  -     Comprehensive metabolic panel; Future  -     Lipid Panel with Direct LDL reflex; Future  -     TSH, 3rd generation with Free T4 reflex; Future  -     Vitamin D 25 hydroxy; Future    3  Primary hypertension  Assessment & Plan:  Chronic asymptomatic fair control continue current management including benazepril HCT 20-12 5 mg a day metoprolol succinate 25 mg half tablet once a day low-salt diet reviewed with the patient    Orders:  -     CBC and differential; Future  -     Comprehensive metabolic panel; Future  -     Lipid Panel with Direct LDL reflex; Future  -     TSH, 3rd generation with Free T4 reflex; Future  -     Vitamin D 25 hydroxy; Future    4  Acquired hypothyroidism  Assessment & Plan:  Chronic asymptomatic fair control continue with the levothyroxine 100 mcg a day             Subjective      Patient follow-up with a chronic condition patient compliant with medication tolerated well without side effect no new concern    Review of Systems   Constitutional: Negative for chills and fever  HENT: Negative for ear pain and sore throat  Eyes: Negative for pain and visual disturbance  Respiratory: Negative for cough and shortness of breath  Cardiovascular: Negative for chest pain and palpitations  Gastrointestinal: Negative for abdominal pain, constipation, diarrhea and vomiting  Genitourinary: Negative for dysuria and hematuria  Musculoskeletal: Negative for arthralgias and back pain  Skin: Negative for color change and rash  Neurological: Negative for seizures and syncope  All other systems reviewed and are negative  Current Outpatient Medications on File Prior to Visit   Medication Sig   • Ascorbic Acid (vitamin C) 1000 MG tablet Take 1,000 mg by mouth daily   • aspirin 81 mg chewable tablet Chew 81 mg daily   • atorvastatin (LIPITOR) 40 mg tablet TAKE 1 TABLET BY MOUTH EVERY DAY   • benazepril-hydrochlorthiazide (LOTENSIN HCT) 20-12 5 MG per tablet Take 1 tablet by mouth daily   • Blood Pressure KIT Use in the morning   • Cholecalciferol (Vitamin D3) 50 MCG (2000 UT) capsule TAKE 1 CAPSULE BY MOUTH EVERY DAY   • levothyroxine 100 mcg tablet TAKE 1 TABLET BY MOUTH EVERY DAY   • metoprolol succinate (TOPROL-XL) 25 mg 24 hr tablet TAKE 1 TABLET (25 MG TOTAL) BY MOUTH DAILY  (Patient taking differently: Take 12 5 mg by mouth daily)   • multivitamin-iron-minerals-folic acid (CENTRUM) chewable tablet Chew 1 tablet daily       Objective     /68 (BP Location: Left arm, Patient Position: Sitting, Cuff Size: Adult)   Pulse 63   Temp 99 6 °F (37 6 °C) (Tympanic)   Resp 16   Ht 4' 11 75" (1 518 m)   Wt 71 7 kg (158 lb)   LMP  (LMP Unknown)   SpO2 98%   BMI 31 12 kg/m²     Physical Exam  Vitals and nursing note reviewed  Constitutional:       General: She is not in acute distress  Appearance: She is well-developed  She is not diaphoretic  HENT:      Head: Normocephalic  Right Ear: External ear normal       Left Ear: External ear normal    Eyes:      General:         Right eye: No discharge  Left eye: No discharge  Conjunctiva/sclera: Conjunctivae normal    Neck:      Vascular: No JVD     Cardiovascular: Rate and Rhythm: Normal rate and regular rhythm  Heart sounds: Murmur heard  No gallop  Pulmonary:      Effort: Pulmonary effort is normal  No respiratory distress  Breath sounds: Normal breath sounds  No stridor  No wheezing or rales  Chest:      Chest wall: No tenderness  Abdominal:      General: There is no distension  Palpations: Abdomen is soft  There is no mass  Tenderness: There is no abdominal tenderness  There is no rebound  Musculoskeletal:         General: No tenderness  Cervical back: Normal range of motion and neck supple  Lymphadenopathy:      Cervical: No cervical adenopathy  Skin:     General: Skin is warm  Findings: No erythema or rash  Neurological:      Mental Status: She is alert and oriented to person, place, and time         Gregorio Christensen MD

## 2022-12-20 NOTE — ASSESSMENT & PLAN NOTE
Chronic asymptomatic fair control continue current management including benazepril HCT 20-12 5 mg a day metoprolol succinate 25 mg half tablet once a day low-salt diet reviewed with the patient

## 2023-01-11 ENCOUNTER — LAB (OUTPATIENT)
Dept: LAB | Facility: HOSPITAL | Age: 63
End: 2023-01-11

## 2023-01-11 DIAGNOSIS — R73.01 IMPAIRED FASTING GLUCOSE: ICD-10-CM

## 2023-01-11 DIAGNOSIS — E55.9 VITAMIN D DEFICIENCY: ICD-10-CM

## 2023-01-11 DIAGNOSIS — I10 PRIMARY HYPERTENSION: ICD-10-CM

## 2023-01-11 LAB
25(OH)D3 SERPL-MCNC: 19 NG/ML (ref 30–100)
ALBUMIN SERPL BCP-MCNC: 4 G/DL (ref 3.5–5)
ALP SERPL-CCNC: 92 U/L (ref 46–116)
ALT SERPL W P-5'-P-CCNC: 29 U/L (ref 12–78)
ANION GAP SERPL CALCULATED.3IONS-SCNC: 9 MMOL/L (ref 4–13)
AST SERPL W P-5'-P-CCNC: 19 U/L (ref 5–45)
BASOPHILS # BLD AUTO: 0.04 THOUSANDS/ÂΜL (ref 0–0.1)
BASOPHILS NFR BLD AUTO: 1 % (ref 0–1)
BILIRUB SERPL-MCNC: 0.52 MG/DL (ref 0.2–1)
BUN SERPL-MCNC: 8 MG/DL (ref 5–25)
CALCIUM SERPL-MCNC: 9.8 MG/DL (ref 8.3–10.1)
CHLORIDE SERPL-SCNC: 104 MMOL/L (ref 96–108)
CHOLEST SERPL-MCNC: 245 MG/DL
CO2 SERPL-SCNC: 29 MMOL/L (ref 21–32)
CREAT SERPL-MCNC: 0.65 MG/DL (ref 0.6–1.3)
EOSINOPHIL # BLD AUTO: 0.27 THOUSAND/ÂΜL (ref 0–0.61)
EOSINOPHIL NFR BLD AUTO: 5 % (ref 0–6)
ERYTHROCYTE [DISTWIDTH] IN BLOOD BY AUTOMATED COUNT: 11.8 % (ref 11.6–15.1)
GFR SERPL CREATININE-BSD FRML MDRD: 94 ML/MIN/1.73SQ M
GLUCOSE P FAST SERPL-MCNC: 104 MG/DL (ref 65–99)
HCT VFR BLD AUTO: 40.5 % (ref 34.8–46.1)
HDLC SERPL-MCNC: 52 MG/DL
HGB BLD-MCNC: 13.9 G/DL (ref 11.5–15.4)
IMM GRANULOCYTES # BLD AUTO: 0.02 THOUSAND/UL (ref 0–0.2)
IMM GRANULOCYTES NFR BLD AUTO: 0 % (ref 0–2)
LDLC SERPL CALC-MCNC: 159 MG/DL (ref 0–100)
LYMPHOCYTES # BLD AUTO: 1.22 THOUSANDS/ÂΜL (ref 0.6–4.47)
LYMPHOCYTES NFR BLD AUTO: 23 % (ref 14–44)
MCH RBC QN AUTO: 31.9 PG (ref 26.8–34.3)
MCHC RBC AUTO-ENTMCNC: 34.3 G/DL (ref 31.4–37.4)
MCV RBC AUTO: 93 FL (ref 82–98)
MONOCYTES # BLD AUTO: 0.49 THOUSAND/ÂΜL (ref 0.17–1.22)
MONOCYTES NFR BLD AUTO: 9 % (ref 4–12)
NEUTROPHILS # BLD AUTO: 3.23 THOUSANDS/ÂΜL (ref 1.85–7.62)
NEUTS SEG NFR BLD AUTO: 62 % (ref 43–75)
NRBC BLD AUTO-RTO: 0 /100 WBCS
PLATELET # BLD AUTO: 307 THOUSANDS/UL (ref 149–390)
PMV BLD AUTO: 9.9 FL (ref 8.9–12.7)
POTASSIUM SERPL-SCNC: 3.9 MMOL/L (ref 3.5–5.3)
PROT SERPL-MCNC: 8 G/DL (ref 6.4–8.4)
RBC # BLD AUTO: 4.36 MILLION/UL (ref 3.81–5.12)
SODIUM SERPL-SCNC: 142 MMOL/L (ref 135–147)
T4 FREE SERPL-MCNC: 0.63 NG/DL (ref 0.76–1.46)
TRIGL SERPL-MCNC: 172 MG/DL
TSH SERPL DL<=0.05 MIU/L-ACNC: 52.31 UIU/ML (ref 0.45–4.5)
WBC # BLD AUTO: 5.27 THOUSAND/UL (ref 4.31–10.16)

## 2023-01-11 NOTE — RESULT ENCOUNTER NOTE
Thyroid uncontrol ,patient currently on Levothyroxine 100 Meq po/day to increase it to 150 meq po/day   Recheck TSH in 8 w

## 2023-01-12 DIAGNOSIS — E03.9 ACQUIRED HYPOTHYROIDISM: Primary | ICD-10-CM

## 2023-01-12 DIAGNOSIS — E55.9 VITAMIN D DEFICIENCY: Primary | ICD-10-CM

## 2023-01-12 RX ORDER — LEVOTHYROXINE SODIUM 0.15 MG/1
150 TABLET ORAL
Qty: 30 TABLET | Refills: 2 | Status: SHIPPED | OUTPATIENT
Start: 2023-01-12

## 2023-01-12 RX ORDER — ERGOCALCIFEROL 1.25 MG/1
50000 CAPSULE ORAL WEEKLY
Qty: 12 CAPSULE | Refills: 0 | Status: SHIPPED | OUTPATIENT
Start: 2023-01-12

## 2023-01-12 NOTE — TELEPHONE ENCOUNTER
----- Message from Bebe Lane MD sent at 1/12/2023 10:51 AM EST -----  Vit D  def ,start Vit D 84424 iu/w for 12 w

## 2023-01-12 NOTE — TELEPHONE ENCOUNTER
----- Message from Zay Ceja MD sent at 1/11/2023  4:25 PM EST -----  Thyroid uncontrol ,patient currently on Levothyroxine 100 Meq po/day to increase it to 150 meq po/day   Recheck TSH in 8 w

## 2023-03-20 ENCOUNTER — OFFICE VISIT (OUTPATIENT)
Dept: FAMILY MEDICINE CLINIC | Facility: CLINIC | Age: 63
End: 2023-03-20

## 2023-03-20 VITALS
RESPIRATION RATE: 16 BRPM | WEIGHT: 153 LBS | HEIGHT: 60 IN | OXYGEN SATURATION: 96 % | HEART RATE: 63 BPM | BODY MASS INDEX: 30.04 KG/M2 | TEMPERATURE: 98.6 F | SYSTOLIC BLOOD PRESSURE: 118 MMHG | DIASTOLIC BLOOD PRESSURE: 82 MMHG

## 2023-03-20 DIAGNOSIS — E03.9 ACQUIRED HYPOTHYROIDISM: ICD-10-CM

## 2023-03-20 DIAGNOSIS — E78.2 MIXED HYPERLIPIDEMIA: ICD-10-CM

## 2023-03-20 DIAGNOSIS — E66.09 CLASS 1 OBESITY DUE TO EXCESS CALORIES WITH SERIOUS COMORBIDITY AND BODY MASS INDEX (BMI) OF 30.0 TO 30.9 IN ADULT: ICD-10-CM

## 2023-03-20 DIAGNOSIS — I51.7 LVH (LEFT VENTRICULAR HYPERTROPHY): ICD-10-CM

## 2023-03-20 DIAGNOSIS — Z00.01 ENCOUNTER FOR WELL ADULT EXAM WITH ABNORMAL FINDINGS: Primary | ICD-10-CM

## 2023-03-20 DIAGNOSIS — I10 PRIMARY HYPERTENSION: ICD-10-CM

## 2023-03-20 DIAGNOSIS — E55.9 VITAMIN D DEFICIENCY: ICD-10-CM

## 2023-03-20 DIAGNOSIS — Z28.21 IMMUNIZATION REFUSED: ICD-10-CM

## 2023-03-20 RX ORDER — METOPROLOL SUCCINATE 25 MG/1
12.5 TABLET, EXTENDED RELEASE ORAL DAILY
Qty: 90 TABLET | Refills: 0
Start: 2023-03-20

## 2023-03-20 NOTE — PROGRESS NOTES
[address]  PRIMARY CARE [facility]    NAME: Sheran Collet  AGE: 57 y o  SEX: female  : 1960     DATE: 3/23/2023     Assessment and Plan:     Problem List Items Addressed This Visit        Endocrine    Hypothyroidism    Relevant Medications    metoprolol succinate (TOPROL-XL) 25 mg 24 hr tablet    Other Relevant Orders    TSH, 3rd generation with Free T4 reflex       Cardiovascular and Mediastinum    Hypertension     Chronic asymptomatic fair control continue current medication low-salt diet reviewed with patient important lose weight         Relevant Medications    metoprolol succinate (TOPROL-XL) 25 mg 24 hr tablet    LVH (left ventricular hypertrophy)     Chronic asymptomatic patient already on statin and she is on beta-blocke she follow-up with cardiology  Likely         Relevant Medications    metoprolol succinate (TOPROL-XL) 25 mg 24 hr tablet       Other    Hyperlipidemia     Chronic asymptomatic uncontrolled patient has not been taking her medication regularly recommend to continue atorvastatin 40 mg once a day         Relevant Orders    Lipid Panel with Direct LDL reflex    Vitamin D deficiency     Chronic uncontrolled patient on vitamin D 50,000 IU once a week for 12-week         Class 1 obesity due to excess calories with serious comorbidity and body mass index (BMI) of 30 0 to 30 9 in adult     BMI today 30 13 discussed patient portion control low-carb low-fat diet and increase physical activity         Encounter for well adult exam with abnormal findings - Primary     Advice and education were given regarding nutrition, aerobic exercises, weight-bearing exercises, cardiovascular risk reduction, fall risk reduction, and age-appropriate supplements       The patient was counseled regarding instructions for management, risk factor reductions, prognosis, risks and benefits of treatment options, patient and family education, and importance of compliance with treatment  Relevant Orders    Basic metabolic panel    Immunization refused       Immunizations and preventive care screenings were discussed with patient today  Appropriate education was printed on patient's after visit summary  Counseling:  Alcohol/drug use: discussed moderation in alcohol intake, the recommendations for healthy alcohol use, and avoidance of illicit drug use  Dental Health: discussed importance of regular tooth brushing, flossing, and dental visits  Injury prevention: discussed safety/seat belts, safety helmets, smoke detectors, carbon dioxide detectors, and smoking near bedding or upholstery  Sexual health: discussed sexually transmitted diseases, partner selection, use of condoms, avoidance of unintended pregnancy, and contraceptive alternatives  Exercise: the importance of regular exercise/physical activity was discussed  Recommend exercise 3-5 times per week for at least 30 minutes  BMI Counseling: Body mass index is 30 13 kg/m²  The BMI is above normal  Nutrition recommendations include decreasing portion sizes, decreasing fast food intake and limiting drinks that contain sugar  Exercise recommendations include exercising 3-5 times per week  No pharmacotherapy was ordered  Rationale for BMI follow-up plan is due to patient being overweight or obese  Depression Screening and Follow-up Plan: Patient was screened for depression during today's encounter  They screened negative with a PHQ-2 score of 0  Return in about 1 year (around 3/20/2024)  Chief Complaint:     Chief Complaint   Patient presents with   • Physical Exam     Patient is here today for her routine yearly physical exam       History of Present Illness:     Adult Annual Physical   Patient here for a comprehensive physical exam  The patient reports And patient follow-up with a chronic condition no new concerns recent blood work discussed with the patient      Diet and Physical Activity  Diet/Nutrition: no special diet  Exercise: walking  Depression Screening  PHQ-2/9 Depression Screening    Little interest or pleasure in doing things: 0 - not at all  Feeling down, depressed, or hopeless: 0 - not at all  PHQ-2 Score: 0  PHQ-2 Interpretation: Negative depression screen       General Health  Sleep: sleeps well  Hearing: normal - bilateral   Vision: wears glasses and contacts  Dental: regular dental visits  /GYN Health  Patient is: postmenopausal     Review of Systems:     Review of Systems   Constitutional: Negative for chills and fever  HENT: Negative for ear pain and sore throat  Eyes: Negative for pain and visual disturbance  Respiratory: Negative for cough and shortness of breath  Cardiovascular: Negative for chest pain and palpitations  Gastrointestinal: Negative for abdominal pain, blood in stool, constipation, diarrhea, nausea and vomiting  Genitourinary: Negative for dysuria and hematuria  Musculoskeletal: Negative for arthralgias and back pain  Skin: Negative for color change and rash  Neurological: Negative for seizures and syncope  Hematological: Does not bruise/bleed easily  Psychiatric/Behavioral: Negative for agitation and behavioral problems  All other systems reviewed and are negative       Past Medical History:     Past Medical History:   Diagnosis Date   • Acute MI ([facility] ) 8/12/2012   • Anemia    • CAD (coronary artery disease)    • Hyperlipidemia    • Hypertension    • Myocardial infarction ([place] [address] ) 03/26/2002   • Thyroid disease    • Ventricular fibrillation ([place] [address] ) 8/12/2012      Past Surgical History:     Past Surgical History:   Procedure Laterality Date   • COLONOSCOPY  2013   • CORONARY ARTERY BYPASS GRAFT  2003      Social History:     Social History     Socioeconomic History   • Marital status:      Spouse name: None   • Number of children: None   • Years of education: None   • Highest education level: None Occupational History   • Occupation:  worker ;  to infant ages     Employer: OTHER     Comment: parttime    Tobacco Use   • Smoking status: Former     Packs/day: 0 25     Years: 22 00     Pack years: 5 50     Types: Cigarettes     Start date: 1971     Quit date: 1993     Years since quittin 2     Passive exposure: Never   • Smokeless tobacco: Never   • Tobacco comments:     no passive smoke exposure   Vaping Use   • Vaping Use: Never used   Substance and Sexual Activity   • Alcohol use: No   • Drug use: No   • Sexual activity: Not Currently   Other Topics Concern   • None   Social History Narrative   • None     Social Determinants of Health     Financial Resource Strain: Not on file   Food Insecurity: Not on file   Transportation Needs: Not on file   Physical Activity: Insufficiently Active   • Days of Exercise per Week: 3 days   • Minutes of Exercise per Session: 30 min   Stress: Not on file   Social Connections: Not on file   Intimate Partner Violence: Not on file   Housing Stability: Not on file      Family History:     Family History   Problem Relation Age of Onset   • Hypertension Mother    • Stroke Mother         3 strokes   • Heart attack Father    • No Known Problems Sister    • No Known Problems Daughter    • No Known Problems Maternal Grandmother    • No Known Problems Maternal Grandfather    • No Known Problems Paternal Grandmother    • No Known Problems Paternal Grandfather    • No Known Problems Maternal Aunt    • No Known Problems Maternal Aunt    • No Known Problems Maternal Aunt    • No Known Problems Paternal Aunt    • No Known Problems Paternal Aunt       Current Medications:     Current Outpatient Medications   Medication Sig Dispense Refill   • Ascorbic Acid (vitamin C) 1000 MG tablet Take 1,000 mg by mouth daily     • aspirin 81 mg chewable tablet Chew 81 mg daily     • atorvastatin (LIPITOR) 40 mg tablet TAKE 1 TABLET BY MOUTH EVERY DAY 90 tablet 1   • benazepril-hydrochlorthiazide (LOTENSIN HCT) 20-12 5 MG per tablet Take 1 tablet by mouth daily 90 tablet 3   • Blood Pressure KIT Use in the morning 1 kit 0   • Cholecalciferol (Vitamin D3) 50 MCG (2000 UT) capsule TAKE 1 CAPSULE BY MOUTH EVERY DAY 90 capsule 1   • ergocalciferol (VITAMIN D2) 50,000 units Take 1 capsule (50,000 Units total) by mouth once a week 12 capsule 0   • levothyroxine 150 mcg tablet TAKE 1 TABLET (150 MCG TOTAL) BY MOUTH DAILY IN THE EARLY MORNING 90 tablet 0   • metoprolol succinate (TOPROL-XL) 25 mg 24 hr tablet Take 0 5 tablets (12 5 mg total) by mouth daily 90 tablet 0   • multivitamin-iron-minerals-folic acid (CENTRUM) chewable tablet Chew 1 tablet daily       No current facility-administered medications for this visit  Allergies: Allergies   Allergen Reactions   • Cat Hair Extract Sneezing      Physical Exam:     /82 (BP Location: Left arm, Patient Position: Sitting, Cuff Size: Adult)   Pulse 63   Temp 98 6 °F (37 °C) (Tympanic)   Resp 16   Ht 4' 11 75" (1 518 m)   Wt 69 4 kg (153 lb)   LMP  (LMP Unknown)   SpO2 96%   BMI 30 13 kg/m²     Physical Exam  Vitals and nursing note reviewed  Constitutional:       General: She is not in acute distress  Appearance: She is well-developed  HENT:      Head: Normocephalic and atraumatic  Right Ear: Tympanic membrane normal  There is no impacted cerumen  Left Ear: Tympanic membrane normal  There is no impacted cerumen  Nose: No congestion  Mouth/Throat:      Pharynx: No oropharyngeal exudate  Eyes:      Conjunctiva/sclera: Conjunctivae normal    Cardiovascular:      Rate and Rhythm: Normal rate and regular rhythm  Heart sounds: No murmur heard  Pulmonary:      Effort: Pulmonary effort is normal  No respiratory distress  Breath sounds: Normal breath sounds  Abdominal:      Palpations: Abdomen is soft  Tenderness: There is no abdominal tenderness     Musculoskeletal: General: No swelling  Cervical back: Neck supple  Skin:     General: Skin is warm and dry  Capillary Refill: Capillary refill takes less than 2 seconds  Neurological:      Mental Status: She is alert and oriented to person, place, and time     Psychiatric:         Mood and Affect: Mood normal           Tiffanie Diaz MD  [address] [place] [address]

## 2023-04-19 DIAGNOSIS — Z12.31 ENCOUNTER FOR SCREENING MAMMOGRAM FOR BREAST CANCER: Primary | ICD-10-CM

## 2023-04-29 DIAGNOSIS — E55.9 VITAMIN D DEFICIENCY: ICD-10-CM

## 2023-05-22 ENCOUNTER — LAB (OUTPATIENT)
Dept: LAB | Facility: CLINIC | Age: 63
End: 2023-05-22

## 2023-05-22 ENCOUNTER — OFFICE VISIT (OUTPATIENT)
Dept: FAMILY MEDICINE CLINIC | Facility: CLINIC | Age: 63
End: 2023-05-22

## 2023-05-22 VITALS
WEIGHT: 135 LBS | DIASTOLIC BLOOD PRESSURE: 60 MMHG | SYSTOLIC BLOOD PRESSURE: 110 MMHG | BODY MASS INDEX: 26.5 KG/M2 | HEART RATE: 60 BPM | OXYGEN SATURATION: 97 % | TEMPERATURE: 97.6 F | HEIGHT: 60 IN

## 2023-05-22 DIAGNOSIS — E03.9 ACQUIRED HYPOTHYROIDISM: ICD-10-CM

## 2023-05-22 DIAGNOSIS — E78.2 MIXED HYPERLIPIDEMIA: ICD-10-CM

## 2023-05-22 DIAGNOSIS — Z00.01 ENCOUNTER FOR WELL ADULT EXAM WITH ABNORMAL FINDINGS: ICD-10-CM

## 2023-05-22 DIAGNOSIS — R73.01 IMPAIRED FASTING GLUCOSE: ICD-10-CM

## 2023-05-22 DIAGNOSIS — I25.10 CHRONIC CORONARY ARTERY DISEASE: ICD-10-CM

## 2023-05-22 LAB
ANION GAP SERPL CALCULATED.3IONS-SCNC: 2 MMOL/L (ref 4–13)
BUN SERPL-MCNC: 8 MG/DL (ref 5–25)
CALCIUM SERPL-MCNC: 9.8 MG/DL (ref 8.3–10.1)
CHLORIDE SERPL-SCNC: 111 MMOL/L (ref 96–108)
CHOLEST SERPL-MCNC: 233 MG/DL
CO2 SERPL-SCNC: 25 MMOL/L (ref 21–32)
CREAT SERPL-MCNC: 0.66 MG/DL (ref 0.6–1.3)
GFR SERPL CREATININE-BSD FRML MDRD: 93 ML/MIN/1.73SQ M
GLUCOSE P FAST SERPL-MCNC: 93 MG/DL (ref 65–99)
HDLC SERPL-MCNC: 43 MG/DL
LDLC SERPL CALC-MCNC: 170 MG/DL (ref 0–100)
POTASSIUM SERPL-SCNC: 3.5 MMOL/L (ref 3.5–5.3)
SODIUM SERPL-SCNC: 138 MMOL/L (ref 135–147)
T4 FREE SERPL-MCNC: 0.63 NG/DL (ref 0.61–1.12)
TRIGL SERPL-MCNC: 99 MG/DL
TSH SERPL DL<=0.05 MIU/L-ACNC: 30.72 UIU/ML (ref 0.45–4.5)

## 2023-05-22 NOTE — PROGRESS NOTES
"Name: Epi Irby      : 1960      MRN: [id number]  Encounter Provider: Franklin Frote MD  Encounter Date: 2023   Encounter department: [place] [address]  Acquired hypothyroidism  Assessment & Plan:  Chronic asymptomatic TSH recent blood work 2023 was uncontrolled secondary patient has not been taking her medication patient restarted on the 150 mcg a day she is overdue for check TSH      2  Impaired fasting glucose  Assessment & Plan:  Chronic asymptomatic fair control low-carb diet discussed with patient \"weight      3  Chronic coronary artery disease  Assessment & Plan:  Chronic asymptomatic patient on statin ,metoprolol and ASA   patient due to see Cardiology      4  Mixed hyperlipidemia           Subjective      Patient is here f/up with chronic condition patient has not been compliant with her atorvastatin discussed with patient pertinent compliant with the medication she denies any chest pain short of breath no palpitation forgot to do blood work    Review of Systems   Constitutional: Negative for chills and fever  HENT: Negative for ear pain and sore throat  Eyes: Negative for pain and visual disturbance  Respiratory: Negative for cough and shortness of breath  Cardiovascular: Negative for chest pain and palpitations  Gastrointestinal: Negative for abdominal pain, constipation, diarrhea and vomiting  Genitourinary: Negative for dysuria and hematuria  Musculoskeletal: Negative for arthralgias and back pain  Skin: Negative for color change and rash  Neurological: Negative for seizures and syncope  All other systems reviewed and are negative        Current Outpatient Medications on File Prior to Visit   Medication Sig   • Ascorbic Acid (vitamin C) 1000 MG tablet Take 1,000 mg by mouth daily   • aspirin 81 mg chewable tablet Chew 81 mg daily   • atorvastatin (LIPITOR) 40 mg tablet TAKE 1 TABLET BY MOUTH EVERY DAY " • benazepril-hydrochlorthiazide (LOTENSIN HCT) 20-12 5 MG per tablet Take 1 tablet by mouth daily   • Blood Pressure KIT Use in the morning   • Cholecalciferol 50 MCG (2000 UT) CAPS TAKE 1 CAPSULE BY MOUTH EVERY DAY   • levothyroxine 150 mcg tablet TAKE 1 TABLET (150 MCG TOTAL) BY MOUTH DAILY IN THE EARLY MORNING   • metoprolol succinate (TOPROL-XL) 25 mg 24 hr tablet Take 0 5 tablets (12 5 mg total) by mouth daily   • multivitamin-iron-minerals-folic acid (CENTRUM) chewable tablet Chew 1 tablet daily   • [DISCONTINUED] ergocalciferol (VITAMIN D2) 50,000 units Take 1 capsule (50,000 Units total) by mouth once a week       Objective     /60 (BP Location: Left arm)   Pulse 60   Temp 97 6 °F (36 4 °C) (Tympanic)   Ht 5' (1 524 m)   Wt 61 2 kg (135 lb)   LMP  (LMP Unknown)   SpO2 97%   Breastfeeding No   BMI 26 37 kg/m²     Physical Exam  Vitals and nursing note reviewed  Constitutional:       General: She is not in acute distress  Appearance: She is well-developed  She is not diaphoretic  HENT:      Head: Normocephalic and atraumatic  Right Ear: External ear normal       Left Ear: External ear normal       Nose: No congestion  Mouth/Throat:      Pharynx: No oropharyngeal exudate  Eyes:      General:         Right eye: No discharge  Left eye: No discharge  Conjunctiva/sclera: Conjunctivae normal    Neck:      Vascular: No JVD  Cardiovascular:      Rate and Rhythm: Normal rate and regular rhythm  Heart sounds: Normal heart sounds  No murmur heard  No friction rub  No gallop  Pulmonary:      Effort: Pulmonary effort is normal  No respiratory distress  Breath sounds: Normal breath sounds  No stridor  No wheezing or rales  Chest:      Chest wall: No tenderness  Abdominal:      General: Bowel sounds are normal  There is no distension  Palpations: Abdomen is soft  There is no mass  Tenderness: There is no abdominal tenderness  There is no rebound  Musculoskeletal:         General: No tenderness  Cervical back: Normal range of motion and neck supple  Lymphadenopathy:      Cervical: No cervical adenopathy  Skin:     General: Skin is warm  Findings: No erythema or rash  Neurological:      Mental Status: She is alert and oriented to person, place, and time         Cherelle Aaron MD

## 2023-05-22 NOTE — ASSESSMENT & PLAN NOTE
Chronic asymptomatic TSH recent blood work January 2023 was uncontrolled secondary patient has not been taking her medication patient restarted on the 150 mcg a day she is overdue for check TSH

## 2023-05-23 ENCOUNTER — TELEPHONE (OUTPATIENT)
Dept: FAMILY MEDICINE CLINIC | Facility: CLINIC | Age: 63
End: 2023-05-23

## 2023-05-23 DIAGNOSIS — E78.2 MIXED HYPERLIPIDEMIA: ICD-10-CM

## 2023-05-23 DIAGNOSIS — E03.9 ACQUIRED HYPOTHYROIDISM: Primary | ICD-10-CM

## 2023-05-23 RX ORDER — LEVOTHYROXINE SODIUM 175 UG/1
175 TABLET ORAL
Qty: 90 TABLET | Refills: 0 | Status: SHIPPED | OUTPATIENT
Start: 2023-05-23

## 2023-05-23 RX ORDER — EZETIMIBE 10 MG/1
10 TABLET ORAL DAILY
Qty: 90 TABLET | Refills: 0 | Status: SHIPPED | OUTPATIENT
Start: 2023-05-23

## 2023-05-23 NOTE — TELEPHONE ENCOUNTER
Left message for patient to return call to the office to discuss lab results  Prescriptions sent to patients pharmacy  Lab orders placed to be completed in 4 months

## 2023-05-23 NOTE — TELEPHONE ENCOUNTER
----- Message from Geoffrey Fraire MD sent at 5/23/2023 12:39 PM EDT -----  Stop Levothyroxine 150 meq   Start Levothyroxine 175 meq po/day #90    Lipid uncontrol ,continue Atorvastatin 40 mg po/day start Zetia 10 mg po/day    Repeat BW in 4 month Lipid and TSH

## 2023-05-23 NOTE — RESULT ENCOUNTER NOTE
Stop Levothyroxine 150 meq   Start Levothyroxine 175 meq po/day #90    Lipid uncontrol ,continue Atorvastatin 40 mg po/day start Zetia 10 mg po/day    Repeat BW in 4 month Lipid and TSH

## 2023-07-08 DIAGNOSIS — I25.10 ATHEROSCLEROSIS OF NATIVE CORONARY ARTERY OF NATIVE HEART WITHOUT ANGINA PECTORIS: ICD-10-CM

## 2023-07-10 RX ORDER — BENAZEPRIL HYDROCHLORIDE AND HYDROCHLOROTHIAZIDE 20; 12.5 MG/1; MG/1
TABLET ORAL
Qty: 90 TABLET | Refills: 3 | Status: SHIPPED | OUTPATIENT
Start: 2023-07-10

## 2023-07-21 ENCOUNTER — OFFICE VISIT (OUTPATIENT)
Dept: FAMILY MEDICINE CLINIC | Facility: CLINIC | Age: 63
End: 2023-07-21
Payer: MEDICARE

## 2023-07-21 VITALS
WEIGHT: 119 LBS | BODY MASS INDEX: 23.36 KG/M2 | TEMPERATURE: 98.3 F | OXYGEN SATURATION: 97 % | HEART RATE: 64 BPM | SYSTOLIC BLOOD PRESSURE: 120 MMHG | HEIGHT: 60 IN | DIASTOLIC BLOOD PRESSURE: 80 MMHG

## 2023-07-21 DIAGNOSIS — F41.9 ANXIETY: ICD-10-CM

## 2023-07-21 DIAGNOSIS — F33.9 DEPRESSION, RECURRENT (HCC): Primary | ICD-10-CM

## 2023-07-21 PROBLEM — F32.A ANXIETY AND DEPRESSION: Status: ACTIVE | Noted: 2023-07-21

## 2023-07-21 PROCEDURE — 99214 OFFICE O/P EST MOD 30 MIN: CPT | Performed by: FAMILY MEDICINE

## 2023-07-21 RX ORDER — CITALOPRAM HYDROBROMIDE 10 MG/1
10 TABLET ORAL DAILY
Qty: 30 TABLET | Refills: 1 | Status: ON HOLD | OUTPATIENT
Start: 2023-07-21

## 2023-07-21 NOTE — PROGRESS NOTES
Name: Quintin Dean      : 1960      MRN: [id number]  Encounter Provider: Ge Ames MD  Encounter Date: 2023   Encounter department: [facility]     1. Depression, recurrent ([address] [facility])  Assessment & Plan:  Symptomatic PHQ-9 =25 patient decreased appetite lost weight patient currently lives with the son and no suicide attempt but there is suicidal ideation  recommend start escitalopram 10 mg once a day proper use and possible side effect discussed with patient we will reevaluate in 4-week crisis number has been given to the daughter and the sister in case it gets worse to call the office or go to the emergency room also recommend the patient to follow-up with psychotherapist      Orders:  -     Ambulatory Referral to Psychology; Future    2. Anxiety  Assessment & Plan:  Symptomatic JOANNA-7 equal to 21 recommend start citalopram 10 mg once a day proper use and possible side effects discussed with patient    Orders:  -     citalopram (CeleXA) 10 mg tablet; Take 1 tablet (10 mg total) by mouth daily  -     Ambulatory Referral to Psychology; Future           Subjective      Patient here in the office with her daughter and sister concerned about her depression and anxiety patient has been having GERD symptoms currently mild but recently has been the last 2 months is getting worse she no appetite to eat still with well crying most of the time and she feels looseness and she is under lots of stress financial stress she sold her home and moved to live with this is her daughter and recently moved to live with her son she feels she burden on her family    Review of Systems   Constitutional: Negative for chills and fever. HENT: Negative for ear pain and sore throat. Eyes: Negative for pain and visual disturbance. Respiratory: Negative for cough and shortness of breath. Cardiovascular: Negative for chest pain and palpitations.    Gastrointestinal: Negative for abdominal pain and vomiting. Genitourinary: Negative for dysuria and hematuria. Musculoskeletal: Negative for arthralgias and back pain. Skin: Negative for color change and rash. Neurological: Negative for seizures and syncope. Psychiatric/Behavioral: Positive for behavioral problems, decreased concentration and sleep disturbance. Negative for self-injury. The patient is nervous/anxious. All other systems reviewed and are negative. Current Outpatient Medications on File Prior to Visit   Medication Sig   • Ascorbic Acid (vitamin C) 1000 MG tablet Take 1,000 mg by mouth daily   • aspirin 81 mg chewable tablet Chew 81 mg daily   • atorvastatin (LIPITOR) 40 mg tablet TAKE 1 TABLET BY MOUTH EVERY DAY   • benazepril-hydrochlorthiazide (LOTENSIN HCT) 20-12.5 MG per tablet TAKE 1 TABLET BY MOUTH EVERY DAY   • Blood Pressure KIT Use in the morning   • Cholecalciferol 50 MCG (2000 UT) CAPS TAKE 1 CAPSULE BY MOUTH EVERY DAY   • ezetimibe (ZETIA) 10 mg tablet Take 1 tablet (10 mg total) by mouth daily   • levothyroxine (Euthyrox) 175 mcg tablet Take 1 tablet (175 mcg total) by mouth daily in the early morning   • metoprolol succinate (TOPROL-XL) 25 mg 24 hr tablet Take 0.5 tablets (12.5 mg total) by mouth daily   • multivitamin-iron-minerals-folic acid (CENTRUM) chewable tablet Chew 1 tablet daily       Objective     /80 (BP Location: Left arm, Patient Position: Sitting)   Pulse 64   Temp 98.3 °F (36.8 °C) (Tympanic)   Ht 4' 11.5" (1.511 m)   Wt 54 kg (119 lb)   LMP  (LMP Unknown)   SpO2 97%   Breastfeeding No   BMI 23.63 kg/m²     Physical Exam  Vitals and nursing note reviewed. Constitutional:       General: She is not in acute distress. Appearance: She is well-developed. She is not diaphoretic. HENT:      Head: Normocephalic. Right Ear: External ear normal.      Left Ear: External ear normal.      Nose: No rhinorrhea.       Mouth/Throat:      Pharynx: No posterior oropharyngeal erythema. Eyes:      General:         Right eye: No discharge. Left eye: No discharge. Conjunctiva/sclera: Conjunctivae normal.   Neck:      Vascular: No JVD. Cardiovascular:      Rate and Rhythm: Normal rate and regular rhythm. Heart sounds: Normal heart sounds. No murmur heard. No gallop. Pulmonary:      Effort: Pulmonary effort is normal. No respiratory distress. Breath sounds: Normal breath sounds. No stridor. No wheezing or rales. Chest:      Chest wall: No tenderness. Abdominal:      General: There is no distension. Palpations: Abdomen is soft. There is no mass. Tenderness: There is no abdominal tenderness. There is no rebound. Musculoskeletal:         General: No tenderness. Cervical back: Normal range of motion and neck supple. Lymphadenopathy:      Cervical: No cervical adenopathy. Skin:     General: Skin is warm. Findings: No erythema or rash. Neurological:      Mental Status: She is alert and oriented to person, place, and time.        Marisol Bardales MD

## 2023-07-23 NOTE — ASSESSMENT & PLAN NOTE
Symptomatic PHQ-9 =25 patient decreased appetite lost weight patient currently lives with the son and no suicide attempt but there is suicidal ideation  recommend start escitalopram 10 mg once a day proper use and possible side effect discussed with patient we will reevaluate in 4-week crisis number has been given to the daughter and the sister in case it gets worse to call the office or go to the emergency room also recommend the patient to follow-up with psychotherapist

## 2023-07-23 NOTE — ASSESSMENT & PLAN NOTE
Symptomatic JOANNA-7 equal to 21 recommend start citalopram 10 mg once a day proper use and possible side effects discussed with patient

## 2023-07-28 ENCOUNTER — HOSPITAL ENCOUNTER (INPATIENT)
Facility: HOSPITAL | Age: 63
LOS: 3 days | End: 2023-07-31
Attending: EMERGENCY MEDICINE | Admitting: INTERNAL MEDICINE
Payer: MEDICARE

## 2023-07-28 ENCOUNTER — APPOINTMENT (EMERGENCY)
Dept: CT IMAGING | Facility: HOSPITAL | Age: 63
End: 2023-07-28
Payer: MEDICARE

## 2023-07-28 DIAGNOSIS — R41.82 ALTERED MENTAL STATUS: ICD-10-CM

## 2023-07-28 DIAGNOSIS — F43.22 ADJUSTMENT DISORDER WITH ANXIETY: ICD-10-CM

## 2023-07-28 DIAGNOSIS — N39.0 UTI (URINARY TRACT INFECTION): Primary | ICD-10-CM

## 2023-07-28 DIAGNOSIS — R41.81 AGE-RELATED COGNITIVE DECLINE: ICD-10-CM

## 2023-07-28 DIAGNOSIS — E03.9 ACQUIRED HYPOTHYROIDISM: ICD-10-CM

## 2023-07-28 DIAGNOSIS — E78.2 MIXED HYPERLIPIDEMIA: ICD-10-CM

## 2023-07-28 DIAGNOSIS — I10 PRIMARY HYPERTENSION: ICD-10-CM

## 2023-07-28 PROBLEM — G93.41 ACUTE METABOLIC ENCEPHALOPATHY: Status: ACTIVE | Noted: 2023-07-28

## 2023-07-28 LAB
ALBUMIN SERPL BCP-MCNC: 4.5 G/DL (ref 3.5–5)
ALP SERPL-CCNC: 57 U/L (ref 34–104)
ALT SERPL W P-5'-P-CCNC: 37 U/L (ref 7–52)
AMMONIA PLAS-SCNC: 15 UMOL/L (ref 18–72)
ANION GAP SERPL CALCULATED.3IONS-SCNC: 9 MMOL/L
AST SERPL W P-5'-P-CCNC: 35 U/L (ref 13–39)
BACTERIA UR QL AUTO: ABNORMAL /HPF
BASOPHILS # BLD AUTO: 0.01 THOUSANDS/ÂΜL (ref 0–0.1)
BASOPHILS NFR BLD AUTO: 0 % (ref 0–1)
BILIRUB SERPL-MCNC: 1.11 MG/DL (ref 0.2–1)
BILIRUB UR QL STRIP: NEGATIVE
BUN SERPL-MCNC: 17 MG/DL (ref 5–25)
CALCIUM SERPL-MCNC: 9.9 MG/DL (ref 8.4–10.2)
CHLORIDE SERPL-SCNC: 103 MMOL/L (ref 96–108)
CLARITY UR: ABNORMAL
CO2 SERPL-SCNC: 26 MMOL/L (ref 21–32)
COLOR UR: YELLOW
CREAT SERPL-MCNC: 0.63 MG/DL (ref 0.6–1.3)
EOSINOPHIL # BLD AUTO: 0.04 THOUSAND/ÂΜL (ref 0–0.61)
EOSINOPHIL NFR BLD AUTO: 1 % (ref 0–6)
ERYTHROCYTE [DISTWIDTH] IN BLOOD BY AUTOMATED COUNT: 12.1 % (ref 11.6–15.1)
GFR SERPL CREATININE-BSD FRML MDRD: 94 ML/MIN/1.73SQ M
GLUCOSE SERPL-MCNC: 125 MG/DL (ref 65–140)
GLUCOSE UR STRIP-MCNC: NEGATIVE MG/DL
HCT VFR BLD AUTO: 42.4 % (ref 34.8–46.1)
HGB BLD-MCNC: 14.5 G/DL (ref 11.5–15.4)
HGB UR QL STRIP.AUTO: NEGATIVE
IMM GRANULOCYTES # BLD AUTO: 0.02 THOUSAND/UL (ref 0–0.2)
IMM GRANULOCYTES NFR BLD AUTO: 0 % (ref 0–2)
KETONES UR STRIP-MCNC: ABNORMAL MG/DL
LEUKOCYTE ESTERASE UR QL STRIP: ABNORMAL
LYMPHOCYTES # BLD AUTO: 1.12 THOUSANDS/ÂΜL (ref 0.6–4.47)
LYMPHOCYTES NFR BLD AUTO: 21 % (ref 14–44)
MCH RBC QN AUTO: 31.7 PG (ref 26.8–34.3)
MCHC RBC AUTO-ENTMCNC: 34.2 G/DL (ref 31.4–37.4)
MCV RBC AUTO: 93 FL (ref 82–98)
MONOCYTES # BLD AUTO: 0.61 THOUSAND/ÂΜL (ref 0.17–1.22)
MONOCYTES NFR BLD AUTO: 11 % (ref 4–12)
MUCOUS THREADS UR QL AUTO: ABNORMAL
NEUTROPHILS # BLD AUTO: 3.62 THOUSANDS/ÂΜL (ref 1.85–7.62)
NEUTS SEG NFR BLD AUTO: 67 % (ref 43–75)
NITRITE UR QL STRIP: POSITIVE
NON-SQ EPI CELLS URNS QL MICRO: ABNORMAL /HPF
NRBC BLD AUTO-RTO: 0 /100 WBCS
PH UR STRIP.AUTO: 5.5 [PH]
PLATELET # BLD AUTO: 295 THOUSANDS/UL (ref 149–390)
PMV BLD AUTO: 9.7 FL (ref 8.9–12.7)
POTASSIUM SERPL-SCNC: 3.8 MMOL/L (ref 3.5–5.3)
PROT SERPL-MCNC: 7.3 G/DL (ref 6.4–8.4)
PROT UR STRIP-MCNC: ABNORMAL MG/DL
RBC # BLD AUTO: 4.57 MILLION/UL (ref 3.81–5.12)
RBC #/AREA URNS AUTO: ABNORMAL /HPF
RENAL EPI CELLS #/AREA URNS HPF: PRESENT /[HPF]
SODIUM SERPL-SCNC: 138 MMOL/L (ref 135–147)
SP GR UR STRIP.AUTO: 1.03 (ref 1–1.03)
T4 FREE SERPL-MCNC: 1.06 NG/DL (ref 0.61–1.12)
TSH SERPL DL<=0.05 MIU/L-ACNC: 10.27 UIU/ML (ref 0.45–4.5)
UROBILINOGEN UR STRIP-ACNC: <2 MG/DL
WBC # BLD AUTO: 5.42 THOUSAND/UL (ref 4.31–10.16)
WBC #/AREA URNS AUTO: ABNORMAL /HPF
WBC CLUMPS # UR AUTO: PRESENT /UL

## 2023-07-28 PROCEDURE — 81001 URINALYSIS AUTO W/SCOPE: CPT | Performed by: EMERGENCY MEDICINE

## 2023-07-28 PROCEDURE — 96365 THER/PROPH/DIAG IV INF INIT: CPT

## 2023-07-28 PROCEDURE — 84443 ASSAY THYROID STIM HORMONE: CPT | Performed by: EMERGENCY MEDICINE

## 2023-07-28 PROCEDURE — 85025 COMPLETE CBC W/AUTO DIFF WBC: CPT | Performed by: EMERGENCY MEDICINE

## 2023-07-28 PROCEDURE — 99223 1ST HOSP IP/OBS HIGH 75: CPT

## 2023-07-28 PROCEDURE — 99285 EMERGENCY DEPT VISIT HI MDM: CPT | Performed by: EMERGENCY MEDICINE

## 2023-07-28 PROCEDURE — 87077 CULTURE AEROBIC IDENTIFY: CPT | Performed by: EMERGENCY MEDICINE

## 2023-07-28 PROCEDURE — 82140 ASSAY OF AMMONIA: CPT | Performed by: EMERGENCY MEDICINE

## 2023-07-28 PROCEDURE — G1004 CDSM NDSC: HCPCS

## 2023-07-28 PROCEDURE — 80053 COMPREHEN METABOLIC PANEL: CPT | Performed by: EMERGENCY MEDICINE

## 2023-07-28 PROCEDURE — 99285 EMERGENCY DEPT VISIT HI MDM: CPT

## 2023-07-28 PROCEDURE — 70450 CT HEAD/BRAIN W/O DYE: CPT

## 2023-07-28 PROCEDURE — 36415 COLL VENOUS BLD VENIPUNCTURE: CPT | Performed by: EMERGENCY MEDICINE

## 2023-07-28 PROCEDURE — 84439 ASSAY OF FREE THYROXINE: CPT | Performed by: EMERGENCY MEDICINE

## 2023-07-28 PROCEDURE — G0425 INPT/ED TELECONSULT30: HCPCS | Performed by: GENERAL PRACTICE

## 2023-07-28 PROCEDURE — 99223 1ST HOSP IP/OBS HIGH 75: CPT | Performed by: INTERNAL MEDICINE

## 2023-07-28 PROCEDURE — 87186 SC STD MICRODIL/AGAR DIL: CPT | Performed by: EMERGENCY MEDICINE

## 2023-07-28 PROCEDURE — 87086 URINE CULTURE/COLONY COUNT: CPT | Performed by: EMERGENCY MEDICINE

## 2023-07-28 RX ORDER — LISINOPRIL 20 MG/1
20 TABLET ORAL DAILY
Status: DISCONTINUED | OUTPATIENT
Start: 2023-07-29 | End: 2023-07-31 | Stop reason: HOSPADM

## 2023-07-28 RX ORDER — ASCORBIC ACID 500 MG
1000 TABLET ORAL DAILY
Status: DISCONTINUED | OUTPATIENT
Start: 2023-07-28 | End: 2023-07-31 | Stop reason: HOSPADM

## 2023-07-28 RX ORDER — ENOXAPARIN SODIUM 100 MG/ML
40 INJECTION SUBCUTANEOUS DAILY
Status: DISCONTINUED | OUTPATIENT
Start: 2023-07-28 | End: 2023-07-31 | Stop reason: HOSPADM

## 2023-07-28 RX ORDER — ASPIRIN 81 MG/1
81 TABLET, CHEWABLE ORAL DAILY
Status: DISCONTINUED | OUTPATIENT
Start: 2023-07-28 | End: 2023-07-31 | Stop reason: HOSPADM

## 2023-07-28 RX ORDER — DEXTROSE AND SODIUM CHLORIDE 5; .9 G/100ML; G/100ML
75 INJECTION, SOLUTION INTRAVENOUS CONTINUOUS
Status: DISCONTINUED | OUTPATIENT
Start: 2023-07-28 | End: 2023-07-29

## 2023-07-28 RX ORDER — CEFTRIAXONE 1 G/50ML
1000 INJECTION, SOLUTION INTRAVENOUS EVERY 24 HOURS
Status: DISCONTINUED | OUTPATIENT
Start: 2023-07-29 | End: 2023-07-30

## 2023-07-28 RX ORDER — CITALOPRAM 20 MG/1
10 TABLET ORAL DAILY
Status: DISCONTINUED | OUTPATIENT
Start: 2023-07-28 | End: 2023-07-31 | Stop reason: HOSPADM

## 2023-07-28 RX ORDER — EZETIMIBE 10 MG/1
10 TABLET ORAL DAILY
Status: DISCONTINUED | OUTPATIENT
Start: 2023-07-28 | End: 2023-07-31 | Stop reason: HOSPADM

## 2023-07-28 RX ORDER — HYDROCHLOROTHIAZIDE 12.5 MG/1
12.5 TABLET ORAL DAILY
Status: DISCONTINUED | OUTPATIENT
Start: 2023-07-29 | End: 2023-07-29

## 2023-07-28 RX ORDER — CEFTRIAXONE 1 G/50ML
1000 INJECTION, SOLUTION INTRAVENOUS ONCE
Status: COMPLETED | OUTPATIENT
Start: 2023-07-28 | End: 2023-07-28

## 2023-07-28 RX ORDER — MELATONIN
1000 DAILY
Status: DISCONTINUED | OUTPATIENT
Start: 2023-07-28 | End: 2023-07-31 | Stop reason: HOSPADM

## 2023-07-28 RX ORDER — ATORVASTATIN CALCIUM 40 MG/1
40 TABLET, FILM COATED ORAL
Status: DISCONTINUED | OUTPATIENT
Start: 2023-07-28 | End: 2023-07-31 | Stop reason: HOSPADM

## 2023-07-28 RX ORDER — ACETAMINOPHEN 325 MG/1
650 TABLET ORAL EVERY 6 HOURS PRN
Status: DISCONTINUED | OUTPATIENT
Start: 2023-07-28 | End: 2023-07-31 | Stop reason: HOSPADM

## 2023-07-28 RX ORDER — METOPROLOL SUCCINATE 25 MG/1
12.5 TABLET, EXTENDED RELEASE ORAL DAILY
Status: DISCONTINUED | OUTPATIENT
Start: 2023-07-28 | End: 2023-07-31 | Stop reason: HOSPADM

## 2023-07-28 RX ADMIN — EZETIMIBE 10 MG: 10 TABLET ORAL at 12:57

## 2023-07-28 RX ADMIN — Medication 1000 UNITS: at 12:57

## 2023-07-28 RX ADMIN — ASPIRIN 81 MG 81 MG: 81 TABLET ORAL at 12:57

## 2023-07-28 RX ADMIN — CITALOPRAM HYDROBROMIDE 10 MG: 20 TABLET ORAL at 12:57

## 2023-07-28 RX ADMIN — CEFTRIAXONE 1000 MG: 1 INJECTION, SOLUTION INTRAVENOUS at 08:37

## 2023-07-28 RX ADMIN — OXYCODONE HYDROCHLORIDE AND ACETAMINOPHEN 1000 MG: 500 TABLET ORAL at 12:57

## 2023-07-28 RX ADMIN — DEXTROSE AND SODIUM CHLORIDE 75 ML/HR: 5; .9 INJECTION, SOLUTION INTRAVENOUS at 12:56

## 2023-07-28 RX ADMIN — LEVOTHYROXINE SODIUM 175 MCG: 125 TABLET ORAL at 12:56

## 2023-07-28 RX ADMIN — ENOXAPARIN SODIUM 40 MG: 40 INJECTION SUBCUTANEOUS at 12:56

## 2023-07-28 RX ADMIN — METOPROLOL SUCCINATE 12.5 MG: 25 TABLET, EXTENDED RELEASE ORAL at 12:56

## 2023-07-28 RX ADMIN — ACETAMINOPHEN 325MG 650 MG: 325 TABLET ORAL at 20:54

## 2023-07-28 NOTE — CONSULTS
Consultation - Geriatrics   [facility] 70 y.o. female MRN: [id number]  Unit/Bed#: E5 -01 Encounter: [id number]      Assessment/Plan    Dementia  • Patient with no known history of dementia  • Most recent TSH on 07/28/2023 noted to be 10.272  • No recent vitamin B12 level  • Most recent vitamin D, 25-hydroxy on 01/11/2023 noted to be 19.0  • Recommend checking vitamin b12, vitamin D, 25-hydroxy  • CT of the head on 07/28/2023 revealed   o Parenchyma: no intracranial mass, decreased attenuation is noted in periventricular and subcortical white matter demonstrating an appearance that is statistically most likely to represent mild microangiopathic change. No acute intracranial abnormality.   • No MOCA charted in epic  • Recommend OT check MOCA once patient more medically stable to obtain a baseline  • Keep physically, mentally, and socially active     Cognitive Screening   • No history of prior memory issues or cognitive impairment   • Most recent TSH on 07/28/2023 noted to be 10.272  • No recent vitamin b-12 level  • Recommend checking vitamin b-12 and vitamin d, 25-hydroxy   • No MOCA charted in epic   • Keep physically, mentally, and socially active      Delirium   • Baseline mentation: alert and oriented x 3  • Current mentation: alert and oriented x 3, oriented to person, place, time  • Patient is at high risk secondary to age, hospitalization, loss of appetite, UTI requiring antibiotics  • Maintain delirium precautions   • Provide redirection, reorientation, and distraction techniques  • Maintain fall and safety precautions   • Assist with ADLs/IADLs  • Avoid deliriogenic medications such as tramadol, benzodiazepines, anticholinergics, benadryl  • Treat pain using geriatric pain protocol   • Encourage oral hydration and nutrition   • Monitor for constipation and urinary retention   • Implement sleep hygiene and limit night time interuptions   • Maintain sleep-wake cycle   • Encourage early and frequent mobilization   • Encourage participation in group activities  • No recent EKG documented  • Would avoid benzodiazepines such as Ativan as these can worsen delirium     Deconditioning   • Baseline function: ADLs and IADLs  • Patient is at increased risk for deconditioning secondary to hospitalization, weakness, infection, anxiety, depression, loss of appetite, recent weight loss  • Family reporting patient has lost 15 lbs   • Continue to optimize diet, hydration, and mobility for healing   • GFR 94 on 07/28/23  • Encourage increased PO intake  • Monitor for signs of interactions with IV antibiotics to treat UTI infection  • Monitor for signs and symptoms of infection, dehydration, DVT, and skin breakdown    Frailty   • Most recent albumin on 07/28/2023 noted to be 4.5  • Consider nutrition consult  • Encourage protein supplementation d/t poor appetite and recent weight loss secondary to depression    Ambulatory Dysfunction/Falls  • No recent falls   • Ambulates independently at baseline   • Assess patient frequently for physical needs, encourage use of assistant devices as needed and directed by PT/OT  • Identify cognitive and physical deficits and behaviors that affect risk of falls  • Consider moving patient closer to nursing station to monitor more closely for impulsive behavior which may increase risk of falls  • [place] fall and safety precautions   • Educate patient/family on patient safety including physical limitations and importance of using call bell for assistance   • Modify environment to reduce risk of injury including disconnecting from pole when not in use, ensuring adequate lighting in room and restroom, ensuring that path to restroom is clear and free of trip hazards  • PT/OT consulted to assist with strengthening/mobility and assist with discharge planning to appropriate level of care  • Out of bed as tolerated    Impaired Vision   • Patient does/does not have vision impairment  • Patient does/does not wear eyeglasses  • Recommend use of corrective lenses at all appropriate times  • Encourage adequate lighting and encourage use of assistance with ambulation  • Keep personal belongings close to avoid reaching  • Encourage appropriate footwear at all times  • Recommend large font for printed materials provided to patient    Impaired Hearing   • Patient does not have hearing impairment   • Patient does not have hearing aids  • Hearing impairment strongly correlated with depression, cognitive impairment, delirium and falls in the older adult  • Speak face to face  • Use clear dictation and enunciation of words    Loss of Appetite   · Family reports recent 15 lb weight loss  · Recent reports at PCP of GERD symptoms  · Not currently on PPI  · Recent depression   • Ensure meal consistency is appropriate for all abilities   • Consider nutrition consult   • Consider protein supplementation    Elimination   • Patient continent of bowel and bladder at baseline  • Patient does not have difficulty voiding   • No documented bowel movement since admission  • Monitor for constipation and urinary retention   • Recommend bowel regimen  Miralax daily prn  Senna-S 8.6-50 mg po daily at bedtime    Insomnia   • Patient notes to not be sleeping well at home    • Does not take any medication for sleep at baseline   • First line is behavioral therapy   • Avoid sedative hypnotics including benzodiazepines and benadryl  • Encourage staying awake during the day   • Encourage daytime activities and morning exercise   • Decrease or eliminate daytime naps   • Avoid caffeine especially during late afternoon and evening hours  • Establish a nighttime routine  • Implement sleep hygiene and limit nighttime interruptions  • Can consider melatonin 3 mg daily at bedtime for sleep if needed     Anxiety/Depression  • Patient has history of anxiety/depression   • PHQ-9=25  • JOANNA-7=21  • Does take medication   • Mood appears scared, anxious, depressed on exam today   • Family reports patient has not left house  • Continue current medication regimen  o Citalopram 10 mg po daily  o Patient recently started on medication by PCP on 07/21/23  • Continue supportive care   • Recommend checking vitamin b-12, vitamin b-6, vitamin b-1, and folate    Hypothyroidism  · Recent TSH on 07/28/2023 noted to be 10.272  · PCP notes uncontrolled secondary to medication non-adherence   · Patient currently taking levothyroxine 150 mcg po daily    UTI  · Found to have UTI in ED  · UA showing positive nitrites, large leukocytes  · Culture results in process  · Patient was started on ceftriaxone IV 1000 mg Q24  · Monitor for signs of changes in bowel movements  · Patient with elevated T-bili-1.11    Home Safety  · Currently lives with son  · Recent PCP appointment on 07/21/23 reveals patient with suicidal ideation no suicide attempt  · Was started on citalopram 10 mg po daily  · Ambulatory referral to psychology       Home Medication Review   [facility] [address] [place] [address], [phone]  Ascorbic acid 1000 mg po daily  Aspirin 81 mg po daily  Atorvastatin 40 mg po daily after dinner  Benazepril-hydrochlorothiazide 20-12.5 mg po daily  Cholecalciferol 50 mcg (2000 units) po daily  Citalopram 10 mg po daily  Ezetimibe 10 mg po daily  Levothyroxine 175 mcg po daily  Metoprolol succinate 12.5 mg po daily  Multiple vitamins-minerals 1 tablet po daily    I have personally reviewed this medication list with the patients pharmacy listed above. History of Present Illness   Physician Requesting Consult: Halie Aleman DO  Reason for Consult / Principal Problem: age-related cognitive decline  Hx and PE limited by: poor-historian, decreased concentration  HPI: Mary Meza is a 70y.o. year old female who presents with worsening confusion and psychomotor decline. Patient's family reports patient's depression and anxiety have been worse.   She was recently seen by her PCP on 07/21/23, family then concerned about her depression and anxiety. PCP starting citalopram 10 mg po daily. Patient with medical history to include, not limited to, hypothyroidism, hyperlipidemia, hypertension, adjustment disorder with anxiety, depression, and acute metabolism encephalopathy. Upon entering patient's room, she is resting comfortably in bed. She is alert and oriented x 3. Able to answer questions. Slow to answer and seems withdrawn. She has periods of tearfulness throughout our conversation. She states she would like to go home. States she should not be here. Understands she is at the hospital.  States she does work at a . Conversation is limited due to patient being tearful with decreased concentration. Spoke with nursing staff, Nacho Gonzalez regarding patient, able to provide patient information. Inpatient consult to Gerontology  Consult performed by: SOLO Majano  Consult ordered by: Mag Guzman DO          Review of Systems   Constitutional: Positive for activity change and appetite change. Negative for fatigue, fever and unexpected weight change. HENT: Negative for congestion. Eyes: Negative for pain, discharge and visual disturbance. Respiratory: Negative for cough and shortness of breath. Cardiovascular: Negative for chest pain and palpitations. Gastrointestinal: Negative for abdominal pain, constipation and diarrhea. Genitourinary: Negative for difficulty urinating, dysuria, hematuria and urgency. Musculoskeletal: Negative for arthralgias. Skin: Negative for color change. Neurological: Negative for syncope and weakness. Psychiatric/Behavioral: Positive for confusion, decreased concentration, dysphoric mood and sleep disturbance. The patient is nervous/anxious. All other systems reviewed and are negative.       Historical Information   Past Medical History:   Diagnosis Date   • Acute MI ([address]) 08/12/2012   • Anemia    • Anxiety 07/21/2023 • CAD (coronary artery disease)    • Hyperlipidemia    • Hypertension    • Myocardial infarction ([address]) 2002   • Thyroid disease    • Ventricular fibrillation ([address]) 2012     Past Surgical History:   Procedure Laterality Date   • COLONOSCOPY     • CORONARY ARTERY BYPASS GRAFT       Social History   Social History     Substance and Sexual Activity   Alcohol Use No     Social History     Substance and Sexual Activity   Drug Use No     Social History     Tobacco Use   Smoking Status Former   • Packs/day: 0.25   • Years: 22.00   • Total pack years: 5.50   • Types: Cigarettes   • Start date: 1971   • Quit date: 1993   • Years since quittin.5   • Passive exposure: Never   Smokeless Tobacco Never   Tobacco Comments    no passive smoke exposure         Family History: non-contributory    Meds/Allergies   Current meds:   Current Facility-Administered Medications   Medication Dose Route Frequency   • ascorbic acid (VITAMIN C) tablet 1,000 mg  1,000 mg Oral Daily   • aspirin chewable tablet 81 mg  81 mg Oral Daily   • atorvastatin (LIPITOR) tablet 40 mg  40 mg Oral Daily With Dinner   • [START ON 2023] cefTRIAXone (ROCEPHIN) IVPB (premix in dextrose) 1,000 mg 50 mL  1,000 mg Intravenous Q24H   • cholecalciferol (VITAMIN D3) tablet 1,000 Units  1,000 Units Oral Daily   • citalopram (CeleXA) tablet 10 mg  10 mg Oral Daily   • dextrose 5 % and sodium chloride 0.9 % infusion  75 mL/hr Intravenous Continuous   • enoxaparin (LOVENOX) subcutaneous injection 40 mg  40 mg Subcutaneous Daily   • ezetimibe (ZETIA) tablet 10 mg  10 mg Oral Daily   • [START ON 2023] lisinopril (ZESTRIL) tablet 20 mg  20 mg Oral Daily    And   • [START ON 2023] hydrochlorothiazide (HYDRODIURIL) tablet 12.5 mg  12.5 mg Oral Daily   • levothyroxine tablet 175 mcg  175 mcg Oral Early Morning   • metoprolol succinate (TOPROL-XL) 24 hr tablet 12.5 mg  12.5 mg Oral Daily   • [START ON 2023] multivitamin stress formula tablet 1 tablet  1 tablet Oral Daily        Allergies   Allergen Reactions   • Cat Hair Extract Sneezing       Objective   Vitals: Blood pressure 113/62, pulse 55, temperature 97.5 °F (36.4 °C), temperature source Oral, resp. rate 19, height 4' 11.5" (1.511 m), weight 52.2 kg (115 lb 1.3 oz), SpO2 97 %, not currently breastfeeding. ,Body mass index is 22.85 kg/m². Physical Exam  Vitals reviewed. Constitutional:       General: She is not in acute distress. Appearance: Normal appearance. She is not ill-appearing. HENT:      Head: Normocephalic. Right Ear: Tympanic membrane normal.      Left Ear: Tympanic membrane normal.      Nose: Nose normal. No congestion. Mouth/Throat:      Mouth: Mucous membranes are moist.      Pharynx: Oropharynx is clear. Eyes:      General:         Right eye: No discharge. Left eye: No discharge. Extraocular Movements: Extraocular movements intact. Conjunctiva/sclera: Conjunctivae normal.      Pupils: Pupils are equal, round, and reactive to light. Cardiovascular:      Rate and Rhythm: Normal rate and regular rhythm. Pulses: Normal pulses. Heart sounds: Normal heart sounds. Pulmonary:      Effort: Pulmonary effort is normal. No respiratory distress. Breath sounds: Normal breath sounds. Chest:      Chest wall: No tenderness. Abdominal:      General: Bowel sounds are normal.      Palpations: Abdomen is soft. Tenderness: There is no abdominal tenderness. Musculoskeletal:         General: No swelling. Normal range of motion. Cervical back: Normal range of motion. Right lower leg: No edema. Left lower leg: No edema. Skin:     General: Skin is warm and dry. Neurological:      Mental Status: She is alert and oriented to person, place, and time. Mental status is at baseline. Motor: No weakness.    Psychiatric:         Behavior: Behavior normal.         Lab Results:   Results from last 7 days   Lab Units 07/28/23  0821   WBC Thousand/uL 5.42   HEMOGLOBIN g/dL 14.5   HEMATOCRIT % 42.4   PLATELETS Thousands/uL 295        Results from last 7 days   Lab Units 07/28/23  0821   POTASSIUM mmol/L 3.8   CHLORIDE mmol/L 103   CO2 mmol/L 26   BUN mg/dL 17   CREATININE mg/dL 0.63   CALCIUM mg/dL 9.9   ALK PHOS U/L 57   ALT U/L 37   AST U/L 35       Imaging Studies: I have personally reviewed pertinent reports. EKG, Pathology, and Other Studies: I have personally reviewed pertinent reports. VTE Prophylaxis: Enoxaparin (Lovenox)    Code Status: Level 1 - Full Code      Please note:  Voice-recognition software may have been used in the preparation of this document. Occasional wrong word or "sound-alike" substitutions may have occurred due to the inherent limitations of voice recognition software. Interpretation should be guided by context.

## 2023-07-28 NOTE — ASSESSMENT & PLAN NOTE
· Started on citalopram without much improvement yet.   Profound anxiety and has not really left the house  · Has had weight loss since May due to increased anxiety and poor intake   · Consult to psychiatry placed

## 2023-07-28 NOTE — PLAN OF CARE
Problem: Potential for Falls  Goal: Patient will remain free of falls  Description: INTERVENTIONS:  - Educate patient/family on patient safety including physical limitations  - Instruct patient to call for assistance with activity   - Consult OT/PT to assist with strengthening/mobility   - Keep Call bell within reach  - Keep bed low and locked with side rails adjusted as appropriate  - Keep care items and personal belongings within reach  - Initiate and maintain comfort rounds  - Make Fall Risk Sign visible to staff  - Offer Toileting every  Hours, in advance of need  - Initiate/Maintain alarm  - Obtain necessary fall risk management equipment:   - Apply yellow socks and bracelet for high fall risk patients  - Consider moving patient to room near nurses station  Outcome: Progressing     Problem: PAIN - ADULT  Goal: Verbalizes/displays adequate comfort level or baseline comfort level  Description: Interventions:  - Encourage patient to monitor pain and request assistance  - Assess pain using appropriate pain scale  - Administer analgesics based on type and severity of pain and evaluate response  - Implement non-pharmacological measures as appropriate and evaluate response  - Consider cultural and social influences on pain and pain management  - Notify physician/advanced practitioner if interventions unsuccessful or patient reports new pain  Outcome: Progressing     Problem: INFECTION - ADULT  Goal: Absence or prevention of progression during hospitalization  Description: INTERVENTIONS:  - Assess and monitor for signs and symptoms of infection  - Monitor lab/diagnostic results  - Monitor all insertion sites, i.e. indwelling lines, tubes, and drains  - Monitor endotracheal if appropriate and nasal secretions for changes in amount and color  - [place] appropriate cooling/warming therapies per order  - Administer medications as ordered  - Instruct and encourage patient and family to use good hand hygiene technique  - Identify and instruct in appropriate isolation precautions for identified infection/condition  Outcome: Progressing  Goal: Absence of fever/infection during neutropenic period  Description: INTERVENTIONS:  - Monitor WBC    Outcome: Progressing     Problem: SAFETY ADULT  Goal: Patient will remain free of falls  Description: INTERVENTIONS:  - Educate patient/family on patient safety including physical limitations  - Instruct patient to call for assistance with activity   - Consult OT/PT to assist with strengthening/mobility   - Keep Call bell within reach  - Keep bed low and locked with side rails adjusted as appropriate  - Keep care items and personal belongings within reach  - Initiate and maintain comfort rounds  - Make Fall Risk Sign visible to staff  - Offer Toileting every  Hours, in advance of need  - Initiate/Maintain alarm  - Obtain necessary fall risk management equipment:   - Apply yellow socks and bracelet for high fall risk patients  - Consider moving patient to room near nurses station  Outcome: Progressing  Goal: Maintain or return to baseline ADL function  Description: INTERVENTIONS:  -  Assess patient's ability to carry out ADLs; assess patient's baseline for ADL function and identify physical deficits which impact ability to perform ADLs (bathing, care of mouth/teeth, toileting, grooming, dressing, etc.)  - Assess/evaluate cause of self-care deficits   - Assess range of motion  - Assess patient's mobility; develop plan if impaired  - Assess patient's need for assistive devices and provide as appropriate  - Encourage maximum independence but intervene and supervise when necessary  - Involve family in performance of ADLs  - Assess for home care needs following discharge   - Consider OT consult to assist with ADL evaluation and planning for discharge  - Provide patient education as appropriate  Outcome: Progressing  Goal: Maintains/Returns to pre admission functional level  Description: INTERVENTIONS:  - Perform BMAT or MOVE assessment daily.   - Set and communicate daily mobility goal to care team and patient/family/caregiver. - Collaborate with rehabilitation services on mobility goals if consulted  - Perform Range of Motion  times a day. - Reposition patient every  hours. - Dangle patient  times a day  - Stand patient  times a day  - Ambulate patient  times a day  - Out of bed to chair  times a day   - Out of bed for meals times a day  - Out of bed for toileting  - Record patient progress and toleration of activity level   Outcome: Progressing     Problem: DISCHARGE PLANNING  Goal: Discharge to home or other facility with appropriate resources  Description: INTERVENTIONS:  - Identify barriers to discharge w/patient and caregiver  - Arrange for needed discharge resources and transportation as appropriate  - Identify discharge learning needs (meds, wound care, etc.)  - Arrange for interpretive services to assist at discharge as needed  - Refer to Case Management Department for coordinating discharge planning if the patient needs post-hospital services based on physician/advanced practitioner order or complex needs related to functional status, cognitive ability, or social support system  Outcome: Progressing     Problem: Knowledge Deficit  Goal: Patient/family/caregiver demonstrates understanding of disease process, treatment plan, medications, and discharge instructions  Description: Complete learning assessment and assess knowledge base.   Interventions:  - Provide teaching at level of understanding  - Provide teaching via preferred learning methods  Outcome: Progressing

## 2023-07-28 NOTE — ASSESSMENT & PLAN NOTE
History of CAD status post CABG hypertension and hypothyroidism presents with worsening confusion and psychomotor decline/retardation  · Has had worsening depression over the past several weeks. Started on citalopram last week without improvement  · Found to have UTI in ED. Continue IV fluids and ceftriaxone.   · Consult psychiatry, gerontology, and neurology as patient has profound depression with evidence of pseudodementia

## 2023-07-28 NOTE — H&P
[address] [facility]  H&P  Name: Ava Lema 70 y.o. female I MRN: [id number]  Unit/Bed#: ED-18 I Date of Admission: 7/28/2023   Date of Service: 7/28/2023 I Hospital Day: 0      Assessment/Plan   * Acute metabolic encephalopathy  Assessment & Plan  History of CAD status post CABG hypertension and hypothyroidism presents with worsening confusion and psychomotor decline/retardation  · Has had worsening depression over the past several weeks. Started on citalopram last week without improvement  · Found to have UTI in ED. Continue IV fluids and ceftriaxone. · Consult psychiatry, gerontology, and neurology as patient has profound depression with evidence of pseudodementia    Adjustment disorder with anxiety  Assessment & Plan  · Started on citalopram without much improvement yet. Profound anxiety and has not really left the house  · Has had weight loss since May due to increased anxiety and poor intake   · Consult to psychiatry placed    Hypertension  Assessment & Plan  · Stable continue benazepril/HCT and metoprolol    Hyperlipidemia  Assessment & Plan  · Continue atorvastatin    Hypothyroidism  Assessment & Plan  · TSH elevated. Continue current dose levothyroxine. Chronic coronary artery disease  Assessment & Plan  · CAD with history of CABG. No chest pain. · Follows with Dr. Elliott Pinedo. · Continue aspirin and atorvastatin    VTE Pharmacologic Prophylaxis: VTE Score: 4 Moderate Risk (Score 3-4) - Pharmacological DVT Prophylaxis Ordered: enoxaparin (Lovenox). Code Status: Level 1 - Full Code  Discussion with family: Updated  (daughter) at bedside. Anticipated Length of Stay: Patient will be admitted on an inpatient basis with an anticipated length of stay of greater than 2 midnights secondary to Encephalopathy. Total Time Spent on Date of Encounter in care of patient:  This time was spent on one or more of the following: performing physical exam; counseling and coordination of care; obtaining or reviewing history; documenting in the medical record; reviewing/ordering tests, medications or procedures; communicating with other healthcare professionals and discussing with patient's family/caregivers. Chief Complaint:     Altered Mental Status (For past 10 days, patient has been more confused. Confusion has been getting progressively worse. Pt's depression and anxiety have been worse too. Was seen at dr recently and they started an antidepressant .)    History of Present Illness:    Marques Roblero is a 70 y.o. female with a past medical history of CAD status post CABG hypothyroidism hypertension hyperlipidemia and depression who presents with worsening confusion. Both daughters are at bedside to help provide history. During her well visit with PCP in May she was doing well. Over the past 10 to 14 days she has had a mental decline where she has become slowed with drawn and lacked interest.  She has had poor oral intake as well and has lost 15 pounds. She was seen by PCP and started on citalopram which does not appear to have made an impact yet. The patient otherwise denies any chest pain shortness of breath nausea vomiting or diarrhea. She does have dysuria and was found to have UTI in ED. She recently picked up a part-time job doing  work which she does enjoy but otherwise is semiretired. Prior to this event, the patient was completely independent of all ADLs including driving and grocery shopping. She now just days at home    Review of Systems:  Review of Systems   Constitutional: Positive for fatigue and unexpected weight change (15 pound weight loss since between May and July). Negative for chills and fever. HENT: Negative for facial swelling and trouble swallowing. Eyes: Negative for photophobia, pain and visual disturbance. Respiratory: Negative for shortness of breath. Cardiovascular: Negative for chest pain and palpitations. Gastrointestinal: Negative for abdominal distention, abdominal pain, diarrhea, nausea and vomiting. Genitourinary: Positive for dysuria and urgency. Negative for hematuria. Musculoskeletal: Negative for back pain and myalgias. Skin: Negative for rash. Neurological: Negative for seizures, speech difficulty and numbness. Psychiatric/Behavioral: Positive for confusion, decreased concentration, dysphoric mood and sleep disturbance. Negative for agitation, hallucinations, self-injury and suicidal ideas. The patient is nervous/anxious. All other systems reviewed and are negative. Past Medical and Surgical History:   Past Medical History:   Diagnosis Date   • Acute MI ([address]) 08/12/2012   • Anemia    • Anxiety 07/21/2023   • CAD (coronary artery disease)    • Hyperlipidemia    • Hypertension    • Myocardial infarction ([address] [facility]) 03/26/2002   • Thyroid disease    • Ventricular fibrillation ([address] [facility]) 08/12/2012     Past Surgical History:   Procedure Laterality Date   • COLONOSCOPY  2013   • CORONARY ARTERY BYPASS GRAFT  2003     Meds/Allergies: Allergies: Allergies   Allergen Reactions   • Cat Hair Extract Sneezing     Prior to Admission Medications   Prescriptions Last Dose Informant Patient Reported? Taking?    Ascorbic Acid (vitamin C) 1000 MG tablet Past Week Self Yes Yes   Sig: Take 1,000 mg by mouth daily   Blood Pressure KIT  Self No No   Sig: Use in the morning   Cholecalciferol 50 MCG (2000 UT) CAPS Past Week Self No Yes   Sig: TAKE 1 CAPSULE BY MOUTH EVERY DAY   aspirin 81 mg chewable tablet Past Week Self Yes Yes   Sig: Chew 81 mg daily   atorvastatin (LIPITOR) 40 mg tablet Past Week Self No Yes   Sig: TAKE 1 TABLET BY MOUTH EVERY DAY   benazepril-hydrochlorthiazide (LOTENSIN HCT) 20-12.5 MG per tablet Unknown Self No No   Sig: TAKE 1 TABLET BY MOUTH EVERY DAY   citalopram (CeleXA) 10 mg tablet Past Week  No Yes   Sig: Take 1 tablet (10 mg total) by mouth daily   ezetimibe (ZETIA) 10 mg tablet Past Week Self No Yes   Sig: Take 1 tablet (10 mg total) by mouth daily   levothyroxine (Euthyrox) 175 mcg tablet Past Week Self No Yes   Sig: Take 1 tablet (175 mcg total) by mouth daily in the early morning   metoprolol succinate (TOPROL-XL) 25 mg 24 hr tablet Past Week Self No Yes   Sig: Take 0.5 tablets (12.5 mg total) by mouth daily   multivitamin-iron-minerals-folic acid (CENTRUM) chewable tablet Past Week Self Yes Yes   Sig: Chew 1 tablet daily      Facility-Administered Medications: None     Social History:     Social History     Socioeconomic History   • Marital status:      Spouse name: Not on file   • Number of children: Not on file   • Years of education: Not on file   • Highest education level: Not on file   Occupational History   • Occupation:  worker ;  to infant ages     Employer: OTHER     Comment: parttime    Tobacco Use   • Smoking status: Former     Packs/day: 0.25     Years: 22.00     Total pack years: 5.50     Types: Cigarettes     Start date: 1971     Quit date: 1993     Years since quittin.5     Passive exposure: Never   • Smokeless tobacco: Never   • Tobacco comments:     no passive smoke exposure   Vaping Use   • Vaping Use: Never used   Substance and Sexual Activity   • Alcohol use: No   • Drug use: No   • Sexual activity: Not Currently   Other Topics Concern   • Not on file   Social History Narrative   • Not on file     Social Determinants of Health     Financial Resource Strain: Low Risk  (3/18/2022)    Overall Financial Resource Strain (CARDIA)    • Difficulty of Paying Living Expenses: Not very hard   Food Insecurity: No Food Insecurity (3/18/2022)    Hunger Vital Sign    • Worried About Running Out of Food in the Last Year: Never true    • Ran Out of Food in the Last Year: Never true   Transportation Needs: No Transportation Needs (3/18/2022)    PRAPARE - Transportation    • Lack of Transportation (Medical):  No    • Lack of Transportation (Non-Medical): No   Physical Activity: Insufficiently Active (3/20/2023)    Exercise Vital Sign    • Days of Exercise per Week: 3 days    • Minutes of Exercise per Session: 30 min   Stress: No Stress Concern Present (3/18/2022)    [facility]    • Feeling of Stress : Not at all   Social Connections: Moderately Integrated (3/18/2022)    Social Connection and Isolation Panel [NHANES]    • Frequency of Communication with Friends and Family: More than three times a week    • Frequency of Social Gatherings with Friends and Family: More than three times a week    • Attends Islam Services: More than 4 times per year    • Active Member of Clubs or Organizations:  Yes    • Attends Club or Organization Meetings: More than 4 times per year    • Marital Status:    Intimate Partner Violence: Not At Risk (3/18/2022)    Humiliation, Afraid, Rape, and Kick questionnaire    • Fear of Current or Ex-Partner: No    • Emotionally Abused: No    • Physically Abused: No    • Sexually Abused: No   Housing Stability: Unknown (3/18/2022)    Housing Stability Vital Sign    • Unable to Pay for Housing in the Last Year: No    • Number of [address] in the Last Year: Not on file    • Unstable Housing in the Last Year: No     Patient Pre-hospital Living Situation: Lives with son  Patient Pre-hospital Level of Mobility:   Patient Pre-hospital Diet Restrictions:     Family History:  Family History   Problem Relation Age of Onset   • Hypertension Mother    • Stroke Mother         3 strokes   • Heart attack Father    • No Known Problems Sister    • No Known Problems Daughter    • No Known Problems Maternal Grandmother    • No Known Problems Maternal Grandfather    • No Known Problems Paternal Grandmother    • No Known Problems Paternal Grandfather    • No Known Problems Maternal Aunt    • No Known Problems Maternal Aunt    • No Known Problems Maternal Aunt    • Breast cancer Paternal Aunt 52   • No Known Problems Paternal Aunt      Physical Exam:   Vitals:   Blood Pressure: 113/62 (07/28/23 1051)  Pulse: 55 (07/28/23 1051)  Temperature: 97.5 °F (36.4 °C) (07/28/23 1051)  Temp Source: Oral (07/28/23 1051)  Respirations: 19 (07/28/23 1051)  Height: 4' 11.5" (151.1 cm) (07/28/23 1051)  Weight - Scale: 52.2 kg (115 lb 1.3 oz) (07/28/23 1051)  SpO2: 97 % (07/28/23 1051)    Physical Exam  Vitals reviewed. Constitutional:       General: She is not in acute distress. Appearance: Normal appearance. HENT:      Head: Atraumatic. Mouth/Throat:      Mouth: Mucous membranes are moist.   Eyes:      General: No scleral icterus. Extraocular Movements: Extraocular movements intact. Pupils: Pupils are equal, round, and reactive to light. Cardiovascular:      Rate and Rhythm: Regular rhythm. Heart sounds: Normal heart sounds. Pulmonary:      Breath sounds: Normal breath sounds. No wheezing. Abdominal:      General: Bowel sounds are normal.      Palpations: Abdomen is soft. Tenderness: There is no guarding or rebound. Musculoskeletal:         General: No swelling. Cervical back: Normal range of motion. Skin:     General: Skin is warm. Neurological:      Mental Status: She is alert. Psychiatric:         Attention and Perception: She is inattentive. Mood and Affect: Mood is depressed. Affect is flat. Behavior: Behavior is slowed and withdrawn. Cognition and Memory: She exhibits impaired recent memory. Lab Results: I have personally reviewed pertinent reports.     Results from last 7 days   Lab Units 07/28/23  0821   WBC Thousand/uL 5.42   HEMOGLOBIN g/dL 14.5   HEMATOCRIT % 42.4   PLATELETS Thousands/uL 295   NEUTROS PCT % 67   LYMPHS PCT % 21   MONOS PCT % 11   EOS PCT % 1     Results from last 7 days   Lab Units 07/28/23  0821   SODIUM mmol/L 138   POTASSIUM mmol/L 3.8   CHLORIDE mmol/L 103   CO2 mmol/L 26   ANION GAP mmol/L 9   BUN mg/dL 17 CREATININE mg/dL 0.63   CALCIUM mg/dL 9.9   ALBUMIN g/dL 4.5   TOTAL BILIRUBIN mg/dL 1.11*   ALK PHOS U/L 57   ALT U/L 37   AST U/L 35   EGFR ml/min/1.73sq m 94   GLUCOSE RANDOM mg/dL 125          Results from last 7 days   Lab Units 07/28/23  0823   COLOR UA  Yellow   CLARITY UA  Turbid   SPEC GRAV UA  1.027   PH UA  5.5   LEUKOCYTES UA  Large*   NITRITE UA  Positive*   GLUCOSE UA mg/dl Negative   KETONES UA mg/dl 10 (1+)*   BILIRUBIN UA  Negative   BLOOD UA  Negative      Results from last 7 days   Lab Units 07/28/23  0823   RBC UA /hpf 4-10*   WBC UA /hpf Innumerable*   EPITHELIAL CELLS WET PREP /hpf Moderate*   BACTERIA UA /hpf Innumerable*            Lines/Drains  Invasive Devices     Peripheral Intravenous Line  Duration           Peripheral IV 07/28/23 Left Antecubital <1 day                Imaging: I have personally reviewed pertinent films in PACS  CT head without contrast    Result Date: 7/28/2023  Impression: No acute intracranial abnormality. Workstation performed: [id number]       EKG, Pathology, and Other Studies Reviewed on Admission:       ** Please Note: This note has been constructed using a voice recognition system.  **

## 2023-07-28 NOTE — ASSESSMENT & PLAN NOTE
· CAD with history of CABG. No chest pain. · Follows with Dr. Elliott Pinedo.   · Continue aspirin and atorvastatin

## 2023-07-28 NOTE — ED PROVIDER NOTES
History  Chief Complaint   Patient presents with   • Altered Mental Status     For past 10 days, patient has been more confused. Confusion has been getting progressively worse. Pt's depression and anxiety have been worse too. Was seen at  recently and they started an antidepressant . 29-year-old female with a past medical history of hypertension, hyperlipidemia, CAD, hypothyroidism, and depression brought in for evaluation after 10 days of altered mental status. Per patient's family, she has been confused over the past 10 days, and has had some strange behaviors. They have also noticed that the patient's depression has been worse than usual recently. The states that the patient has complained of headaches daily over this time as well. Patient has not been sleeping as well as she normally does, and she has also not been eating as much as usual.  Patient's depression medications were recently changed, otherwise no new medication changes. Patient does endorse some recent dysuria, but denies any difficulties with urination. She denies any focal numbness, tingling, or weakness. She has not had any known fevers, cough, shortness of breath, chest pain, abdominal pain, nausea, vomiting, diarrhea, or other associated symptoms. Patient's mother did reportedly have memory issues which started when she was in her 46s, otherwise no known family history of dementia or other memory issues. Prior to Admission Medications   Prescriptions Last Dose Informant Patient Reported? Taking?    Ascorbic Acid (vitamin C) 1000 MG tablet Past Week Self Yes Yes   Sig: Take 1,000 mg by mouth daily   Blood Pressure KIT  Self No No   Sig: Use in the morning   Cholecalciferol 50 MCG (2000 UT) CAPS Past Week Self No Yes   Sig: TAKE 1 CAPSULE BY MOUTH EVERY DAY   aspirin 81 mg chewable tablet Past Week Self Yes Yes   Sig: Chew 81 mg daily   atorvastatin (LIPITOR) 40 mg tablet Past Week Self No Yes   Sig: TAKE 1 TABLET BY MOUTH EVERY DAY   benazepril-hydrochlorthiazide (LOTENSIN HCT) 20-12.5 MG per tablet Unknown Self No No   Sig: TAKE 1 TABLET BY MOUTH EVERY DAY   citalopram (CeleXA) 10 mg tablet Past Week  No Yes   Sig: Take 1 tablet (10 mg total) by mouth daily   ezetimibe (ZETIA) 10 mg tablet Past Week Self No Yes   Sig: Take 1 tablet (10 mg total) by mouth daily   levothyroxine (Euthyrox) 175 mcg tablet Past Week Self No Yes   Sig: Take 1 tablet (175 mcg total) by mouth daily in the early morning   metoprolol succinate (TOPROL-XL) 25 mg 24 hr tablet Past Week Self No Yes   Sig: Take 0.5 tablets (12.5 mg total) by mouth daily   multivitamin-iron-minerals-folic acid (CENTRUM) chewable tablet Past Week Self Yes Yes   Sig: Chew 1 tablet daily      Facility-Administered Medications: None       Past Medical History:   Diagnosis Date   • Acute MI ([address]) 8/12/2012   • Anemia    • CAD (coronary artery disease)    • Hyperlipidemia    • Hypertension    • Myocardial infarction ([address]) 03/26/2002   • Thyroid disease    • Ventricular fibrillation ([address]) 8/12/2012       Past Surgical History:   Procedure Laterality Date   • COLONOSCOPY  2013   • CORONARY ARTERY BYPASS GRAFT  2003       Family History   Problem Relation Age of Onset   • Hypertension Mother    • Stroke Mother         3 strokes   • Heart attack Father    • No Known Problems Sister    • No Known Problems Daughter    • No Known Problems Maternal Grandmother    • No Known Problems Maternal Grandfather    • No Known Problems Paternal Grandmother    • No Known Problems Paternal Grandfather    • No Known Problems Maternal Aunt    • No Known Problems Maternal Aunt    • No Known Problems Maternal Aunt    • Breast cancer Paternal Aunt 53   • No Known Problems Paternal Aunt      I have reviewed and agree with the history as documented.     E-Cigarette/Vaping   • E-Cigarette Use Never User      E-Cigarette/Vaping Substances   • Nicotine No    • THC No    • CBD No    • Flavoring No    • Other No    • Unknown No      Social History     Tobacco Use   • Smoking status: Former     Packs/day: 0.25     Years: 22.00     Total pack years: 5.50     Types: Cigarettes     Start date: 1971     Quit date: 1993     Years since quittin.5     Passive exposure: Never   • Smokeless tobacco: Never   • Tobacco comments:     no passive smoke exposure   Vaping Use   • Vaping Use: Never used   Substance Use Topics   • Alcohol use: No   • Drug use: No       Review of Systems   Constitutional: Negative for fever. Respiratory: Negative for cough and shortness of breath. Cardiovascular: Negative for chest pain. Gastrointestinal: Negative for abdominal pain, diarrhea, nausea and vomiting. Genitourinary: Positive for dysuria. Negative for difficulty urinating. Neurological: Positive for headaches. Negative for weakness and numbness. Psychiatric/Behavioral: Positive for confusion and dysphoric mood. Negative for suicidal ideas. All other systems reviewed and are negative. Physical Exam  Physical Exam  Vitals and nursing note reviewed. Constitutional:       General: She is awake. She is not in acute distress. Appearance: She is not toxic-appearing. HENT:      Head: Normocephalic and atraumatic. Eyes:      General: Vision grossly intact. Gaze aligned appropriately. Cardiovascular:      Rate and Rhythm: Normal rate and regular rhythm. Pulmonary:      Effort: Pulmonary effort is normal. No respiratory distress. Musculoskeletal:      Cervical back: Full passive range of motion without pain and neck supple. Skin:     General: Skin is warm and dry. Neurological:      General: No focal deficit present. Mental Status: She is alert and oriented to person, place, and time. Comments: Patient seems slightly confused but is fully oriented. Psychiatric:         Mood and Affect: Mood is depressed. Behavior: Behavior is withdrawn.          Vital Signs  ED Triage Vitals [23 0745] Temperature Pulse Respirations Blood Pressure SpO2   97.9 °F (36.6 °C) 81 16 114/77 97 %      Temp Source Heart Rate Source Patient Position - Orthostatic VS BP Location FiO2 (%)   Oral Monitor Sitting Right arm --      Pain Score       No Pain           Vitals:    07/28/23 0745   BP: 114/77   Pulse: 81   Patient Position - Orthostatic VS: Sitting         Visual Acuity      ED Medications  Medications   cefTRIAXone (ROCEPHIN) IVPB (premix in dextrose) 1,000 mg 50 mL (0 mg Intravenous Stopped 7/28/23 0901)       Diagnostic Studies  Results Reviewed     Procedure Component Value Units Date/Time    TSH, 3rd generation with Free T4 reflex [[id number]]  (Abnormal) Collected: 07/28/23 0821    Lab Status: Final result Specimen: Blood from Arm, Right Updated: 07/28/23 0904     TSH 3RD GENERATON 10.272 uIU/mL     T4, free [[id number]] Collected: 07/28/23 0821    Lab Status: In process Specimen: Blood from Arm, Right Updated: 07/28/23 0904    Urine Microscopic [[id number]]  (Abnormal) Collected: 07/28/23 0823    Lab Status: Final result Specimen: Urine, Clean Catch Updated: 07/28/23 0854     RBC, UA 4-10 /hpf      WBC, UA Innumerable /hpf      Epithelial Cells Moderate /hpf      Bacteria, UA Innumerable /hpf      MUCUS THREADS Innumerable     WBC Clumps Present     Renal Epithelial Cells Present    Urine culture [[id number]] Collected: 07/28/23 0823    Lab Status:  In process Specimen: Urine, Clean Catch Updated: 07/28/23 0854    Comprehensive metabolic panel [[id number]]  (Abnormal) Collected: 07/28/23 0821    Lab Status: Final result Specimen: Blood from Arm, Right Updated: 07/28/23 0848     Sodium 138 mmol/L      Potassium 3.8 mmol/L      Chloride 103 mmol/L      CO2 26 mmol/L      ANION GAP 9 mmol/L      BUN 17 mg/dL      Creatinine 0.63 mg/dL      Glucose 125 mg/dL      Calcium 9.9 mg/dL      AST 35 U/L      ALT 37 U/L      Alkaline Phosphatase 57 U/L      Total Protein 7.3 g/dL      Albumin 4.5 g/dL      Total Bilirubin 1.11 mg/dL      eGFR 94 ml/min/1.73sq m     Narrative:      National Kid[employer] Disease Foundation guidelines for Chronic Kidney Disease (CKD):   •  Stage 1 with normal or high GFR (GFR > 90 mL/min/1.73 square meters)  •  Stage 2 Mild CKD (GFR = 60-89 mL/min/1.73 square meters)  •  Stage 3A Moderate CKD (GFR = 45-59 mL/min/1.73 square meters)  •  Stage 3B Moderate CKD (GFR = 30-44 mL/min/1.73 square meters)  •  Stage 4 Severe CKD (GFR = 15-29 mL/min/1.73 square meters)  •  Stage 5 End Stage CKD (GFR <15 mL/min/1.73 square meters)  Note: GFR calculation is accurate only with a steady state creatinine    Ammonia [[id number]]  (Abnormal) Collected: 07/28/23 0821    Lab Status: Final result Specimen: Blood from Arm, Right Updated: 07/28/23 0847     Ammonia 15 umol/L     UA w Reflex to Microscopic w Reflex to Culture [[id number]]  (Abnormal) Collected: 07/28/23 0823    Lab Status: Final result Specimen: Urine, Clean Catch Updated: 07/28/23 0833     Color, UA Yellow     Clarity, UA Turbid     Specific Gravity, UA 1.027     pH, UA 5.5     Leukocytes, UA Large     Nitrite, UA Positive     Protein, UA 30 (1+) mg/dl      Glucose, UA Negative mg/dl      Ketones, UA 10 (1+) mg/dl      Urobilinogen, UA <2.0 mg/dl      Bilirubin, UA Negative     Occult Blood, UA Negative    CBC and differential [[id number]] Collected: 07/28/23 0821    Lab Status: Final result Specimen: Blood from Arm, Right Updated: 07/28/23 0831     WBC 5.42 Thousand/uL      RBC 4.57 Million/uL      Hemoglobin 14.5 g/dL      Hematocrit 42.4 %      MCV 93 fL      MCH 31.7 pg      MCHC 34.2 g/dL      RDW 12.1 %      MPV 9.7 fL      Platelets 070 Thousands/uL      nRBC 0 /100 WBCs      Neutrophils Relative 67 %      Immat GRANS % 0 %      Lymphocytes Relative 21 %      Monocytes Relative 11 %      Eosinophils Relative 1 %      Basophils Relative 0 %      Neutrophils Absolute 3.62 Thousands/µL      Immature Grans Absolute 0.02 Thousand/uL      Lymphocytes Absolute 1.12 Thousands/µL      Monocytes Absolute 0.61 Thousand/µL      Eosinophils Absolute 0.04 Thousand/µL      Basophils Absolute 0.01 Thousands/µL                  CT head without contrast   Final Result by Joshua Kline MD (07/28 3092)      No acute intracranial abnormality. Workstation performed: [id number]                    Procedures  Procedures         ED Course  ED Course as of 07/28/23 0930 Fri Jul 28, 2023   0833 Leukocytes, UA(!): Large   0833 Nitrite, UA(!): Positive   0851 TOTAL BILIRUBIN(!): 1.11  Mildly elevated, but LFTs normal.    0855 WBC, UA(!): Innumerable   0855 Bacteria, UA(!): Innumerable   0911 TSH 3RD GENERATON(!): 10.272                                             Medical Decision Making  70year old female brought in for evaluation with 10 days of worsening confusion and anxiety/depression. On exam, patient with normal vitals, in no acute distress. Patient oriented x3 but seems to be slightly confused, also noted to be withdrawn with a depressed mood. Exam otherwise unremarkable. Differential diagnosis includes, but is not limited to, intracranial pathology, infectious etiology, metabolic disturbances, hypothyroidism, undiagnosed dementia, or other acute process. Patient evaluated with CT of the head, CBC, CMP, TSH, and UA. Patient's CT scan of the head negative for acute intracranial pathology. Patient's UA concerning for a UTI with large leukocytes, positive nitrites, and innumerable WBCs and bacteria. IV rocephin ordered for treatment. Patient's TSH also noted to be high, but T4 results pending. Other labs unremarkable. UTI and possible thyroid dysfunction may be contributing to patient's neurologic symptoms, but cannot rule out early onset dementia or other chronic conditions. Spoke with medicine attending who agreed to admit patient for further evaluation and treatment. Patient admitted in stable condition.      UTI (urinary tract infection): acute illness or injury  Amount and/or Complexity of Data Reviewed  Labs: ordered. Decision-making details documented in ED Course. Radiology: ordered. Risk  Prescription drug management. Decision regarding hospitalization. Disposition  Final diagnoses: Altered mental status   UTI (urinary tract infection)     Time reflects when diagnosis was documented in both MDM as applicable and the Disposition within this note     Time User Action Codes Description Comment    7/28/2023  9:28 AM Claudia Spittle Add [R41.82] Altered mental status     7/28/2023  9:29 AM Lorraine Caballero [place] Add [N39.0] UTI (urinary tract infection)     7/28/2023  9:29 AM Claudia Spittle Modify [R41.82] Altered mental status     7/28/2023  9:29 AM Claudia Spittle Modify [N39.0] UTI (urinary tract infection)       ED Disposition     ED Disposition   Admit    Condition   Stable    Date/Time   Fri Jul 28, 2023  9:29 AM    Comment   Case was discussed with RILEY and the patient's admission status was agreed to be Admission Status: inpatient status to the service of Dr. Firman Goodpasture. Follow-up Information    None         Patient's Medications   Discharge Prescriptions    No medications on file       No discharge procedures on file.     PDMP Review     None          ED Provider  Electronically Signed by           Yosi Chan DO  08/03/23 5456

## 2023-07-29 DIAGNOSIS — E55.9 VITAMIN D DEFICIENCY: ICD-10-CM

## 2023-07-29 LAB
25(OH)D3 SERPL-MCNC: 24.5 NG/ML (ref 30–100)
ALBUMIN SERPL BCP-MCNC: 3.7 G/DL (ref 3.5–5)
ALP SERPL-CCNC: 45 U/L (ref 34–104)
ALT SERPL W P-5'-P-CCNC: 27 U/L (ref 7–52)
ANION GAP SERPL CALCULATED.3IONS-SCNC: 9 MMOL/L
AST SERPL W P-5'-P-CCNC: 22 U/L (ref 13–39)
BILIRUB SERPL-MCNC: 0.8 MG/DL (ref 0.2–1)
BUN SERPL-MCNC: 10 MG/DL (ref 5–25)
CALCIUM SERPL-MCNC: 8.5 MG/DL (ref 8.4–10.2)
CHLORIDE SERPL-SCNC: 106 MMOL/L (ref 96–108)
CO2 SERPL-SCNC: 23 MMOL/L (ref 21–32)
CREAT SERPL-MCNC: 0.46 MG/DL (ref 0.6–1.3)
ERYTHROCYTE [DISTWIDTH] IN BLOOD BY AUTOMATED COUNT: 11.9 % (ref 11.6–15.1)
GFR SERPL CREATININE-BSD FRML MDRD: 104 ML/MIN/1.73SQ M
GLUCOSE SERPL-MCNC: 144 MG/DL (ref 65–140)
HCT VFR BLD AUTO: 36.7 % (ref 34.8–46.1)
HGB BLD-MCNC: 12.5 G/DL (ref 11.5–15.4)
MCH RBC QN AUTO: 31.5 PG (ref 26.8–34.3)
MCHC RBC AUTO-ENTMCNC: 34.1 G/DL (ref 31.4–37.4)
MCV RBC AUTO: 92 FL (ref 82–98)
PLATELET # BLD AUTO: 243 THOUSANDS/UL (ref 149–390)
PMV BLD AUTO: 10.1 FL (ref 8.9–12.7)
POTASSIUM SERPL-SCNC: 3.3 MMOL/L (ref 3.5–5.3)
PROT SERPL-MCNC: 5.8 G/DL (ref 6.4–8.4)
RBC # BLD AUTO: 3.97 MILLION/UL (ref 3.81–5.12)
SODIUM SERPL-SCNC: 138 MMOL/L (ref 135–147)
VIT B12 SERPL-MCNC: 335 PG/ML (ref 180–914)
WBC # BLD AUTO: 4.28 THOUSAND/UL (ref 4.31–10.16)

## 2023-07-29 PROCEDURE — 99232 SBSQ HOSP IP/OBS MODERATE 35: CPT | Performed by: INTERNAL MEDICINE

## 2023-07-29 PROCEDURE — G0407 INPT/TELE FOLLOW UP 25: HCPCS | Performed by: GENERAL PRACTICE

## 2023-07-29 PROCEDURE — 85027 COMPLETE CBC AUTOMATED: CPT | Performed by: INTERNAL MEDICINE

## 2023-07-29 PROCEDURE — 82607 VITAMIN B-12: CPT | Performed by: INTERNAL MEDICINE

## 2023-07-29 PROCEDURE — 80053 COMPREHEN METABOLIC PANEL: CPT | Performed by: INTERNAL MEDICINE

## 2023-07-29 PROCEDURE — 82306 VITAMIN D 25 HYDROXY: CPT | Performed by: INTERNAL MEDICINE

## 2023-07-29 RX ORDER — POTASSIUM CHLORIDE 20 MEQ/1
40 TABLET, EXTENDED RELEASE ORAL ONCE
Status: COMPLETED | OUTPATIENT
Start: 2023-07-29 | End: 2023-07-29

## 2023-07-29 RX ORDER — LANOLIN ALCOHOL/MO/W.PET/CERES
6 CREAM (GRAM) TOPICAL
Status: DISCONTINUED | OUTPATIENT
Start: 2023-07-29 | End: 2023-07-31 | Stop reason: HOSPADM

## 2023-07-29 RX ADMIN — B-COMPLEX W/ C & FOLIC ACID TAB 1 TABLET: TAB at 09:35

## 2023-07-29 RX ADMIN — CEFTRIAXONE 1000 MG: 1 INJECTION, SOLUTION INTRAVENOUS at 09:35

## 2023-07-29 RX ADMIN — Medication 6 MG: at 02:15

## 2023-07-29 RX ADMIN — HYDROCHLOROTHIAZIDE 12.5 MG: 12.5 TABLET ORAL at 09:35

## 2023-07-29 RX ADMIN — Medication 1000 UNITS: at 09:35

## 2023-07-29 RX ADMIN — EZETIMIBE 10 MG: 10 TABLET ORAL at 09:35

## 2023-07-29 RX ADMIN — ASPIRIN 81 MG 81 MG: 81 TABLET ORAL at 09:35

## 2023-07-29 RX ADMIN — CITALOPRAM HYDROBROMIDE 10 MG: 20 TABLET ORAL at 09:35

## 2023-07-29 RX ADMIN — METOPROLOL SUCCINATE 12.5 MG: 25 TABLET, EXTENDED RELEASE ORAL at 09:35

## 2023-07-29 RX ADMIN — POTASSIUM CHLORIDE 40 MEQ: 1500 TABLET, EXTENDED RELEASE ORAL at 20:28

## 2023-07-29 RX ADMIN — DEXTROSE AND SODIUM CHLORIDE 75 ML/HR: 5; .9 INJECTION, SOLUTION INTRAVENOUS at 09:34

## 2023-07-29 RX ADMIN — LISINOPRIL 20 MG: 20 TABLET ORAL at 09:36

## 2023-07-29 RX ADMIN — ATORVASTATIN CALCIUM 40 MG: 40 TABLET, FILM COATED ORAL at 16:39

## 2023-07-29 RX ADMIN — OXYCODONE HYDROCHLORIDE AND ACETAMINOPHEN 1000 MG: 500 TABLET ORAL at 09:35

## 2023-07-29 RX ADMIN — ENOXAPARIN SODIUM 40 MG: 40 INJECTION SUBCUTANEOUS at 09:34

## 2023-07-29 RX ADMIN — Medication 6 MG: at 23:41

## 2023-07-29 RX ADMIN — LEVOTHYROXINE SODIUM 175 MCG: 125 TABLET ORAL at 05:59

## 2023-07-29 NOTE — PLAN OF CARE
Problem: Potential for Falls  Goal: Patient will remain free of falls  Description: INTERVENTIONS:  - Educate patient/family on patient safety including physical limitations  - Instruct patient to call for assistance with activity   - Consult OT/PT to assist with strengthening/mobility   - Keep Call bell within reach  - Keep bed low and locked with side rails adjusted as appropriate  - Keep care items and personal belongings within reach  - Initiate and maintain comfort rounds  - Make Fall Risk Sign visible to staff  - Offer Toileting every 2 Hours, in advance of need  - Initiate/Maintain bed/chair alarm  - Obtain necessary fall risk management equipment: walker  - Apply yellow socks and bracelet for high fall risk patients  - Consider moving patient to room near nurses station  Outcome: Progressing     Problem: PAIN - ADULT  Goal: Verbalizes/displays adequate comfort level or baseline comfort level  Description: Interventions:  - Encourage patient to monitor pain and request assistance  - Assess pain using appropriate pain scale  - Administer analgesics based on type and severity of pain and evaluate response  - Implement non-pharmacological measures as appropriate and evaluate response  - Consider cultural and social influences on pain and pain management  - Notify physician/advanced practitioner if interventions unsuccessful or patient reports new pain  Outcome: Progressing     Problem: DISCHARGE PLANNING  Goal: Discharge to home or other facility with appropriate resources  Description: INTERVENTIONS:  - Identify barriers to discharge w/patient and caregiver  - Arrange for needed discharge resources and transportation as appropriate  - Identify discharge learning needs (meds, wound care, etc.)  - Arrange for interpretive services to assist at discharge as needed  - Refer to Case Management Department for coordinating discharge planning if the patient needs post-hospital services based on physician/advanced practitioner order or complex needs related to functional status, cognitive ability, or social support system  Outcome: Progressing

## 2023-07-29 NOTE — ASSESSMENT & PLAN NOTE
History of CAD status post CABG hypertension and hypothyroidism presents with worsening confusion and psychomotor decline/retardation  · Has had worsening depression over the past several weeks. Started on citalopram last week without improvement  · Found to have UTI in ED. · Tolerating ceftriaxone. Follow-up on urine culture. · Appreciate gerontology evaluation. Will check MRI of brain  · Psychiatry recommended reevaluation after medical stability.   Due to severe anxiety/depression will see if psychiatry recommends inpatient treatment

## 2023-07-29 NOTE — ASSESSMENT & PLAN NOTE
· Started on citalopram without much improvement yet.   Profound anxiety   · Has had weight loss since May due to increased anxiety and poor intake   · Psychiatry consulted and recommended reevaluation for possible inpatient psychiatric placement

## 2023-07-29 NOTE — PROGRESS NOTES
[facility]  Progress Note  Name: Lorena Florian  MRN: [id number]  Unit/Bed#: E5 -01 I Date of Admission: 7/28/2023   Date of Service: 7/29/2023 I Hospital Day: 1    Assessment/Plan   * Acute metabolic encephalopathy  Assessment & Plan  History of CAD status post CABG hypertension and hypothyroidism presents with worsening confusion and psychomotor decline/retardation  · Has had worsening depression over the past several weeks. Started on citalopram last week without improvement  · Found to have UTI in ED. · Tolerating ceftriaxone. Follow-up on urine culture. · Appreciate gerontology evaluation. Will check MRI of brain  · Psychiatry recommended reevaluation after medical stability. Due to severe anxiety/depression will see if psychiatry recommends inpatient treatment    Adjustment disorder with anxiety  Assessment & Plan  · Started on citalopram without much improvement yet. Profound anxiety   · Has had weight loss since May due to increased anxiety and poor intake   · Psychiatry consulted and recommended reevaluation for possible inpatient psychiatric placement    Hypertension  Assessment & Plan  · Blood pressure is low normal.  · Stop HCTZ  · Continue lisinopril and metoprolol. Hyperlipidemia  Assessment & Plan  · Continue atorvastatin    Hypothyroidism  Assessment & Plan  · TSH elevated but free T4 within limits. · Continue current dose levothyroxine. Chronic coronary artery disease  Assessment & Plan  · CAD with history of CABG. No chest pain. · Follows with Dr. Sanjiv Pitts. · Continue aspirin and atorvastatin    VTE Pharmacologic Prophylaxis: VTE Score: 4 Moderate Risk (Score 3-4) - Pharmacological DVT Prophylaxis Ordered: enoxaparin (Lovenox). Patient Centered Rounds: I have performed bedside rounds with nursing staff today.   Discussions with Specialists or Other Care Team Provider:     Education and Discussions with Family / Patient: Updated contact person (daughter) at bedside. Time Spent for Care: This time was spent on one or more of the following: performing physical exam; counseling and coordination of care; obtaining or reviewing history; documenting in the medical record; reviewing/ordering tests, medications or procedures; communicating with other healthcare professionals and discussing with patient's family/caregivers. Current Length of Stay: 1 day(s)  Current Patient Status: Inpatient   Certification Statement: The patient will continue to require additional inpatient hospital stay due to Psychiatric reevaluation and urine cultures  Discharge Plan: Anticipate discharge in 24-48 hrs to Home versus inpatient psychiatric treatment    Code Status: Level 1 - Full Code      Subjective:   Patient seen and examined. Patient still very depressed and does not feel right. Denies any SI    Objective:   Vitals: Blood pressure 96/53, pulse 75, temperature 98.7 °F (37.1 °C), temperature source Oral, resp. rate 17, height 4' 11.5" (1.511 m), weight 52.2 kg (115 lb 1.3 oz), SpO2 96 %, not currently breastfeeding. Intake/Output Summary (Last 24 hours) at 7/29/2023 1819  Last data filed at 7/29/2023 0906  Gross per 24 hour   Intake 420 ml   Output --   Net 420 ml       Physical Exam  Vitals reviewed. Constitutional:       General: She is not in acute distress. Appearance: Normal appearance. HENT:      Head: Atraumatic. Eyes:      General: No scleral icterus. Extraocular Movements: Extraocular movements intact. Cardiovascular:      Rate and Rhythm: Regular rhythm. Heart sounds: Normal heart sounds. Pulmonary:      Breath sounds: Decreased breath sounds present. No wheezing. Abdominal:      General: Bowel sounds are normal.      Palpations: Abdomen is soft. Tenderness: There is no guarding or rebound. Musculoskeletal:         General: No swelling. Skin:     General: Skin is warm.    Neurological:      Mental Status: She is alert and oriented to person, place, and time. Mental status is at baseline. Psychiatric:         Mood and Affect: Mood normal.       Additional Data:   Labs:  Results from last 7 days   Lab Units 07/29/23  0504 07/28/23  0821   WBC Thousand/uL 4.28* 5.42   HEMOGLOBIN g/dL 12.5 14.5   PLATELETS Thousands/uL 243 295   MCV fL 92 93     Results from last 7 days   Lab Units 07/29/23  0504 07/28/23  0821   SODIUM mmol/L 138 138   POTASSIUM mmol/L 3.3* 3.8   CHLORIDE mmol/L 106 103   CO2 mmol/L 23 26   ANION GAP mmol/L 9 9   BUN mg/dL 10 17   CREATININE mg/dL 0.46* 0.63   CALCIUM mg/dL 8.5 9.9   ALBUMIN g/dL 3.7 4.5   TOTAL BILIRUBIN mg/dL 0.80 1.11*   ALK PHOS U/L 45 57   ALT U/L 27 37   AST U/L 22 35   EGFR ml/min/1.73sq m 104 94   GLUCOSE RANDOM mg/dL 144* 125                                  Results from last 7 days   Lab Units 07/28/23  0821   TSH 3RD GENERATON uIU/mL 10.272*   FREE T4 ng/dL 1.06     * I Have Reviewed All Lab Data Listed Above. Cultures:   Results from last 7 days   Lab Units 07/28/23  0823   URINE CULTURE  70,000-79,000 cfu/ml Gram Negative Ramos Enteric Like*                 Lines/Drains:  Invasive Devices     Peripheral Intravenous Line  Duration           Peripheral IV 07/28/23 Right Antecubital 1 day    Peripheral IV 07/29/23 Left;Ventral (anterior) Forearm <1 day              Telemetry:      Imaging:  Imaging Reports Reviewed Today Include:   CT head without contrast    Result Date: 7/28/2023  Impression: No acute intracranial abnormality.  Workstation performed: [id number]       Scheduled Meds:  Current Facility-Administered Medications   Medication Dose Route Frequency Provider Last Rate   • acetaminophen  650 mg Oral Q6H PRN Behzad Johns PA-C     • vitamin C  1,000 mg Oral Daily Jose Manuel Doe DO     • aspirin  81 mg Oral Daily Jose Manuel Doe DO     • atorvastatin  40 mg Oral Daily With [email or web address], DO     • cefTRIAXone  1,000 mg Intravenous Q24H Bakari Eisenberg DO 1,000 mg (07/29/23 0935)   • cholecalciferol  1,000 Units Oral Daily Bakari Eisenberg, DO     • citalopram  10 mg Oral Daily Bakari Eisenberg, DO     • enoxaparin  40 mg Subcutaneous Daily Celia Kirby DO     • ezetimibe  10 mg Oral Daily Bakari Eisenberg, DO     • lisinopril  20 mg Oral Daily Celia Kirby DO      And   • hydrochlorothiazide  12.5 mg Oral Daily Celia Kirby DO     • levothyroxine  175 mcg Oral Early Morning Bakari Eisenberg DO     • melatonin  6 mg Oral HS PRN Amanda Phillips PA-C     • metoprolol succinate  12.5 mg Oral Daily Celia Kirby DO     • multivitamin stress formula  1 tablet Oral Daily Celia Kirby DO         Today, Patient Was Seen By: Celia Kirby DO    ** Please Note: Dictation voice to text software may have been used in the creation of this document.  **

## 2023-07-29 NOTE — CONSULTS
TeleConsultation - [facility] 70 y.o. female MRN: [id number]  Unit/Bed#: E5 -01 Encounter: [id number]        REQUIRED DOCUMENTATION:     1. This service was provided via Telemedicine. 2. Provider located at [facility].  3. TeleMed provider: Irene Alva MD.  4. Identify all parties in room with patient during tele consult: Patient   5. Patient was then informed that this was a Telemedicine visit and that the exam was being conducted confidentially over secure lines. My office door was closed. No one else was in the room. Patient acknowledged consent and understanding of privacy and security of the Telemedicine visit, and gave us permission to have the assistant stay in the room in order to assist with the history and to conduct the exam.  I informed the patient that I have reviewed their record in Epic and presented the opportunity for them to ask any questions regarding the visit today. The patient agreed to participate. Discussed with Cande Meyers D.O     Assessment/Plan     Present on Admission:  • Adjustment disorder with anxiety  • Chronic coronary artery disease  • Depression, recurrent ([address])  • Hypothyroidism  • Hyperlipidemia  • Hypertension  • Acute metabolic encephalopathy    Assessment:    Unspecified Depressive Disorder     Patient presents with notable psychomotor slowing and depressive symptoms concerning for differential of dementia versus pseudodementia. Patient recently started on citalopram 10 mg daily given recent initiation we will hold off on further dose increases. Recommend vitamin b-12, vitamin b-6, vitamin b-1, and folate as per geriatrics consult. Patient's hypothyroid can also cause similar symptoms and given TSH may be a component of presentation as well as urinary tract infection.   Patient requires further medical stabilization and recommend repeat evaluation pending further medical treatment for consideration for inpatient psychiatric admission given functional impairment and risk for decompensation. Treatment Plan:    Planned Medication Changes:    -None     Current Medications:     Current Facility-Administered Medications   Medication Dose Route Frequency Provider Last Rate   • acetaminophen  650 mg Oral Q6H PRN Afshin Puga PA-C     • vitamin C  1,000 mg Oral Daily Nobie Printers, DO     • aspirin  81 mg Oral Daily Nobie Printers, DO     • atorvastatin  40 mg Oral Daily With PresenceID, DO     • [START ON 7/29/2023] cefTRIAXone  1,000 mg Intravenous Q24H Nobie Printers, DO     • cholecalciferol  1,000 Units Oral Daily Nobie Printers, DO     • citalopram  10 mg Oral Daily Bakari Elgin, DO     • dextrose 5 % and sodium chloride 0.9 %  75 mL/hr Intravenous Continuous Bakari Eisenberg, DO 75 mL/hr (07/28/23 1256)   • enoxaparin  40 mg Subcutaneous Daily Bakari Eisenberg, DO     • ezetimibe  10 mg Oral Daily Bakari Eisenberg, DO     • [START ON 7/29/2023] lisinopril  20 mg Oral Daily Bakari Eisenberg, DO      And   • [START ON 7/29/2023] hydrochlorothiazide  12.5 mg Oral Daily Nobie Printers, DO     • levothyroxine  175 mcg Oral Early Morning Nobie Printers, DO     • metoprolol succinate  12.5 mg Oral Daily Nobie Printers, DO     • [START ON 7/29/2023] multivitamin stress formula  1 tablet Oral Daily Nogayatrie Printers, DO         Risks / Benefits of Treatment:    Risks, benefits, and possible side effects of medications explained to patient and patient verbalizes understanding. Other treatment modalities recommended as indicated:    · psychotherapy      Inpatient consult to Psychiatry  Consult performed by: Anila Porras MD  Consult ordered by: Len Montero DO        Physician Requesting Consult: Len Montero DO  Principal Problem:Acute metabolic encephalopathy    Reason for Consult: Psych Evaluation       History of Present Illness      Patient states that she is in the hospital because she was forgetting things.  Patient is AxOx3 on exam but is a limited historian. Patient states that her thoughts feel slow and notable latency in responding. Patient states that she does not deal with SI no any history of SAs. Patient states that she has not been eating how she should be and has no appetite. Psychiatric Review Of Systems:    sleep: yes  appetite changes: yes  weight changes: no  energy/anergy: no  interest/pleasure/anhedonia: yes  somatic symptoms: no  anxiety/panic: no  lenny: no  guilty/hopeless: no  self injurious behavior/risky behavior: no    Historical Information     Past Psychiatric History:     Psychiatric Hospitalizations:   • No history of past inpatient psychiatric admissions  Outpatient Treatment History:   • Denied  Suicide Attempts:   • None  History of self-harm:   • None  Violence History:   • no  Past Psychiatric medication trials: Denied     Substance Abuse History: Denied         Family Psychiatric History: Denied      Mother: Alzheimer's       Social History:    Education: high school diploma/GED  Learning Disabilities: Denied  Marital history: single  Living arrangement, social support: Family. Occupational History: retired  Functioning Relationships: good support system.   Other Pertinent History: None    Traumatic History:     Abuse: None  Other Traumatic Events: none    Past Medical History:   Diagnosis Date   • Acute MI ([address]) 08/12/2012   • Anemia    • Anxiety 07/21/2023   • CAD (coronary artery disease)    • Hyperlipidemia    • Hypertension    • Myocardial infarction ([address]) 03/26/2002   • Thyroid disease    • Ventricular fibrillation ([address]) 08/12/2012       Medical Review Of Systems:    Review of Systems    Meds/Allergies     all current active meds have been reviewed  Allergies   Allergen Reactions   • Cat Hair Extract Sneezing       Objective     Vital signs in last 24 hours:  Temp:  [97.5 °F (36.4 °C)-97.9 °F (36.6 °C)] 97.8 °F (36.6 °C)  HR:  [55-81] 79  Resp:  [16-19] 19  BP: (105-114)/(60-77) 105/60      Intake/Output Summary (Last 24 hours) at 7/28/2023 2230  Last data filed at 7/28/2023 1830  Gross per 24 hour   Intake 647.5 ml   Output --   Net 647.5 ml       Mental Status Evaluation:    Appearance:  age appropriate   Behavior:  psychomotor retardation   Speech:  soft   Mood:  constricted   Affect:  constricted   Language: naming objects   Thought Process:  logical   Associations intact associations   Thought Content:  normal   Perceptual Disturbances: None   Risk Potential: Suicidal Ideations none  Homicidal Ideations none  Potential for Aggression No   Sensorium:  person, place and time/date   Cognition:  recent memory moderately impaired   Consciousness:  alert    Attention: attention span and concentration were age appropriate   Intellect: within normal limits   Fund of Knowledge: awareness of current events: President   Insight:  limited   Judgment: limited   Muscle Strength:  Muscle Tone: normal NFT  normal   Gait/Station: normal gait/station   Motor Activity: no abnormal movements       Lab Results: I have personally reviewed all pertinent laboratory/tests results.      Most Recent Labs:   Lab Results   Component Value Date    WBC 5.42 07/28/2023    RBC 4.57 07/28/2023    HGB 14.5 07/28/2023    HCT 42.4 07/28/2023     07/28/2023    RDW 12.1 07/28/2023    NEUTROABS 3.62 07/28/2023    SODIUM 138 07/28/2023    K 3.8 07/28/2023     07/28/2023    CO2 26 07/28/2023    BUN 17 07/28/2023    CREATININE 0.63 07/28/2023    GLUC 125 07/28/2023    GLUF 93 05/22/2023    CALCIUM 9.9 07/28/2023    AST 35 07/28/2023    ALT 37 07/28/2023    ALKPHOS 57 07/28/2023    TP 7.3 07/28/2023    ALB 4.5 07/28/2023    TBILI 1.11 (H) 07/28/2023    CHOLESTEROL 233 (H) 05/22/2023    HDL 43 (L) 05/22/2023    TRIG 99 05/22/2023    LDLCALC 170 (H) 05/22/2023    NONHDLC 127 02/05/2019    AMMONIA 15 (L) 07/28/2023    [id number] 10.272 (H) 07/28/2023    FREET4 1.06 07/28/2023    HGBA1C 5.6 05/02/2018     05/02/2018       Imaging Studies: CT head without contrast    Result Date: 7/28/2023  Narrative: CT BRAIN - WITHOUT CONTRAST INDICATION:   Mental status change, persistent or worsening confusion, worsening depression, eval for frontal lobe abnormalities. COMPARISON:  None. TECHNIQUE:  CT examination of the brain was performed. Multiplanar 2D reformatted images were created from the source data. Radiation dose length product (DLP) for this visit:  867 mGy-cm . This examination, like all CT scans performed in the [facility], was performed utilizing techniques to minimize radiation dose exposure, including the use of iterative reconstruction and automated exposure control. IMAGE QUALITY:  Diagnostic. FINDINGS: PARENCHYMA:  No intracranial mass, decreased attenuation is noted in periventricular and subcortical white matter demonstrating an appearance that is statistically most likely to represent mild microangiopathic change. No CT signs of acute infarction. No intracranial mass, mass effect or midline shift. No acute parenchymal hemorrhage. ass effect or midline shift. No CT signs of acute infarction. No acute parenchymal hemorrhage. VENTRICLES AND EXTRA-AXIAL SPACES:  Ventricles and extra-axial CSF spaces are prominent commensurate with the degree of volume loss. No hydrocephalus. No acute extra-axial hemorrhage. VISUALIZED ORBITS: Normal visualized orbits. PARANASAL SINUSES: Normal visualized paranasal sinuses. CALVARIUM AND EXTRACRANIAL SOFT TISSUES:  Normal.     Impression: No acute intracranial abnormality. Workstation performed: [id number]     EKG/Pathology/Other Studies: No results found for: "VENTRATE", "Kristi Backers", "PRINT", "QRSDINT", "QTINT", "QTCINT", "PAXIS", "QRSAXIS", "TWAVEAXIS"     Code Status: Level 1 - Full Code  Advance Directive and Living Will:      Power of :    POLST:      Screenings:    1.  Nutrition Screening  Nutrition Assessment (completed by Staff):      2. Pain Screening  Pain Screening: Pain Assessment  Pain Assessment Tool: 0-10  Pain Score: 5  Pain Location/Orientation: Location: Head    3. Suicide Screening  ED Crisis Suicide Risk Assessment:        Counseling / Coordination of Care: Total floor / unit time spent today 30 minutes. Greater than 50% of total time was spent with the patient and / or family counseling and / or coordination of care. A description of the counseling / coordination of care: Direct Patient Care, Chart Review, and Documentation.

## 2023-07-29 NOTE — CONSULTS
TeleConsultation - [facility] 70 y.o. female MRN: [id number]  Unit/Bed#: E5 -01 Encounter: [id number]        REQUIRED DOCUMENTATION:     1. This service was provided via Telemedicine. 2. Provider located at [facility].  3. TeleMed provider: Susanne Rodas MD.  4. Identify all parties in room with patient during tele consult: Patient   5. Patient was then informed that this was a Telemedicine visit and that the exam was being conducted confidentially over secure lines. My office door was closed. No one else was in the room. Patient acknowledged consent and understanding of privacy and security of the Telemedicine visit, and gave us permission to have the assistant stay in the room in order to assist with the history and to conduct the exam.  I informed the patient that I have reviewed their record in Epic and presented the opportunity for them to ask any questions regarding the visit today. The patient agreed to participate. Discussed with Talisha Srivastava D.O     Assessment/Plan     Present on Admission:  • Adjustment disorder with anxiety  • Chronic coronary artery disease  • Depression, recurrent ([address])  • Hypothyroidism  • Hyperlipidemia  • Hypertension  • Acute metabolic encephalopathy    Assessment:    Unspecified Depressive Disorder     Given patient's persistent depressive symptoms recommend voluntary inpatient psychiatric admission for further evaluation and treatment upon medical stabilization and clearance.     Treatment Plan:    Planned Medication Changes:    -None     Current Medications:     Current Facility-Administered Medications   Medication Dose Route Frequency Provider Last Rate   • acetaminophen  650 mg Oral Q6H PRN Julius Mathias PA-C     • vitamin C  1,000 mg Oral Daily Talisha Srivastava DO     • aspirin  81 mg Oral Daily Talisha Srivastava DO     • atorvastatin  40 mg Oral Daily With 3Touch, DO     • cefTRIAXone  1,000 mg Intravenous Q24H Bakari Eisenberg DO 1,000 mg (07/29/23 0935)   • cholecalciferol  1,000 Units Oral Daily Cande Meyers, DO     • citalopram  10 mg Oral Daily Bakari Eisenberg, DO     • enoxaparin  40 mg Subcutaneous Daily Cande Meyers DO     • ezetimibe  10 mg Oral Daily Bakari Eisenberg, DO     • lisinopril  20 mg Oral Daily Cande Meyers DO      And   • hydrochlorothiazide  12.5 mg Oral Daily Cande Meyers DO     • levothyroxine  175 mcg Oral Early Morning Bakrai Eisenberg DO     • melatonin  6 mg Oral HS PRN Meliton Palmer PA-C     • metoprolol succinate  12.5 mg Oral Daily Cande Meyers, DO     • multivitamin stress formula  1 tablet Oral Daily Cande Meyers DO         Risks / Benefits of Treatment:    Risks, benefits, and possible side effects of medications explained to patient and patient verbalizes understanding. Other treatment modalities recommended as indicated:    · psychotherapy      Consults  Physician Requesting Consult: Cande Meyers DO  Principal Problem:Acute metabolic encephalopathy    Reason for Consult: Psych Evaluation      History of Present Illness      Per Initial Consult  Patient states that she is in the hospital because she was forgetting things. Patient is AxOx3 on exam but is a limited historian. Patient states that her thoughts feel slow and notable latency in responding. Patient states that she does not deal with SI no any history of SAs. Patient states that she has not been eating how she should be and has no appetite. Patient remains a limited historian and reports being told that she needs inpatient psychiatric admission. Patient states she does not feel happy but is unsure what she wants to do but knows she needs help. Patient does report that she feels somewhat improved since initial presentation.       Psychiatric Review Of Systems:    sleep: yes  appetite changes: yes  weight changes: no  energy/anergy: no  interest/pleasure/anhedonia: yes  somatic symptoms: no  anxiety/panic: no  lenny: no  guilty/hopeless: no  self injurious behavior/risky behavior: no     Historical Information     Past Psychiatric History:   Psychiatric Hospitalizations:   • No history of past inpatient psychiatric admissions  Outpatient Treatment History:   • Denied  Suicide Attempts:   • None  History of self-harm:   • None  Violence History:   • no  Past Psychiatric medication trials: Denied      Substance Abuse History: Denied            Family Psychiatric History: Denied      Mother: Alzheimer's         Social History:     Education: high school diploma/GED  Learning Disabilities: Denied  Marital history: single  Living arrangement, social support: Family. Occupational History: retired  Functioning Relationships: good support system.   Other Pertinent History: None     Traumatic History:      Abuse: None  Other Traumatic Events: none    Past Medical History:   Diagnosis Date   • Acute MI ([address]) 08/12/2012   • Anemia    • Anxiety 07/21/2023   • CAD (coronary artery disease)    • Hyperlipidemia    • Hypertension    • Myocardial infarction ([address]) 03/26/2002   • Thyroid disease    • Ventricular fibrillation ([address]) 08/12/2012       Medical Review Of Systems:    Review of Systems    Meds/Allergies     all current active meds have been reviewed  Allergies   Allergen Reactions   • Cat Hair Extract Sneezing       Objective     Vital signs in last 24 hours:  Temp:  [97.7 °F (36.5 °C)-98.7 °F (37.1 °C)] 98.7 °F (37.1 °C)  HR:  [60-75] 75  Resp:  [17-19] 17  BP: ()/(53-85) 96/53      Intake/Output Summary (Last 24 hours) at 7/29/2023 1725  Last data filed at 7/29/2023 5685  Gross per 24 hour   Intake 420 ml   Output --   Net 420 ml       Mental Status Evaluation:  Mental Status Evaluation:     Appearance:  age appropriate   Behavior:  psychomotor retardation   Speech:  soft   Mood:  constricted   Affect:  constricted   Language: naming objects   Thought Process:  logical   Associations intact associations   Thought Content:  normal   Perceptual Disturbances: None   Risk Potential: Suicidal Ideations none  Homicidal Ideations none  Potential for Aggression No   Sensorium:  person, place and time/date   Cognition:  recent memory moderately impaired   Consciousness:  alert    Attention: attention span and concentration were age appropriate   Intellect: within normal limits   Fund of Knowledge: awareness of current events: President   Insight:  limited   Judgment: limited   Muscle Strength:  Muscle Tone: normal NFT  normal   Gait/Station: normal gait/station   Motor Activity: no abnormal movements         Lab Results: I have personally reviewed all pertinent laboratory/tests results. Most Recent Labs:   Lab Results   Component Value Date    WBC 4.28 (L) 07/29/2023    RBC 3.97 07/29/2023    HGB 12.5 07/29/2023    HCT 36.7 07/29/2023     07/29/2023    RDW 11.9 07/29/2023    NEUTROABS 3.62 07/28/2023    SODIUM 138 07/29/2023    K 3.3 (L) 07/29/2023     07/29/2023    CO2 23 07/29/2023    BUN 10 07/29/2023    CREATININE 0.46 (L) 07/29/2023    GLUC 144 (H) 07/29/2023    GLUF 93 05/22/2023    CALCIUM 8.5 07/29/2023    AST 22 07/29/2023    ALT 27 07/29/2023    ALKPHOS 45 07/29/2023    TP 5.8 (L) 07/29/2023    ALB 3.7 07/29/2023    TBILI 0.80 07/29/2023    CHOLESTEROL 233 (H) 05/22/2023    HDL 43 (L) 05/22/2023    TRIG 99 05/22/2023    LDLCALC 170 (H) 05/22/2023    [address] 127 02/05/2019    AMMONIA 15 (L) 07/28/2023    [id number] 10.272 (H) 07/28/2023    FREET4 1.06 07/28/2023    HGBA1C 5.6 05/02/2018     05/02/2018       Imaging Studies: CT head without contrast    Result Date: 7/28/2023  Narrative: CT BRAIN - WITHOUT CONTRAST INDICATION:   Mental status change, persistent or worsening confusion, worsening depression, eval for frontal lobe abnormalities. COMPARISON:  None. TECHNIQUE:  CT examination of the brain was performed. Multiplanar 2D reformatted images were created from the source data.  Radiation dose length product (DLP) for this visit:  867 mGy-cm . This examination, like all CT scans performed in the [facility], was performed utilizing techniques to minimize radiation dose exposure, including the use of iterative reconstruction and automated exposure control. IMAGE QUALITY:  Diagnostic. FINDINGS: PARENCHYMA:  No intracranial mass, decreased attenuation is noted in periventricular and subcortical white matter demonstrating an appearance that is statistically most likely to represent mild microangiopathic change. No CT signs of acute infarction. No intracranial mass, mass effect or midline shift. No acute parenchymal hemorrhage. ass effect or midline shift. No CT signs of acute infarction. No acute parenchymal hemorrhage. VENTRICLES AND EXTRA-AXIAL SPACES:  Ventricles and extra-axial CSF spaces are prominent commensurate with the degree of volume loss. No hydrocephalus. No acute extra-axial hemorrhage. VISUALIZED ORBITS: Normal visualized orbits. PARANASAL SINUSES: Normal visualized paranasal sinuses. CALVARIUM AND EXTRACRANIAL SOFT TISSUES:  Normal.     Impression: No acute intracranial abnormality. Workstation performed: [id number]     EKG/Pathology/Other Studies: No results found for: "VENTRATE", "Selestine Narciso", "PRINT", "QRSDINT", "QTINT", "QTCINT", "PAXIS", "QRSAXIS", "TWAVEAXIS"     Code Status: Level 1 - Full Code  Advance Directive and Living Will:      Power of :    POLST:      Screenings:    1. Nutrition Screening  Nutrition Assessment (completed by Staff):      2. Pain Screening  Pain Screening: Pain Assessment  Pain Assessment Tool: 0-10  Pain Score: 0  Pain Location/Orientation: Location: Head  Hospital Pain Intervention(s): Medication (See MAR)    3. Suicide Screening  ED Crisis Suicide Risk Assessment:        Counseling / Coordination of Care: Total floor / unit time spent today 30 minutes.  Greater than 50% of total time was spent with the patient and / or family counseling and / or coordination of care. A description of the counseling / coordination of care: Direct Patient Care, Chart Review, and Documentation.

## 2023-07-29 NOTE — PLAN OF CARE
Problem: Potential for Falls  Goal: Patient will remain free of falls  Description: INTERVENTIONS:  - Educate patient/family on patient safety including physical limitations  - Instruct patient to call for assistance with activity   - Consult OT/PT to assist with strengthening/mobility   - Keep Call bell within reach  - Keep bed low and locked with side rails adjusted as appropriate  - Keep care items and personal belongings within reach  - Initiate and maintain comfort rounds  - Make Fall Risk Sign visible to staff  - Offer Toileting every  Hours, in advance of need  - Initiate/Maintain alarm  - Obtain necessary fall risk management equipment:   - Apply yellow socks and bracelet for high fall risk patients  - Consider moving patient to room near nurses station  7/29/2023 1948 by Shan Mckeon, RN  Outcome: Progressing  7/29/2023 1947 by Shan Mckeon, RN  Outcome: Progressing

## 2023-07-29 NOTE — ASSESSMENT & PLAN NOTE
· CAD with history of CABG. No chest pain. · Follows with Dr. Susi Panad.   · Continue aspirin and atorvastatin

## 2023-07-29 NOTE — PLAN OF CARE
Problem: Potential for Falls  Goal: Patient will remain free of falls  Description: INTERVENTIONS:  - Educate patient/family on patient safety including physical limitations  - Instruct patient to call for assistance with activity   - Consult OT/PT to assist with strengthening/mobility   - Keep Call bell within reach  - Keep bed low and locked with side rails adjusted as appropriate  - Keep care items and personal belongings within reach  - Initiate and maintain comfort rounds  - Make Fall Risk Sign visible to staff  - Offer Toileting every  Hours, in advance of need  - Initiate/Maintain alarm  - Obtain necessary fall risk management equipment:   - Apply yellow socks and bracelet for high fall risk patients  - Consider moving patient to room near nurses station  Outcome: Progressing     Problem: PAIN - ADULT  Goal: Verbalizes/displays adequate comfort level or baseline comfort level  Description: Interventions:  - Encourage patient to monitor pain and request assistance  - Assess pain using appropriate pain scale  - Administer analgesics based on type and severity of pain and evaluate response  - Implement non-pharmacological measures as appropriate and evaluate response  - Consider cultural and social influences on pain and pain management  - Notify physician/advanced practitioner if interventions unsuccessful or patient reports new pain  Outcome: Progressing     Problem: INFECTION - ADULT  Goal: Absence or prevention of progression during hospitalization  Description: INTERVENTIONS:  - Assess and monitor for signs and symptoms of infection  - Monitor lab/diagnostic results  - Monitor all insertion sites, i.e. indwelling lines, tubes, and drains  - Monitor endotracheal if appropriate and nasal secretions for changes in amount and color  - [place] appropriate cooling/warming therapies per order  - Administer medications as ordered  - Instruct and encourage patient and family to use good hand hygiene technique  - Identify and instruct in appropriate isolation precautions for identified infection/condition  Outcome: Progressing  Goal: Absence of fever/infection during neutropenic period  Description: INTERVENTIONS:  - Monitor WBC    Outcome: Progressing     Problem: SAFETY ADULT  Goal: Patient will remain free of falls  Description: INTERVENTIONS:  - Educate patient/family on patient safety including physical limitations  - Instruct patient to call for assistance with activity   - Consult OT/PT to assist with strengthening/mobility   - Keep Call bell within reach  - Keep bed low and locked with side rails adjusted as appropriate  - Keep care items and personal belongings within reach  - Initiate and maintain comfort rounds  - Make Fall Risk Sign visible to staff  - Offer Toileting every  Hours, in advance of need  - Initiate/Maintain alarm  - Obtain necessary fall risk management equipment:   - Apply yellow socks and bracelet for high fall risk patients  - Consider moving patient to room near nurses station  Outcome: Progressing  Goal: Maintain or return to baseline ADL function  Description: INTERVENTIONS:  -  Assess patient's ability to carry out ADLs; assess patient's baseline for ADL function and identify physical deficits which impact ability to perform ADLs (bathing, care of mouth/teeth, toileting, grooming, dressing, etc.)  - Assess/evaluate cause of self-care deficits   - Assess range of motion  - Assess patient's mobility; develop plan if impaired  - Assess patient's need for assistive devices and provide as appropriate  - Encourage maximum independence but intervene and supervise when necessary  - Involve family in performance of ADLs  - Assess for home care needs following discharge   - Consider OT consult to assist with ADL evaluation and planning for discharge  - Provide patient education as appropriate  Outcome: Progressing  Goal: Maintains/Returns to pre admission functional level  Description: INTERVENTIONS:  - Perform BMAT or MOVE assessment daily.   - Set and communicate daily mobility goal to care team and patient/family/caregiver. - Collaborate with rehabilitation services on mobility goals if consulted  - Perform Range of Motion  times a day. - Reposition patient every  hours. - Dangle patient  times a day  - Stand patient  times a day  - Ambulate patient  times a day  - Out of bed to chair  times a day   - Out of bed for meals times a day  - Out of bed for toileting  - Record patient progress and toleration of activity level   Outcome: Progressing     Problem: DISCHARGE PLANNING  Goal: Discharge to home or other facility with appropriate resources  Description: INTERVENTIONS:  - Identify barriers to discharge w/patient and caregiver  - Arrange for needed discharge resources and transportation as appropriate  - Identify discharge learning needs (meds, wound care, etc.)  - Arrange for interpretive services to assist at discharge as needed  - Refer to Case Management Department for coordinating discharge planning if the patient needs post-hospital services based on physician/advanced practitioner order or complex needs related to functional status, cognitive ability, or social support system  Outcome: Progressing     Problem: Knowledge Deficit  Goal: Patient/family/caregiver demonstrates understanding of disease process, treatment plan, medications, and discharge instructions  Description: Complete learning assessment and assess knowledge base.   Interventions:  - Provide teaching at level of understanding  - Provide teaching via preferred learning methods  Outcome: Progressing

## 2023-07-30 ENCOUNTER — APPOINTMENT (INPATIENT)
Dept: MRI IMAGING | Facility: HOSPITAL | Age: 63
End: 2023-07-30
Payer: MEDICARE

## 2023-07-30 PROBLEM — R90.82 WHITE MATTER ABNORMALITY ON MRI OF BRAIN: Status: ACTIVE | Noted: 2023-07-30

## 2023-07-30 LAB
ANION GAP SERPL CALCULATED.3IONS-SCNC: 6 MMOL/L
BACTERIA UR CULT: ABNORMAL
BUN SERPL-MCNC: 8 MG/DL (ref 5–25)
CALCIUM SERPL-MCNC: 9.5 MG/DL (ref 8.4–10.2)
CHLORIDE SERPL-SCNC: 109 MMOL/L (ref 96–108)
CO2 SERPL-SCNC: 25 MMOL/L (ref 21–32)
CREAT SERPL-MCNC: 0.53 MG/DL (ref 0.6–1.3)
GFR SERPL CREATININE-BSD FRML MDRD: 99 ML/MIN/1.73SQ M
GLUCOSE SERPL-MCNC: 141 MG/DL (ref 65–140)
POTASSIUM SERPL-SCNC: 3.9 MMOL/L (ref 3.5–5.3)
SODIUM SERPL-SCNC: 140 MMOL/L (ref 135–147)

## 2023-07-30 PROCEDURE — 70551 MRI BRAIN STEM W/O DYE: CPT

## 2023-07-30 PROCEDURE — 80048 BASIC METABOLIC PNL TOTAL CA: CPT | Performed by: INTERNAL MEDICINE

## 2023-07-30 PROCEDURE — 99232 SBSQ HOSP IP/OBS MODERATE 35: CPT | Performed by: INTERNAL MEDICINE

## 2023-07-30 RX ORDER — CEPHALEXIN 500 MG/1
500 CAPSULE ORAL 4 TIMES DAILY
Status: DISCONTINUED | OUTPATIENT
Start: 2023-07-31 | End: 2023-07-31 | Stop reason: HOSPADM

## 2023-07-30 RX ORDER — QUETIAPINE FUMARATE 25 MG/1
25 TABLET, FILM COATED ORAL
Status: DISCONTINUED | OUTPATIENT
Start: 2023-07-30 | End: 2023-07-31 | Stop reason: HOSPADM

## 2023-07-30 RX ADMIN — Medication 1000 UNITS: at 10:53

## 2023-07-30 RX ADMIN — B-COMPLEX W/ C & FOLIC ACID TAB 1 TABLET: TAB at 10:53

## 2023-07-30 RX ADMIN — CEFTRIAXONE 1000 MG: 1 INJECTION, SOLUTION INTRAVENOUS at 10:54

## 2023-07-30 RX ADMIN — ATORVASTATIN CALCIUM 40 MG: 40 TABLET, FILM COATED ORAL at 16:37

## 2023-07-30 RX ADMIN — LISINOPRIL 20 MG: 20 TABLET ORAL at 10:53

## 2023-07-30 RX ADMIN — EZETIMIBE 10 MG: 10 TABLET ORAL at 10:53

## 2023-07-30 RX ADMIN — METOPROLOL SUCCINATE 12.5 MG: 25 TABLET, EXTENDED RELEASE ORAL at 10:53

## 2023-07-30 RX ADMIN — OXYCODONE HYDROCHLORIDE AND ACETAMINOPHEN 1000 MG: 500 TABLET ORAL at 10:53

## 2023-07-30 RX ADMIN — Medication 6 MG: at 20:44

## 2023-07-30 RX ADMIN — QUETIAPINE FUMARATE 25 MG: 25 TABLET ORAL at 20:42

## 2023-07-30 RX ADMIN — ASPIRIN 81 MG 81 MG: 81 TABLET ORAL at 10:53

## 2023-07-30 RX ADMIN — LEVOTHYROXINE SODIUM 175 MCG: 125 TABLET ORAL at 05:15

## 2023-07-30 RX ADMIN — CITALOPRAM HYDROBROMIDE 10 MG: 20 TABLET ORAL at 10:53

## 2023-07-30 NOTE — CASE MANAGEMENT
Case Management Discharge Planning Note    Patient name Chantelle Farley  Location [facility] [address] [address] [address] MRN [id number]  : 1960 Date 2023       Current Admission Date: 2023  Current Admission Diagnosis:Acute metabolic encephalopathy   Patient Active Problem List    Diagnosis Date Noted   • Acute metabolic encephalopathy    • Anxiety 2023   • Depression, recurrent ([address]) 2023   • Immunization refused 2023   • LVH (left ventricular hypertrophy) 2022   • Adjustment disorder with anxiety 2019   • Class 1 obesity due to excess calories with serious comorbidity and body mass index (BMI) of 30.0 to 30.9 in adult 2019   • Encounter for well adult exam with abnormal findings 2019   • Postsurgical aortocoronary bypass status 2019   • Impaired fasting glucose 2016   • Vitamin D deficiency 2016   • Anemia 2012   • Chronic coronary artery disease 2012   • Hypothyroidism 2012   • Hyperlipidemia 2012   • Hypertension 2012      LOS (days): 2  Geometric Mean LOS (GMLOS) (days): 3.80  Days to GMLOS:1.7     OBJECTIVE:  Risk of Unplanned Readmission Score: 8.14         Current admission status: Inpatient   Preferred Pharmacy:   [address], [address] [place] [address]  Phone: [phone] Fax: [phone]    Primary Care Provider: Melly Holcomb MD    Primary Insurance: MEDICARE  Secondary Insurance: [place] [employer] AND [employer] [facility]    DISCHARGE DETAILS:     CM communicated with [facility]. Patient has profound depression and is agreeable to inpatient psych, per psychiatry rec. Patient signed a 12. CM faxed the 201 to [facility] for review. There are no beds available today. CM dept will continue to follow.

## 2023-07-30 NOTE — PLAN OF CARE
Problem: Potential for Falls  Goal: Patient will remain free of falls  Description: INTERVENTIONS:  - Educate patient/family on patient safety including physical limitations  - Instruct patient to call for assistance with activity   - Consult OT/PT to assist with strengthening/mobility   - Keep Call bell within reach  - Keep bed low and locked with side rails adjusted as appropriate  - Keep care items and personal belongings within reach  - Initiate and maintain comfort rounds  - Apply yellow socks and bracelet for high fall risk patients  - Consider moving patient to room near nurses station  Outcome: Progressing     Problem: PAIN - ADULT  Goal: Verbalizes/displays adequate comfort level or baseline comfort level  Description: Interventions:  - Encourage patient to monitor pain and request assistance  - Assess pain using appropriate pain scale  - Administer analgesics based on type and severity of pain and evaluate response  - Implement non-pharmacological measures as appropriate and evaluate response  - Consider cultural and social influences on pain and pain management  - Notify physician/advanced practitioner if interventions unsuccessful or patient reports new pain  Outcome: Progressing     Problem: INFECTION - ADULT  Goal: Absence or prevention of progression during hospitalization  Description: INTERVENTIONS:  - Assess and monitor for signs and symptoms of infection  - Monitor lab/diagnostic results  - Monitor all insertion sites, i.e. indwelling lines, tubes, and drains  - Monitor endotracheal if appropriate and nasal secretions for changes in amount and color  - [place] appropriate cooling/warming therapies per order  - Administer medications as ordered  - Instruct and encourage patient and family to use good hand hygiene technique  - Identify and instruct in appropriate isolation precautions for identified infection/condition  Outcome: Progressing  Goal: Absence of fever/infection during neutropenic period  Description: INTERVENTIONS:  - Monitor WBC    Outcome: Progressing     Problem: SAFETY ADULT  Goal: Patient will remain free of falls  Description: INTERVENTIONS:  - Educate patient/family on patient safety including physical limitations  - Instruct patient to call for assistance with activity   - Consult OT/PT to assist with strengthening/mobility   - Keep Call bell within reach  - Keep bed low and locked with side rails adjusted as appropriate  - Keep care items and personal belongings within reach  - Initiate and maintain comfort rounds  - Make Fall Risk Sign visible to staff  - Apply yellow socks and bracelet for high fall risk patients  - Consider moving patient to room near nurses station  Outcome: Progressing  Goal: Maintain or return to baseline ADL function  Description: INTERVENTIONS:  -  Assess patient's ability to carry out ADLs; assess patient's baseline for ADL function and identify physical deficits which impact ability to perform ADLs (bathing, care of mouth/teeth, toileting, grooming, dressing, etc.)  - Assess/evaluate cause of self-care deficits   - Assess range of motion  - Assess patient's mobility; develop plan if impaired  - Assess patient's need for assistive devices and provide as appropriate  - Encourage maximum independence but intervene and supervise when necessary  - Involve family in performance of ADLs  - Assess for home care needs following discharge   - Consider OT consult to assist with ADL evaluation and planning for discharge  - Provide patient education as appropriate  Outcome: Progressing  Goal: Maintains/Returns to pre admission functional level  Description: INTERVENTIONS:  - Perform BMAT or MOVE assessment daily.   - Set and communicate daily mobility goal to care team and patient/family/caregiver.    - Collaborate with rehabilitation services on mobility goals if consulted  - Out of bed for toileting  - Record patient progress and toleration of activity level Outcome: Progressing     Problem: DISCHARGE PLANNING  Goal: Discharge to home or other facility with appropriate resources  Description: INTERVENTIONS:  - Identify barriers to discharge w/patient and caregiver  - Arrange for needed discharge resources and transportation as appropriate  - Identify discharge learning needs (meds, wound care, etc.)  - Arrange for interpretive services to assist at discharge as needed  - Refer to Case Management Department for coordinating discharge planning if the patient needs post-hospital services based on physician/advanced practitioner order or complex needs related to functional status, cognitive ability, or social support system  Outcome: Progressing     Problem: Knowledge Deficit  Goal: Patient/family/caregiver demonstrates understanding of disease process, treatment plan, medications, and discharge instructions  Description: Complete learning assessment and assess knowledge base.   Interventions:  - Provide teaching at level of understanding  - Provide teaching via preferred learning methods  Outcome: Progressing

## 2023-07-30 NOTE — ASSESSMENT & PLAN NOTE
· Started on citalopram without much improvement yet. Profound anxiety   · Has had weight loss since May due to increased anxiety and poor intake   · Psychiatry consulted and recommended inpatient psychiatric placement  · 201 signed. Patient agreeable. · Of note is having insomnia.   Will add quetiapine to help maintain sleep/wake cycle

## 2023-07-30 NOTE — ASSESSMENT & PLAN NOTE
· Blood pressure is low normal due to poor intake  · Stopped HCTZ  · Continue lisinopril and metoprolol.

## 2023-07-30 NOTE — ASSESSMENT & PLAN NOTE
History of CAD status post CABG hypertension and hypothyroidism presents with worsening confusion and psychomotor decline/retardation  · Has had worsening depression over the past several weeks. Started on citalopram last week without improvement  · Found to have UTI in ED. · Currently on ceftriaxone. Urine culture with multiple sensitivities. Will transition to cephalexin for 2 more days to complete a 5-day course. · Appreciate gerontology evaluation. MRI brain did demonstrate old infarcts which patient was not aware of. · Psychiatry on reevaluation recommended inpatient psychiatry placement. Patient and family agreeable signed 12. At this time medically stable for transfer to psychiatric facility when bed available.

## 2023-07-30 NOTE — PROGRESS NOTES
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   · Continue aspirin and atorvastatin as prescribed for CAD  · Discussed with family and advised blood pressure control

## 2023-07-30 NOTE — PROGRESS NOTES
[facility]  Progress Note  Name: Elie Collet  MRN: [id number]  Unit/Bed#: E5 -01 I Date of Admission: 7/28/2023   Date of Service: 7/30/2023 I Hospital Day: 2    Assessment/Plan   * Acute metabolic encephalopathy  Assessment & Plan  History of CAD status post CABG hypertension and hypothyroidism presents with worsening confusion and psychomotor decline/retardation  · Has had worsening depression over the past several weeks. Started on citalopram last week without improvement  · Found to have UTI in ED. · Currently on ceftriaxone. Urine culture with multiple sensitivities. Will transition to cephalexin for 2 more days to complete a 5-day course. · Appreciate gerontology evaluation. MRI brain did demonstrate old infarcts which patient was not aware of. · Psychiatry on reevaluation recommended inpatient psychiatry placement. Patient and family agreeable signed 12. At this time medically stable for transfer to psychiatric facility when bed available. Adjustment disorder with anxiety  Assessment & Plan  · Started on citalopram without much improvement yet. Profound anxiety   · Has had weight loss since May due to increased anxiety and poor intake   · Psychiatry consulted and recommended inpatient psychiatric placement  · 201 signed. Patient agreeable. · Of note is having insomnia. Will add quetiapine to help maintain sleep/wake cycle    Hypertension  Assessment & Plan  · Blood pressure is low normal due to poor intake  · Stopped HCTZ  · Continue lisinopril and metoprolol. Hyperlipidemia  Assessment & Plan  · Continue atorvastatin    Hypothyroidism  Assessment & Plan  · TSH elevated but free T4 within limits. · Continue current dose levothyroxine. Chronic coronary artery disease  Assessment & Plan  · CAD with history of CABG. No chest pain. · Follows with Dr. Beni Santana.   · Continue aspirin and atorvastatin    VTE Pharmacologic Prophylaxis: VTE Score: 4 Moderate Risk (Score 3-4) - Pharmacological DVT Prophylaxis Ordered: enoxaparin (Lovenox). Patient Centered Rounds: I have performed bedside rounds with nursing staff today. Discussions with Specialists or Other Care Team Provider: Case management    Education and Discussions with Family / Patient: Updated  (daughter) at bedside. Time Spent for Care: This time was spent on one or more of the following: performing physical exam; counseling and coordination of care; obtaining or reviewing history; documenting in the medical record; reviewing/ordering tests, medications or procedures; communicating with other healthcare professionals and discussing with patient's family/caregivers. Current Length of Stay: 2 day(s)  Current Patient Status: Inpatient   Certification Statement: The patient will continue to require additional inpatient hospital stay due to Psychiatric placement  Discharge Plan: Medically stable awaiting psychiatric placement    Code Status: Level 1 - Full Code      Subjective:   Patient seen and examined. Anxious and worried about different things. Family reports insomnia. Objective:   Vitals: Blood pressure 143/79, pulse 73, temperature 97.9 °F (36.6 °C), temperature source Oral, resp. rate 16, height 4' 11.5" (1.511 m), weight 52.2 kg (115 lb 1.3 oz), SpO2 99 %, not currently breastfeeding. No intake or output data in the 24 hours ending 07/30/23 1703    Physical Exam  Vitals reviewed. Constitutional:       General: She is not in acute distress. HENT:      Head: Atraumatic. Cardiovascular:      Rate and Rhythm: Regular rhythm. Heart sounds: Normal heart sounds. Pulmonary:      Breath sounds: Normal breath sounds. No wheezing. Abdominal:      General: Bowel sounds are normal.      Palpations: Abdomen is soft. Tenderness: There is no guarding or rebound. Musculoskeletal:         General: No swelling. Skin:     General: Skin is warm.    Psychiatric: Attention and Perception: She is inattentive. Mood and Affect: Mood is anxious and depressed. Affect is flat and tearful. Behavior: Behavior is withdrawn. Additional Data:   Labs:  Results from last 7 days   Lab Units 07/29/23  0504 07/28/23  0821   WBC Thousand/uL 4.28* 5.42   HEMOGLOBIN g/dL 12.5 14.5   PLATELETS Thousands/uL 243 295   MCV fL 92 93     Results from last 7 days   Lab Units 07/30/23  0453 07/29/23  0504 07/28/23  0821   SODIUM mmol/L 140 138 138   POTASSIUM mmol/L 3.9 3.3* 3.8   CHLORIDE mmol/L 109* 106 103   CO2 mmol/L 25 23 26   ANION GAP mmol/L 6 9 9   BUN mg/dL 8 10 17   CREATININE mg/dL 0.53* 0.46* 0.63   CALCIUM mg/dL 9.5 8.5 9.9   ALBUMIN g/dL  --  3.7 4.5   TOTAL BILIRUBIN mg/dL  --  0.80 1.11*   ALK PHOS U/L  --  45 57   ALT U/L  --  27 37   AST U/L  --  22 35   EGFR ml/min/1.73sq m 99 104 94   GLUCOSE RANDOM mg/dL 141* 144* 125                                  Results from last 7 days   Lab Units 07/28/23  0821   TSH 3RD GENERATON uIU/mL 10.272*   FREE T4 ng/dL 1.06     * I Have Reviewed All Lab Data Listed Above. Cultures:   Results from last 7 days   Lab Units 07/28/23  0823   URINE CULTURE  70,000-79,000 cfu/ml Escherichia coli*                 Lines/Drains:  Invasive Devices     Peripheral Intravenous Line  Duration           Peripheral IV 07/28/23 Right Antecubital 2 days    Peripheral IV 07/29/23 Left;Ventral (anterior) Forearm 1 day              Telemetry:      Imaging:  Imaging Reports Reviewed Today Include:   MRI brain wo contrast    Result Date: 7/30/2023  Impression: No acute intracranial pathology. Old left cerebellar infarct. . Chronic microangiopathy and old lacunar infarcts. Workstation performed: [id number]     CT head without contrast    Result Date: 7/28/2023  Impression: No acute intracranial abnormality.  Workstation performed: [id number]       Scheduled Meds:  Current Facility-Administered Medications   Medication Dose Route Frequency Provider Last Rate   • acetaminophen  650 mg Oral Q6H PRN Vandana Fontana PA-C     • vitamin C  1,000 mg Oral Daily Tete Higuera, DO     • aspirin  81 mg Oral Daily Tete Higuera, DO     • atorvastatin  40 mg Oral Daily With Jade Magnet, DO     • cefTRIAXone  1,000 mg Intravenous Q24H Tete Higuera, DO 1,000 mg (07/30/23 1054)   • cholecalciferol  1,000 Units Oral Daily Tete Higuera, DO     • citalopram  10 mg Oral Daily Bakari Eisenberg, DO     • enoxaparin  40 mg Subcutaneous Daily Tete Carrier, DO     • ezetimibe  10 mg Oral Daily Tete Higuera, DO     • levothyroxine  175 mcg Oral Early Morning Bakari Eisenberg, DO     • lisinopril  20 mg Oral Daily Bakari Eisenberg, DO     • melatonin  6 mg Oral HS PRN Vandana Fontana PA-C     • metoprolol succinate  12.5 mg Oral Daily Tete Higuera, DO     • multivitamin stress formula  1 tablet Oral Daily Tete Higuera,          Today, Patient Was Seen By: Tete Higuera DO    ** Please Note: Dictation voice to text software may have been used in the creation of this document.  **

## 2023-07-30 NOTE — ASSESSMENT & PLAN NOTE
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   · Continue aspirin and atorvastatin as prescribed for CAD  · Family was notified of the findings by previous provider

## 2023-07-30 NOTE — ASSESSMENT & PLAN NOTE
· CAD with history of CABG. No chest pain. · Follows with Dr. Sanjiv Pitts.   · Continue aspirin and atorvastatin

## 2023-07-31 ENCOUNTER — HOSPITAL ENCOUNTER (INPATIENT)
Facility: HOSPITAL | Age: 63
LOS: 11 days | Discharge: HOME | DRG: 885 | End: 2023-08-11
Attending: PSYCHIATRY & NEUROLOGY | Admitting: PSYCHIATRY & NEUROLOGY
Payer: MEDICARE

## 2023-07-31 VITALS
HEART RATE: 62 BPM | WEIGHT: 115.08 LBS | RESPIRATION RATE: 18 BRPM | DIASTOLIC BLOOD PRESSURE: 64 MMHG | TEMPERATURE: 98.3 F | SYSTOLIC BLOOD PRESSURE: 128 MMHG | BODY MASS INDEX: 22.59 KG/M2 | OXYGEN SATURATION: 98 % | HEIGHT: 60 IN

## 2023-07-31 DIAGNOSIS — Z86.73 H/O: CVA (CEREBROVASCULAR ACCIDENT): ICD-10-CM

## 2023-07-31 DIAGNOSIS — K59.00 CONSTIPATION: ICD-10-CM

## 2023-07-31 DIAGNOSIS — E78.2 MIXED HYPERLIPIDEMIA: ICD-10-CM

## 2023-07-31 DIAGNOSIS — F39 MOOD DISORDER (HCC): Primary | ICD-10-CM

## 2023-07-31 DIAGNOSIS — E55.9 VITAMIN D DEFICIENCY: ICD-10-CM

## 2023-07-31 DIAGNOSIS — I25.10 ATHEROSCLEROSIS OF NATIVE CORONARY ARTERY OF NATIVE HEART WITHOUT ANGINA PECTORIS: ICD-10-CM

## 2023-07-31 DIAGNOSIS — I10 PRIMARY HYPERTENSION: ICD-10-CM

## 2023-07-31 DIAGNOSIS — N39.0 UTI (URINARY TRACT INFECTION): ICD-10-CM

## 2023-07-31 DIAGNOSIS — E53.8 VITAMIN B12 DEFICIENCY: ICD-10-CM

## 2023-07-31 DIAGNOSIS — E03.9 ACQUIRED HYPOTHYROIDISM: ICD-10-CM

## 2023-07-31 DIAGNOSIS — F41.9 ANXIETY: ICD-10-CM

## 2023-07-31 DIAGNOSIS — I51.7 LVH (LEFT VENTRICULAR HYPERTROPHY): ICD-10-CM

## 2023-07-31 LAB
ANION GAP SERPL CALCULATED.3IONS-SCNC: 6 MMOL/L
BUN SERPL-MCNC: 10 MG/DL (ref 5–25)
CALCIUM SERPL-MCNC: 9.4 MG/DL (ref 8.4–10.2)
CHLORIDE SERPL-SCNC: 108 MMOL/L (ref 96–108)
CO2 SERPL-SCNC: 25 MMOL/L (ref 21–32)
CREAT SERPL-MCNC: 0.52 MG/DL (ref 0.6–1.3)
ERYTHROCYTE [DISTWIDTH] IN BLOOD BY AUTOMATED COUNT: 12.2 % (ref 11.6–15.1)
GFR SERPL CREATININE-BSD FRML MDRD: 100 ML/MIN/1.73SQ M
GLUCOSE SERPL-MCNC: 109 MG/DL (ref 65–140)
HCT VFR BLD AUTO: 37.9 % (ref 34.8–46.1)
HGB BLD-MCNC: 13 G/DL (ref 11.5–15.4)
MCH RBC QN AUTO: 31.6 PG (ref 26.8–34.3)
MCHC RBC AUTO-ENTMCNC: 34.3 G/DL (ref 31.4–37.4)
MCV RBC AUTO: 92 FL (ref 82–98)
PLATELET # BLD AUTO: 298 THOUSANDS/UL (ref 149–390)
PMV BLD AUTO: 9.8 FL (ref 8.9–12.7)
POTASSIUM SERPL-SCNC: 4.2 MMOL/L (ref 3.5–5.3)
RBC # BLD AUTO: 4.12 MILLION/UL (ref 3.81–5.12)
SODIUM SERPL-SCNC: 139 MMOL/L (ref 135–147)
WBC # BLD AUTO: 6.58 THOUSAND/UL (ref 4.31–10.16)

## 2023-07-31 PROCEDURE — 99239 HOSP IP/OBS DSCHRG MGMT >30: CPT | Performed by: FAMILY MEDICINE

## 2023-07-31 PROCEDURE — 80048 BASIC METABOLIC PNL TOTAL CA: CPT | Performed by: INTERNAL MEDICINE

## 2023-07-31 PROCEDURE — 85027 COMPLETE CBC AUTOMATED: CPT | Performed by: INTERNAL MEDICINE

## 2023-07-31 RX ORDER — CEPHALEXIN 500 MG/1
500 CAPSULE ORAL 4 TIMES DAILY
Status: CANCELLED | OUTPATIENT
Start: 2023-07-31 | End: 2023-08-02

## 2023-07-31 RX ORDER — LORAZEPAM 2 MG/ML
1 INJECTION INTRAMUSCULAR
Status: DISCONTINUED | OUTPATIENT
Start: 2023-07-31 | End: 2023-08-11 | Stop reason: HOSPADM

## 2023-07-31 RX ORDER — ATORVASTATIN CALCIUM 40 MG/1
40 TABLET, FILM COATED ORAL
Status: DISCONTINUED | OUTPATIENT
Start: 2023-07-31 | End: 2023-08-11 | Stop reason: HOSPADM

## 2023-07-31 RX ORDER — OLANZAPINE 5 MG/1
5 TABLET ORAL
Status: DISCONTINUED | OUTPATIENT
Start: 2023-07-31 | End: 2023-08-11 | Stop reason: HOSPADM

## 2023-07-31 RX ORDER — LORAZEPAM 2 MG/ML
1 INJECTION INTRAMUSCULAR
Status: CANCELLED | OUTPATIENT
Start: 2023-07-31

## 2023-07-31 RX ORDER — LORAZEPAM 0.5 MG/1
0.5 TABLET ORAL
Status: DISCONTINUED | OUTPATIENT
Start: 2023-07-31 | End: 2023-08-11 | Stop reason: HOSPADM

## 2023-07-31 RX ORDER — OLANZAPINE 5 MG/1
5 TABLET ORAL
Status: CANCELLED | OUTPATIENT
Start: 2023-07-31

## 2023-07-31 RX ORDER — LORAZEPAM 1 MG/1
1 TABLET ORAL
Status: DISCONTINUED | OUTPATIENT
Start: 2023-07-31 | End: 2023-08-11 | Stop reason: HOSPADM

## 2023-07-31 RX ORDER — ENOXAPARIN SODIUM 100 MG/ML
40 INJECTION SUBCUTANEOUS DAILY
Status: CANCELLED | OUTPATIENT
Start: 2023-08-01

## 2023-07-31 RX ORDER — LANOLIN ALCOHOL/MO/W.PET/CERES
6 CREAM (GRAM) TOPICAL
Status: CANCELLED | OUTPATIENT
Start: 2023-07-31

## 2023-07-31 RX ORDER — ACETAMINOPHEN 325 MG/1
650 TABLET ORAL EVERY 4 HOURS PRN
Status: CANCELLED | OUTPATIENT
Start: 2023-07-31

## 2023-07-31 RX ORDER — EZETIMIBE 10 MG/1
10 TABLET ORAL DAILY
Status: CANCELLED | OUTPATIENT
Start: 2023-08-01

## 2023-07-31 RX ORDER — OLANZAPINE 10 MG/1
5 INJECTION, POWDER, LYOPHILIZED, FOR SOLUTION INTRAMUSCULAR
Status: DISCONTINUED | OUTPATIENT
Start: 2023-07-31 | End: 2023-08-11 | Stop reason: HOSPADM

## 2023-07-31 RX ORDER — QUETIAPINE FUMARATE 25 MG/1
25 TABLET, FILM COATED ORAL
Status: CANCELLED | OUTPATIENT
Start: 2023-07-31

## 2023-07-31 RX ORDER — LISINOPRIL 20 MG/1
20 TABLET ORAL DAILY
Status: DISCONTINUED | OUTPATIENT
Start: 2023-08-01 | End: 2023-08-11 | Stop reason: HOSPADM

## 2023-07-31 RX ORDER — OLANZAPINE 2.5 MG/1
2.5 TABLET ORAL
Status: DISCONTINUED | OUTPATIENT
Start: 2023-07-31 | End: 2023-08-11 | Stop reason: HOSPADM

## 2023-07-31 RX ORDER — BENZTROPINE MESYLATE 0.5 MG/1
0.5 TABLET ORAL
Status: DISCONTINUED | OUTPATIENT
Start: 2023-07-31 | End: 2023-08-11 | Stop reason: HOSPADM

## 2023-07-31 RX ORDER — METOPROLOL SUCCINATE 25 MG/1
12.5 TABLET, EXTENDED RELEASE ORAL DAILY
Status: DISCONTINUED | OUTPATIENT
Start: 2023-08-01 | End: 2023-08-11 | Stop reason: HOSPADM

## 2023-07-31 RX ORDER — LORAZEPAM 0.5 MG/1
0.5 TABLET ORAL
Status: CANCELLED | OUTPATIENT
Start: 2023-07-31

## 2023-07-31 RX ORDER — ATORVASTATIN CALCIUM 40 MG/1
40 TABLET, FILM COATED ORAL
Status: CANCELLED | OUTPATIENT
Start: 2023-07-31

## 2023-07-31 RX ORDER — METOPROLOL SUCCINATE 25 MG/1
12.5 TABLET, EXTENDED RELEASE ORAL DAILY
Status: CANCELLED | OUTPATIENT
Start: 2023-08-01

## 2023-07-31 RX ORDER — BENZTROPINE MESYLATE 1 MG/1
0.5 TABLET ORAL
Status: CANCELLED | OUTPATIENT
Start: 2023-07-31

## 2023-07-31 RX ORDER — MELATONIN
1000 DAILY
Status: DISCONTINUED | OUTPATIENT
Start: 2023-08-01 | End: 2023-08-11 | Stop reason: HOSPADM

## 2023-07-31 RX ORDER — ENOXAPARIN SODIUM 100 MG/ML
40 INJECTION SUBCUTANEOUS DAILY
Status: DISCONTINUED | OUTPATIENT
Start: 2023-08-01 | End: 2023-08-01

## 2023-07-31 RX ORDER — ACETAMINOPHEN 325 MG/1
650 TABLET ORAL EVERY 4 HOURS PRN
Status: DISCONTINUED | OUTPATIENT
Start: 2023-07-31 | End: 2023-08-11 | Stop reason: HOSPADM

## 2023-07-31 RX ORDER — ASPIRIN 81 MG/1
81 TABLET, CHEWABLE ORAL DAILY
Status: CANCELLED | OUTPATIENT
Start: 2023-08-01

## 2023-07-31 RX ORDER — HYDROXYZINE HYDROCHLORIDE 25 MG/1
25 TABLET, FILM COATED ORAL
Status: DISCONTINUED | OUTPATIENT
Start: 2023-07-31 | End: 2023-08-11 | Stop reason: HOSPADM

## 2023-07-31 RX ORDER — ASCORBIC ACID 500 MG
1000 TABLET ORAL DAILY
Status: DISCONTINUED | OUTPATIENT
Start: 2023-08-01 | End: 2023-08-11 | Stop reason: HOSPADM

## 2023-07-31 RX ORDER — MELATONIN
1000 DAILY
Status: CANCELLED | OUTPATIENT
Start: 2023-08-01

## 2023-07-31 RX ORDER — ACETAMINOPHEN 160 MG
2000 TABLET,DISINTEGRATING ORAL DAILY
Qty: 90 CAPSULE | Refills: 0 | Status: SHIPPED | OUTPATIENT
Start: 2023-07-31 | End: 2023-08-11

## 2023-07-31 RX ORDER — LANOLIN ALCOHOL/MO/W.PET/CERES
6 CREAM (GRAM) TOPICAL
Status: DISCONTINUED | OUTPATIENT
Start: 2023-07-31 | End: 2023-08-11 | Stop reason: HOSPADM

## 2023-07-31 RX ORDER — CITALOPRAM HYDROBROMIDE 10 MG/1
10 TABLET ORAL DAILY
Status: DISCONTINUED | OUTPATIENT
Start: 2023-08-01 | End: 2023-08-11 | Stop reason: HOSPADM

## 2023-07-31 RX ORDER — OLANZAPINE 5 MG/1
2.5 TABLET ORAL
Status: CANCELLED | OUTPATIENT
Start: 2023-07-31

## 2023-07-31 RX ORDER — LISINOPRIL 20 MG/1
20 TABLET ORAL DAILY
Status: CANCELLED | OUTPATIENT
Start: 2023-08-01

## 2023-07-31 RX ORDER — OLANZAPINE 10 MG/1
5 INJECTION, POWDER, LYOPHILIZED, FOR SOLUTION INTRAMUSCULAR
Status: CANCELLED | OUTPATIENT
Start: 2023-07-31

## 2023-07-31 RX ORDER — HYDROXYZINE HYDROCHLORIDE 25 MG/1
25 TABLET, FILM COATED ORAL
Status: CANCELLED | OUTPATIENT
Start: 2023-07-31

## 2023-07-31 RX ORDER — ACETAMINOPHEN 325 MG/1
975 TABLET ORAL EVERY 6 HOURS PRN
Status: CANCELLED | OUTPATIENT
Start: 2023-07-31

## 2023-07-31 RX ORDER — ASPIRIN 81 MG/1
81 TABLET, CHEWABLE ORAL DAILY
Status: DISCONTINUED | OUTPATIENT
Start: 2023-08-01 | End: 2023-08-11 | Stop reason: HOSPADM

## 2023-07-31 RX ORDER — CEPHALEXIN 250 MG/1
500 CAPSULE ORAL 4 TIMES DAILY
Status: DISCONTINUED | OUTPATIENT
Start: 2023-07-31 | End: 2023-08-01

## 2023-07-31 RX ORDER — QUETIAPINE FUMARATE 25 MG/1
25 TABLET, FILM COATED ORAL
Status: DISCONTINUED | OUTPATIENT
Start: 2023-07-31 | End: 2023-08-11 | Stop reason: HOSPADM

## 2023-07-31 RX ORDER — LORAZEPAM 1 MG/1
1 TABLET ORAL
Status: CANCELLED | OUTPATIENT
Start: 2023-07-31

## 2023-07-31 RX ORDER — EZETIMIBE 10 MG/1
10 TABLET ORAL DAILY
Status: DISCONTINUED | OUTPATIENT
Start: 2023-08-01 | End: 2023-08-11 | Stop reason: HOSPADM

## 2023-07-31 RX ORDER — CITALOPRAM 20 MG/1
10 TABLET ORAL DAILY
Status: CANCELLED | OUTPATIENT
Start: 2023-08-01

## 2023-07-31 RX ORDER — ACETAMINOPHEN 325 MG/1
975 TABLET ORAL EVERY 6 HOURS PRN
Status: DISCONTINUED | OUTPATIENT
Start: 2023-07-31 | End: 2023-08-11 | Stop reason: HOSPADM

## 2023-07-31 RX ORDER — ASCORBIC ACID 500 MG
1000 TABLET ORAL DAILY
Status: CANCELLED | OUTPATIENT
Start: 2023-08-01

## 2023-07-31 RX ADMIN — LEVOTHYROXINE SODIUM 175 MCG: 125 TABLET ORAL at 05:36

## 2023-07-31 RX ADMIN — ATORVASTATIN CALCIUM 40 MG: 40 TABLET, FILM COATED ORAL at 17:45

## 2023-07-31 RX ADMIN — QUETIAPINE FUMARATE 25 MG: 25 TABLET ORAL at 20:58

## 2023-07-31 RX ADMIN — LORAZEPAM 0.5 MG: 0.5 TABLET ORAL at 18:55

## 2023-07-31 RX ADMIN — CEPHALEXIN 500 MG: 250 CAPSULE ORAL at 17:45

## 2023-07-31 RX ADMIN — CEPHALEXIN 500 MG: 250 CAPSULE ORAL at 20:58

## 2023-07-31 NOTE — ASSESSMENT & PLAN NOTE
History of CAD status post CABG hypertension and hypothyroidism presents with worsening confusion and psychomotor decline/retardation  · Has had worsening depression over the past several weeks. Started on citalopram last week without improvement  · Found to have UTI in ED and completed 3-day course of ceftriaxone  · Appreciate gerontology evaluation. MRI brain did demonstrate old infarcts which patient was not aware of. · Psychiatry on reevaluation recommended inpatient psychiatry placement. Patient and family agreeable signed 12. At this time medically stable for transfer to psychiatric facility. Accepted at [facility]

## 2023-07-31 NOTE — CASE MANAGEMENT
Case Management Discharge Planning Note    Patient name Heather Cook  Location [address] [facility] [address] [address] [address]-* MRN [id number]  : 1960 Date 2023       Current Admission Date: 2023  Current Admission Diagnosis:Acute metabolic encephalopathy   Patient Active Problem List    Diagnosis Date Noted   • White matter abnormality on MRI of brain 2023   • Acute metabolic encephalopathy    • Anxiety 2023   • Depression, recurrent ([address]) 2023   • Immunization refused 2023   • LVH (left ventricular hypertrophy) 2022   • Adjustment disorder with anxiety 2019   • Class 1 obesity due to excess calories with serious comorbidity and body mass index (BMI) of 30.0 to 30.9 in adult 2019   • Encounter for well adult exam with abnormal findings 2019   • Postsurgical aortocoronary bypass status 2019   • Impaired fasting glucose 2016   • Vitamin D deficiency 2016   • Anemia 2012   • Chronic coronary artery disease 2012   • Hypothyroidism 2012   • Hyperlipidemia 2012   • Hypertension 2012      LOS (days): 3  Geometric Mean LOS (GMLOS) (days): 3.80  Days to GMLOS:0.6     OBJECTIVE:  Risk of Unplanned Readmission Score: 9.69         Current admission status: Inpatient   Preferred Pharmacy:   [address], [address] [place] [address]  Phone: [phone] Fax: [phone]    Primary Care Provider: Sumi Villeda MD    Primary Insurance: MEDICARE  Secondary Insurance: [place]    DISCHARGE DETAILS:       Other Referral/Resources/Interventions Provided:  Interventions: Psych Rehab  Referral Comments: Patient signed a 12 and has been accepted to [facility]/ [facility]    Treatment Team Recommendation: Other (in patient psych)  Discharge Destination Plan[de-identified] Inpatient [address] [place], [place] [address]y Geoffrey at Discharge :  Other (Comment) (CTS)  Dispatcher Contacted: Yes  Number/Name of Dispatcher: Estella Martinez [phone]     NEN DO JNHHYVIHL (Date): 07/31/23  ETA of Transport (Time):  ( time pending for 4pm)     Additional Comments: Patient recommended for inpatient psych, patient got accepted to [facility]. CM called for a COB to patient's secondary insurance through [facility]. [facility] representative advised COB should be called when patient arrives to the unit. CM requested transport for 4pm today.  201 in paperchart for transport

## 2023-07-31 NOTE — DISCHARGE SUMMARY
[facility]  Discharge- Caitlin Perera 1960, 70 y.o. female MRN: [id number]  Unit/Bed#: E5 -01 Encounter: [id number]  Primary Care Provider: Tasneem Bauer MD   Date and time admitted to hospital: 7/28/2023  7:36 AM    * Acute metabolic encephalopathy  Assessment & Plan  History of CAD status post CABG hypertension and hypothyroidism presents with worsening confusion and psychomotor decline/retardation  · Has had worsening depression over the past several weeks. Started on citalopram last week without improvement  · Found to have UTI in ED and completed 3-day course of ceftriaxone  · Appreciate gerontology evaluation. MRI brain did demonstrate old infarcts which patient was not aware of. · Psychiatry on reevaluation recommended inpatient psychiatry placement. Patient and family agreeable signed 12. At this time medically stable for transfer to psychiatric facility. Accepted at [address] 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 · Continue aspirin and atorvastatin as prescribed for CAD  · Family was notified of the findings by previous provider    Adjustment disorder with anxiety  Assessment & Plan  · Started on citalopram without much improvement yet. Profound anxiety   · Has had weight loss since May due to increased anxiety and poor intake   · Psychiatry consulted and recommended inpatient psychiatric placement  · 201 signed. Patient agreeable. · Of note is having insomnia. Was added quetiapine to help maintain sleep/wake cycle    Hypertension  Assessment & Plan  · Blood pressure is low normal due to poor intake  · Stopped HCTZ  · Continue lisinopril and metoprolol. Hyperlipidemia  Assessment & Plan  · Continue atorvastatin    Hypothyroidism  Assessment & Plan  · TSH elevated but free T4 within limits. · Continued on current dose levothyroxine. · Unclear compliance in setting of severe depression    Chronic coronary artery disease  Assessment & Plan  · CAD with history of CABG. · Follows with Dr. Jeremy Marrero. · Continue aspirin and atorvastatin      Medical Problems     Resolved Problems  Date Reviewed: 7/31/2023   None       Discharging Physician / Practitioner: Venkat Puga MD  PCP: Marci Sorto MD  Admission Date:   Admission Orders (From admission, onward)     Ordered        07/28/23 0929  INPATIENT ADMISSION  Once            Signed and Held  ED TO DIFFERENT CAMPUS [address] UNIT or INPATIENT MEDICAL UNIT to [facility] (using Discharge Readmit Navigator) - Admit Patient to [facility]  Once,   Status:  Canceled                      Discharge Date: 07/31/23    Consultations During Hospital Stay:  · Geriatric medicine, behavioral health    Procedures Performed:   MRI brain wo contrast   Final Result by GARO Lee MD (07/30 1269)      No acute intracranial pathology. Old left cerebellar infarct. . Chronic microangiopathy and old lacunar infarcts. Workstation performed: [id number]         CT head without contrast   Final Result by Peng Garcia MD (07/28 1928)      No acute intracranial abnormality. Workstation performed: [id number]             Significant Findings / Test Results:   Results from last 7 days   Lab Units 07/31/23  0522   WBC Thousand/uL 6.58   HEMOGLOBIN g/dL 13.0   HEMATOCRIT % 37.9   PLATELETS Thousands/uL 298     Results from last 7 days   Lab Units 07/31/23  0522   SODIUM mmol/L 139   POTASSIUM mmol/L 4.2   CHLORIDE mmol/L 108   CO2 mmol/L 25   BUN mg/dL 10   CREATININE mg/dL 0.52*   CALCIUM mg/dL 9.4           Test Results Pending at Discharge (will require follow up): · None     Outpatient Tests Requested:  · N/a    Complications:  None    Reason for Admission: altered mental status     Hospital Course:   Vijay Meza is a 70 y.o. female patient who originally presented to the hospital on 7/28/2023 due to altered mental status. Patient was noted for an uncomplicated UTI for which she underwent a short course of antibiotics. Her altered mental status was attributed to severe depression. Per behavioral health evaluation the patient was recommended inpatient psychiatric hospitalization to which she was in agreement and signed 61 51 81 form. The patient was discharged in improved and stable condition. Please see above list of diagnoses and related plan for additional information. Condition at Discharge: stable    Discharge Day Visit / Exam:   Subjective: Patient is not providing significant history. No overnight events reported. Vitals: Blood Pressure: 128/64 (07/31/23 0913)  Pulse: 62 (07/31/23 0913)  Temperature: 98.3 °F (36.8 °C) (07/31/23 0724)  Temp Source: Oral (07/31/23 0724)  Respirations: 18 (07/31/23 0724)  Height: 4' 11.5" (151.1 cm) (07/28/23 1051)  Weight - Scale: 52.2 kg (115 lb 1.3 oz) (07/28/23 1051)  SpO2: 98 % (07/31/23 0724)  Exam:   Physical Exam  Constitutional:       General: She is not in acute distress. HENT:      Head: Normocephalic and atraumatic. Nose: No congestion. Eyes:      Conjunctiva/sclera: Conjunctivae normal.   Cardiovascular:      Rate and Rhythm: Normal rate and regular rhythm. Heart sounds: No murmur heard. Pulmonary:      Effort: No respiratory distress. Abdominal:      General: There is no distension. Musculoskeletal:      Right lower leg: No edema. Left lower leg: No edema. Skin:     General: Skin is warm and dry. Psychiatric:         Behavior: Behavior is slowed. Comments: Limited communication          Discussion with Family: Attempted to update  (daughter) via phone. Left voicemail. Discharge instructions/Information to patient and family:   See after visit summary for information provided to patient and family.       Provisions for Follow-Up Care:  See after visit summary for information related to follow-up care and any pertinent home health orders. Disposition:   Inpatient Psychiatry at [facility] Readmission: No     Discharge Statement:  I spent 35 minutes discharging the patient. This time was spent on the day of discharge. I had direct contact with the patient on the day of discharge. Greater than 50% of the total time was spent examining patient, answering all patient questions, arranging and discussing plan of care with patient as well as directly providing post-discharge instructions. Additional time then spent on discharge activities. Discharge Medications:  See after visit summary for reconciled discharge medications provided to patient and/or family.       **Please Note: This note may have been constructed using a voice recognition system**

## 2023-07-31 NOTE — PLAN OF CARE
Problem: Potential for Falls  Goal: Patient will remain free of falls  Description: INTERVENTIONS:  - Educate patient/family on patient safety including physical limitations  - Instruct patient to call for assistance with activity   - Consult OT/PT to assist with strengthening/mobility   - Keep Call bell within reach  - Keep bed low and locked with side rails adjusted as appropriate  - Keep care items and personal belongings within reach  - Initiate and maintain comfort rounds  - Make Fall Risk Sign visible to staff  - Apply yellow socks and bracelet for high fall risk patients  - Consider moving patient to room near nurses station  Outcome: Progressing     Problem: PAIN - ADULT  Goal: Verbalizes/displays adequate comfort level or baseline comfort level  Description: Interventions:  - Encourage patient to monitor pain and request assistance  - Assess pain using appropriate pain scale  - Administer analgesics based on type and severity of pain and evaluate response  - Implement non-pharmacological measures as appropriate and evaluate response  - Consider cultural and social influences on pain and pain management  - Notify physician/advanced practitioner if interventions unsuccessful or patient reports new pain  Outcome: Progressing     Problem: INFECTION - ADULT  Goal: Absence or prevention of progression during hospitalization  Description: INTERVENTIONS:  - Assess and monitor for signs and symptoms of infection  - Monitor lab/diagnostic results  - Monitor all insertion sites, i.e. indwelling lines, tubes, and drains  - Monitor endotracheal if appropriate and nasal secretions for changes in amount and color  - [place] appropriate cooling/warming therapies per order  - Administer medications as ordered  - Instruct and encourage patient and family to use good hand hygiene technique  - Identify and instruct in appropriate isolation precautions for identified infection/condition  Outcome: Progressing     Problem: SAFETY ADULT  Goal: Patient will remain free of falls  Description: INTERVENTIONS:  - Educate patient/family on patient safety including physical limitations  - Instruct patient to call for assistance with activity   - Consult OT/PT to assist with strengthening/mobility   - Keep Call bell within reach  - Keep bed low and locked with side rails adjusted as appropriate  - Keep care items and personal belongings within reach  - Initiate and maintain comfort rounds  - Make Fall Risk Sign visible to staff  - Apply yellow socks and bracelet for high fall risk patients  - Consider moving patient to room near nurses station  Outcome: Progressing     Problem: DISCHARGE PLANNING  Goal: Discharge to home or other facility with appropriate resources  Description: INTERVENTIONS:  - Identify barriers to discharge w/patient and caregiver  - Arrange for needed discharge resources and transportation as appropriate  - Identify discharge learning needs (meds, wound care, etc.)  - Arrange for interpretive services to assist at discharge as needed  - Refer to Case Management Department for coordinating discharge planning if the patient needs post-hospital services based on physician/advanced practitioner order or complex needs related to functional status, cognitive ability, or social support system  Outcome: Progressing     Problem: Knowledge Deficit  Goal: Patient/family/caregiver demonstrates understanding of disease process, treatment plan, medications, and discharge instructions  Description: Complete learning assessment and assess knowledge base.   Interventions:  - Provide teaching at level of understanding  - Provide teaching via preferred learning methods  Outcome: Progressing

## 2023-07-31 NOTE — ASSESSMENT & PLAN NOTE
· Started on citalopram without much improvement yet. Profound anxiety   · Has had weight loss since May due to increased anxiety and poor intake   · Psychiatry consulted and recommended inpatient psychiatric placement  · 201 signed. Patient agreeable. · Of note is having insomnia.   Was added quetiapine to help maintain sleep/wake cycle

## 2023-07-31 NOTE — PROGRESS NOTES
Patient came with the following    1 pr tan underwear  1 beige sweayer shirt  1 pr bronson shorts  1 blouse black & white  1 blue bra  1 pr white underwear  1 pr blue small socks      Locked in cabinet    1 hair clip  1 black radha band (cloth)  1 tresemme hair gel  1 hair comb  1 pr white sneakers  1 pr blue hospital pants      Behind T desk    1 black cell phone with wallet case  1 cell phone charging cord      No wallet, no cash, no jewelry    Patient signed for all the above items

## 2023-07-31 NOTE — ASSESSMENT & PLAN NOTE
· TSH elevated but free T4 within limits. · Continued on current dose levothyroxine.   · Unclear compliance in setting of severe depression

## 2023-07-31 NOTE — PLAN OF CARE
Problem: Ineffective Coping  Goal: Cooperates with admission process  Description: Interventions:   - Complete admission process  Outcome: Progressing  Goal: Understands least restrictive measures  Description: Interventions:  - Utilize least restrictive behavior  Outcome: Progressing  Goal: Free from restraint events  Description: - Utilize least restrictive measures   - Provide behavioral interventions   - Redirect inappropriate behaviors   Outcome: Progressing     Problem: Risk for Self Injury/Neglect  Goal: Refrain from harming self  Description: Interventions:  - Monitor patient closely, per order  - Develop a trusting relationship  - Supervise medication ingestion, monitor effects and side effects   Outcome: Progressing     Problem: Depression  Goal: Refrain from harming self  Description: Interventions:  - Monitor patient closely, per order   - Supervise medication ingestion, monitor effects and side effects   Outcome: Progressing

## 2023-08-01 ENCOUNTER — TRANSITIONAL CARE MANAGEMENT (OUTPATIENT)
Dept: FAMILY MEDICINE CLINIC | Facility: CLINIC | Age: 63
End: 2023-08-01

## 2023-08-01 PROBLEM — F33.2 MDD (MAJOR DEPRESSIVE DISORDER), RECURRENT EPISODE, SEVERE (HCC): Status: ACTIVE | Noted: 2023-08-01

## 2023-08-01 PROBLEM — Z86.73 H/O: CVA (CEREBROVASCULAR ACCIDENT): Status: ACTIVE | Noted: 2023-08-01

## 2023-08-01 PROCEDURE — 99223 1ST HOSP IP/OBS HIGH 75: CPT | Performed by: PSYCHIATRY & NEUROLOGY

## 2023-08-01 RX ORDER — LISINOPRIL 20 MG/1
20 TABLET ORAL DAILY
Status: DISCONTINUED | OUTPATIENT
Start: 2023-08-01 | End: 2023-08-01

## 2023-08-01 RX ORDER — ASPIRIN 81 MG/1
81 TABLET, CHEWABLE ORAL DAILY
Status: DISCONTINUED | OUTPATIENT
Start: 2023-08-01 | End: 2023-08-01

## 2023-08-01 RX ORDER — METOPROLOL SUCCINATE 25 MG/1
12.5 TABLET, EXTENDED RELEASE ORAL DAILY
Status: DISCONTINUED | OUTPATIENT
Start: 2023-08-01 | End: 2023-08-01

## 2023-08-01 RX ORDER — HYDROCHLOROTHIAZIDE 25 MG/1
25 TABLET ORAL DAILY
Status: DISCONTINUED | OUTPATIENT
Start: 2023-08-01 | End: 2023-08-11 | Stop reason: HOSPADM

## 2023-08-01 RX ORDER — EZETIMIBE 10 MG/1
10 TABLET ORAL DAILY
Status: DISCONTINUED | OUTPATIENT
Start: 2023-08-01 | End: 2023-08-01

## 2023-08-01 RX ORDER — MELATONIN
1000 DAILY
Status: DISCONTINUED | OUTPATIENT
Start: 2023-08-01 | End: 2023-08-01

## 2023-08-01 RX ORDER — ATORVASTATIN CALCIUM 40 MG/1
40 TABLET, FILM COATED ORAL DAILY
Status: DISCONTINUED | OUTPATIENT
Start: 2023-08-01 | End: 2023-08-01

## 2023-08-01 RX ORDER — CEPHALEXIN 250 MG/1
500 CAPSULE ORAL 4 TIMES DAILY
Status: COMPLETED | OUTPATIENT
Start: 2023-08-01 | End: 2023-08-06

## 2023-08-01 RX ADMIN — CEPHALEXIN 500 MG: 250 CAPSULE ORAL at 17:01

## 2023-08-01 RX ADMIN — OXYCODONE HYDROCHLORIDE AND ACETAMINOPHEN 1000 MG: 500 TABLET ORAL at 08:24

## 2023-08-01 RX ADMIN — CEPHALEXIN 500 MG: 250 CAPSULE ORAL at 21:49

## 2023-08-01 RX ADMIN — ASPIRIN 81 MG 81 MG: 81 TABLET ORAL at 08:25

## 2023-08-01 RX ADMIN — CEPHALEXIN 500 MG: 250 CAPSULE ORAL at 13:07

## 2023-08-01 RX ADMIN — QUETIAPINE FUMARATE 25 MG: 25 TABLET ORAL at 21:49

## 2023-08-01 RX ADMIN — CEPHALEXIN 500 MG: 250 CAPSULE ORAL at 08:25

## 2023-08-01 RX ADMIN — LEVOTHYROXINE SODIUM 175 MCG: 75 TABLET ORAL at 06:01

## 2023-08-01 RX ADMIN — CITALOPRAM HYDROBROMIDE 10 MG: 10 TABLET ORAL at 08:24

## 2023-08-01 RX ADMIN — HYDROCHLOROTHIAZIDE 25 MG: 25 TABLET ORAL at 10:10

## 2023-08-01 RX ADMIN — LISINOPRIL 20 MG: 20 TABLET ORAL at 08:25

## 2023-08-01 RX ADMIN — EZETIMIBE 10 MG: 10 TABLET ORAL at 08:24

## 2023-08-01 RX ADMIN — B-COMPLEX W/ C & FOLIC ACID TAB 1 TABLET: TAB at 08:24

## 2023-08-01 RX ADMIN — METOPROLOL SUCCINATE 12.5 MG: 25 TABLET, EXTENDED RELEASE ORAL at 08:25

## 2023-08-01 RX ADMIN — Medication 1000 UNITS: at 08:25

## 2023-08-01 RX ADMIN — ATORVASTATIN CALCIUM 40 MG: 40 TABLET, FILM COATED ORAL at 17:01

## 2023-08-01 NOTE — PROGRESS NOTES
08/01/23 1055   Team Meeting   Meeting Type Tx Team Meeting   Team Members Present   Team Members Present Physician;Nurse;   Physician Team Member Dr. Dominique Huynh MD   Nursing Team Member Yamila Ordaz, RN   Care Management Team Member Celina Hernandes MS, [facility], [facility]   Patient/Family Present   Patient Present Yes   Patient's Family Present No     Reviewed TX plan and goals, all in agreement and signed.

## 2023-08-01 NOTE — NURSING NOTE
S; received a 69 YO F, a 201 from [facility] floor via EMS. oriented to unit,unit rules,offered and accepted nutrition and hydration. B: Patient transported to ED by family for increased confusion,psychomotor retardation,depression,anxiety,and withdrawal.UA revealed Gloria Brizuela was + for E. Coli infection of which she is currently Rx'D keflex. She recently was placed on lexapro and it appears it has not (yet) been effective. She did not care to sign an CALLIE @ this time. She denies ETOH,drug,and nicotine use. She resides with her son. A: Gloria Brizuela is soft spoken,withdrawn,poverty of speech and thought. She denies SI,HI,AH,VH:endorses moderate depression and anxiety. She is a&o of @ least 2,is tearful @ times and has expressed fear of being on the unit. Her physical asses  ment ,including integument, is unremarkable. R: Darlene Chongk comfortable,encourage group attendance,educate on disease process and medications,and nutrition assessment.

## 2023-08-01 NOTE — PROGRESS NOTES
08/01/23 1330   Activity/Group Checklist   Group Personal control   Attendance Did not attend  (Pt offered grp; pt remained in room)

## 2023-08-01 NOTE — NURSING NOTE
PT observed sleeping during q7 minute safety checks. No distress or labor breathing observed. No changes in medical condition. No behavior noted. Will continue to monitor.

## 2023-08-01 NOTE — H&P
Psychiatric Evaluation - Behavioral Health   This note was not shared with the patient due to reasonable likelihood of causing patient harm  Identification Data:Socorro Briceño 70 y.o. female MRN: [id number]  Unit/Bed#[facility] 202-02 Encounter: [id number]    Chief Complaint: depression, anxiety, inability to care for self and confusion    History of Present Illness     Caitlin Perera is a 70 y.o. female with reported history of depression and anxiety, complicated with history of coronary artery disease with CABG, A fib, DJD, hypertension, hypothyroidism, UTI who was admitted to the inpatient older adult psychiatric unit on a voluntary 12 commitment basis due to depression, anxiety, unstable mood, confusion and inability to care for self. Currently on antibiotic for UTI, TSH 10.272 Free T4 1.06  On evaluation in the inpatient psychiatric unit Charline Perez presents somewhat confused, very limited historian and unable to provide any elaborate information. She was able to answer questions very delayed and concrete with frequent redirection. She claims she was on medical floor, feeling very anxious but is unsure why she came to the behavioral health unit. Patient admits that she has been more forgetful, not eating and not taking care for herself as she used to in her home. She is alert and oriented X 2 and showed significant latency in her responses. Denies any current suicidal ideation or history of suicide attempt. Denies any past psychiatric hospitalization but shows significant psychomotor retardation, appears internally preoccupied and demonstrated difficulty in attention concentration and remembering things. No acute focal neurological deficit was noted during examination.     Psychiatric Review Of Systems:    Sleep changes: decreased  Appetite changes:decreased  Weight changes: weight loss   Energy: decreased  Interest/pleasure/: decreased  Anhedonia: no  Anxiety: yes  Hailee: no  Guilt:  no  Hopeless:  no  Self injurious behavior/risky behavior: no  Suicidal ideation: no  Homicidal ideation: no  Auditory hallucinations: preoccupied  Visual hallucinations: no  Delusional thinking: no  Eating disorder history: no  Obsessive/compulsive symptoms: no    Historical Information     Past Psychiatric History:     Past Inpatient Psychiatric Treatment:   No history of past inpatient psychiatric admissions  Past Outpatient Psychiatric Treatment:    No history of past outpatient psychiatric treatment  Past Suicide Attempts: denies  Past Violent Behavior: denies   Past Psychiatric Medication Trials: Lexapro     Substance Abuse History:    Social History     Tobacco History     Smoking Status  Former Smoking Start Date  4/6/1971 Quit Date  1/1/1993 Smoking Frequency  0.25 packs/day for 22.00 years (5.50 ttl pk-yrs)    Smoking Tobacco Type  Cigarettes from 4/6/1971 to 1/1/1993    Passive Exposure  Never    Smokeless Tobacco Use  Never    Tobacco Comments  no passive smoke exposure          Alcohol History     Alcohol Use Status  Not Currently          Drug Use     Drug Use Status  No          Sexual Activity     Sexually Active  Not Currently          Activities of Daily Living    Not Asked                 I have assessed this patient for substance use within the past 12 months    Alcohol use: denies use  Recreational drug use: denies    Family Psychiatric History:  Mother with h/o early dementia    Social History:     Education: high school diploma/GED  Marital History: single  Children: 2 adult daughters  Occupational History: retired  Functioning Relationships: daughters is supportive  Legal History: none   History: None    Traumatic History:   Denies    Past Medical History:      Past Medical History:   Diagnosis Date   • Acute MI ([address] [facility]) 08/12/2012   • Anemia    • Anxiety 07/21/2023   • CAD (coronary artery disease)    • Hyperlipidemia    • Hypertension    • Myocardial infarction ([address]) 03/26/2002   • Thyroid disease    • Ventricular fibrillation ([address]) 08/12/2012     Past Surgical History:   Procedure Laterality Date   • COLONOSCOPY  2013   • CORONARY ARTERY BYPASS GRAFT  2003       Medical Review Of Systems:    Pertinent items are noted in HPI. Allergies: Allergies   Allergen Reactions   • Cat Hair Extract Sneezing       Medications: All current active medications have been reviewed. OBJECTIVE:    Vital signs in last 24 hours:    Temp:  [97 °F (36.1 °C)-98.6 °F (37 °C)] 97 °F (36.1 °C)  HR:  [71-89] 71  Resp:  [16-18] 16  BP: (131-146)/(74-86) 137/86    No intake or output data in the 24 hours ending 08/01/23 1232     Mental Status Evaluation:    Appearance:  age appropriate   Behavior:  guarded   Speech:  scant, delayed   Mood:  depressed, anxious   Affect:  blunted   Language: naming objects   Thought Process:  slowing of thoughts, concrete   Associations: blocking   Thought Content:  no overt delusions   Perceptual Disturbances: appears preoccupied   Risk Potential: Suicidal ideation - None  Homicidal ideation - None  Potential for aggression - No   Sensorium:  oriented to person and place   Memory:  recent and remote memory: unable to assess due to lack of cooperation   Consciousness:  alert and awake   Attention: decreased concentration and decreased attention span   Intellect: unable to assess due to lack of cooperation   Fund of Knowledge: awareness of current events: limited   Insight:  poor   Judgment: limited   Muscle Strength Muscle Tone:   not examined   Gait/Station: slow gait   Motor Activity: no abnormal movements       Laboratory Results:   I have personally reviewed all pertinent laboratory/tests results.   Most Recent Labs:   Lab Results   Component Value Date    WBC 6.58 07/31/2023    RBC 4.12 07/31/2023    HGB 13.0 07/31/2023    HCT 37.9 07/31/2023     07/31/2023    RDW 12.2 07/31/2023    NEUTROABS 3.62 07/28/2023    SODIUM 139 07/31/2023    K 4.2 07/31/2023     07/31/2023    CO2 25 07/31/2023    BUN 10 07/31/2023    CREATININE 0.52 (L) 07/31/2023    GLUC 109 07/31/2023    GLUF 93 05/22/2023    CALCIUM 9.4 07/31/2023    AST 22 07/29/2023    ALT 27 07/29/2023    ALKPHOS 45 07/29/2023    TP 5.8 (L) 07/29/2023    ALB 3.7 07/29/2023    TBILI 0.80 07/29/2023    CHOLESTEROL 233 (H) 05/22/2023    HDL 43 (L) 05/22/2023    TRIG 99 05/22/2023    LDLCALC 170 (H) 05/22/2023    [address] 02/05/2019    AMMONIA 15 (L) 07/28/2023    IYL3YBOTBREZ 10.272 (H) 07/28/2023    FREET4 1.06 07/28/2023    HGBA1C 5.6 05/02/2018     05/02/2018       Imaging Studies:   MRI brain wo contrast    Result Date: 7/30/2023  Narrative: MRI BRAIN WITHOUT CONTRAST INDICATION: AMS. COMPARISON:   CT dated 7/28/2023. TECHNIQUE:  Multiplanar, multisequence imaging of the brain was performed. IMAGE QUALITY:  Diagnostic. FINDINGS: BRAIN PARENCHYMA: No restricted diffusion to indicate an acute infarct. There is encephalomalacia in the left cerebellum due to remote infarct. There are T2/FLAIR hyperintensities in the periventricular and subcortical white matter as well as the tracie compatible with moderate chronic microangiopathic change. Chronic lacunar infarcts in the basal ganglia and thalamus with associated chronic microhemorrhages. VENTRICLES:  Normal for the patient's age. SELLA AND PITUITARY GLAND:  Normal. ORBITS:  Normal. PARANASAL SINUSES:  Normal. VASCULATURE:  Evaluation of the major intracranial vasculature demonstrates appropriate flow voids. CALVARIUM AND SKULL BASE:  Normal. EXTRACRANIAL SOFT TISSUES:  Normal.     Impression: No acute intracranial pathology. Old left cerebellar infarct. . Chronic microangiopathy and old lacunar infarcts. Workstation performed: [id number]     CT head without contrast    Result Date: 7/28/2023  Narrative: CT BRAIN - WITHOUT CONTRAST INDICATION:   Mental status change, persistent or worsening confusion, worsening depression, eval for frontal lobe abnormalities. COMPARISON:  None. TECHNIQUE:  CT examination of the brain was performed. Multiplanar 2D reformatted images were created from the source data. Radiation dose length product (DLP) for this visit:  867 mGy-cm . This examination, like all CT scans performed in the [facility], was performed utilizing techniques to minimize radiation dose exposure, including the use of iterative reconstruction and automated exposure control. IMAGE QUALITY:  Diagnostic. FINDINGS: PARENCHYMA:  No intracranial mass, decreased attenuation is noted in periventricular and subcortical white matter demonstrating an appearance that is statistically most likely to represent mild microangiopathic change. No CT signs of acute infarction. No intracranial mass, mass effect or midline shift. No acute parenchymal hemorrhage. ass effect or midline shift. No CT signs of acute infarction. No acute parenchymal hemorrhage. VENTRICLES AND EXTRA-AXIAL SPACES:  Ventricles and extra-axial CSF spaces are prominent commensurate with the degree of volume loss. No hydrocephalus. No acute extra-axial hemorrhage. VISUALIZED ORBITS: Normal visualized orbits. PARANASAL SINUSES: Normal visualized paranasal sinuses. CALVARIUM AND EXTRACRANIAL SOFT TISSUES:  Normal.     Impression: No acute intracranial abnormality.  Workstation performed: [id number]       Code Status: Level 1 - Full Code  Advance Directive and Living Will: <no information>    Assessment/Plan   Principal Problem:    MDD (major depressive disorder), recurrent episode, severe (HCC)  Active Problems:    Anemia    Chronic coronary artery disease    Hypothyroidism    Hyperlipidemia    Vitamin D deficiency    Hypertension    LVH (left ventricular hypertrophy)    Anxiety    White matter abnormality on MRI of brain    H/O: CVA (cerebrovascular accident)      Patient Strengths: family ties, negotiates basic needs, patient is on a voluntary commitment     Patient Barriers: difficulty adapting, medical problems, poor insight, poor self-care    Treatment Plan:     Planned Treatment and Medication Changes: All current active medications have been reviewed  Encourage group therapy, milieu therapy and occupational therapy  Behavioral Health checks every 7 minutes  Celexa 10 mg po daily  Seroquel 25 mg po hs    Risks / Benefits of Treatment:    Risks, benefits, and possible side effects of medications explained to patient. Patient has limited understanding of risks and benefits of treatment at this time, but agrees to take medications as prescribed. Counseling / Coordination of Care:    Patient's presentation on admission and proposed treatment plan discussed with treatment team.  Diagnosis, medication changes and treatment plan reviewed with patient. Events leading to admission reviewed with patient.     Inpatient Psychiatric Certification:    Estimated length of stay: 10 midnights      Patsy Frost MD 08/01/23

## 2023-08-01 NOTE — PROGRESS NOTES
08/01/23    Team Meeting   Meeting Type Daily Rounds   Team Members Present   Team Members Present Physician;Nurse;;Occupational Therapist   Physician Team Member Dr. Jacky Phillips MD; Rosemarie Liao, [address] [place] [address]   Nursing Team Member Farrah Garcia, ALISHA   Care Management Team Member Jayant Hamilton, MS, [facility]; Delbert Saleh [facility]   OT Team Member Gypsy Keys [place]   Patient/Family Present   Patient Present No   Patient's Family Present No   From medical floor, family brought to ed, confusion, with drawn, poor appetite, cat scan reviewed, reviewed medical, uti, minimal in conversation, prn, anxiety and depression, no behaviors, no psych hx.

## 2023-08-01 NOTE — H&P
Lizette Briceño#  PUO:[id number] F  PQA:[id number]    KISHAN:[id number]  Adm Date: 7/31/2023 1647  4:47 PM   ATT PHY: René Roman, [address] [facility]         Chief Complaint: Worsening depression and anxiety    History of Presenting Illness: Dusty Boswell is a(n) 70y.o. year old female who was admitted through ED due to increasing confusion along with depression and anxiety. Patient was found to have evidence of UTI on admission. Patient examined at bedside. Patient denied any active suicidal homicidal ideations.     Allergies   Allergen Reactions   • Cat Hair Extract Sneezing       Current Facility-Administered Medications on File Prior to Encounter   Medication Dose Route Frequency Provider Last Rate Last Admin   • [DISCONTINUED] acetaminophen (TYLENOL) tablet 650 mg  650 mg Oral Q6H PRN Mary Lambert PA-C   650 mg at 07/28/23 2054   • [DISCONTINUED] ascorbic acid (VITAMIN C) tablet 1,000 mg  1,000 mg Oral Daily Bakari Elgin, DO   1,000 mg at 07/30/23 1053   • [DISCONTINUED] aspirin chewable tablet 81 mg  81 mg Oral Daily Bakari Elgin, DO   81 mg at 07/30/23 1053   • [DISCONTINUED] atorvastatin (LIPITOR) tablet 40 mg  40 mg Oral Daily With [employer], DO   40 mg at 07/30/23 1637   • [DISCONTINUED] cephalexin (KEFLEX) capsule 500 mg  500 mg Oral 4x Daily Macel Backers, DO       • [DISCONTINUED] cholecalciferol (VITAMIN D3) tablet 1,000 Units  1,000 Units Oral Daily Bakari Elgin, DO   1,000 Units at 07/30/23 1053   • [DISCONTINUED] citalopram (CeleXA) tablet 10 mg  10 mg Oral Daily Bakari Elgin, DO   10 mg at 07/30/23 1053   • [DISCONTINUED] enoxaparin (LOVENOX) subcutaneous injection 40 mg  40 mg Subcutaneous Daily Bakari Elgin, DO   40 mg at 07/29/23 6411   • [DISCONTINUED] ezetimibe (ZETIA) tablet 10 mg  10 mg Oral Daily Bakari Elgin, DO   10 mg at 07/30/23 1053   • [DISCONTINUED] levothyroxine tablet 175 mcg  175 mcg Oral Early Morning Onesimo Lopez, DO   175 mcg at 07/31/23 4213   • [DISCONTINUED] lisinopril (ZESTRIL) tablet 20 mg  20 mg Oral Daily Bakari Elgin, DO   20 mg at 07/30/23 1053   • [DISCONTINUED] melatonin tablet 6 mg  6 mg Oral HS PRN Jon Bermudez PA-C   6 mg at 07/30/23 2044   • [DISCONTINUED] metoprolol succinate (TOPROL-XL) 24 hr tablet 12.5 mg  12.5 mg Oral Daily Bakari Eisenberg, DO   12.5 mg at 07/30/23 1053   • [DISCONTINUED] multivitamin stress formula tablet 1 tablet  1 tablet Oral Daily Onesimo Wyattoneil, DO   1 tablet at 07/30/23 1053   • [DISCONTINUED] QUEtiapine (SEROquel) tablet 25 mg  25 mg Oral HS Bakari Elgin, DO   25 mg at 07/30/23 2042     Current Outpatient Medications on File Prior to Encounter   Medication Sig Dispense Refill   • Ascorbic Acid (vitamin C) 1000 MG tablet Take 1,000 mg by mouth daily     • aspirin 81 mg chewable tablet Chew 81 mg daily     • atorvastatin (LIPITOR) 40 mg tablet TAKE 1 TABLET BY MOUTH EVERY DAY 90 tablet 1   • benazepril-hydrochlorthiazide (LOTENSIN HCT) 20-12.5 MG per tablet TAKE 1 TABLET BY MOUTH EVERY DAY 90 tablet 3   • Cholecalciferol (Vitamin D3) 50 MCG (2000 UT) capsule TAKE 1 CAPSULE BY MOUTH EVERY DAY 90 capsule 0   • ezetimibe (ZETIA) 10 mg tablet Take 1 tablet (10 mg total) by mouth daily 90 tablet 0   • levothyroxine (Euthyrox) 175 mcg tablet Take 1 tablet (175 mcg total) by mouth daily in the early morning 90 tablet 0   • metoprolol succinate (TOPROL-XL) 25 mg 24 hr tablet Take 0.5 tablets (12.5 mg total) by mouth daily 90 tablet 0   • multivitamin-iron-minerals-folic acid (CENTRUM) chewable tablet Chew 1 tablet daily     • Blood Pressure KIT Use in the morning 1 kit 0   • citalopram (CeleXA) 10 mg tablet Take 1 tablet (10 mg total) by mouth daily 30 tablet 1       Active Ambulatory Problems     Diagnosis Date Noted   • Anemia 08/12/2012   • Chronic coronary artery disease 08/12/2012   • Hypothyroidism 08/12/2012   • Impaired fasting glucose 12/07/2016   • Hyperlipidemia 2012   • Vitamin D deficiency 2016   • Hypertension 2012   • Class 1 obesity due to excess calories with serious comorbidity and body mass index (BMI) of 30.0 to 30.9 in adult 2019   • Encounter for well adult exam with abnormal findings 2019   • Postsurgical aortocoronary bypass status 2019   • Adjustment disorder with anxiety 2019   • LVH (left ventricular hypertrophy) 2022   • Immunization refused 2023   • Anxiety 2023   • Depression, recurrent ([address]) 2023   • Acute metabolic encephalopathy    • White matter abnormality on MRI of brain 2023     Resolved Ambulatory Problems     Diagnosis Date Noted   • Acute MI ([address]) 2012   • Ventricular fibrillation ([address]) 2012     Past Medical History:   Diagnosis Date   • CAD (coronary artery disease)    • Myocardial infarction ([address]) 2002   • Thyroid disease        Past Surgical History:   Procedure Laterality Date   • COLONOSCOPY     • CORONARY ARTERY BYPASS GRAFT         Social History:   Social History     Socioeconomic History   • Marital status:      Spouse name: None   • Number of children: None   • Years of education: None   • Highest education level: None   Occupational History   • Occupation:  worker ;  to infant ages     Employer: OTHER     Comment: parttime    Tobacco Use   • Smoking status: Former     Packs/day: 0.25     Years: 22.00     Total pack years: 5.50     Types: Cigarettes     Start date: 1971     Quit date: 1993     Years since quittin.6     Passive exposure: Never   • Smokeless tobacco: Never   • Tobacco comments:     no passive smoke exposure   Vaping Use   • Vaping Use: Never used   Substance and Sexual Activity   • Alcohol use: Not Currently   • Drug use: No   • Sexual activity: Not Currently   Other Topics Concern   • None   Social History Narrative   • None     Social Determinants of Health Financial Resource Strain: Low Risk  (3/18/2022)    Overall Financial Resource Strain (CARDIA)    • Difficulty of Paying Living Expenses: Not very hard   Food Insecurity: No Food Insecurity (3/18/2022)    Hunger Vital Sign    • Worried About Running Out of Food in the Last Year: Never true    • Ran Out of Food in the Last Year: Never true   Transportation Needs: No Transportation Needs (3/18/2022)    PRAPARE - Transportation    • Lack of Transportation (Medical): No    • Lack of Transportation (Non-Medical): No   Physical Activity: Insufficiently Active (3/20/2023)    Exercise Vital Sign    • Days of Exercise per Week: 3 days    • Minutes of Exercise per Session: 30 min   Stress: No Stress Concern Present (3/18/2022)    [address] [facility]    • Feeling of Stress : Not at all   Social Connections: Moderately Integrated (3/18/2022)    Social Connection and Isolation Panel [NHANES]    • Frequency of Communication with Friends and Family: More than three times a week    • Frequency of Social Gatherings with Friends and Family: More than three times a week    • Attends Baptism Services: More than 4 times per year    • Active Member of Clubs or Organizations:  Yes    • Attends Club or Organization Meetings: More than 4 times per year    • Marital Status:    Intimate Partner Violence: Not At Risk (3/18/2022)    Humiliation, Afraid, Rape, and Kick questionnaire    • Fear of Current or Ex-Partner: No    • Emotionally Abused: No    • Physically Abused: No    • Sexually Abused: No   Housing Stability: Unknown (3/18/2022)    Housing Stability Vital Sign    • Unable to Pay for Housing in the Last Year: No    • Number of [address] in the Last Year: Not on file    • Unstable Housing in the Last Year: No       Family History:   Family History   Problem Relation Age of Onset   • Hypertension Mother    • Stroke Mother         3 strokes   • Heart attack Father • No Known Problems Sister    • No Known Problems Daughter    • No Known Problems Maternal Grandmother    • No Known Problems Maternal Grandfather    • No Known Problems Paternal Grandmother    • No Known Problems Paternal Grandfather    • No Known Problems Maternal Aunt    • No Known Problems Maternal Aunt    • No Known Problems Maternal Aunt    • Breast cancer Paternal Aunt 53   • No Known Problems Paternal Aunt        Review of Systems   Musculoskeletal: Positive for arthralgias and back pain. Neurological: Positive for weakness and headaches. Psychiatric/Behavioral: Positive for sleep disturbance. All other systems reviewed and are negative. Physical Exam   Vitals: Blood pressure 137/86, pulse 71, temperature (!) 97 °F (36.1 °C), temperature source Temporal, resp. rate 16, height 4' 11.5" (1.511 m), weight 53.3 kg (117 lb 6.4 oz), SpO2 98 %, not currently breastfeeding. ,Body mass index is 23.32 kg/m². Constitutional: Awake and Alert. Well-developed and well-nourished. No distress. HENT: PERR,EOMI, conjunctiva normal  Head: Normocephalic and atraumatic. Mouth/Throat: Oropharynx is clear and moist.    Eyes: Conjunctivae and EOM are normal. Pupils are equal, round, and reactive to light. Right and left eye exhibits no discharge. Neck: Neck supple. No tracheal deviation present. No thyromegaly present. Cardiovascular: Normal rate, regular rhythm and normal heart sounds. Exam reveals no friction rub. No murmur heard. Pulmonary/Chest: Effort normal and breath sounds normal. No respiratory distress. She has no wheezes. Abdominal: Soft. Bowel sounds are normal. She exhibits no distension. There is no tenderness. There is no rebound and no guarding. Neurological: Cranial Nerves grossly intact. No sensory deficit. Coordination normal.   Musculoskeletal:   Nontender spine  Skin: Skin is warm and dry. No rash noted. No diaphoresis. No erythema. No edema. No cyanosis.     Assessment     [facility] is a(n) 70y.o. year old female with MDD    1. Cardiac with history of hypertension, coronary artery disease status post MI, ventricular fibrillation and dyslipidemia. Patient will be continued on Toprol-XL 12.5 mg daily, lisinopril 20 mg daily, hydrochlorothiazide 25 mg daily, atorvastatin 40 mg daily, Zetia 10 mg daily and aspirin 81 mg daily. 2.  Hypothyroidism. Patient is on levothyroxine 175 mcg daily. 3.  DJD/osteoarthritis. Patient may get Tylenol as needed. 4.  Acute UTI. Patient is being treated with Keflex 500 mg every 6 hours for 5 days. 5.  Psych with MDD. Patient is being managed by psych. Prognosis: Fair. Discharge Plan: In progress. Advanced Directives: I have discussed in detail the patient the advanced directives. Patient has not appointed anybody as her POA and has no living will with advanced healthcare directives. Patient's first contact is her daughter Dolly Dubose and her phone number is [phone]. When discussing cardiac and pulmonary resuscitation efforts with the patient, the patient wishes to be  FULL CODE. I have spent more than 50 minutes gathering data, doing physical examination, and discussing the advanced directives, which was witnessed by caring staff. Colostomy in place    Gastrostomy tube in place    H/O brain surgery  june 2016  H/O kidney transplant    Tracheostomy tube present

## 2023-08-01 NOTE — NURSING NOTE
Post prn ativan assessment,she states she feels a 'little more at ease",no c/o unwanted SE. Will monitor.

## 2023-08-01 NOTE — NURSING NOTE
Pt was visible but withdrawn to self. Pt is delayed in speech. Pt appears confused at times. Pt was tearful stating she just wants to go home. Support was offered. Pt was incontinent of urine in the afternoon. Pt ambulates independently with a steady gait. Pt denies SI/HI/AVH. Pt did not respond when asked if she is experiencing anxiety or depression. Pt was cooperative. Pt was medication compliant. Q 7 minute safety checks were maintained.

## 2023-08-01 NOTE — NURSING NOTE
Jennifer Sampsonkeri noted to be visibly shake,queries revealed she is "feareful" of being on the unit:1:1 to alleviate fears ineffective. offered and accepted 0.5 mg of ativan po with education on expected therapeutics and SE to report. Will monitor.

## 2023-08-01 NOTE — TREATMENT PLAN
psychiatric treatment, adequate self care, adequate sleep, adequate appetite, adequate oral intake, appropriate interaction with peers    Progress Towards Goals: starting psychiatric medications as prescribed    Recommended Treatment: medication management, patient medication education, group therapy, milieu therapy, continued Behavioral Health psychiatric evaluation/assessment process, medical follow up with medical team    Treatment Frequency: daily medication monitoring, group and milieu therapy daily, monitoring through interdisciplinary rounds, monitoring through weekly patient care conferences    Expected Discharge Date: 10 midnights     Discharge Plan: will be determined    Treatment Plan Created/Updated By: Lizabeth Bartholomew MD

## 2023-08-01 NOTE — PROGRESS NOTES
08/01/23 1100   Activity/Group Checklist   Group Admission/Discharge   Attendance Attended   Attendance Duration (min) 16-30   Interactions Disorganized interaction   Affect/Mood Blunted/flat; Appropriate  (some confusion and delayed)   Goals Achieved Identified feelings; Identified triggers; Identified relapse prevention strategies; Discussed coping strategies; Able to listen to others; Identified resources and support systems; Able to engage in interactions; Able to self-disclose; Able to recieve feedback     Patient agreeable to meet and complete her self assessment and relapse prevention plan. She is very soft spoken and delayed as well as confused/forgetful in her responses. Patient shared she is here due to increased confusion and anxiety. She feels supported by her daughter and son. She shared likes of reading, music, outdoors, time with her daughter, watching tv/movies, [employer] and the pet dog Eliceo. Patient is not a very talkative person and is a little scared on the unit and needs reassurance.

## 2023-08-02 LAB
25(OH)D3 SERPL-MCNC: 24.9 NG/ML (ref 30–100)
FOLATE SERPL-MCNC: 22.2 NG/ML
VIT B12 SERPL-MCNC: 303 PG/ML (ref 180–914)

## 2023-08-02 PROCEDURE — 99232 SBSQ HOSP IP/OBS MODERATE 35: CPT | Performed by: PSYCHIATRY & NEUROLOGY

## 2023-08-02 PROCEDURE — 82306 VITAMIN D 25 HYDROXY: CPT | Performed by: FAMILY MEDICINE

## 2023-08-02 PROCEDURE — 82746 ASSAY OF FOLIC ACID SERUM: CPT | Performed by: FAMILY MEDICINE

## 2023-08-02 PROCEDURE — 82607 VITAMIN B-12: CPT | Performed by: FAMILY MEDICINE

## 2023-08-02 RX ADMIN — LISINOPRIL 20 MG: 20 TABLET ORAL at 08:49

## 2023-08-02 RX ADMIN — Medication 1 TABLET: at 08:49

## 2023-08-02 RX ADMIN — OXYCODONE HYDROCHLORIDE AND ACETAMINOPHEN 1000 MG: 500 TABLET ORAL at 08:49

## 2023-08-02 RX ADMIN — CITALOPRAM HYDROBROMIDE 10 MG: 10 TABLET ORAL at 08:49

## 2023-08-02 RX ADMIN — CEPHALEXIN 500 MG: 250 CAPSULE ORAL at 17:15

## 2023-08-02 RX ADMIN — HYDROCHLOROTHIAZIDE 25 MG: 25 TABLET ORAL at 08:49

## 2023-08-02 RX ADMIN — EZETIMIBE 10 MG: 10 TABLET ORAL at 08:49

## 2023-08-02 RX ADMIN — LEVOTHYROXINE SODIUM 175 MCG: 75 TABLET ORAL at 06:09

## 2023-08-02 RX ADMIN — METOPROLOL SUCCINATE 12.5 MG: 25 TABLET, EXTENDED RELEASE ORAL at 08:49

## 2023-08-02 RX ADMIN — CEPHALEXIN 500 MG: 250 CAPSULE ORAL at 13:42

## 2023-08-02 RX ADMIN — Medication 1000 UNITS: at 08:49

## 2023-08-02 RX ADMIN — CEPHALEXIN 500 MG: 250 CAPSULE ORAL at 21:26

## 2023-08-02 RX ADMIN — ATORVASTATIN CALCIUM 40 MG: 40 TABLET, FILM COATED ORAL at 16:07

## 2023-08-02 RX ADMIN — ASPIRIN 81 MG 81 MG: 81 TABLET ORAL at 08:49

## 2023-08-02 RX ADMIN — CEPHALEXIN 500 MG: 250 CAPSULE ORAL at 08:49

## 2023-08-02 RX ADMIN — QUETIAPINE FUMARATE 25 MG: 25 TABLET ORAL at 21:26

## 2023-08-02 NOTE — NURSING NOTE
Controlled and visible in the community. Positive for medications and snacks. Denies any suicidal or homicidal ideations. No behavioral issues. Speech is very soft, delayed, appeared guarded during assessment. No change in medical condition or complaints voiced. Maintained on q 7 minute checks. No aspiration risks noted. Medication education given. Care Plan reviewed and amended. Will continue to monitor.

## 2023-08-02 NOTE — MALNUTRITION/BMI
This medical record reflects one or more clinical indicators suggestive of malnutrition and/or morbid obesity. Malnutrition Findings:   Adult Malnutrition type: Chronic illness  Adult Degree of Malnutrition: Other severe protein calorie malnutrition  Malnutrition Characteristics: Weight loss, Inadequate energy              360 Statement: Malnutrition related to inadequate energy intake and MDD as evidenced by 36#(24%) weight loss from 3/20/# to 7/31/# and <75% energy intake compared to estimated needs > 1 month. Regular diet thin liquids with ensure compact BID. BMI Findings: Body mass index is 22.93 kg/m². See Nutrition note dated 8/2/2023  for additional details. Completed nutrition assessment is viewable in the nutrition documentation.

## 2023-08-02 NOTE — NUTRITION
08/02/23 1540   Biochemical Data,Medical Tests, and Procedures   Biochemical Data/Medical Tests/Procedures Meds reviewed;Lab values reviewed   Labs (Comment) 7/31 creat:0.52, CBC WNL. 7/29 pro:5.8   Meds (Comment) Vit C, ASA, lipitor, cogentin, keflex, zetia, atarax, levothyroxine, zestril, ativan, metoprolol succinate, melatonin, zyprexa, seroquel   Nutrition-Focused Physical Exam   Nutrition-Focused Physical Exam Findings RN skin assessment reviewed; No skin issues documented   Medical-Related Concerns anemia, anxiety, CAD, HLD, HTN, MI, thyroid disease, Ventricular fibrillation   Adequacy of Intake   Nutrition Modality PO   Feeding Route   PO Independent   Current PO Intake   Current Diet Order Regular diet thin liquids   Current Meal Intake 0-25%;25-50%;50-75%;%   Estimated calorie intake compared to estimated need Nutrient needs not met. PES Statement   Problem Intake   Energy Balance (1) Inadequate energy intake NI-1.2   Related to Decreased appetite;Depression   As evidenced by: Intake < estimated needs;Weight loss   Recommendations/Interventions   Malnutrition/BMI Present Yes   Adult Malnutrition type Chronic illness   Adult Degree of Malnutrition Other severe protein calorie malnutrition   Malnutrition Characteristics Weight loss; Inadequate energy   360 Statement Malnutrition related to inadequate energy intake and MDD as evidenced by 36#(24%) weight loss from 3/20/# to 7/31/# and <75% energy intake compared to estimated needs > 1 month. Summary Weight change, 2-13#, poor PO; MST-2. Regular diet thin liquids. Meal completions vary. Patient noted to be anxious, tearful, withdrawn. Patient very soft spoken during interview and also provided limited responses. Per chart has been living with son since June. She states her appetite has not been good. 7/31/#; 5/22/#, 18#(13%) weight loss; 3/20/#, 36#(24%); 12/19/#. Significant weight loss present.  She admits to weight loss due to decreased intake. Skin intact. Discussed nutrition supplements, agreeable to ensure compact BID. Interventions/Recommendations Continue current diet order;Supplement initiate   Intervention Comments ensure compact BID.    Education Assessment   Education Patient/caregiver not appropriate for education at this time   Patient Nutrition Goals   Goal Avoid weight loss;Meet PO needs   Goal Status Initiated   Timeframe to complete goal by next f/u   Nutrition Complexity Risk   Nutrition complexity level High risk   Follow up date 08/09/23

## 2023-08-02 NOTE — PROGRESS NOTES
08/02/23    Team Meeting   Meeting Type Daily Rounds   Team Members Present   Team Members Present Physician;Nurse;;Occupational Therapist   Physician Team Member Dr. Deneen Whitt MD; Jerilyn Tejeda, [address] [place] [address]   Nursing Team Member Kati Mendieta RN   Care Management Team Member MS Ramiro, [facility], [facility]; JAYLENE Donohue   OT Team Member Melissa Mares [place]   Patient/Family Present   Patient Present No   Patient's Family Present No   Delayed, tx for uti, family mtg tues at 2pm via phone, internally preoccupied, medication compliant, wanders, walking, nursing to f/u on bm, no groups. From home. No psych hx.

## 2023-08-02 NOTE — NURSING NOTE
Sleep observed as sound during shift, no respiratory distress overnight. Continues on q 7 minute safety checks. Clutter free environment continued to prevent falls. Fluids maintained at beside to promote hydration. Will continue to monitor.

## 2023-08-02 NOTE — PROGRESS NOTES
08/02/23 0758   Activity/Group Checklist   Group Community meeting   Attendance Attended   Attendance Duration (min) 46-60   Interactions Interacted appropriately   Affect/Mood Appropriate   Goals Achieved Identified feelings; Able to listen to others; Able to engage in interactions; Able to reflect/comment on own behavior;Able to self-disclose; Able to recieve feedback

## 2023-08-02 NOTE — PLAN OF CARE
Problem: Risk for Self Injury/Neglect  Goal: Treatment Goal: Remain safe during length of stay, learn and adopt new coping skills, and be free of self-injurious ideation, impulses and acts at the time of discharge  Outcome: Progressing  Goal: Verbalize thoughts and feelings  Description: Interventions:  - Assess and re-assess patient's lethality and potential for self-injury  - Engage patient in 1:1 interactions, daily, for a minimum of 15 minutes  - Encourage patient to express feelings, fears, frustrations, hopes  - Establish rapport/trust with patient   Outcome: Progressing  Goal: Refrain from harming self  Description: Interventions:  - Monitor patient closely, per order  - Develop a trusting relationship  - Supervise medication ingestion, monitor effects and side effects   Outcome: Progressing  Goal: Attend and participate in unit activities, including therapeutic, recreational, and educational groups  Description: Interventions:  - Provide therapeutic and educational activities daily, encourage attendance and participation, and document same in the medical record  - Obtain collateral information, encourage visitation and family involvement in care   Outcome: Progressing  Goal: Recognize maladaptive responses and adopt new coping mechanisms  Outcome: Progressing  Goal: Complete daily ADLs, including personal hygiene independently, as able  Description: Interventions:  - Observe, teach, and assist patient with ADLS  - Monitor and promote a balance of rest/activity, with adequate nutrition and elimination  Outcome: Progressing     Problem: Depression  Goal: Treatment Goal: Demonstrate behavioral control of depressive symptoms, verbalize feelings of improved mood/affect, and adopt new coping skills prior to discharge  Outcome: Progressing  Goal: Verbalize thoughts and feelings  Description: Interventions:  - Assess and re-assess patient's level of risk   - Engage patient in 1:1 interactions, daily, for a minimum of 15 minutes   - Encourage patient to express feelings, fears, frustrations, hopes   Outcome: Progressing  Goal: Refrain from harming self  Description: Interventions:  - Monitor patient closely, per order   - Supervise medication ingestion, monitor effects and side effects   Outcome: Progressing  Goal: Refrain from isolation  Description: Interventions:  - Develop a trusting relationship   - Encourage socialization   Outcome: Progressing  Goal: Refrain from self-neglect  Outcome: Progressing  Goal: Attend and participate in unit activities, including therapeutic, recreational, and educational groups  Description: Interventions:  - Provide therapeutic and educational activities daily, encourage attendance and participation, and document same in the medical record   Outcome: Progressing  Goal: Complete daily ADLs, including personal hygiene independently, as able  Description: Interventions:  - Observe, teach, and assist patient with ADLS  -  Monitor and promote a balance of rest/activity, with adequate nutrition and elimination   Outcome: Progressing     Problem: Anxiety  Goal: Anxiety is at manageable level  Description: Interventions:  - Assess and monitor patient's anxiety level. - Monitor for signs and symptoms (heart palpitations, chest pain, shortness of breath, headaches, nausea, feeling jumpy, restlessness, irritable, apprehensive). - Collaborate with interdisciplinary team and initiate plan and interventions as ordered.   - [place] patient to unit/surroundings  - Explain treatment plan  - Encourage participation in care  - Encourage verbalization of concerns/fears  - Identify coping mechanisms  - Assist in developing anxiety-reducing skills  - Administer/offer alternative therapies  - Limit or eliminate stimulants  Outcome: Progressing     Problem: Alteration in Orientation  Goal: Treatment Goal: Demonstrate a reduction of confusion and improved orientation to person, place, time and/or situation upon discharge, according to optimum baseline/ability  Outcome: Progressing  Goal: Interact with staff daily  Description: Interventions:  - Assess and re-assess patient's level of orientation  - Engage patient in 1 on 1 interactions, daily, for a minimum of 15 minutes   - Establish rapport/trust with patient   Outcome: Progressing  Goal: Express concerns related to confused thinking related to:  Description: Interventions:  - Encourage patient to express feelings, fears, frustrations, hopes  - Assign consistent caregivers   - [place]/re-orient patient as needed  - Allow comfort items, as appropriate  - Provide visual cues, signs, etc.   Outcome: Progressing  Goal: Allow medical examinations, as recommended  Description: Interventions:  - Provide physical/neurological exams and/or referrals, per provider   Outcome: Progressing  Goal: Cooperate with recommended testing/procedures  Description: Interventions:  - Determine need for ancillary testing  - Observe for mental status changes  - Implement falls/precaution protocol   Outcome: Progressing  Goal: Attend and participate in unit activities, including therapeutic, recreational, and educational groups  Description: Interventions:  - Provide therapeutic and educational activities daily, encourage attendance and participation, and document same in the medical record   - Provide appropriate opportunities for reminiscence   - Provide a consistent daily routine   - Encourage family contact/visitation   Outcome: Progressing  Goal: Complete daily ADLs, including personal hygiene independently, as able  Description: Interventions:  - Observe, teach, and assist patient with ADLS  Outcome: Progressing     Problem: Potential for Falls  Goal: Patient will remain free of falls  Description: INTERVENTIONS:  - Educate patient/family on patient safety including physical limitations  - Instruct patient to call for assistance with activity   - Consult OT/PT to assist with strengthening/mobility   - Keep Call bell within reach  - Keep bed low and locked with side rails adjusted as appropriate  - Keep care items and personal belongings within reach  - Initiate and maintain comfort rounds  - Make Fall Risk Sign visible to staff  - Offer Toileting every 2 Hours, in advance of need  - Initiate/Maintain NA  - Obtain necessary fall risk management equipment: NA  - Apply yellow socks and bracelet for high fall risk patients  - Consider moving patient to room near nurses station  Outcome: Progressing

## 2023-08-02 NOTE — PROGRESS NOTES
08/02/23 1330   Activity/Group Checklist   Group Life Skills   Attendance Attended   Attendance Duration (min) 46-60   Interactions Interacted appropriately   Affect/Mood Appropriate   Goals Achieved Identified feelings; Able to listen to others; Able to engage in interactions; Able to reflect/comment on own behavior;Able to self-disclose; Able to recieve feedback

## 2023-08-02 NOTE — NURSING NOTE
Quiet, markedly delayed/minimal in responses to approach/inquiry. Oriented to person; aware of location in a hospital. Somewhat reluctant to medication administration though did take AM scheduled meds with some prompting. Withdrawn to self; ambulating for periods in hallway with independent/steady gait. Offers little to no expression of thoughts/ feelings or issues re: to inpatient need. Will continue to support/ offer guidance/direction/ validation and attempt to meet needs presented.

## 2023-08-02 NOTE — PROGRESS NOTES
08/02/23 6148   Activity/Group Checklist   Group Community meeting   Attendance Did not attend  (Pt offered grp; pt remained in her bed)

## 2023-08-02 NOTE — PROGRESS NOTES
Progress Note - Behavioral Health   This note was not shared with the patient due to reasonable likelihood of causing patient harm  THE [facility] 70 y.o. female MRN: [id number]   Unit/Bed#: [facility] 267-57 Encounter: [id number]    Behavior over the last 24 hours: unchanged. Aspen Cabello was seen for continuing care. She remains guarded, withdrawn and delayed in her thought process and speech. She is able to answer questions mostly in few words. She requires frequent redirection since she has been wandering in the unit. She has been compliant with medication and denies any current side effect. Medical team is currently treating UTI with antibiotic. Staff report poor particpation in groups and on the unit. Sleep: slept better  Appetite: fair  Medication side effects: denies  ROS: all other systems are negative    Mental Status Evaluation:    Appearance:  age appropriate   Behavior:  guarded   Speech:  decreased rate, delayed   Mood:  depressed   Affect:  blunted   Thought Process:  concrete, poverty of thought   Associations: concrete associations   Thought Content:  mild paranoia   Perceptual Disturbances: appears preoccupied   Risk Potential: Suicidal ideation - None at present  Homicidal ideation - None at present   Sensorium:  oriented to person and place   Memory:  recent memory mildly impaired   Consciousness:  alert and awake   Attention: decreased concentration and decreased attention span   Insight:  limited   Judgment: limited   Gait/Station: slow gait   Motor Activity: no abnormal movements     Vital signs in last 24 hours:    Temp:  [97.7 °F (36.5 °C)-98.4 °F (36.9 °C)] 98.4 °F (36.9 °C)  HR:  [60-95] 60  Resp:  [16-18] 18  BP: (116-149)/(58-97) 126/97    Laboratory results:   I have personally reviewed all pertinent laboratory/tests results.   Most Recent Labs:   Lab Results   Component Value Date    WBC 6.58 07/31/2023    RBC 4.12 07/31/2023    HGB 13.0 07/31/2023    HCT 37.9 07/31/2023     07/31/2023    RDW 12.2 07/31/2023    NEUTROABS 3.62 07/28/2023    SODIUM 139 07/31/2023    K 4.2 07/31/2023     07/31/2023    CO2 25 07/31/2023    BUN 10 07/31/2023    CREATININE 0.52 (L) 07/31/2023    GLUC 109 07/31/2023    GLUF 93 05/22/2023    CALCIUM 9.4 07/31/2023    AST 22 07/29/2023    ALT 27 07/29/2023    ALKPHOS 45 07/29/2023    TP 5.8 (L) 07/29/2023    ALB 3.7 07/29/2023    TBILI 0.80 07/29/2023    CHOLESTEROL 233 (H) 05/22/2023    HDL 43 (L) 05/22/2023    TRIG 99 05/22/2023    LDLCALC 170 (H) 05/22/2023    [address] 02/05/2019    AMMONIA 15 (L) 07/28/2023    [id number] 10.272 (H) 07/28/2023    FREET4 1.06 07/28/2023    HGBA1C 5.6 05/02/2018     05/02/2018       Assessment/Plan   Principal Problem:    MDD (major depressive disorder), recurrent episode, severe (HCC)  Active Problems:    Anemia    Chronic coronary artery disease    Hypothyroidism    Hyperlipidemia    Vitamin D deficiency    Hypertension    LVH (left ventricular hypertrophy)    Anxiety    White matter abnormality on MRI of brain    H/O: CVA (cerebrovascular accident)    Recommended Treatment:     Planned medication and treatment changes: All current active medications have been reviewed  Encourage group therapy, milieu therapy and occupational therapy  Behavioral Health checks every 7 minutes   Currently on antibiotic for UTI  Patient will require cognitive function test to rule out neurocognitive disorder.     Current Facility-Administered Medications   Medication Dose Route Frequency Provider Last Rate   • acetaminophen  650 mg Oral Q4H PRN SOLO Castillo     • acetaminophen  650 mg Oral Q4H PRN SOLO Castillo     • acetaminophen  975 mg Oral Q6H PRN SOLO Castillo     • vitamin C  1,000 mg Oral Daily SOLO Castillo     • aspirin  81 mg Oral Daily SOLO Castillo     • atorvastatin  40 mg Oral Daily With [place] SOLO Lindsay     • benztropine  0.5 mg Oral Q4H PRN Max 6/day SOLO Akhtar     • cephalexin  500 mg Oral 4x Daily Bisi Mejía MD     • cholecalciferol  1,000 Units Oral Daily SOLO Akhtar     • citalopram  10 mg Oral Daily SOLO Akhtar     • ezetimibe  10 mg Oral Daily SOLO Akhtar     • hydrochlorothiazide  25 mg Oral Daily Bisi Mejía MD     • hydrOXYzine HCL  25 mg Oral Q6H PRN Max 4/day SOLO Akhtar     • levothyroxine  175 mcg Oral Early Morning SOLO Akhtar     • lisinopril  20 mg Oral Daily SOLO Akhtar     • LORazepam  1 mg Intramuscular Q6H PRN Max 3/day SOLO Akhtar     • LORazepam  0.5 mg Oral Q6H PRN Max 4/day SOLO Akhtar     • LORazepam  1 mg Oral Q6H PRN Max 3/day SOLO Akhtar     • melatonin  6 mg Oral HS PRN SOLO Akhtar     • metoprolol succinate  12.5 mg Oral Daily SOLO Akhtar     • multivitamin-minerals  1 tablet Oral Daily Bisi Mejía MD     • OLANZapine  5 mg Intramuscular Q3H PRN Max 3/day SOLO Akhtar     • OLANZapine  2.5 mg Oral Q4H PRN Max 6/day SOLO Akhtar     • OLANZapine  5 mg Oral Q4H PRN Max 3/day SOLO Akhtar     • OLANZapine  5 mg Oral Q3H PRN Max 3/day SOLO Akhtar     • QUEtiapine  25 mg Oral HS SOLO Akhtar         Risks / Benefits of Treatment:    Risks, benefits, and possible side effects of medications explained to patient. Patient has limited understanding of risks and benefits of treatment at this time, but agrees to take medications as prescribed. Counseling / Coordination of Care:    Patient's progress discussed with staff in treatment team meeting. Medications, treatment progress and treatment plan reviewed with patient. Supportive therapy provided to patient. Group attendance encouraged.     Héctor Murillo MD 08/02/23

## 2023-08-02 NOTE — PROGRESS NOTES
Progress Note - Maxx Melendrez 70 y.o. female MRN: [id number]    Unit/Bed#: Ma[facility] Bower 202-02 Encounter: [id number]        Subjective:   Patient seen and examined at bedside after reviewing the chart and discussing the case with the caring staff. Patient examined at bedside. Patient has no acute complaints. Vitamin D, B12, folate pending. Physical Exam   Vitals: Blood pressure 126/97, pulse 60, temperature 98.4 °F (36.9 °C), temperature source Temporal, resp. rate 18, height 4' 11.5" (1.511 m), weight 53.3 kg (117 lb 6.4 oz), SpO2 97 %, not currently breastfeeding. ,Body mass index is 23.32 kg/m². Constitutional: Patient in no acute distress. HEENT: PERR, EOMI, MMM. Cardiovascular: Normal rate and regular rhythm. Pulmonary/Chest: Effort normal and breath sounds normal.   Abdomen: Soft, + BS, NT. Assessment/Plan:  Maxx Melendrez is a(n) 70y.o. year old female with MDD.     1.  Cardiac with hx of HTN, HLD, CAD s/p CABG, LVH. Continue Toprol-XL 12.5 mg daily, lisinopril 20 mg daily, hydrochlorothiazide 25 mg daily, atorvastatin 40 mg daily, Zetia 10 mg daily and aspirin 81 mg daily. 2.  Hypothyroidism. Patient is on levothyroxine 175 mcg daily. 3.  DJD/osteoarthritis. Patient may get Tylenol as needed. 4.  Acute UTI. Patient is being treated with Keflex 500 mg every 6 hours for 5 days. 5.  Vitamin D deficiency. Repeat levels. The patient was discussed with Dr. Tarah Jimenes and he is in agreement with the above note.

## 2023-08-03 PROBLEM — E43 SEVERE PROTEIN-CALORIE MALNUTRITION (HCC): Status: ACTIVE | Noted: 2023-08-03

## 2023-08-03 LAB
CHOLEST SERPL-MCNC: 121 MG/DL
EST. AVERAGE GLUCOSE BLD GHB EST-MCNC: 126 MG/DL
HBA1C MFR BLD: 6 %
HDLC SERPL-MCNC: 41 MG/DL
LDLC SERPL CALC-MCNC: 63 MG/DL (ref 0–100)
TRIGL SERPL-MCNC: 84 MG/DL

## 2023-08-03 PROCEDURE — 83036 HEMOGLOBIN GLYCOSYLATED A1C: CPT

## 2023-08-03 PROCEDURE — 99232 SBSQ HOSP IP/OBS MODERATE 35: CPT | Performed by: PSYCHIATRY & NEUROLOGY

## 2023-08-03 PROCEDURE — 80061 LIPID PANEL: CPT

## 2023-08-03 RX ORDER — ERGOCALCIFEROL 1.25 MG/1
50000 CAPSULE ORAL WEEKLY
Status: DISCONTINUED | OUTPATIENT
Start: 2023-08-03 | End: 2023-08-11 | Stop reason: HOSPADM

## 2023-08-03 RX ORDER — CYANOCOBALAMIN 1000 UG/ML
1000 INJECTION, SOLUTION INTRAMUSCULAR; SUBCUTANEOUS
Status: DISCONTINUED | OUTPATIENT
Start: 2023-08-03 | End: 2023-08-11 | Stop reason: HOSPADM

## 2023-08-03 RX ADMIN — HYDROCHLOROTHIAZIDE 25 MG: 25 TABLET ORAL at 08:42

## 2023-08-03 RX ADMIN — ATORVASTATIN CALCIUM 40 MG: 40 TABLET, FILM COATED ORAL at 17:05

## 2023-08-03 RX ADMIN — LISINOPRIL 20 MG: 20 TABLET ORAL at 08:40

## 2023-08-03 RX ADMIN — LEVOTHYROXINE SODIUM 175 MCG: 75 TABLET ORAL at 06:16

## 2023-08-03 RX ADMIN — ERGOCALCIFEROL 50000 UNITS: 1.25 CAPSULE ORAL at 11:24

## 2023-08-03 RX ADMIN — OXYCODONE HYDROCHLORIDE AND ACETAMINOPHEN 1000 MG: 500 TABLET ORAL at 08:41

## 2023-08-03 RX ADMIN — Medication 1 TABLET: at 08:42

## 2023-08-03 RX ADMIN — EZETIMIBE 10 MG: 10 TABLET ORAL at 08:42

## 2023-08-03 RX ADMIN — Medication 1000 UNITS: at 08:42

## 2023-08-03 RX ADMIN — CYANOCOBALAMIN 1000 MCG: 1000 INJECTION, SOLUTION INTRAMUSCULAR; SUBCUTANEOUS at 11:25

## 2023-08-03 RX ADMIN — CEPHALEXIN 500 MG: 250 CAPSULE ORAL at 08:41

## 2023-08-03 RX ADMIN — ASPIRIN 81 MG 81 MG: 81 TABLET ORAL at 08:42

## 2023-08-03 RX ADMIN — METOPROLOL SUCCINATE 12.5 MG: 25 TABLET, EXTENDED RELEASE ORAL at 08:40

## 2023-08-03 RX ADMIN — QUETIAPINE FUMARATE 25 MG: 25 TABLET ORAL at 21:00

## 2023-08-03 RX ADMIN — CITALOPRAM HYDROBROMIDE 10 MG: 10 TABLET ORAL at 08:42

## 2023-08-03 RX ADMIN — CEPHALEXIN 500 MG: 250 CAPSULE ORAL at 17:05

## 2023-08-03 RX ADMIN — CEPHALEXIN 500 MG: 250 CAPSULE ORAL at 21:00

## 2023-08-03 RX ADMIN — CEPHALEXIN 500 MG: 250 CAPSULE ORAL at 11:24

## 2023-08-03 NOTE — NURSING NOTE
Ordered blood work obtained by staff T without issue. Patient tolerated well. Specimen sent to the lab for processing.

## 2023-08-03 NOTE — PROGRESS NOTES
Progress Note - Behavioral Health   This note was not shared with the patient due to reasonable likelihood of causing patient harm  THE [facility] 70 y.o. female MRN: [id number]   Unit/Bed#: Jewell Gutierrez 766-93 Encounter: [id number]    Behavior over the last 24 hours: minimal improvement. Norah Guerrero was seen for continuing care. She remains withdrawn, pacing in the unit slowly and limited interaction with staff and peers. She agrees she has been feeling low in energy level, being more forgetful with anhedonia. Denies any current SI or wish to die but appears flat in her affect. She has been compliant with medication and denies any current side effect. Staff report poor particpation in groups and on the unit. Sleep: slept better  Appetite: fair  Medication side effects: denies  ROS: all other systems are negative    Mental Status Evaluation:    Appearance:  age appropriate, looks stated age   Behavior:  psychomotor retardation, responds to redirection   Speech:  decreased rate, delayed   Mood:  depressed   Affect:  blunted   Thought Process:  poverty of thought   Associations: concrete associations   Thought Content:  mild paranoia   Perceptual Disturbances: appears preoccupied   Risk Potential: Suicidal ideation - None at present  Homicidal ideation - None at present   Sensorium:  oriented to person and place   Memory:  recent memory mildly impaired   Consciousness:  alert and awake   Attention: decreased concentration and decreased attention span   Insight:  limited   Judgment: limited   Gait/Station: slow gait   Motor Activity: no abnormal movements     Vital signs in last 24 hours:    Temp:  [97.5 °F (36.4 °C)-98.2 °F (36.8 °C)] 97.7 °F (36.5 °C)  HR:  [] 107  Resp:  [17-18] 17  BP: (110-139)/(57-80) 139/80    Laboratory results:   I have personally reviewed all pertinent laboratory/tests results.   Most Recent Labs:   Lab Results   Component Value Date    WBC 6.58 07/31/2023    RBC 4.12 07/31/2023    HGB 13.0 07/31/2023    HCT 37.9 07/31/2023     07/31/2023    RDW 12.2 07/31/2023    NEUTROABS 3.62 07/28/2023    SODIUM 139 07/31/2023    K 4.2 07/31/2023     07/31/2023    CO2 25 07/31/2023    BUN 10 07/31/2023    CREATININE 0.52 (L) 07/31/2023    GLUC 109 07/31/2023    GLUF 93 05/22/2023    CALCIUM 9.4 07/31/2023    AST 22 07/29/2023    ALT 27 07/29/2023    ALKPHOS 45 07/29/2023    TP 5.8 (L) 07/29/2023    ALB 3.7 07/29/2023    TBILI 0.80 07/29/2023    CHOLESTEROL 121 08/03/2023    HDL 41 (L) 08/03/2023    TRIG 84 08/03/2023    LDLCALC 63 08/03/2023    [address] 02/05/2019    AMMONIA 15 (L) 07/28/2023    BBL4BIYDZJTS 10.272 (H) 07/28/2023    FREET4 1.06 07/28/2023    HGBA1C 6.0 (H) 08/03/2023     08/03/2023       Assessment/Plan   Principal Problem:    MDD (major depressive disorder), recurrent episode, severe (HCC)  Active Problems:    Anemia    Chronic coronary artery disease    Hypothyroidism    Hyperlipidemia    Vitamin D deficiency    Hypertension    LVH (left ventricular hypertrophy)    Anxiety    White matter abnormality on MRI of brain    H/O: CVA (cerebrovascular accident)    Severe protein-calorie malnutrition ([address])    Recommended Treatment:     Planned medication and treatment changes:     All current active medications have been reviewed  Encourage group therapy, milieu therapy and occupational therapy  Behavioral Health checks every 7 minutes  Requires continued inpatient treatment due to chronic illness and high risk of decompensation if discharged before long term stability is achieved     Current Facility-Administered Medications   Medication Dose Route Frequency Provider Last Rate   • acetaminophen  650 mg Oral Q4H PRN SOLO Nix     • acetaminophen  650 mg Oral Q4H PRN SOLO Nix     • acetaminophen  975 mg Oral Q6H PRN SOLO Nix     • vitamin C  1,000 mg Oral Daily SOLO Nix     • aspirin  81 mg Oral Daily Sharee Helton SOLO De León     • atorvastatin  40 mg Oral Daily With Alger Morenci SOLO De León     • benztropine  0.5 mg Oral Q4H PRN Max 6/day SOLO Thorne     • cephalexin  500 mg Oral 4x Daily Arcadio Jenkins MD     • cholecalciferol  1,000 Units Oral Daily SOLO Thorne     • citalopram  10 mg Oral Daily SOLO Thorne     • cyanocobalamin  1,000 mcg Intramuscular Q30 Days Arcadio Jenkins MD     • ergocalciferol  50,000 Units Oral Weekly Arcadio Jenkins MD     • ezetimibe  10 mg Oral Daily SOLO Thorne     • hydrochlorothiazide  25 mg Oral Daily Arcadio Jenkins MD     • hydrOXYzine HCL  25 mg Oral Q6H PRN Max 4/day Chris Crum, SHAVONNP     • levothyroxine  175 mcg Oral Early Morning Chris Crum, SOLO     • lisinopril  20 mg Oral Daily SOLO Thorne     • LORazepam  1 mg Intramuscular Q6H PRN Max 3/day SHAVON ThorneNP     • LORazepam  0.5 mg Oral Q6H PRN Max 4/day SOLO Thorne     • LORazepam  1 mg Oral Q6H PRN Max 3/day SOLO Thorne     • melatonin  6 mg Oral HS PRN Chris Crum, SHAVONNP     • metoprolol succinate  12.5 mg Oral Daily Chris Crum, SOLO     • multivitamin-minerals  1 tablet Oral Daily Arcadio Jenkins MD     • OLANZapine  5 mg Intramuscular Q3H PRN Max 3/day SOLO Thorne     • OLANZapine  2.5 mg Oral Q4H PRN Max 6/day Chris Crum, SOLO     • OLANZapine  5 mg Oral Q4H PRN Max 3/day SOLO Thorne     • OLANZapine  5 mg Oral Q3H PRN Max 3/day SOLO Thorne     • QUEtiapine  25 mg Oral HS SOLO Thorne         Risks / Benefits of Treatment:    Risks, benefits, and possible side effects of medications explained to patient. Patient has limited understanding of risks and benefits of treatment at this time, but agrees to take medications as prescribed.     Counseling / Coordination of Care:    Patient's progress discussed with staff in treatment team meeting. Medications, treatment progress and treatment plan reviewed with patient. Supportive therapy provided to patient. Group attendance encouraged.     Vikki Al MD 08/03/23

## 2023-08-03 NOTE — PLAN OF CARE
Problem: Ineffective Coping  Goal: Demonstrates healthy coping skills  Outcome: Progressing  Goal: Participates in unit activities  Description: Interventions:  - Provide therapeutic environment   - Provide required programming   - Redirect inappropriate behaviors   Outcome: Progressing  Goal: Understands least restrictive measures  Description: Interventions:  - Utilize least restrictive behavior  Outcome: Progressing  Goal: Free from restraint events  Description: - Utilize least restrictive measures   - Provide behavioral interventions   - Redirect inappropriate behaviors   Outcome: Progressing     Problem: Risk for Self Injury/Neglect  Goal: Treatment Goal: Remain safe during length of stay, learn and adopt new coping skills, and be free of self-injurious ideation, impulses and acts at the time of discharge  Outcome: Progressing  Goal: Verbalize thoughts and feelings  Description: Interventions:  - Assess and re-assess patient's lethality and potential for self-injury  - Engage patient in 1:1 interactions, daily, for a minimum of 15 minutes  - Encourage patient to express feelings, fears, frustrations, hopes  - Establish rapport/trust with patient   Outcome: Progressing  Goal: Refrain from harming self  Description: Interventions:  - Monitor patient closely, per order  - Develop a trusting relationship  - Supervise medication ingestion, monitor effects and side effects   Outcome: Progressing  Goal: Attend and participate in unit activities, including therapeutic, recreational, and educational groups  Description: Interventions:  - Provide therapeutic and educational activities daily, encourage attendance and participation, and document same in the medical record  - Obtain collateral information, encourage visitation and family involvement in care   Outcome: Progressing  Goal: Complete daily ADLs, including personal hygiene independently, as able  Description: Interventions:  - Observe, teach, and assist patient with ADLS  - Monitor and promote a balance of rest/activity, with adequate nutrition and elimination  Outcome: Progressing     Problem: Depression  Goal: Treatment Goal: Demonstrate behavioral control of depressive symptoms, verbalize feelings of improved mood/affect, and adopt new coping skills prior to discharge  Outcome: Progressing  Goal: Verbalize thoughts and feelings  Description: Interventions:  - Assess and re-assess patient's level of risk   - Engage patient in 1:1 interactions, daily, for a minimum of 15 minutes   - Encourage patient to express feelings, fears, frustrations, hopes   Outcome: Progressing  Goal: Refrain from harming self  Description: Interventions:  - Monitor patient closely, per order   - Supervise medication ingestion, monitor effects and side effects   Outcome: Progressing  Goal: Refrain from isolation  Description: Interventions:  - Develop a trusting relationship   - Encourage socialization   Outcome: Progressing  Goal: Refrain from self-neglect  Outcome: Progressing  Goal: Attend and participate in unit activities, including therapeutic, recreational, and educational groups  Description: Interventions:  - Provide therapeutic and educational activities daily, encourage attendance and participation, and document same in the medical record   Outcome: Progressing  Goal: Complete daily ADLs, including personal hygiene independently, as able  Description: Interventions:  - Observe, teach, and assist patient with ADLS  -  Monitor and promote a balance of rest/activity, with adequate nutrition and elimination   Outcome: Progressing     Problem: Anxiety  Goal: Anxiety is at manageable level  Description: Interventions:  - Assess and monitor patient's anxiety level. - Monitor for signs and symptoms (heart palpitations, chest pain, shortness of breath, headaches, nausea, feeling jumpy, restlessness, irritable, apprehensive).    - Collaborate with interdisciplinary team and initiate plan and interventions as ordered.   - [place] patient to unit/surroundings  - Explain treatment plan  - Encourage participation in care  - Encourage verbalization of concerns/fears  - Identify coping mechanisms  - Assist in developing anxiety-reducing skills  - Administer/offer alternative therapies  - Limit or eliminate stimulants  Outcome: Progressing     Problem: Alteration in Orientation  Goal: Treatment Goal: Demonstrate a reduction of confusion and improved orientation to person, place, time and/or situation upon discharge, according to optimum baseline/ability  Outcome: Progressing  Goal: Interact with staff daily  Description: Interventions:  - Assess and re-assess patient's level of orientation  - Engage patient in 1 on 1 interactions, daily, for a minimum of 15 minutes   - Establish rapport/trust with patient   Outcome: Progressing  Goal: Express concerns related to confused thinking related to:  Description: Interventions:  - Encourage patient to express feelings, fears, frustrations, hopes  - Assign consistent caregivers   - Mather/re-orient patient as needed  - Allow comfort items, as appropriate  - Provide visual cues, signs, etc.   Outcome: Not Progressing  Goal: Allow medical examinations, as recommended  Description: Interventions:  - Provide physical/neurological exams and/or referrals, per provider   Outcome: Progressing  Goal: Cooperate with recommended testing/procedures  Description: Interventions:  - Determine need for ancillary testing  - Observe for mental status changes  - Implement falls/precaution protocol   Outcome: Progressing  Goal: Attend and participate in unit activities, including therapeutic, recreational, and educational groups  Description: Interventions:  - Provide therapeutic and educational activities daily, encourage attendance and participation, and document same in the medical record   - Provide appropriate opportunities for reminiscence   - Provide a consistent daily routine   - Encourage family contact/visitation   Outcome: Progressing  Goal: Complete daily ADLs, including personal hygiene independently, as able  Description: Interventions:  - Observe, teach, and assist patient with ADLS  Outcome: Progressing     Problem: Potential for Falls  Goal: Patient will remain free of falls  Description: INTERVENTIONS:  - Educate patient/family on patient safety including physical limitations  - Instruct patient to call for assistance with activity   - Consult OT/PT to assist with strengthening/mobility   - Keep Call bell within reach  - Keep bed low and locked with side rails adjusted as appropriate  - Keep care items and personal belongings within reach  - Initiate and maintain comfort rounds  - Make Fall Risk Sign visible to staff  - Apply yellow socks and bracelet for high fall risk patients  - Consider moving patient to room near nurses station  Outcome: Progressing     Problem: Nutrition/Hydration-ADULT  Goal: Nutrient/Hydration intake appropriate for improving, restoring or maintaining nutritional needs  Description: Monitor and assess patient's nutrition/hydration status for malnutrition. Collaborate with interdisciplinary team and initiate plan and interventions as ordered. Monitor patient's weight and dietary intake as ordered or per policy. Utilize nutrition screening tool and intervene as necessary. Determine patient's food preferences and provide high-protein, high-caloric foods as appropriate.      INTERVENTIONS:  - Monitor oral intake, urinary output, labs, and treatment plans  - Assess nutrition and hydration status and recommend course of action  - Evaluate amount of meals eaten  - Assist patient with eating if necessary   - Allow adequate time for meals  - Recommend/ encourage appropriate diets, oral nutritional supplements, and vitamin/mineral supplements  - Order, calculate, and assess calorie counts as needed  - Recommend, monitor, and adjust tube feedings and TPN/PPN based on assessed needs  - Assess need for intravenous fluids  - Provide specific nutrition/hydration education as appropriate  - Include patient/family/caregiver in decisions related to nutrition  Outcome: Progressing

## 2023-08-03 NOTE — PROGRESS NOTES
08/03/23 0930   Team Meeting   Meeting Type Daily Rounds   Team Members Present   Team Members Present Physician;Nurse;   Physician Team Member Dr. Delaney Meza MD; Lidya Horse, [facility]   Nursing Team Member Manpreet Whitlock RN; Silvia Alejandro RN   Care Management Team Member MS Ramiro, [facility], [facility], [facility]; [facility]   OT Team Member Jolly Najjar, [place]   Patient/Family Present   Patient Present No   Patient's Family Present No   On keflex for uti, minimal verbal, flat, denies anxiety and depression. Family mtg on Tuesday at 20pm via phone.

## 2023-08-03 NOTE — NURSING NOTE
Patient appears to have slept through the overnight hours without issue. No complaints voiced. No acute behaviors observed. Patient remains in bed sleeping at this time. Continuous safety rounding in progress.

## 2023-08-03 NOTE — PROGRESS NOTES
Progress Note - Danelle Georges 70 y.o. female MRN: [id number]    Unit/Bed#: Gulshan Carter 202-02 Encounter: [id number]        Subjective:   Patient seen and examined at bedside after reviewing the chart and discussing the case with the caring staff. Patient examined at bedside. Patient has no acute complaints. On review of patient's labs it was found that patient's vitamin D level was low 24.9 and vitamin B12 level is also low 303. Physical Exam   Vitals: Blood pressure 139/80, pulse (!) 107, temperature 97.7 °F (36.5 °C), temperature source Temporal, resp. rate 17, height 5' (1.524 m), weight 53.3 kg (117 lb 6.4 oz), SpO2 97 %, not currently breastfeeding. ,Body mass index is 22.93 kg/m². Constitutional: Patient in no acute distress. HEENT: PERR, EOMI, MMM. Cardiovascular: Normal rate and regular rhythm. Pulmonary/Chest: Effort normal and breath sounds normal.   Abdomen: Soft, + BS, NT. Assessment/Plan:  Danelle Georges is a(n) 70y.o. year old female with MDD.     1.  Cardiac with hx of HTN, HLD, CAD s/p CABG, LVH. Continue Toprol-XL 12.5 mg daily, lisinopril 20 mg daily, hydrochlorothiazide 25 mg daily, atorvastatin 40 mg daily, Zetia 10 mg daily and aspirin 81 mg daily. 2.  Hypothyroidism. Patient is on levothyroxine 175 mcg daily. 3.  DJD/osteoarthritis. Patient may get Tylenol as needed. 4.  Acute UTI. Patient is being treated with Keflex 500 mg every 6 hours for 5 days. 5.  New Vitamin D deficiency. I will put the patient on vitamin D bolus doses for 6 weeks followed by vitamin D3 1000 units daily. 6.  New vitamin B12 deficiency. I will put the patient on monthly B12 injections.

## 2023-08-03 NOTE — NURSING NOTE
Patient was observed to be visible in the community this evening; spending time ambulating the unit hallways. She is withdrawn to self; does not interact with other peers on the unit. Speech is soft and minimal; delayed responses. Denies any pain. Patient was medication compliant at HS. Positive for snack and fluids tonight. Continuous safety rounding in progress.

## 2023-08-03 NOTE — PROGRESS NOTES
08/03/23 1100   Activity/Group Checklist   Group Life Skills   Attendance Did not attend  (Pt offered grp; pt remained in her room)

## 2023-08-03 NOTE — NURSING NOTE
Patient visible in milieu, pacing unit. Pleasant and cooperative in interaction. Patient offers minimal socialization with staff, guarded with delayed speech, affect flat, concrete thinking. Withdrawn to self. Patient denied anxiety/depression, SI/HI, hallucinations. Remains on Keflex for UTI, denies dysuria, frequency, urgency upon urination. Ate 50% for breakfast and lunch. Remains medication compliant and on 7" checks for safety and behaviors.

## 2023-08-03 NOTE — PROGRESS NOTES
08/03/23 9624   Activity/Group Checklist   Group Community meeting   Attendance Did not attend  (Pt offered grp; pt remained in her room)

## 2023-08-04 RX ORDER — DOCUSATE SODIUM 100 MG/1
100 CAPSULE, LIQUID FILLED ORAL 2 TIMES DAILY
Status: DISCONTINUED | OUTPATIENT
Start: 2023-08-04 | End: 2023-08-11 | Stop reason: HOSPADM

## 2023-08-04 RX ORDER — POLYETHYLENE GLYCOL 3350 17 G/17G
17 POWDER, FOR SOLUTION ORAL DAILY PRN
Status: DISCONTINUED | OUTPATIENT
Start: 2023-08-04 | End: 2023-08-11 | Stop reason: HOSPADM

## 2023-08-04 RX ADMIN — CEPHALEXIN 500 MG: 250 CAPSULE ORAL at 11:50

## 2023-08-04 RX ADMIN — Medication 1 TABLET: at 08:57

## 2023-08-04 RX ADMIN — QUETIAPINE FUMARATE 25 MG: 25 TABLET ORAL at 21:22

## 2023-08-04 RX ADMIN — CEPHALEXIN 500 MG: 250 CAPSULE ORAL at 21:22

## 2023-08-04 RX ADMIN — CITALOPRAM HYDROBROMIDE 10 MG: 10 TABLET ORAL at 08:57

## 2023-08-04 RX ADMIN — ATORVASTATIN CALCIUM 40 MG: 40 TABLET, FILM COATED ORAL at 17:15

## 2023-08-04 RX ADMIN — CEPHALEXIN 500 MG: 250 CAPSULE ORAL at 08:59

## 2023-08-04 RX ADMIN — METOPROLOL SUCCINATE 12.5 MG: 25 TABLET, EXTENDED RELEASE ORAL at 08:58

## 2023-08-04 RX ADMIN — EZETIMIBE 10 MG: 10 TABLET ORAL at 08:59

## 2023-08-04 RX ADMIN — CEPHALEXIN 500 MG: 250 CAPSULE ORAL at 17:15

## 2023-08-04 RX ADMIN — LEVOTHYROXINE SODIUM 175 MCG: 75 TABLET ORAL at 06:09

## 2023-08-04 RX ADMIN — Medication 1000 UNITS: at 08:58

## 2023-08-04 RX ADMIN — HYDROCHLOROTHIAZIDE 25 MG: 25 TABLET ORAL at 08:58

## 2023-08-04 RX ADMIN — OXYCODONE HYDROCHLORIDE AND ACETAMINOPHEN 1000 MG: 500 TABLET ORAL at 08:57

## 2023-08-04 RX ADMIN — ASPIRIN 81 MG 81 MG: 81 TABLET ORAL at 08:58

## 2023-08-04 RX ADMIN — LISINOPRIL 20 MG: 20 TABLET ORAL at 08:59

## 2023-08-04 RX ADMIN — DOCUSATE SODIUM 100 MG: 100 CAPSULE, LIQUID FILLED ORAL at 17:15

## 2023-08-04 NOTE — NURSING NOTE
Patient was observed to be pacing about the unit this evening. She has been calm and controlled, no acute behaviors observed. Speech is soft and minimal with delayed responses. She presents with a flat affect. Ben Ocampo was medication compliant at HS. Positive for snack and fluids tonight. Continuous safety rounding in progress.

## 2023-08-04 NOTE — PROGRESS NOTES
08/04/23 1030   Activity/Group Checklist   Group Wellness   Attendance Attended   Attendance Duration (min) 46-60   Interactions Interacted appropriately   Affect/Mood Appropriate   Goals Achieved Identified feelings; Able to listen to others; Able to engage in interactions; Able to self-disclose; Able to recieve feedback; Discussed coping strategies

## 2023-08-04 NOTE — PROGRESS NOTES
08/04/23 0930   Team Meeting   Meeting Type Daily Rounds   Team Members Present   Team Members Present Physician;Nurse;; Other (Discipline and Name)   Physician Team Member Dr. Mickie Pro MD; SOLO Goldman   Nursing Team Member Reji Sevilla RN; Mario Mathis RN, CC   Care Management Team Member [facility], [facility]   Other (Discipline and Name) Demarcus Ramirez   Patient/Family Present   Patient Present No   Patient's Family Present No     Pt being treated for UTI, family meeting on Tues @ [phone], does not speak much, shook head for assessment for depression, confused, wanders

## 2023-08-04 NOTE — PLAN OF CARE
Problem: Risk for Self Injury/Neglect  Goal: Treatment Goal: Remain safe during length of stay, learn and adopt new coping skills, and be free of self-injurious ideation, impulses and acts at the time of discharge  Outcome: Progressing  Goal: Verbalize thoughts and feelings  Description: Interventions:  - Assess and re-assess patient's lethality and potential for self-injury  - Engage patient in 1:1 interactions, daily, for a minimum of 15 minutes  - Encourage patient to express feelings, fears, frustrations, hopes  - Establish rapport/trust with patient   Outcome: Not Progressing  Goal: Refrain from harming self  Description: Interventions:  - Monitor patient closely, per order  - Develop a trusting relationship  - Supervise medication ingestion, monitor effects and side effects   Outcome: Progressing  Goal: Complete daily ADLs, including personal hygiene independently, as able  Description: Interventions:  - Observe, teach, and assist patient with ADLS  - Monitor and promote a balance of rest/activity, with adequate nutrition and elimination  Outcome: Progressing     Problem: Depression  Goal: Refrain from self-neglect  Outcome: Progressing     Problem: Anxiety  Goal: Anxiety is at manageable level  Description: Interventions:  - Assess and monitor patient's anxiety level. - Monitor for signs and symptoms (heart palpitations, chest pain, shortness of breath, headaches, nausea, feeling jumpy, restlessness, irritable, apprehensive). - Collaborate with interdisciplinary team and initiate plan and interventions as ordered.   - [place] patient to unit/surroundings  - Explain treatment plan  - Encourage participation in care  - Encourage verbalization of concerns/fears  - Identify coping mechanisms  - Assist in developing anxiety-reducing skills  - Administer/offer alternative therapies  - Limit or eliminate stimulants  Outcome: Progressing     Problem: Potential for Falls  Goal: Patient will remain free of falls  Description: INTERVENTIONS:  - Educate patient/family on patient safety including physical limitations  - Instruct patient to call for assistance with activity   - Consult OT/PT to assist with strengthening/mobility   - Keep Call bell within reach  - Keep bed low and locked with side rails adjusted as appropriate  - Keep care items and personal belongings within reach  - Initiate and maintain comfort rounds  - Make Fall Risk Sign visible to staff  - Apply yellow socks and bracelet for high fall risk patients  - Consider moving patient to room near nurses station  Outcome: Progressing     Problem: Nutrition/Hydration-ADULT  Goal: Nutrient/Hydration intake appropriate for improving, restoring or maintaining nutritional needs  Description: Monitor and assess patient's nutrition/hydration status for malnutrition. Collaborate with interdisciplinary team and initiate plan and interventions as ordered. Monitor patient's weight and dietary intake as ordered or per policy. Utilize nutrition screening tool and intervene as necessary. Determine patient's food preferences and provide high-protein, high-caloric foods as appropriate.      INTERVENTIONS:  - Monitor oral intake, urinary output, labs, and treatment plans  - Assess nutrition and hydration status and recommend course of action  - Evaluate amount of meals eaten  - Assist patient with eating if necessary   - Allow adequate time for meals  - Recommend/ encourage appropriate diets, oral nutritional supplements, and vitamin/mineral supplements  - Order, calculate, and assess calorie counts as needed  - Recommend, monitor, and adjust tube feedings and TPN/PPN based on assessed needs  - Assess need for intravenous fluids  - Provide specific nutrition/hydration education as appropriate  - Include patient/family/caregiver in decisions related to nutrition  Outcome: Progressing

## 2023-08-04 NOTE — PROGRESS NOTES
08/04/23 0453   Activity/Group Checklist   Group Community meeting   Attendance Did not attend  (Pt offered grp; pt remained in her room)

## 2023-08-04 NOTE — PROGRESS NOTES
Progress Note - Darci Church 70 y.o. female MRN: [id number]    Unit/Bed#: [facility] 202-02 Encounter: [id number]        Subjective:   Patient seen and examined at bedside after reviewing the chart and discussing the case with the caring staff. Patient examined at bedside. Patient complaining of constipation. She states she cannot recall when her last bowel movement was. Denies abdominal pain, nausea, vomiting. Physical Exam   Vitals: Blood pressure 122/72, pulse 93, temperature 97.6 °F (36.4 °C), temperature source Temporal, resp. rate 17, height 5' (1.524 m), weight 53.3 kg (117 lb 6.4 oz), SpO2 99 %, not currently breastfeeding. ,Body mass index is 22.93 kg/m². Constitutional: Patient in no acute distress. HEENT: PERR, EOMI, MMM. Cardiovascular: Normal rate and regular rhythm. Pulmonary/Chest: Effort normal and breath sounds normal.   Abdomen: Soft, + BS, NT, no rebound, no guarding. Assessment/Plan:  Dacri Church is a(n) 70y.o. year old female with MDD.     1.  Cardiac with hx of HTN, HLD, CAD s/p CABG, LVH. Continue Toprol-XL 12.5 mg daily, lisinopril 20 mg daily, hydrochlorothiazide 25 mg daily, atorvastatin 40 mg daily, Zetia 10 mg daily and aspirin 81 mg daily. 2. Prediabetes. Diet controlled. Hgb a1c 6.0% on 8/3/23. 3. Hypothyroidism. Patient is on levothyroxine 175 mcg daily. 4. DJD/osteoarthritis. Patient may get Tylenol as needed. 5. Vitamin D deficiency. Patient started on vitamin D bolus doses for 6 weeks followed by vitamin D3 1000 units daily. 6. Vitamin B12 deficiency. Patient started on monthly B12 injections. 7. Constipation. Start Colace 100 mg twice daily. MiraLAX as needed. 8. Acute UTI. Patient is being treated with Keflex 500 mg every 6 hours for 5 days. The patient was discussed with Dr. Evi Walters and he is in agreement with the above note.

## 2023-08-04 NOTE — NURSING NOTE
Patient appears to have slept through the overnight hours, however she appeared to be mildly restless. No complaints voiced. Patient remains in bed sleeping at this time. Continuous safety rounding in progress.

## 2023-08-04 NOTE — NURSING NOTE
During early AM medication administration, patient was observed to be moaning while in bed with her hands on her abdomen. Patient is not responding to queries by nursing staff to assess her. Staff assisted patient to the bathroom where she voided and then was attempting to have a BM. She appears to be straining to have a BM. Patient returned to her bed where she is resting at this time. Abdomen is SNT with positive bowel sounds. Request placed on medical clipboard to assess patient for constipation.

## 2023-08-04 NOTE — NURSING NOTE
Patient alert to self only, verbalizes simple needs, speech minimal, nods head. Guarded in conversation, pacing unit. Patient endorsed depression, denies anxiety, SI/HI, no overt hallucinations. [place] thought process. Remains medication compliant and on 7" checks for safety and behaviors.

## 2023-08-05 PROCEDURE — 99232 SBSQ HOSP IP/OBS MODERATE 35: CPT | Performed by: NURSE PRACTITIONER

## 2023-08-05 RX ADMIN — ATORVASTATIN CALCIUM 40 MG: 40 TABLET, FILM COATED ORAL at 17:07

## 2023-08-05 RX ADMIN — DOCUSATE SODIUM 100 MG: 100 CAPSULE, LIQUID FILLED ORAL at 17:07

## 2023-08-05 RX ADMIN — LEVOTHYROXINE SODIUM 175 MCG: 75 TABLET ORAL at 05:07

## 2023-08-05 RX ADMIN — CEPHALEXIN 500 MG: 250 CAPSULE ORAL at 08:33

## 2023-08-05 RX ADMIN — OXYCODONE HYDROCHLORIDE AND ACETAMINOPHEN 1000 MG: 500 TABLET ORAL at 08:33

## 2023-08-05 RX ADMIN — ASPIRIN 81 MG 81 MG: 81 TABLET ORAL at 08:33

## 2023-08-05 RX ADMIN — HYDROCHLOROTHIAZIDE 25 MG: 25 TABLET ORAL at 08:33

## 2023-08-05 RX ADMIN — Medication 1 TABLET: at 08:33

## 2023-08-05 RX ADMIN — EZETIMIBE 10 MG: 10 TABLET ORAL at 08:33

## 2023-08-05 RX ADMIN — DOCUSATE SODIUM 100 MG: 100 CAPSULE, LIQUID FILLED ORAL at 08:33

## 2023-08-05 RX ADMIN — Medication 1000 UNITS: at 08:33

## 2023-08-05 RX ADMIN — CEPHALEXIN 500 MG: 250 CAPSULE ORAL at 12:22

## 2023-08-05 RX ADMIN — CEPHALEXIN 500 MG: 250 CAPSULE ORAL at 17:07

## 2023-08-05 RX ADMIN — CITALOPRAM HYDROBROMIDE 10 MG: 10 TABLET ORAL at 08:33

## 2023-08-05 RX ADMIN — CEPHALEXIN 500 MG: 250 CAPSULE ORAL at 21:32

## 2023-08-05 RX ADMIN — LISINOPRIL 20 MG: 20 TABLET ORAL at 08:33

## 2023-08-05 RX ADMIN — METOPROLOL SUCCINATE 12.5 MG: 25 TABLET, EXTENDED RELEASE ORAL at 08:32

## 2023-08-05 RX ADMIN — QUETIAPINE FUMARATE 25 MG: 25 TABLET ORAL at 21:32

## 2023-08-05 NOTE — NURSING NOTE
Presents with depressed,anxious affect:povery of speech,thought,and delayed in responses. She does not rate depression and anxiety but does endorse the aforementioned. Jaylen Doe denies SI,HI,AH,VH. She paces the unit,does not initiate conversation with peers nor staff. We discussed the importance and rationale of taking medications post DC. Will continue to educate,monitor,and provide safe,therapeutic milieu.

## 2023-08-05 NOTE — PROGRESS NOTES
Progress Note - Caitlin Perera 70 y.o. female MRN: [id number]    Unit/Bed#: [facility] 202-02 Encounter: [id number]        Subjective:   Patient seen and examined at bedside after reviewing the chart and discussing the case with the caring staff. Patient examined at bedside. Patient reports no acute symptoms. Physical Exam   Vitals: Blood pressure 136/58, pulse 94, temperature 97.5 °F (36.4 °C), temperature source Temporal, resp. rate 18, height 5' (1.524 m), weight 51.9 kg (114 lb 8 oz), SpO2 98 %, not currently breastfeeding. ,Body mass index is 22.36 kg/m². Constitutional: Patient in no acute distress. HEENT: PERR, EOMI, MMM. Cardiovascular: Normal rate and regular rhythm. Pulmonary/Chest: Effort normal and breath sounds normal.   Abdomen: Soft, + BS, NT, no rebound, no guarding. Assessment/Plan:  Caitlin Perera is a(n) 70y.o. year old female with MDD.     1.  Cardiac with hx of HTN, HLD, CAD s/p CABG, LVH. Continue Toprol-XL 12.5 mg daily, lisinopril 20 mg daily, hydrochlorothiazide 25 mg daily, atorvastatin 40 mg daily, Zetia 10 mg daily and aspirin 81 mg daily. 2. Prediabetes. Diet controlled. Hgb a1c 6.0% on 8/3/23. 3. Hypothyroidism. Patient is on levothyroxine 175 mcg daily. 4. DJD/osteoarthritis. Patient may get Tylenol as needed. 5. Vitamin D deficiency. Patient started on vitamin D bolus doses for 6 weeks followed by vitamin D3 1000 units daily. 6. Vitamin B12 deficiency. Patient started on monthly B12 injections. 7. Constipation. Start Colace 100 mg twice daily. MiraLAX as needed. 8. Acute UTI. Patient is being treated with Keflex 500 mg every 6 hours for 5 days.

## 2023-08-05 NOTE — PROGRESS NOTES
Progress Note - Feliciano Briceño 70 y.o. female MRN: [id number]   Unit/Bed#: [facility] 202-02 Encounter: [id number]    Behavior over the last 24 hours: unchanged. Aspen Cabello is seen in her room and pacing. Slow to respond and minimally verbal.  Does present internally preoccupied and appears to be responding. She does not answer questions regarding suicidal ideation or hallucinations. Admits to feeling helpless and hopeless. But offers very little else and extremely guarded. Sleep: slept off and on  Appetite: poor  Medication side effects: No   ROS: no complaints, all other systems are negative    Mental Status Evaluation:    Appearance:  disheveled, marginal hygiene   Behavior:  bizarre, guarded   Speech:  slow, soft   Mood:  depressed   Affect:  constricted   Thought Process:  decreased rate of thoughts   Associations: concrete associations   Thought Content:  no overt delusions   Perceptual Disturbances: denies auditory hallucinations when asked, appears responding to internal stimuli, appears preoccupied   Risk Potential: Suicidal ideation - None  Homicidal ideation - None  Potential for aggression - No   Sensorium:  oriented to person, place and time/date   Memory:  recent and remote memory grossly intact   Consciousness:  alert and awake   Attention: decreased concentration and decreased attention span   Insight:  limited   Judgment: limited   Gait/Station: normal gait/station, normal balance   Motor Activity: no abnormal movements     Vital signs in last 24 hours:    Temp:  [97.3 °F (36.3 °C)-98.3 °F (36.8 °C)] 97.5 °F (36.4 °C)  HR:  [] 94  Resp:  [17-20] 18  BP: (114-159)/(58-88) 136/58    Laboratory results: I have personally reviewed all pertinent laboratory/tests results.       Progress Toward Goals: no significant improvement    Assessment/Plan   Principal Problem:    MDD (major depressive disorder), recurrent episode, severe (HCC)  Active Problems:    Anemia    Chronic coronary artery disease    Hypothyroidism    Hyperlipidemia    Vitamin D deficiency    Hypertension    LVH (left ventricular hypertrophy)    Anxiety    White matter abnormality on MRI of brain    H/O: CVA (cerebrovascular accident)    Severe protein-calorie malnutrition ([facility])    Recommended Treatment:     Planned medication and treatment changes: All current active medications have been reviewed  Encourage group therapy, milieu therapy and occupational therapy  Behavioral Health checks every 7 minutes    Requires continued inpatient treatment due to chronic illness and high risk of decompensation if discharged before long term stability is achieved.     Continue current medications:  Current Facility-Administered Medications   Medication Dose Route Frequency Provider Last Rate   • acetaminophen  650 mg Oral Q4H PRN Jovan Lens, CRNP     • acetaminophen  650 mg Oral Q4H PRN Holden Marts, CRNP     • acetaminophen  975 mg Oral Q6H PRN Holden Marts, CRNP     • vitamin C  1,000 mg Oral Daily Jovan MartsSHAVONNP     • aspirin  81 mg Oral Daily Holden Marts, CRNP     • atorvastatin  40 mg Oral Daily With SOLO Quijano     • benztropine  0.5 mg Oral Q4H PRN Max 6/day SOLO Verma     • cephalexin  500 mg Oral 4x Daily Deo Kebede MD     • cholecalciferol  1,000 Units Oral Daily Holden SOLO Gray     • citalopram  10 mg Oral Daily Holden SOLO Gray     • cyanocobalamin  1,000 mcg Intramuscular Q30 Days Deo Kebede MD     • docusate sodium  100 mg Oral BID Estefanía Reid PA-C     • ergocalciferol  50,000 Units Oral Weekly Deo Kebede MD     • ezetimibe  10 mg Oral Daily SOLO Verma     • hydrochlorothiazide  25 mg Oral Daily Deo Kebede MD     • hydrOXYzine HCL  25 mg Oral Q6H PRN Max 4/day SOLO Verma     • levothyroxine  175 mcg Oral Early Morning SOLO Verma     • lisinopril  20 mg Oral Daily Comfort Rued, CRNP     • LORazepam  1 mg Intramuscular Q6H PRN Max 3/day Comfort Rued, CRNP     • LORazepam  0.5 mg Oral Q6H PRN Max 4/day Comfort Rued, CRNP     • LORazepam  1 mg Oral Q6H PRN Max 3/day Comfort Rued, CRNP     • melatonin  6 mg Oral HS PRN Comfort Rued, CRNP     • metoprolol succinate  12.5 mg Oral Daily Comfort Rued, CRNP     • multivitamin-minerals  1 tablet Oral Daily Juvenal Lobato MD     • OLANZapine  5 mg Intramuscular Q3H PRN Max 3/day Comfort Rued, CRNP     • OLANZapine  2.5 mg Oral Q4H PRN Max 6/day Comfort Rued, CRNP     • OLANZapine  5 mg Oral Q4H PRN Max 3/day Comfort Rued, CRNP     • OLANZapine  5 mg Oral Q3H PRN Max 3/day Comfort Rued, CRNP     • polyethylene glycol  17 g Oral Daily PRN Estefanía Reid PA-C     • QUEtiapine  25 mg Oral HS Comfort Rued, CRNP         Risks / Benefits of Treatment:    Risks, benefits, and possible side effects of medications explained to patient and patient verbalizes understanding and agreement for treatment. Counseling / Coordination of Care:    Patient's progress discussed with staff in treatment team meeting. Medications, treatment progress and treatment plan reviewed with patient.     SOLO Pina 08/05/23

## 2023-08-05 NOTE — PLAN OF CARE
Problem: Ineffective Coping  Goal: Demonstrates healthy coping skills  Outcome: Progressing  Goal: Participates in unit activities  Description: Interventions:  - Provide therapeutic environment   - Provide required programming   - Redirect inappropriate behaviors   Outcome: Not Progressing  Goal: Patient/Family participate in treatment and DC plans  Description: Interventions:  - Provide therapeutic environment  Outcome: Not Progressing  Goal: Patient/Family verbalizes awareness of resources  Outcome: Not Progressing  Goal: Understands least restrictive measures  Description: Interventions:  - Utilize least restrictive behavior  Outcome: Progressing  Goal: Free from restraint events  Description: - Utilize least restrictive measures   - Provide behavioral interventions   - Redirect inappropriate behaviors   Outcome: Progressing     Problem: Risk for Self Injury/Neglect  Goal: Verbalize thoughts and feelings  Description: Interventions:  - Assess and re-assess patient's lethality and potential for self-injury  - Engage patient in 1:1 interactions, daily, for a minimum of 15 minutes  - Encourage patient to express feelings, fears, frustrations, hopes  - Establish rapport/trust with patient   Outcome: Not Progressing  Goal: Refrain from harming self  Description: Interventions:  - Monitor patient closely, per order  - Develop a trusting relationship  - Supervise medication ingestion, monitor effects and side effects   Outcome: Progressing  Goal: Attend and participate in unit activities, including therapeutic, recreational, and educational groups  Description: Interventions:  - Provide therapeutic and educational activities daily, encourage attendance and participation, and document same in the medical record  - Obtain collateral information, encourage visitation and family involvement in care   Outcome: Not Progressing  Goal: Recognize maladaptive responses and adopt new coping mechanisms  Outcome: Not Progressing  Goal: Complete daily ADLs, including personal hygiene independently, as able  Description: Interventions:  - Observe, teach, and assist patient with ADLS  - Monitor and promote a balance of rest/activity, with adequate nutrition and elimination  Outcome: Progressing     Problem: Depression  Goal: Treatment Goal: Demonstrate behavioral control of depressive symptoms, verbalize feelings of improved mood/affect, and adopt new coping skills prior to discharge  Outcome: Not Progressing  Goal: Verbalize thoughts and feelings  Description: Interventions:  - Assess and re-assess patient's level of risk   - Engage patient in 1:1 interactions, daily, for a minimum of 15 minutes   - Encourage patient to express feelings, fears, frustrations, hopes   Outcome: Not Progressing  Goal: Refrain from harming self  Description: Interventions:  - Monitor patient closely, per order   - Supervise medication ingestion, monitor effects and side effects   Outcome: Progressing  Goal: Refrain from isolation  Description: Interventions:  - Develop a trusting relationship   - Encourage socialization   Outcome: Not Progressing  Goal: Refrain from self-neglect  Outcome: Progressing  Goal: Attend and participate in unit activities, including therapeutic, recreational, and educational groups  Description: Interventions:  - Provide therapeutic and educational activities daily, encourage attendance and participation, and document same in the medical record   Outcome: Not Progressing  Goal: Complete daily ADLs, including personal hygiene independently, as able  Description: Interventions:  - Observe, teach, and assist patient with ADLS  -  Monitor and promote a balance of rest/activity, with adequate nutrition and elimination   Outcome: Progressing     Problem: Anxiety  Goal: Anxiety is at manageable level  Description: Interventions:  - Assess and monitor patient's anxiety level.    - Monitor for signs and symptoms (heart palpitations, chest pain, shortness of breath, headaches, nausea, feeling jumpy, restlessness, irritable, apprehensive). - Collaborate with interdisciplinary team and initiate plan and interventions as ordered. - [place] patient to unit/surroundings  - Explain treatment plan  - Encourage participation in care  - Encourage verbalization of concerns/fears  - Identify coping mechanisms  - Assist in developing anxiety-reducing skills  - Administer/offer alternative therapies  - Limit or eliminate stimulants  Outcome: Progressing     Problem: Alteration in Orientation  Goal: Treatment Goal: Demonstrate a reduction of confusion and improved orientation to person, place, time and/or situation upon discharge, according to optimum baseline/ability  Outcome: Not Progressing  Goal: Interact with staff daily  Description: Interventions:  - Assess and re-assess patient's level of orientation  - Engage patient in 1 on 1 interactions, daily, for a minimum of 15 minutes   - Establish rapport/trust with patient   Outcome: Not Progressing     Problem: Potential for Falls  Goal: Patient will remain free of falls  Description: INTERVENTIONS:  - Educate patient/family on patient safety including physical limitations  - Instruct patient to call for assistance with activity   - Consult OT/PT to assist with strengthening/mobility   - Keep Call bell within reach  - Keep bed low and locked with side rails adjusted as appropriate  - Keep care items and personal belongings within reach  - Initiate and maintain comfort rounds  - Make Fall Risk Sign visible to staff  - Apply yellow socks and bracelet for high fall risk patients  - Consider moving patient to room near nurses station  Outcome: Progressing     Problem: Nutrition/Hydration-ADULT  Goal: Nutrient/Hydration intake appropriate for improving, restoring or maintaining nutritional needs  Description: Monitor and assess patient's nutrition/hydration status for malnutrition.  Collaborate with interdisciplinary team and initiate plan and interventions as ordered. Monitor patient's weight and dietary intake as ordered or per policy. Utilize nutrition screening tool and intervene as necessary. Determine patient's food preferences and provide high-protein, high-caloric foods as appropriate.      INTERVENTIONS:  - Monitor oral intake, urinary output, labs, and treatment plans  - Assess nutrition and hydration status and recommend course of action  - Evaluate amount of meals eaten  - Assist patient with eating if necessary   - Allow adequate time for meals  - Recommend/ encourage appropriate diets, oral nutritional supplements, and vitamin/mineral supplements  - Order, calculate, and assess calorie counts as needed  - Recommend, monitor, and adjust tube feedings and TPN/PPN based on assessed needs  - Assess need for intravenous fluids  - Provide specific nutrition/hydration education as appropriate  - Include patient/family/caregiver in decisions related to nutrition  Outcome: Progressing

## 2023-08-05 NOTE — NURSING NOTE
Pt is flat with minimal 1 word responses or nod. Pt does c/o depression & anxiety. Pt denies any hallucinations, suicidal or homicidal ideations. Pt denies any pain or discomfort. Q 7 min checks maintained to monitor pt's behavior & safety. Pt up ad aliza & gait is steady. Pt is delayed & cooperative. Pt is compliant with medications. Pt does pace in [place] @ times.

## 2023-08-05 NOTE — NURSING NOTE
Patient experienced broken sleep throughout the overnight hours. She was observed to be restless and ambulating about while in her room. She was encouraged to try resting. Continuous safety rounding in progress.

## 2023-08-05 NOTE — NURSING NOTE
Patient was observed to be ambulating about the unit this evening; presents as guarded. She is minimal in conversation, answering a few assessment questions with much encouragement. She is alert and oriented to person and time, able to tell this writer her name and that it is August 2023. Patient was following this writer throughout the hallways; needing verbal redirection at times. Adam Johnson had a large, formed BM tonight. She was medication compliant at . Declined evening snack. Continuous safety rounding in progress.

## 2023-08-05 NOTE — PLAN OF CARE
Problem: Depression  Goal: Refrain from harming self  Description: Interventions:  - Monitor patient closely, per order   - Supervise medication ingestion, monitor effects and side effects   Outcome: Progressing  Goal: Complete daily ADLs, including personal hygiene independently, as able  Description: Interventions:  - Observe, teach, and assist patient with ADLS  -  Monitor and promote a balance of rest/activity, with adequate nutrition and elimination   Outcome: Progressing     Problem: Nutrition/Hydration-ADULT  Goal: Nutrient/Hydration intake appropriate for improving, restoring or maintaining nutritional needs  Description: Monitor and assess patient's nutrition/hydration status for malnutrition. Collaborate with interdisciplinary team and initiate plan and interventions as ordered. Monitor patient's weight and dietary intake as ordered or per policy. Utilize nutrition screening tool and intervene as necessary. Determine patient's food preferences and provide high-protein, high-caloric foods as appropriate.      INTERVENTIONS:  - Monitor oral intake, urinary output, labs, and treatment plans  - Assess nutrition and hydration status and recommend course of action  - Evaluate amount of meals eaten  - Assist patient with eating if necessary   - Allow adequate time for meals  - Recommend/ encourage appropriate diets, oral nutritional supplements, and vitamin/mineral supplements  - Order, calculate, and assess calorie counts as needed  - Recommend, monitor, and adjust tube feedings and TPN/PPN based on assessed needs  - Assess need for intravenous fluids  - Provide specific nutrition/hydration education as appropriate  - Include patient/family/caregiver in decisions related to nutrition  Outcome: Progressing

## 2023-08-06 PROCEDURE — 99232 SBSQ HOSP IP/OBS MODERATE 35: CPT | Performed by: NURSE PRACTITIONER

## 2023-08-06 RX ADMIN — ATORVASTATIN CALCIUM 40 MG: 40 TABLET, FILM COATED ORAL at 16:02

## 2023-08-06 RX ADMIN — LEVOTHYROXINE SODIUM 175 MCG: 75 TABLET ORAL at 06:25

## 2023-08-06 RX ADMIN — ASPIRIN 81 MG 81 MG: 81 TABLET ORAL at 08:24

## 2023-08-06 RX ADMIN — QUETIAPINE FUMARATE 25 MG: 25 TABLET ORAL at 21:34

## 2023-08-06 RX ADMIN — Medication 1000 UNITS: at 08:24

## 2023-08-06 RX ADMIN — CITALOPRAM HYDROBROMIDE 10 MG: 10 TABLET ORAL at 08:24

## 2023-08-06 RX ADMIN — Medication 1 TABLET: at 08:23

## 2023-08-06 RX ADMIN — OXYCODONE HYDROCHLORIDE AND ACETAMINOPHEN 1000 MG: 500 TABLET ORAL at 08:24

## 2023-08-06 RX ADMIN — DOCUSATE SODIUM 100 MG: 100 CAPSULE, LIQUID FILLED ORAL at 08:24

## 2023-08-06 RX ADMIN — DOCUSATE SODIUM 100 MG: 100 CAPSULE, LIQUID FILLED ORAL at 17:07

## 2023-08-06 RX ADMIN — EZETIMIBE 10 MG: 10 TABLET ORAL at 08:24

## 2023-08-06 RX ADMIN — CEPHALEXIN 500 MG: 250 CAPSULE ORAL at 08:24

## 2023-08-06 NOTE — PROGRESS NOTES
Progress Note - Ava Lema 70 y.o. female MRN: [id number]    Unit/Bed#: [facility] 202-02 Encounter: [id number]        Subjective:   Patient seen and examined at bedside after reviewing the chart and discussing the case with the caring staff. Patient examined at bedside. Patient reports no acute symptoms. Physical Exam   Vitals: Blood pressure 96/61, pulse 99, temperature 98 °F (36.7 °C), temperature source Temporal, resp. rate 18, height 5' (1.524 m), weight 51.9 kg (114 lb 8 oz), SpO2 98 %, not currently breastfeeding. ,Body mass index is 22.36 kg/m². Constitutional: Patient in no acute distress. HEENT: PERR, EOMI, MMM. Cardiovascular: Normal rate and regular rhythm. Pulmonary/Chest: Effort normal and breath sounds normal.   Abdomen: Soft, + BS, NT, no rebound, no guarding. Assessment/Plan:  Ava Lema is a(n) 70y.o. year old female with MDD.     1.  Cardiac with hx of HTN, HLD, CAD s/p CABG, LVH. Continue Toprol-XL 12.5 mg daily, lisinopril 20 mg daily, hydrochlorothiazide 25 mg daily, atorvastatin 40 mg daily, Zetia 10 mg daily and aspirin 81 mg daily. 2. Prediabetes. Diet controlled. Hgb a1c 6.0% on 8/3/23. 3. Hypothyroidism. Patient is on levothyroxine 175 mcg daily. 4. DJD/osteoarthritis. Patient may get Tylenol as needed. 5. Vitamin D deficiency. Patient started on vitamin D bolus doses for 6 weeks followed by vitamin D3 1000 units daily. 6. Vitamin B12 deficiency. Patient started on monthly B12 injections. 7. Constipation. Start Colace 100 mg twice daily. MiraLAX as needed. 8. Acute UTI. Patient is being treated with Keflex 500 mg every 6 hours for 5 days.

## 2023-08-06 NOTE — NURSING NOTE
Report that patient was in her room,naked with her door open. Upon finding clothes patient had been washing her clothes in the sink. Re directed without issue,she was not able to explain why she was naked ,or why she was washing her clothes in the sink when we have laundry facilities here. Will monitor.

## 2023-08-06 NOTE — PROGRESS NOTES
Progress Note - Feliciano Madden Navarro 70 y.o. female MRN: [id number]   Unit/Bed#: [facility] 202-02 Encounter: [id number]    Behavior over the last 24 hours: unchanged. Dara Busby is seen in her room and pacing. She continues to present guarded and suspicious, paranoid. She is minimally verbal and looking around the room suspiciously. Appears to be internally preoccupied and with thought blocking. Staff report she continues to present confused and with bizarre behaviors, worse overnight. Not able to participate in milieu. Sleep: slept off and on  Appetite: poor  Medication side effects: No   ROS: no complaints, all other systems are negative    Mental Status Evaluation:    Appearance:  marginal hygiene, wearing hospital clothes   Behavior:  bizarre, guarded   Speech:  scant   Mood:  dysphoric   Affect:  blunted   Thought Process:  decreased rate of thoughts   Associations: unable to assess - does not answer   Thought Content:  paranoid ideation   Perceptual Disturbances: appears responding to internal stimuli, appears preoccupied   Risk Potential: Suicidal ideation - None  Homicidal ideation - None  Potential for aggression - No   Sensorium:  disoriented to place, time/date and situation   Memory:  recent memory impaired   Consciousness:  alert and awake   Attention: decreased concentration and decreased attention span   Insight:  limited   Judgment: limited   Gait/Station: normal gait/station, normal balance   Motor Activity: no abnormal movements     Vital signs in last 24 hours:    Temp:  [97.1 °F (36.2 °C)-98 °F (36.7 °C)] 98 °F (36.7 °C)  HR:  [80-99] 99  Resp:  [18] 18  BP: ()/(60-66) 96/61    Laboratory results: I have personally reviewed all pertinent laboratory/tests results.       Progress Toward Goals: no significant improvement    Assessment/Plan   Principal Problem:    MDD (major depressive disorder), recurrent episode, severe (HCC)  Active Problems:    Anemia    Chronic coronary artery disease    Hypothyroidism    Hyperlipidemia    Vitamin D deficiency    Hypertension    LVH (left ventricular hypertrophy)    Anxiety    White matter abnormality on MRI of brain    H/O: CVA (cerebrovascular accident)    Severe protein-calorie malnutrition ([address])    Recommended Treatment:     Planned medication and treatment changes: All current active medications have been reviewed  Encourage group therapy, milieu therapy and occupational therapy  Behavioral Health checks every 7 minutes    Requires continued inpatient treatment due to chronic illness and high risk of decompensation if discharged before long term stability is achieved.     Continue current medications:  Current Facility-Administered Medications   Medication Dose Route Frequency Provider Last Rate   • acetaminophen  650 mg Oral Q4H PRN SOLO Ambrocio     • acetaminophen  650 mg Oral Q4H PRN SOLO Ambrocio     • acetaminophen  975 mg Oral Q6H PRN SOLO Ambrocio     • vitamin C  1,000 mg Oral Daily SOLO Ambrocio     • aspirin  81 mg Oral Daily SOLO Ambrocio     • atorvastatin  40 mg Oral Daily With SOLO Quijano     • benztropine  0.5 mg Oral Q4H PRN Max 6/day SOLO Ambrocio     • cholecalciferol  1,000 Units Oral Daily SOLO Ambrocio     • citalopram  10 mg Oral Daily SOLO Ambrocio     • cyanocobalamin  1,000 mcg Intramuscular Q30 Days Jessica Gilbert MD     • docusate sodium  100 mg Oral BID Estefanía Reid PA-C     • ergocalciferol  50,000 Units Oral Weekly Jessica Gilbert MD     • ezetimibe  10 mg Oral Daily SOLO Ambrocio     • hydrochlorothiazide  25 mg Oral Daily Jessica Gilbert MD     • hydrOXYzine HCL  25 mg Oral Q6H PRN Max 4/day SOLO Ambrocio     • levothyroxine  175 mcg Oral Early Morning SOLO Ambrocio     • lisinopril  20 mg Oral Daily SOLO Ambrocio     • LORazepam  1 mg Intramuscular Q6H PRN Max 3/day SOLO Joe     • LORazepam  0.5 mg Oral Q6H PRN Max 4/day SOLO Joe     • LORazepam  1 mg Oral Q6H PRN Max 3/day SOLO Joe     • melatonin  6 mg Oral HS PRN SOLO Joe     • metoprolol succinate  12.5 mg Oral Daily SOLO Joe     • multivitamin-minerals  1 tablet Oral Daily Kristin Marte MD     • OLANZapine  5 mg Intramuscular Q3H PRN Max 3/day SOLO Joe     • OLANZapine  2.5 mg Oral Q4H PRN Max 6/day SOLO Joe     • OLANZapine  5 mg Oral Q4H PRN Max 3/day SOLO Joe     • OLANZapine  5 mg Oral Q3H PRN Max 3/day SOLO Joe     • polyethylene glycol  17 g Oral Daily PRN Estefanía Reid PA-C     • QUEtiapine  25 mg Oral HS SOLO Joe         Risks / Benefits of Treatment:    Risks, benefits, and possible side effects of medications explained to patient and patient verbalizes understanding and agreement for treatment. Counseling / Coordination of Care:    Patient's progress discussed with staff in treatment team meeting. Medications, treatment progress and treatment plan reviewed with patient.     SOLO Chicas 08/06/23

## 2023-08-06 NOTE — PLAN OF CARE
Problem: Ineffective Coping  Goal: Participates in unit activities  Description: Interventions:  - Provide therapeutic environment   - Provide required programming   - Redirect inappropriate behaviors   Outcome: Not Progressing     Problem: Risk for Self Injury/Neglect  Goal: Treatment Goal: Remain safe during length of stay, learn and adopt new coping skills, and be free of self-injurious ideation, impulses and acts at the time of discharge  Outcome: Progressing  Goal: Verbalize thoughts and feelings  Description: Interventions:  - Assess and re-assess patient's lethality and potential for self-injury  - Engage patient in 1:1 interactions, daily, for a minimum of 15 minutes  - Encourage patient to express feelings, fears, frustrations, hopes  - Establish rapport/trust with patient   Outcome: Not Progressing  Goal: Refrain from harming self  Description: Interventions:  - Monitor patient closely, per order  - Develop a trusting relationship  - Supervise medication ingestion, monitor effects and side effects   Outcome: Progressing  Goal: Attend and participate in unit activities, including therapeutic, recreational, and educational groups  Description: Interventions:  - Provide therapeutic and educational activities daily, encourage attendance and participation, and document same in the medical record  - Obtain collateral information, encourage visitation and family involvement in care   Outcome: Not Progressing  Goal: Recognize maladaptive responses and adopt new coping mechanisms  Outcome: Not Progressing  Goal: Complete daily ADLs, including personal hygiene independently, as able  Description: Interventions:  - Observe, teach, and assist patient with ADLS  - Monitor and promote a balance of rest/activity, with adequate nutrition and elimination  Outcome: Progressing     Problem: Depression  Goal: Verbalize thoughts and feelings  Description: Interventions:  - Assess and re-assess patient's level of risk   - Engage patient in 1:1 interactions, daily, for a minimum of 15 minutes   - Encourage patient to express feelings, fears, frustrations, hopes   Outcome: Not Progressing  Goal: Refrain from harming self  Description: Interventions:  - Monitor patient closely, per order   - Supervise medication ingestion, monitor effects and side effects   Outcome: Progressing  Goal: Refrain from isolation  Description: Interventions:  - Develop a trusting relationship   - Encourage socialization   Outcome: Not Progressing  Goal: Refrain from self-neglect  Outcome: Progressing     Problem: Anxiety  Goal: Anxiety is at manageable level  Description: Interventions:  - Assess and monitor patient's anxiety level. - Monitor for signs and symptoms (heart palpitations, chest pain, shortness of breath, headaches, nausea, feeling jumpy, restlessness, irritable, apprehensive). - Collaborate with interdisciplinary team and initiate plan and interventions as ordered.   - [place] patient to unit/surroundings  - Explain treatment plan  - Encourage participation in care  - Encourage verbalization of concerns/fears  - Identify coping mechanisms  - Assist in developing anxiety-reducing skills  - Administer/offer alternative therapies  - Limit or eliminate stimulants  Outcome: Progressing     Problem: Alteration in Orientation  Goal: Treatment Goal: Demonstrate a reduction of confusion and improved orientation to person, place, time and/or situation upon discharge, according to optimum baseline/ability  Outcome: Not Progressing  Goal: Interact with staff daily  Description: Interventions:  - Assess and re-assess patient's level of orientation  - Engage patient in 1 on 1 interactions, daily, for a minimum of 15 minutes   - Establish rapport/trust with patient   Outcome: Not Progressing  Goal: Cooperate with recommended testing/procedures  Description: Interventions:  - Determine need for ancillary testing  - Observe for mental status changes  - Implement falls/precaution protocol   Outcome: Progressing     Problem: Potential for Falls  Goal: Patient will remain free of falls  Description: INTERVENTIONS:  - Educate patient/family on patient safety including physical limitations  - Instruct patient to call for assistance with activity   - Consult OT/PT to assist with strengthening/mobility   - Keep Call bell within reach  - Keep bed low and locked with side rails adjusted as appropriate  - Keep care items and personal belongings within reach  - Initiate and maintain comfort rounds  - Make Fall Risk Sign visible to staff  - Apply yellow socks and bracelet for high fall risk patients  - Consider moving patient to room near nurses station  Outcome: Progressing     Problem: Nutrition/Hydration-ADULT  Goal: Nutrient/Hydration intake appropriate for improving, restoring or maintaining nutritional needs  Description: Monitor and assess patient's nutrition/hydration status for malnutrition. Collaborate with interdisciplinary team and initiate plan and interventions as ordered. Monitor patient's weight and dietary intake as ordered or per policy. Utilize nutrition screening tool and intervene as necessary. Determine patient's food preferences and provide high-protein, high-caloric foods as appropriate.      INTERVENTIONS:  - Monitor oral intake, urinary output, labs, and treatment plans  - Assess nutrition and hydration status and recommend course of action  - Evaluate amount of meals eaten  - Assist patient with eating if necessary   - Allow adequate time for meals  - Recommend/ encourage appropriate diets, oral nutritional supplements, and vitamin/mineral supplements  - Order, calculate, and assess calorie counts as needed  - Recommend, monitor, and adjust tube feedings and TPN/PPN based on assessed needs  - Assess need for intravenous fluids  - Provide specific nutrition/hydration education as appropriate  - Include patient/family/caregiver in decisions related to nutrition  Outcome: Progressing

## 2023-08-06 NOTE — NURSING NOTE
Assumed care of this patient from prior shift nurse at [phone]. Patient is currently in bed, appears to be sleeping. No acute behaviors observed. No obvious signs of distress or discomfort noted. Continuous safety rounding in progress.

## 2023-08-06 NOTE — NURSING NOTE
Patient appears to have had broken sleep throughout the overnight hours. She was observed by staff to be pacing while in her room. She did partially disrobe, however did replace her pants. No complaints voiced. She is currently in bed, appears to be sleeping. Continuous safety rounding in progress.

## 2023-08-06 NOTE — NURSING NOTE
Pt up ad aliza & gait is steady. Pt is delayed, disorganized & oriented to self only. Pt c/o feeling sad. Pt endorses depression & anxiety. Pt denies any hallucinations, suicidal or homicidal ideations. Q 7 min checks maintained to monitor pt's behavior & safety. Pt responds with 1 word responses or a nod. Pt is withdrawn to her room & does not socialize with others. Pt is cooperative & compliant with medications.

## 2023-08-07 PROCEDURE — 99232 SBSQ HOSP IP/OBS MODERATE 35: CPT | Performed by: PSYCHIATRY & NEUROLOGY

## 2023-08-07 RX ORDER — POLYETHYLENE GLYCOL 3350 17 G/17G
17 POWDER, FOR SOLUTION ORAL DAILY
Status: DISCONTINUED | OUTPATIENT
Start: 2023-08-07 | End: 2023-08-11 | Stop reason: HOSPADM

## 2023-08-07 RX ADMIN — QUETIAPINE FUMARATE 25 MG: 25 TABLET ORAL at 21:17

## 2023-08-07 RX ADMIN — HYDROCHLOROTHIAZIDE 25 MG: 25 TABLET ORAL at 08:21

## 2023-08-07 RX ADMIN — METOPROLOL SUCCINATE 12.5 MG: 25 TABLET, EXTENDED RELEASE ORAL at 08:21

## 2023-08-07 RX ADMIN — EZETIMIBE 10 MG: 10 TABLET ORAL at 08:21

## 2023-08-07 RX ADMIN — ASPIRIN 81 MG 81 MG: 81 TABLET ORAL at 08:21

## 2023-08-07 RX ADMIN — POLYETHYLENE GLYCOL 3350 17 G: 17 POWDER, FOR SOLUTION ORAL at 13:01

## 2023-08-07 RX ADMIN — DOCUSATE SODIUM 100 MG: 100 CAPSULE, LIQUID FILLED ORAL at 17:01

## 2023-08-07 RX ADMIN — LISINOPRIL 20 MG: 20 TABLET ORAL at 08:21

## 2023-08-07 RX ADMIN — Medication 1 TABLET: at 08:22

## 2023-08-07 RX ADMIN — CITALOPRAM HYDROBROMIDE 10 MG: 10 TABLET ORAL at 08:21

## 2023-08-07 RX ADMIN — ATORVASTATIN CALCIUM 40 MG: 40 TABLET, FILM COATED ORAL at 16:53

## 2023-08-07 RX ADMIN — OXYCODONE HYDROCHLORIDE AND ACETAMINOPHEN 1000 MG: 500 TABLET ORAL at 08:21

## 2023-08-07 RX ADMIN — DOCUSATE SODIUM 100 MG: 100 CAPSULE, LIQUID FILLED ORAL at 08:21

## 2023-08-07 RX ADMIN — Medication 1000 UNITS: at 08:21

## 2023-08-07 RX ADMIN — LEVOTHYROXINE SODIUM 175 MCG: 75 TABLET ORAL at 06:13

## 2023-08-07 NOTE — PROGRESS NOTES
Progress Note - Andry Gonzalez 70 y.o. female MRN: [id number]    Unit/Bed#: [facility] 202-02 Encounter: [id number]        Subjective:   Patient seen and examined at bedside after reviewing the chart and discussing the case with the caring staff. Patient examined at bedside. Patient complaining of constipation and is unsure when she last had a bowel movement. Denies abdominal pain, nausea, vomiting. Physical Exam   Vitals: Blood pressure 125/72, pulse 96, temperature (!) 97.2 °F (36.2 °C), temperature source Temporal, resp. rate 16, height 5' (1.524 m), weight 51.9 kg (114 lb 8 oz), SpO2 96 %, not currently breastfeeding. ,Body mass index is 22.36 kg/m². Constitutional: Patient in no acute distress. HEENT: PERR, EOMI, MMM. Cardiovascular: Normal rate and regular rhythm. Pulmonary/Chest: Effort normal and breath sounds normal.   Abdomen: Soft, + BS, NT, no rebound, no guarding. Assessment/Plan:  Andry Gonzalez is a(n) 70y.o. year old female with MDD.     1.  Cardiac with hx of HTN, HLD, CAD s/p CABG, LVH. Continue Toprol-XL 12.5 mg daily, lisinopril 20 mg daily, hydrochlorothiazide 25 mg daily, atorvastatin 40 mg daily, Zetia 10 mg daily and aspirin 81 mg daily. 2. Prediabetes. Diet controlled. Hgb a1c 6.0% on 8/3/23. 3. Hypothyroidism. Patient is on levothyroxine 175 mcg daily. 4. DJD/osteoarthritis. Patient may get Tylenol as needed. 5. Vitamin D deficiency. Patient started on vitamin D bolus doses for 6 weeks followed by vitamin D3 1000 units daily. 6. Vitamin B12 deficiency. Patient started on monthly B12 injections. 7. Constipation. Continue Colace 100 mg twice daily, add MiraLAX daily. The patient was discussed with Dr. Darrin Richardson and he is in agreement with the above note.

## 2023-08-07 NOTE — PLAN OF CARE
Problem: Ineffective Coping  Goal: Cooperates with admission process  Description: Interventions:   - Complete admission process  Outcome: Progressing  Goal: Demonstrates healthy coping skills  Outcome: Progressing  Goal: Participates in unit activities  Description: Interventions:  - Provide therapeutic environment   - Provide required programming   - Redirect inappropriate behaviors   Outcome: Progressing  Goal: Patient/Family participate in treatment and DC plans  Description: Interventions:  - Provide therapeutic environment  Outcome: Progressing  Goal: Patient/Family verbalizes awareness of resources  Outcome: Progressing     Problem: Risk for Self Injury/Neglect  Goal: Treatment Goal: Remain safe during length of stay, learn and adopt new coping skills, and be free of self-injurious ideation, impulses and acts at the time of discharge  Outcome: Progressing  Goal: Verbalize thoughts and feelings  Description: Interventions:  - Assess and re-assess patient's lethality and potential for self-injury  - Engage patient in 1:1 interactions, daily, for a minimum of 15 minutes  - Encourage patient to express feelings, fears, frustrations, hopes  - Establish rapport/trust with patient   Outcome: Progressing  Goal: Attend and participate in unit activities, including therapeutic, recreational, and educational groups  Description: Interventions:  - Provide therapeutic and educational activities daily, encourage attendance and participation, and document same in the medical record  - Obtain collateral information, encourage visitation and family involvement in care   Outcome: Progressing  Goal: Recognize maladaptive responses and adopt new coping mechanisms  Outcome: Progressing  Goal: Complete daily ADLs, including personal hygiene independently, as able  Description: Interventions:  - Observe, teach, and assist patient with ADLS  - Monitor and promote a balance of rest/activity, with adequate nutrition and elimination  Outcome: Progressing     Problem: Depression  Goal: Treatment Goal: Demonstrate behavioral control of depressive symptoms, verbalize feelings of improved mood/affect, and adopt new coping skills prior to discharge  Outcome: Progressing  Goal: Refrain from harming self  Description: Interventions:  - Monitor patient closely, per order   - Supervise medication ingestion, monitor effects and side effects   Outcome: Progressing  Goal: Refrain from isolation  Description: Interventions:  - Develop a trusting relationship   - Encourage socialization   Outcome: Progressing  Goal: Refrain from self-neglect  Outcome: Progressing     Problem: Anxiety  Goal: Anxiety is at manageable level  Description: Interventions:  - Assess and monitor patient's anxiety level. - Monitor for signs and symptoms (heart palpitations, chest pain, shortness of breath, headaches, nausea, feeling jumpy, restlessness, irritable, apprehensive). - Collaborate with interdisciplinary team and initiate plan and interventions as ordered.   - [place] patient to unit/surroundings  - Explain treatment plan  - Encourage participation in care  - Encourage verbalization of concerns/fears  - Identify coping mechanisms  - Assist in developing anxiety-reducing skills  - Administer/offer alternative therapies  - Limit or eliminate stimulants  Outcome: Progressing     Problem: Alteration in Orientation  Goal: Treatment Goal: Demonstrate a reduction of confusion and improved orientation to person, place, time and/or situation upon discharge, according to optimum baseline/ability  Outcome: Progressing  Goal: Interact with staff daily  Description: Interventions:  - Assess and re-assess patient's level of orientation  - Engage patient in 1 on 1 interactions, daily, for a minimum of 15 minutes   - Establish rapport/trust with patient   Outcome: Progressing  Goal: Cooperate with recommended testing/procedures  Description: Interventions:  - Determine need for ancillary testing  - Observe for mental status changes  - Implement falls/precaution protocol   Outcome: Progressing     Problem: Potential for Falls  Goal: Patient will remain free of falls  Description: INTERVENTIONS:  - Educate patient/family on patient safety including physical limitations  - Instruct patient to call for assistance with activity   - Consult OT/PT to assist with strengthening/mobility   - Keep Call bell within reach  - Keep bed low and locked with side rails adjusted as appropriate  - Keep care items and personal belongings within reach  - Initiate and maintain comfort rounds  - Make Fall Risk Sign visible to staff  - Offer Toileting every  Hours, in advance of need  - Initiate/Maintain alarm  - Obtain necessary fall risk management equipment:   - Apply yellow socks and bracelet for high fall risk patients  - Consider moving patient to room near nurses station  Outcome: Progressing     Problem: Nutrition/Hydration-ADULT  Goal: Nutrient/Hydration intake appropriate for improving, restoring or maintaining nutritional needs  Description: Monitor and assess patient's nutrition/hydration status for malnutrition. Collaborate with interdisciplinary team and initiate plan and interventions as ordered. Monitor patient's weight and dietary intake as ordered or per policy. Utilize nutrition screening tool and intervene as necessary. Determine patient's food preferences and provide high-protein, high-caloric foods as appropriate.      INTERVENTIONS:  - Monitor oral intake, urinary output, labs, and treatment plans  - Assess nutrition and hydration status and recommend course of action  - Evaluate amount of meals eaten  - Assist patient with eating if necessary   - Allow adequate time for meals  - Recommend/ encourage appropriate diets, oral nutritional supplements, and vitamin/mineral supplements  - Order, calculate, and assess calorie counts as needed  - Recommend, monitor, and adjust tube feedings and TPN/PPN based on assessed needs  - Assess need for intravenous fluids  - Provide specific nutrition/hydration education as appropriate  - Include patient/family/caregiver in decisions related to nutrition  Outcome: Progressing

## 2023-08-07 NOTE — PROGRESS NOTES
08/07/23 0930   Team Meeting   Meeting Type Daily Rounds   Team Members Present   Team Members Present Physician;Nurse;;Occupational Therapist   Physician Team Member Francisco, [address] [place] [address]; SOLO Nuñez   Nursing Team Member Shante Dean RN; Bk Cuevas, ALISHA   Care Management Team Member Jeremy Rader MS, [facility], [facility]   OT Team Member Gricelda Ramsey [place]   Patient/Family Present   Patient Present No   Patient's Family Present No   Family mtg tomorrow via phone at 2pm, minimal in conversation, withdrawn, confused, disorganized, delayed, eating some, paces halls, encouragement with medications.

## 2023-08-07 NOTE — NURSING NOTE
Pt is flat, delayed & disorganized. Pt responds with nod or 1 word responses. Pt c/o feeling sad, depressed & anxious. Pt oriented to self only. Pt denies any hallucinations, suicidal or homicidal ideations. Q 7 min checks maintained to monitor pt's behavior & safety. Pt up ad aliza & gait is steady. Pt paces in [place] @ times. Pt does not socialize with staff or other patients.

## 2023-08-07 NOTE — NURSING NOTE
Patient currently resting comfortably. No signs of distress. Will continue to monitor and access. Q 7 minute safety checks maintained.

## 2023-08-07 NOTE — NURSING NOTE
Patient awake pacing the unit. Unable to verbalize needs. Patient writes she has no insurance. Reassure that she would be able to discuss this matter in  A.M. Will continue to monitor and access.

## 2023-08-07 NOTE — NURSING NOTE
Patient out for snack then withdrawn to room. Calm, delayed, responds with one word. Denies SI, HI, and hallucinations. Endorses anxiety and depression. Out for snack. Compliant with evening medication. Will continue to monitor and access. Q 7 minute safety checks maintained.

## 2023-08-07 NOTE — PROGRESS NOTES
Progress Note - Feliciano [address] 70 y.o. female MRN: [id number]   Unit/Bed#: Seth Norie[facility] 202-02 Encounter: [id number]    Behavior over the last 24 hours: unchanged. Dara Busby is a 18-year-old female diagnosed with major depressive disorder, severe, history of CVA with noted MRI changes, hypertension, coronary artery disease, left ventricular hypertrophy, hyperlipidemia, hypothyroidism seen today in psychiatric follow-up. Per staff, she continues to remain extremely withdrawn and delayed often visualized pacing the halls and appears confused. Today, her participation in meaningful conversation was limited. When asked questions, was very delayed and slow to or would not respond at all. She became overwhelmed easily with questioning. 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 Dara Busby does state today that she is unable to go back to work. Does seem to be internally preoccupied and distracted.      Sleep: normal  Appetite: normal  Medication side effects: No   ROS: does not answer    Mental Status Evaluation:    Appearance:  casually dressed, dressed appropriately   Behavior:  guarded, restless, inappropriate   Speech:  delayed, soft, decreased volume   Mood:  depressed   Affect:  blunted   Thought Process:  decreased rate of thoughts, slowing of thoughts, poverty of thought   Associations: thought blocking   Thought Content:  no overt delusions   Perceptual Disturbances: appears preoccupied   Risk Potential: Suicidal ideation - does not answer  Homicidal ideation - does not answer  Potential for aggression - No   Sensorium:  does not answer   Memory:  recent and remote memory: unable to assess due to lack of cooperation   Consciousness:  alert and awake   Attention/Concentration: attention span and concentration: unable to assess due to lack of cooperation   Insight:  poor   Judgment: poor   Gait/Station: normal gait/station   Motor Activity: no abnormal movements     Vital signs in last 24 hours:    Temp:  [97.2 °F (36.2 °C)-97.6 °F (36.4 °C)] 97.2 °F (36.2 °C)  HR:  [] 96  Resp:  [16-18] 16  BP: (105-130)/(63-72) 125/72    Laboratory results: I have personally reviewed all pertinent laboratory/tests results    Results from the past 24 hours: No results found for this or any previous visit (from the past 24 hour(s)).   Most Recent Labs:   Lab Results   Component Value Date    WBC 6.58 07/31/2023    RBC 4.12 07/31/2023    HGB 13.0 07/31/2023    HCT 37.9 07/31/2023     07/31/2023    RDW 12.2 07/31/2023    NEUTROABS 3.62 07/28/2023    SODIUM 139 07/31/2023    K 4.2 07/31/2023     07/31/2023    CO2 25 07/31/2023    BUN 10 07/31/2023    CREATININE 0.52 (L) 07/31/2023    GLUC 109 07/31/2023    CALCIUM 9.4 07/31/2023    AST 22 07/29/2023    ALT 27 07/29/2023    ALKPHOS 45 07/29/2023    TP 5.8 (L) 07/29/2023    ALB 3.7 07/29/2023    TBILI 0.80 07/29/2023    CHOLESTEROL 121 08/03/2023    HDL 41 (L) 08/03/2023    TRIG 84 08/03/2023    LDLCALC 63 08/03/2023    [address] 02/05/2019    AMMONIA 15 (L) 07/28/2023    [id number] 10.272 (H) 07/28/2023    FREET4 1.06 07/28/2023    HGBA1C 6.0 (H) 08/03/2023     08/03/2023       Progress Toward Goals: limited progress, insight remains poor, does not participate in milieu therapy, does not participate in groups    Assessment/Plan   Principal Problem:    MDD (major depressive disorder), recurrent episode, severe ([address])  Active Problems:    Anemia    Chronic coronary artery disease    Hypothyroidism    Hyperlipidemia    Vitamin D deficiency    Hypertension    LVH (left ventricular hypertrophy)    Anxiety    White matter abnormality on MRI of brain    H/O: CVA (cerebrovascular accident)    Severe protein-calorie malnutrition ([address])      Recommended Treatment:     Planned medication and treatment changes:     All current active medications have been reviewed  Encourage group therapy, milieu therapy and occupational therapy  Behavioral Health checks every 7 minutes  Continue current medications and therapy   Consider increase in Seroquel  Family meeting scheduled for tomorrow    Current Facility-Administered Medications   Medication Dose Route Frequency Provider Last Rate   • acetaminophen  650 mg Oral Q4H PRN Ambrosio Deborah, CRNP     • acetaminophen  650 mg Oral Q4H PRN Ambrosio Deborah, CRNP     • acetaminophen  975 mg Oral Q6H PRN Ambrosio Deborah, CRNP     • vitamin C  1,000 mg Oral Daily Ambrosio Deborah, CRNP     • aspirin  81 mg Oral Daily Ambrosio Deborah, CRNP     • atorvastatin  40 mg Oral Daily With SOLO Quijano     • benztropine  0.5 mg Oral Q4H PRN Max 6/day Ambrosio Deborah, CRNP     • cholecalciferol  1,000 Units Oral Daily Ambrosio Deborah, CRNP     • citalopram  10 mg Oral Daily Ambrosio Deborah, CRNP     • cyanocobalamin  1,000 mcg Intramuscular Q30 Days Domitila Duke MD     • docusate sodium  100 mg Oral BID Estefanía Reid PA-C     • ergocalciferol  50,000 Units Oral Weekly Domitila Duke MD     • ezetimibe  10 mg Oral Daily Ambrosio Deborah CRNP     • hydrochlorothiazide  25 mg Oral Daily Domitila Duke MD     • hydrOXYzine HCL  25 mg Oral Q6H PRN Max 4/day Ambrosio Deborah, CRNP     • levothyroxine  175 mcg Oral Early Morning Ambrosio Deborah, CRNP     • lisinopril  20 mg Oral Daily Ambrosio Deborah, CRNP     • LORazepam  1 mg Intramuscular Q6H PRN Max 3/day Ambrosio Deborah, CRNP     • LORazepam  0.5 mg Oral Q6H PRN Max 4/day Ambrosio Deborah, CRNP     • LORazepam  1 mg Oral Q6H PRN Max 3/day Ambrosio Deborah, CRNP     • melatonin  6 mg Oral HS PRN Ambrosio Deborah CRNP     • metoprolol succinate  12.5 mg Oral Daily Ambrosio Deborah, CRNP     • multivitamin-minerals  1 tablet Oral Daily Domitila Duke MD     • OLANZapine  5 mg Intramuscular Q3H PRN Max 3/day Willaim Gutting, CRNP     • OLANZapine  2.5 mg Oral Q4H PRN Max 6/day Willaim Gutting, CRNP     • OLANZapine  5 mg Oral Q4H PRN Max 3/day Willaim Gutting, CRNP     • OLANZapine  5 mg Oral Q3H PRN Max 3/day Willaim Gutting, CRNP     • polyethylene glycol  17 g Oral Daily PRN Estefanía Reid PA-C     • polyethylene glycol  17 g Oral Daily Estefanía Reid PA-C     • QUEtiapine  25 mg Oral HS Willaim Gutting, CRNP       Risks / Benefits of Treatment:    Risks, benefits, and possible side effects of medications explained to patient and patient verbalizes understanding and agreement for treatment. Counseling / Coordination of Care: Total floor / unit time spent today 20 minutes. Greater than 50% of total time was spent with the patient and / or family counseling and / or coordination of care. A description of counseling / coordination of care:  Patient's progress discussed with staff in treatment team meeting. Medications, treatment progress and treatment plan reviewed with patient.     Trini Barreto PA-C 08/07/23

## 2023-08-08 PROCEDURE — 99232 SBSQ HOSP IP/OBS MODERATE 35: CPT

## 2023-08-08 RX ADMIN — Medication 1 TABLET: at 08:42

## 2023-08-08 RX ADMIN — LEVOTHYROXINE SODIUM 175 MCG: 75 TABLET ORAL at 06:18

## 2023-08-08 RX ADMIN — ATORVASTATIN CALCIUM 40 MG: 40 TABLET, FILM COATED ORAL at 17:11

## 2023-08-08 RX ADMIN — DOCUSATE SODIUM 100 MG: 100 CAPSULE, LIQUID FILLED ORAL at 17:11

## 2023-08-08 RX ADMIN — EZETIMIBE 10 MG: 10 TABLET ORAL at 08:42

## 2023-08-08 RX ADMIN — DOCUSATE SODIUM 100 MG: 100 CAPSULE, LIQUID FILLED ORAL at 08:41

## 2023-08-08 RX ADMIN — ASPIRIN 81 MG 81 MG: 81 TABLET ORAL at 08:42

## 2023-08-08 RX ADMIN — QUETIAPINE FUMARATE 25 MG: 25 TABLET ORAL at 21:12

## 2023-08-08 RX ADMIN — POLYETHYLENE GLYCOL 3350 17 G: 17 POWDER, FOR SOLUTION ORAL at 08:44

## 2023-08-08 RX ADMIN — OXYCODONE HYDROCHLORIDE AND ACETAMINOPHEN 1000 MG: 500 TABLET ORAL at 08:41

## 2023-08-08 RX ADMIN — CITALOPRAM HYDROBROMIDE 10 MG: 10 TABLET ORAL at 08:42

## 2023-08-08 RX ADMIN — Medication 1000 UNITS: at 08:42

## 2023-08-08 NOTE — PROGRESS NOTES
08/08/23 1330   Activity/Group Checklist   Group Wellness   Attendance Attended   Attendance Duration (min) 46-60   Interactions Disorganized interaction   Affect/Mood Appropriate;Blunted/flat   Goals Achieved Identified feelings; Discussed coping strategies; Able to listen to others; Able to engage in interactions; Able to self-disclose; Able to recieve feedback

## 2023-08-08 NOTE — NURSING NOTE
Patient visible in milieu, pleasant and cooperative in interaction. Patient offers minimal socialization, only stated to nurse that she is "Not doing well", would not further elaborate. Affect depressed, flat. Difficult to assess psychological status due to lack of socializing. No acute distress. Remains medication compliant and on 7" checks for safety and behaviors.

## 2023-08-08 NOTE — PROGRESS NOTES
08/08/23 0930   Team Meeting   Meeting Type Daily Rounds   Team Members Present   Team Members Present Physician;Nurse;;Occupational Therapist   Physician Team Member Dr. Wilfred Worrell MD; Stefanie Lynne, [address] [place] [address]   Nursing Team Member Mariam Johnson, ALISHA; Judah Arellano, ALISHA   Care Management Team Member [place] R[place], [facility] [place] [facility] Delf[facility]   OT Team Member Vinny Aguirre   Patient/Family Present   Patient Present No   Patient's Family Present No   Delayed, minimal in conversation, wonders, bizarre, disorganized, family mtg today at 2pm.

## 2023-08-08 NOTE — NURSING NOTE
Patient observed to be pacing the unit hallways this evening. She presents with a flat affect; does not converse with this writer; mainly yes or no responses and at times she will nod her head with her response. She denied having any pain. Patient was medication compliant at . Uziel Del Valle was incontinent x 1 of stool for which staff assisted with personal care/ cleaning. Continuous safety rounding in progress.

## 2023-08-08 NOTE — PROGRESS NOTES
Progress Note - Jyoti Phelan 70 y.o. female MRN: [id number]    Unit/Bed#: Estefania Varner 202-02 Encounter: [id number]        Subjective:   Patient seen and examined at bedside after reviewing the chart and discussing the case with the caring staff. Patient examined at bedside. Patient has no reported acute complaints. Physical Exam   Vitals: Blood pressure 107/67, pulse 100, temperature 97.5 °F (36.4 °C), temperature source Temporal, resp. rate 18, height 5' (1.524 m), weight 51.9 kg (114 lb 8 oz), SpO2 98 %, not currently breastfeeding. ,Body mass index is 22.36 kg/m². Constitutional: Patient in no acute distress. HEENT: PERR, EOMI, MMM. Cardiovascular: Normal rate and regular rhythm. Pulmonary/Chest: Effort normal and breath sounds normal.   Abdomen: Soft, + BS, NT, no rebound, no guarding. Assessment/Plan:  Jyoti Phelan is a(n) 70y.o. year old female with MDD.     1.  Cardiac with hx of HTN, HLD, CAD s/p CABG, LVH. Continue Toprol-XL 12.5 mg daily, lisinopril 20 mg daily, hydrochlorothiazide 25 mg daily, atorvastatin 40 mg daily, Zetia 10 mg daily and aspirin 81 mg daily. 2. Prediabetes. Diet controlled. Hgb a1c 6.0% on 8/3/23. 3. Hypothyroidism. Patient is on levothyroxine 175 mcg daily. 4. DJD/osteoarthritis. Patient may get Tylenol as needed. 5. Vitamin D deficiency. Patient started on vitamin D bolus doses for 6 weeks followed by vitamin D3 1000 units daily. 6. Vitamin B12 deficiency. Patient started on monthly B12 injections. 7. Constipation. Continue Colace 100 mg twice daily, MiraLAX daily. The patient was discussed with Dr. Urbano Beckford and he is in agreement with the above note.

## 2023-08-08 NOTE — PROGRESS NOTES
08/08/23 1287   Activity/Group Checklist   Group Community meeting   Attendance Did not attend  (Pt offered grp; pt remained in her room)

## 2023-08-08 NOTE — PROGRESS NOTES
Progress Note - [address] [facility] 70 y.o. female MRN: [id number]  Unit/Bed#: Rama Kay 202-02 Encounter: [id number]    Assessment/Plan   Principal Problem:    MDD (major depressive disorder), recurrent episode, severe ([address])  Active Problems:    Anemia    Chronic coronary artery disease    Hypothyroidism    Hyperlipidemia    Vitamin D deficiency    Hypertension    LVH (left ventricular hypertrophy)    Anxiety    White matter abnormality on MRI of brain    H/O: CVA (cerebrovascular accident)    Severe protein-calorie malnutrition ([address])      Behavior over the last 24 hours:  unchanged  Sleep: normal  Appetite: normal  Medication side effects: No  ROS: no complaints and all other systems are negative    Subjective: Ondina Khoury was seen today for psychiatric follow-up. Patient calm, redirectable. Patient observed pacing the unit hallways. She is withdrawn and isolative to self, with limited participation in the milieu. Patient appears disorganized, with a blunted affect and a very delayed response. She is very minimal in conversation. According to nursing staff patient is compliant with her current medication regimen. She denied any SI/HI/AVH, although patient appears internally preoccupied.     Mental Status Evaluation:  Appearance:  age appropriate and casually dressed   Behavior:  guarded   Speech:  delayed, soft   Mood:  depressed   Affect:  blunted   Thought Process:  decreased rate of thoughts   Associations: thought blocking   Thought Content:  some paranoia   Perceptual Disturbances: denied AVH, did not appear internally preoccupied   Risk Potential: Suicidal Ideations none at present  Homicidal Ideations none at present  Potential for Aggression No   Sensorium:  person   Memory:  recent and remote memory: unable to assess due to lack of cooperation   Consciousness:  alert and awake    Attention: attention span appeared shorter than expected for age   Insight:  poor   Judgment: poor   Gait/Station: normal gait/station   Motor Activity: no abnormal movements     Progress Toward Goals: Patient progressing towards goals of inpatient psychiatric treatment, by continue medication compliance and its attendant therapeutic modalities in the milieu. However, patient continues to require inpatient psychiatric hospitalization for continued medication management and titration to optimize symptom reduction and improve sleep hygiene and demonstrate adequate health care. Family meeting scheduled for 8/9/2023. Recommended Treatment: Continue with group therapy, milieu therapy and occupational therapy. Risks, benefits and possible side effects of Medications:   Risks, benefits, and possible side effects of medications explained to patient and patient verbalizes understanding.       Medications:   all current active meds have been reviewed and current meds:   Current Facility-Administered Medications   Medication Dose Route Frequency   • acetaminophen (TYLENOL) tablet 650 mg  650 mg Oral Q4H PRN   • acetaminophen (TYLENOL) tablet 650 mg  650 mg Oral Q4H PRN   • acetaminophen (TYLENOL) tablet 975 mg  975 mg Oral Q6H PRN   • ascorbic acid (VITAMIN C) tablet 1,000 mg  1,000 mg Oral Daily   • aspirin chewable tablet 81 mg  81 mg Oral Daily   • atorvastatin (LIPITOR) tablet 40 mg  40 mg Oral Daily With Dinner   • benztropine (COGENTIN) tablet 0.5 mg  0.5 mg Oral Q4H PRN Max 6/day   • cholecalciferol (VITAMIN D3) tablet 1,000 Units  1,000 Units Oral Daily   • citalopram (CeleXA) tablet 10 mg  10 mg Oral Daily   • cyanocobalamin injection 1,000 mcg  1,000 mcg Intramuscular Q30 Days   • docusate sodium (COLACE) capsule 100 mg  100 mg Oral BID   • ergocalciferol (VITAMIN D2) capsule 50,000 Units  50,000 Units Oral Weekly   • ezetimibe (ZETIA) tablet 10 mg  10 mg Oral Daily   • hydrochlorothiazide (HYDRODIURIL) tablet 25 mg  25 mg Oral Daily   • hydrOXYzine HCL (ATARAX) tablet 25 mg  25 mg Oral Q6H PRN Max 4/day   • levothyroxine tablet 175 mcg  175 mcg Oral Early Morning   • lisinopril (ZESTRIL) tablet 20 mg  20 mg Oral Daily   • LORazepam (ATIVAN) injection 1 mg  1 mg Intramuscular Q6H PRN Max 3/day   • LORazepam (ATIVAN) tablet 0.5 mg  0.5 mg Oral Q6H PRN Max 4/day   • LORazepam (ATIVAN) tablet 1 mg  1 mg Oral Q6H PRN Max 3/day   • melatonin tablet 6 mg  6 mg Oral HS PRN   • metoprolol succinate (TOPROL-XL) 24 hr tablet 12.5 mg  12.5 mg Oral Daily   • multivitamin-minerals (CENTRUM) tablet 1 tablet  1 tablet Oral Daily   • OLANZapine (ZyPREXA) IM injection 5 mg  5 mg Intramuscular Q3H PRN Max 3/day   • OLANZapine (ZyPREXA) tablet 2.5 mg  2.5 mg Oral Q4H PRN Max 6/day   • OLANZapine (ZyPREXA) tablet 5 mg  5 mg Oral Q4H PRN Max 3/day   • OLANZapine (ZyPREXA) tablet 5 mg  5 mg Oral Q3H PRN Max 3/day   • polyethylene glycol (MIRALAX) packet 17 g  17 g Oral Daily PRN   • polyethylene glycol (MIRALAX) packet 17 g  17 g Oral Daily   • QUEtiapine (SEROquel) tablet 25 mg  25 mg Oral HS   . Labs: I have personally reviewed all pertinent laboratory/tests results.    Most Recent Labs:   Lab Results   Component Value Date    WBC 6.58 07/31/2023    RBC 4.12 07/31/2023    HGB 13.0 07/31/2023    HCT 37.9 07/31/2023     07/31/2023    RDW 12.2 07/31/2023    NEUTROABS 3.62 07/28/2023    SODIUM 139 07/31/2023    K 4.2 07/31/2023     07/31/2023    CO2 25 07/31/2023    BUN 10 07/31/2023    CREATININE 0.52 (L) 07/31/2023    GLUC 109 07/31/2023    GLUF 93 05/22/2023    CALCIUM 9.4 07/31/2023    AST 22 07/29/2023    ALT 27 07/29/2023    ALKPHOS 45 07/29/2023    TP 5.8 (L) 07/29/2023    ALB 3.7 07/29/2023    TBILI 0.80 07/29/2023    CHOLESTEROL 121 08/03/2023    HDL 41 (L) 08/03/2023    TRIG 84 08/03/2023    LDLCALC 63 08/03/2023    NONHDLC 127 02/05/2019    AMMONIA 15 (L) 07/28/2023    [id number] 10.272 (H) 07/28/2023    FREET4 1.06 07/28/2023    HGBA1C 6.0 (H) 08/03/2023     08/03/2023       Counseling / Coordination of Care  Total floor / unit time spent today 25 minutes.

## 2023-08-08 NOTE — PLAN OF CARE
Problem: Ineffective Coping  Goal: Demonstrates healthy coping skills  Outcome: Not Progressing  Goal: Participates in unit activities  Description: Interventions:  - Provide therapeutic environment   - Provide required programming   - Redirect inappropriate behaviors   Outcome: Not Progressing  Goal: Patient/Family participate in treatment and DC plans  Description: Interventions:  - Provide therapeutic environment  Outcome: Not Progressing  Goal: Patient/Family verbalizes awareness of resources  Outcome: Not Progressing     Problem: Risk for Self Injury/Neglect  Goal: Verbalize thoughts and feelings  Description: Interventions:  - Assess and re-assess patient's lethality and potential for self-injury  - Engage patient in 1:1 interactions, daily, for a minimum of 15 minutes  - Encourage patient to express feelings, fears, frustrations, hopes  - Establish rapport/trust with patient   Outcome: Not Progressing  Goal: Attend and participate in unit activities, including therapeutic, recreational, and educational groups  Description: Interventions:  - Provide therapeutic and educational activities daily, encourage attendance and participation, and document same in the medical record  - Obtain collateral information, encourage visitation and family involvement in care   Outcome: Not Progressing  Goal: Complete daily ADLs, including personal hygiene independently, as able  Description: Interventions:  - Observe, teach, and assist patient with ADLS  - Monitor and promote a balance of rest/activity, with adequate nutrition and elimination  Outcome: Progressing     Problem: Depression  Goal: Refrain from harming self  Description: Interventions:  - Monitor patient closely, per order   - Supervise medication ingestion, monitor effects and side effects   Outcome: Progressing  Goal: Refrain from isolation  Description: Interventions:  - Develop a trusting relationship   - Encourage socialization   Outcome: Not Progressing  Goal: Refrain from self-neglect  Outcome: Progressing     Problem: Anxiety  Goal: Anxiety is at manageable level  Description: Interventions:  - Assess and monitor patient's anxiety level. - Monitor for signs and symptoms (heart palpitations, chest pain, shortness of breath, headaches, nausea, feeling jumpy, restlessness, irritable, apprehensive). - Collaborate with interdisciplinary team and initiate plan and interventions as ordered.   - Reading patient to unit/surroundings  - Explain treatment plan  - Encourage participation in care  - Encourage verbalization of concerns/fears  - Identify coping mechanisms  - Assist in developing anxiety-reducing skills  - Administer/offer alternative therapies  - Limit or eliminate stimulants  Outcome: Progressing     Problem: Alteration in Orientation  Goal: Treatment Goal: Demonstrate a reduction of confusion and improved orientation to person, place, time and/or situation upon discharge, according to optimum baseline/ability  Outcome: Not Progressing  Goal: Interact with staff daily  Description: Interventions:  - Assess and re-assess patient's level of orientation  - Engage patient in 1 on 1 interactions, daily, for a minimum of 15 minutes   - Establish rapport/trust with patient   Outcome: Not Progressing  Goal: Cooperate with recommended testing/procedures  Description: Interventions:  - Determine need for ancillary testing  - Observe for mental status changes  - Implement falls/precaution protocol   Outcome: Progressing     Problem: Potential for Falls  Goal: Patient will remain free of falls  Description: INTERVENTIONS:  - Educate patient/family on patient safety including physical limitations  - Instruct patient to call for assistance with activity   - Consult OT/PT to assist with strengthening/mobility   - Keep Call bell within reach  - Keep bed low and locked with side rails adjusted as appropriate  - Keep care items and personal belongings within reach  - Initiate and maintain comfort rounds  - Make Fall Risk Sign visible to staff  - Apply yellow socks and bracelet for high fall risk patients  - Consider moving patient to room near nurses station  Outcome: Progressing     Problem: Nutrition/Hydration-ADULT  Goal: Nutrient/Hydration intake appropriate for improving, restoring or maintaining nutritional needs  Description: Monitor and assess patient's nutrition/hydration status for malnutrition. Collaborate with interdisciplinary team and initiate plan and interventions as ordered. Monitor patient's weight and dietary intake as ordered or per policy. Utilize nutrition screening tool and intervene as necessary. Determine patient's food preferences and provide high-protein, high-caloric foods as appropriate.      INTERVENTIONS:  - Monitor oral intake, urinary output, labs, and treatment plans  - Assess nutrition and hydration status and recommend course of action  - Evaluate amount of meals eaten  - Assist patient with eating if necessary   - Allow adequate time for meals  - Recommend/ encourage appropriate diets, oral nutritional supplements, and vitamin/mineral supplements  - Order, calculate, and assess calorie counts as needed  - Recommend, monitor, and adjust tube feedings and TPN/PPN based on assessed needs  - Assess need for intravenous fluids  - Provide specific nutrition/hydration education as appropriate  - Include patient/family/caregiver in decisions related to nutrition  Outcome: Progressing

## 2023-08-09 PROCEDURE — 99232 SBSQ HOSP IP/OBS MODERATE 35: CPT

## 2023-08-09 RX ADMIN — CITALOPRAM HYDROBROMIDE 10 MG: 10 TABLET ORAL at 08:38

## 2023-08-09 RX ADMIN — LEVOTHYROXINE SODIUM 175 MCG: 75 TABLET ORAL at 06:55

## 2023-08-09 RX ADMIN — Medication 1 TABLET: at 08:38

## 2023-08-09 RX ADMIN — OXYCODONE HYDROCHLORIDE AND ACETAMINOPHEN 1000 MG: 500 TABLET ORAL at 08:39

## 2023-08-09 RX ADMIN — POLYETHYLENE GLYCOL 3350 17 G: 17 POWDER, FOR SOLUTION ORAL at 08:40

## 2023-08-09 RX ADMIN — LISINOPRIL 20 MG: 20 TABLET ORAL at 08:39

## 2023-08-09 RX ADMIN — ASPIRIN 81 MG 81 MG: 81 TABLET ORAL at 08:39

## 2023-08-09 RX ADMIN — DOCUSATE SODIUM 100 MG: 100 CAPSULE, LIQUID FILLED ORAL at 08:40

## 2023-08-09 RX ADMIN — Medication 1000 UNITS: at 08:38

## 2023-08-09 RX ADMIN — QUETIAPINE FUMARATE 25 MG: 25 TABLET ORAL at 21:30

## 2023-08-09 RX ADMIN — EZETIMIBE 10 MG: 10 TABLET ORAL at 08:38

## 2023-08-09 RX ADMIN — HYDROCHLOROTHIAZIDE 25 MG: 25 TABLET ORAL at 08:39

## 2023-08-09 RX ADMIN — METOPROLOL SUCCINATE 12.5 MG: 25 TABLET, EXTENDED RELEASE ORAL at 08:38

## 2023-08-09 NOTE — NURSING NOTE
Patient was observed to be pacing the unit hallways. She has been calm and controlled. Speech is minimal, denies any pain, unable to assess further as she does not respond when prompted. Patient does present with a flat affect. Patient was medication compliant at HS. Continuous safety rounding in progress.

## 2023-08-09 NOTE — NURSING NOTE
Patient experienced broken, restless sleep throughout the overnight hours. She is currently in bed, appears to be sleeping. Continuous safety rounding in progress.

## 2023-08-09 NOTE — PROGRESS NOTES
08/09/23 0930   Team Meeting   Meeting Type Daily Rounds   Team Members Present   Team Members Present Physician;Nurse;;Occupational Therapist   Physician Team Member Dr. Valentino Necessary, MD; SOLO Valente   Nursing Team Member Norma Fowler RN; Arnold Philip RN   Care Management Team Member Charly Mcknight MS, [facility] [place], [address] [facility] [place]   OT Team Member Shira Enamorado [place]   Patient/Family Present   Patient Present No   Patient's Family Present No   Restless, pacing, disorganized, confused, minimal in verbal response, reviewed vitals. Family mtg. Thursday at 11am in person.

## 2023-08-09 NOTE — NURSING NOTE
Patient visible in milieu, pleasant and cooperative in interaction. Remains nonverbal, pacing unit. Difficult to assess psychological status due to lack of verbalization, guarded. Appears anxious/depressed, sad. Remains medication compliant and on 7" checks for safety and behaviors.

## 2023-08-09 NOTE — NUTRITION
08/09/23 1132   Biochemical Data,Medical Tests, and Procedures   Biochemical Data/Medical Tests/Procedures Lab values reviewed; Meds reviewed   Labs (Comment) 8/3/23 HDL 41, HgA1c 6.0   Meds (Comment) vitamin C, atorvastatin, cyanocobalamin, colace, vitamin D2, levothyroxine, lisinopril, MVI, miralax   Nutrition-Focused Physical Exam   Nutrition-Focused Physical Exam Findings RN skin assessment reviewed; No skin issues documented; No edema documented   Medical-Related Concerns PMH reviewed   Current PO Intake   Current Diet Order Regular diet, thin liquids   Current Meal Intake 0-25%;25-50%;50-75%;%   Intake Supplements 0-25%;25-50%;50-75%   Estimated calorie intake compared to estimated need Anticipate minimum nutrient needs are met at times, not consistently. PES Statement   Problem Continue previous diagnosis   Recommendations/Interventions   Malnutrition/BMI Present Yes  (as previously documented)   Summary Nutrition follow up assessment. Prescribed a Regular diet, thin liquids. Ensure Compact BID. Meal completions variable 0-100%. Anticipate minimum nutrient needs are met at times, not consistently. No edema. No pressure areas. 8/5/23 114# via standing scale. 3# weight loss in 5 days. Malnutrition criteria already met. Nutrition interventions in place. Met with pt in [facility]. She participates minimally in conversation and does not answer most questions. She states her appetite is not good. When asked if she drinks Ensure as prescribed she states "I don't think so." Pt is unable to report if she likes both chocolate and vanilla flavors. Continue nutrition interventions in place. Consider appetite stimulant. RD continues following. Interventions/Recommendations Continue current diet order;Supplement continue   Recommendations to Provider Consider appetite stimulant.    Education Assessment   Education Patient/caregiver not appropriate for education at this time   Patient Nutrition Goals   Goal Avoid weight loss;Meet PO needs

## 2023-08-09 NOTE — NURSING NOTE
Patient is awake at this time; observed to be restless and pacing in her room. She did void in the bathroom then returned to her room. She does not respond when queried. Attempted to administer PRN Melatonin 6 mg however patient refuse, shaking her head no and handing this writer the medication cup. Medication wasted as per protocol. Encouraged patient to rest. Continuous safety rounding in progress.

## 2023-08-09 NOTE — NURSING NOTE
Daughter, Octavia Ledezma, called. Requesting patient be discharged today as she will take care of her at home.  and CARROLL aware.

## 2023-08-09 NOTE — PLAN OF CARE
Problem: Ineffective Coping  Goal: Demonstrates healthy coping skills  Outcome: Progressing  Goal: Participates in unit activities  Description: Interventions:  - Provide therapeutic environment   - Provide required programming   - Redirect inappropriate behaviors   Outcome: Not Progressing  Goal: Patient/Family participate in treatment and DC plans  Description: Interventions:  - Provide therapeutic environment  Outcome: Not Progressing  Goal: Patient/Family verbalizes awareness of resources  Outcome: Not Progressing     Problem: Risk for Self Injury/Neglect  Goal: Verbalize thoughts and feelings  Description: Interventions:  - Assess and re-assess patient's lethality and potential for self-injury  - Engage patient in 1:1 interactions, daily, for a minimum of 15 minutes  - Encourage patient to express feelings, fears, frustrations, hopes  - Establish rapport/trust with patient   Outcome: Not Progressing  Goal: Complete daily ADLs, including personal hygiene independently, as able  Description: Interventions:  - Observe, teach, and assist patient with ADLS  - Monitor and promote a balance of rest/activity, with adequate nutrition and elimination  Outcome: Progressing     Problem: Depression  Goal: Refrain from harming self  Description: Interventions:  - Monitor patient closely, per order   - Supervise medication ingestion, monitor effects and side effects   Outcome: Progressing  Goal: Refrain from isolation  Description: Interventions:  - Develop a trusting relationship   - Encourage socialization   Outcome: Not Progressing  Goal: Refrain from self-neglect  Outcome: Progressing     Problem: Anxiety  Goal: Anxiety is at manageable level  Description: Interventions:  - Assess and monitor patient's anxiety level. - Monitor for signs and symptoms (heart palpitations, chest pain, shortness of breath, headaches, nausea, feeling jumpy, restlessness, irritable, apprehensive).    - Collaborate with interdisciplinary team and initiate plan and interventions as ordered.   - Noel patient to unit/surroundings  - Explain treatment plan  - Encourage participation in care  - Encourage verbalization of concerns/fears  - Identify coping mechanisms  - Assist in developing anxiety-reducing skills  - Administer/offer alternative therapies  - Limit or eliminate stimulants  Outcome: Progressing     Problem: Alteration in Orientation  Goal: Treatment Goal: Demonstrate a reduction of confusion and improved orientation to person, place, time and/or situation upon discharge, according to optimum baseline/ability  Outcome: Not Progressing  Goal: Interact with staff daily  Description: Interventions:  - Assess and re-assess patient's level of orientation  - Engage patient in 1 on 1 interactions, daily, for a minimum of 15 minutes   - Establish rapport/trust with patient   Outcome: Not Progressing  Goal: Cooperate with recommended testing/procedures  Description: Interventions:  - Determine need for ancillary testing  - Observe for mental status changes  - Implement falls/precaution protocol   Outcome: Progressing     Problem: Potential for Falls  Goal: Patient will remain free of falls  Description: INTERVENTIONS:  - Educate patient/family on patient safety including physical limitations  - Instruct patient to call for assistance with activity   - Consult OT/PT to assist with strengthening/mobility   - Keep Call bell within reach  - Keep bed low and locked with side rails adjusted as appropriate  - Keep care items and personal belongings within reach  - Initiate and maintain comfort rounds  - Make Fall Risk Sign visible to staff  - Offer Toileting every two Hours, in advance of need  - Apply yellow socks and bracelet for high fall risk patients  - Consider moving patient to room near nurses station  Outcome: Progressing     Problem: Nutrition/Hydration-ADULT  Goal: Nutrient/Hydration intake appropriate for improving, restoring or maintaining nutritional needs  Description: Monitor and assess patient's nutrition/hydration status for malnutrition. Collaborate with interdisciplinary team and initiate plan and interventions as ordered. Monitor patient's weight and dietary intake as ordered or per policy. Utilize nutrition screening tool and intervene as necessary. Determine patient's food preferences and provide high-protein, high-caloric foods as appropriate.      INTERVENTIONS:  - Monitor oral intake, urinary output, labs, and treatment plans  - Assess nutrition and hydration status and recommend course of action  - Evaluate amount of meals eaten  - Assist patient with eating if necessary   - Allow adequate time for meals  - Recommend/ encourage appropriate diets, oral nutritional supplements, and vitamin/mineral supplements  - Order, calculate, and assess calorie counts as needed  - Recommend, monitor, and adjust tube feedings and TPN/PPN based on assessed needs  - Assess need for intravenous fluids  - Provide specific nutrition/hydration education as appropriate  - Include patient/family/caregiver in decisions related to nutrition  Outcome: Progressing

## 2023-08-09 NOTE — PROGRESS NOTES
Progress Note - Aspen Velez 70 y.o. female MRN: [id number]    Unit/Bed#: [facility] 202-02 Encounter: [id number]        Subjective:   Patient seen and examined at bedside after reviewing the chart and discussing the case with the caring staff. Patient examined at bedside. Patient has no reported acute complaints. Physical Exam   Vitals: Blood pressure 139/79, pulse (!) 112, temperature 98.2 °F (36.8 °C), temperature source Temporal, resp. rate 18, height 5' (1.524 m), weight 51.9 kg (114 lb 8 oz), SpO2 96 %, not currently breastfeeding. ,Body mass index is 22.36 kg/m². Constitutional: Patient in no acute distress. HEENT: PERR, EOMI, MMM. Cardiovascular: Normal rate and regular rhythm. Pulmonary/Chest: Effort normal and breath sounds normal.  Abdomen: Soft, + BS, NT, no rebound, no guarding. Assessment/Plan:  Aspen Velez is a(n) 70y.o. year old female with MDD.     1.  Cardiac with hx of HTN, HLD, CAD s/p CABG, LVH. Continue Toprol-XL 12.5 mg daily, lisinopril 20 mg daily, hydrochlorothiazide 25 mg daily, atorvastatin 40 mg daily, Zetia 10 mg daily and aspirin 81 mg daily. 2. Prediabetes. Diet controlled. Hgb a1c 6.0% on 8/3/23. 3. Hypothyroidism. Patient is on levothyroxine 175 mcg daily. 4. DJD/osteoarthritis. Patient may get Tylenol as needed. 5. Vitamin D deficiency. Patient started on vitamin D bolus doses for 6 weeks followed by vitamin D3 1000 units daily. 6. Vitamin B12 deficiency. Patient started on monthly B12 injections. 7. Constipation. Continue Colace 100 mg twice daily, MiraLAX daily. The patient was discussed with Dr. Juvenal Flores and he is in agreement with the above note.

## 2023-08-09 NOTE — PROGRESS NOTES
Progress Note - [facility] 70 y.o. female MRN: [id number]  Unit/Bed#: [facility] 202-02 Encounter: [id number]    Assessment/Plan   Principal Problem:    MDD (major depressive disorder), recurrent episode, severe ([address])  Active Problems:    Anemia    Chronic coronary artery disease    Hypothyroidism    Hyperlipidemia    Vitamin D deficiency    Hypertension    LVH (left ventricular hypertrophy)    Anxiety    White matter abnormality on MRI of brain    H/O: CVA (cerebrovascular accident)    Severe protein-calorie malnutrition ([address])      Behavior over the last 24 hours:  unchanged  Sleep: normal  Appetite: normal  Medication side effects: No  ROS: no complaints and all other systems are negative    Subjective: Everett Kline was seen today for psychiatric follow-up. Patient continues to appear restless anxious she continues to pace the hallways with limited participation in the milieu. She is disorganized in thought and very minimal in conversation. Her mood continues to be controlled on the unit with no recent aggression or agitation for staff or peers. According to nursing staff patient compliant with her medication regimen and unit rules. She denied SI/HI, she appears internally preoccupied.   Mental Status Evaluation:  Appearance:  age appropriate and casually dressed   Behavior:  guarded   Speech:  delayed, soft   Mood:  depressed   Affect:  blunted   Thought Process:  decreased rate of thoughts   Associations: thought blocking   Thought Content:  some paranoia   Perceptual Disturbances: appears internally preoccupied   Risk Potential: Suicidal Ideations none at present  Homicidal Ideations none at present  Potential for Aggression No   Sensorium:  person   Memory:  recent and remote memory: unable to assess due to lack of cooperation   Consciousness:  alert and awake    Attention: attention span appeared shorter than expected for age   Insight:  poor   Judgment: poor   Gait/Station: normal gait/station   Motor Activity: no abnormal movements     Progress Toward Goals: Patient progressing towards goals of inpatient psychiatric treatment, by continue medication compliance and its attendant therapeutic modalities in the milieu.  However, patient continues to require inpatient psychiatric hospitalization for continued medication management and titration to optimize symptom reduction and improve sleep hygiene and demonstrate adequate health care. Recommended Treatment: Continue with group therapy, milieu therapy and occupational therapy. Risks, benefits and possible side effects of Medications:   Risks, benefits, and possible side effects of medications explained to patient and patient verbalizes understanding.       Medications:   all current active meds have been reviewed and current meds:   Current Facility-Administered Medications   Medication Dose Route Frequency   • acetaminophen (TYLENOL) tablet 650 mg  650 mg Oral Q4H PRN   • acetaminophen (TYLENOL) tablet 650 mg  650 mg Oral Q4H PRN   • acetaminophen (TYLENOL) tablet 975 mg  975 mg Oral Q6H PRN   • ascorbic acid (VITAMIN C) tablet 1,000 mg  1,000 mg Oral Daily   • aspirin chewable tablet 81 mg  81 mg Oral Daily   • atorvastatin (LIPITOR) tablet 40 mg  40 mg Oral Daily With Dinner   • benztropine (COGENTIN) tablet 0.5 mg  0.5 mg Oral Q4H PRN Max 6/day   • cholecalciferol (VITAMIN D3) tablet 1,000 Units  1,000 Units Oral Daily   • citalopram (CeleXA) tablet 10 mg  10 mg Oral Daily   • cyanocobalamin injection 1,000 mcg  1,000 mcg Intramuscular Q30 Days   • docusate sodium (COLACE) capsule 100 mg  100 mg Oral BID   • ergocalciferol (VITAMIN D2) capsule 50,000 Units  50,000 Units Oral Weekly   • ezetimibe (ZETIA) tablet 10 mg  10 mg Oral Daily   • hydrochlorothiazide (HYDRODIURIL) tablet 25 mg  25 mg Oral Daily   • hydrOXYzine HCL (ATARAX) tablet 25 mg  25 mg Oral Q6H PRN Max 4/day   • levothyroxine tablet 175 mcg  175 mcg Oral Early Morning • lisinopril (ZESTRIL) tablet 20 mg  20 mg Oral Daily   • LORazepam (ATIVAN) injection 1 mg  1 mg Intramuscular Q6H PRN Max 3/day   • LORazepam (ATIVAN) tablet 0.5 mg  0.5 mg Oral Q6H PRN Max 4/day   • LORazepam (ATIVAN) tablet 1 mg  1 mg Oral Q6H PRN Max 3/day   • melatonin tablet 6 mg  6 mg Oral HS PRN   • metoprolol succinate (TOPROL-XL) 24 hr tablet 12.5 mg  12.5 mg Oral Daily   • multivitamin-minerals (CENTRUM) tablet 1 tablet  1 tablet Oral Daily   • OLANZapine (ZyPREXA) IM injection 5 mg  5 mg Intramuscular Q3H PRN Max 3/day   • OLANZapine (ZyPREXA) tablet 2.5 mg  2.5 mg Oral Q4H PRN Max 6/day   • OLANZapine (ZyPREXA) tablet 5 mg  5 mg Oral Q4H PRN Max 3/day   • OLANZapine (ZyPREXA) tablet 5 mg  5 mg Oral Q3H PRN Max 3/day   • polyethylene glycol (MIRALAX) packet 17 g  17 g Oral Daily PRN   • polyethylene glycol (MIRALAX) packet 17 g  17 g Oral Daily   • QUEtiapine (SEROquel) tablet 25 mg  25 mg Oral HS   . Labs: I have personally reviewed all pertinent laboratory/tests results. Most Recent Labs:   Lab Results   Component Value Date    WBC 6.58 07/31/2023    RBC 4.12 07/31/2023    HGB 13.0 07/31/2023    HCT 37.9 07/31/2023     07/31/2023    RDW 12.2 07/31/2023    NEUTROABS 3.62 07/28/2023    SODIUM 139 07/31/2023    K 4.2 07/31/2023     07/31/2023    CO2 25 07/31/2023    BUN 10 07/31/2023    CREATININE 0.52 (L) 07/31/2023    GLUC 109 07/31/2023    GLUF 93 05/22/2023    CALCIUM 9.4 07/31/2023    AST 22 07/29/2023    ALT 27 07/29/2023    ALKPHOS 45 07/29/2023    TP 5.8 (L) 07/29/2023    ALB 3.7 07/29/2023    TBILI 0.80 07/29/2023    CHOLESTEROL 121 08/03/2023    HDL 41 (L) 08/03/2023    TRIG 84 08/03/2023    LDLCALC 63 08/03/2023    NONHDLC 127 02/05/2019    AMMONIA 15 (L) 07/28/2023    [id number] 10.272 (H) 07/28/2023    FREET4 1.06 07/28/2023    HGBA1C 6.0 (H) 08/03/2023     08/03/2023       Counseling / Coordination of Care  Total floor / unit time spent today 25 minutes.

## 2023-08-09 NOTE — PROGRESS NOTES
08/09/23 1467   Activity/Group Checklist   Group Community meeting   Attendance Did not attend  (Pt offered grp; pt remained in hallway ambulating)

## 2023-08-09 NOTE — PROGRESS NOTES
08/09/23 1100   Activity/Group Checklist   Group Life Skills   Attendance Attended   Attendance Duration (min) 16-30  (Pt joined grp late)   Interactions Disorganized interaction   Affect/Mood Blunted/flat   Goals Achieved Able to listen to others; Able to engage in interactions; Able to recieve feedback

## 2023-08-10 ENCOUNTER — TELEPHONE (OUTPATIENT)
Dept: PSYCHIATRY | Facility: CLINIC | Age: 63
End: 2023-08-10

## 2023-08-10 PROBLEM — F33.9 DEPRESSION, RECURRENT (HCC): Status: RESOLVED | Noted: 2023-07-21 | Resolved: 2023-08-10

## 2023-08-10 PROBLEM — F43.22 ADJUSTMENT DISORDER WITH ANXIETY: Status: RESOLVED | Noted: 2019-05-20 | Resolved: 2023-08-10

## 2023-08-10 PROBLEM — F33.2 MDD (MAJOR DEPRESSIVE DISORDER), RECURRENT EPISODE, SEVERE (HCC): Status: RESOLVED | Noted: 2023-08-01 | Resolved: 2023-08-10

## 2023-08-10 PROBLEM — F41.9 ANXIETY: Status: RESOLVED | Noted: 2023-07-21 | Resolved: 2023-08-10

## 2023-08-10 PROBLEM — R41.9 NEUROCOGNITIVE DISORDER: Status: ACTIVE | Noted: 2023-08-10

## 2023-08-10 PROCEDURE — 99232 SBSQ HOSP IP/OBS MODERATE 35: CPT

## 2023-08-10 RX ORDER — CITALOPRAM HYDROBROMIDE 10 MG/1
10 TABLET ORAL DAILY
Qty: 30 TABLET | Refills: 1 | Status: SHIPPED | OUTPATIENT
Start: 2023-08-10

## 2023-08-10 RX ORDER — QUETIAPINE FUMARATE 25 MG/1
25 TABLET, FILM COATED ORAL
Qty: 30 TABLET | Refills: 1 | Status: SHIPPED | OUTPATIENT
Start: 2023-08-10

## 2023-08-10 RX ORDER — CITALOPRAM HYDROBROMIDE 10 MG/1
10 TABLET ORAL DAILY
Qty: 30 TABLET | Refills: 0 | Status: CANCELLED | OUTPATIENT
Start: 2023-08-10

## 2023-08-10 RX ORDER — QUETIAPINE FUMARATE 25 MG/1
25 TABLET, FILM COATED ORAL
Qty: 30 TABLET | Refills: 0 | Status: CANCELLED | OUTPATIENT
Start: 2023-08-10

## 2023-08-10 RX ADMIN — DOCUSATE SODIUM 100 MG: 100 CAPSULE, LIQUID FILLED ORAL at 17:35

## 2023-08-10 RX ADMIN — CITALOPRAM HYDROBROMIDE 10 MG: 10 TABLET ORAL at 09:20

## 2023-08-10 RX ADMIN — ASPIRIN 81 MG 81 MG: 81 TABLET ORAL at 09:21

## 2023-08-10 RX ADMIN — ATORVASTATIN CALCIUM 40 MG: 40 TABLET, FILM COATED ORAL at 17:35

## 2023-08-10 RX ADMIN — DOCUSATE SODIUM 100 MG: 100 CAPSULE, LIQUID FILLED ORAL at 09:21

## 2023-08-10 RX ADMIN — EZETIMIBE 10 MG: 10 TABLET ORAL at 09:21

## 2023-08-10 NOTE — PROGRESS NOTES
Progress Note - [address] [facility] 70 y.o. female MRN: [id number]  Unit/Bed#: Juice Millan 202-02 Encounter: [id number]    Assessment/Plan   Principal Problem:    MDD (major depressive disorder), recurrent episode, severe ([address])  Active Problems:    Anemia    Chronic coronary artery disease    Hypothyroidism    Hyperlipidemia    Vitamin D deficiency    Hypertension    LVH (left ventricular hypertrophy)    Anxiety    White matter abnormality on MRI of brain    H/O: CVA (cerebrovascular accident)    Severe protein-calorie malnutrition ([address])      Behavior over the last 24 hours:  unchanged  Sleep: normal  Appetite: normal  Medication side effects: No  ROS: no complaints and all other systems are negative    Subjective: Andi Holcomb seen today for psychiatric sore follow-up. Patient continues to be delayed in response, labile and anxious on the unit. She is withdrawn to himself, with little interaction with peers. Her mood continues to be controlled on the unit with no recent aggression agitation toward staff or peers. She is compliant with her medication regimen. She denied any SI/HI/AVH, although patient appears internally preoccupied.     Mental Status Evaluation:  Appearance:  age appropriate and casually dressed   Behavior:  guarded   Speech:  soft and delayed   Mood:  anxious and depressed   Affect:  blunted   Thought Process:  decreased rate of thought   Associations: thought blocking   Thought Content:  some paranoia   Perceptual Disturbances: appears internally preoccupied   Risk Potential: Suicidal Ideations none at present  Homicidal Ideations none at present  Potential for Aggression No   Sensorium:  person   Memory:  recent and remote memory: unable to assess due to lack of cooperation   Consciousness:  alert and awake    Attention: attention span appeared shorter than expected for age   Insight:  poor   Judgment: poor   Gait/Station: normal gait/station   Motor Activity: no abnormal movements Progress Toward Goals: Unchanged. Patient continues to be selectively mute on the unit with a delayed his response. Family meeting held via phone today with patient's daughter, Lashanda Carey and case management. Daughter confirms she and the rest of the family want patient to be discharged back home to her family or environment. Daughter confirms she has services in place for 24 7 supervision of her mother, she also confirms contacting [facility] for services. Patient's daughter made aware of the revocation of her mother's driving license due to safety issues. Daughter in agreement. Meeting ended mutually, anticipated discharge on 8/11/2023. Recommended Treatment: Continue with group therapy, milieu therapy and occupational therapy. Risks, benefits and possible side effects of Medications:   Risks, benefits, and possible side effects of medications explained to patient and patient verbalizes understanding.       Medications:   all current active meds have been reviewed and current meds:   Current Facility-Administered Medications   Medication Dose Route Frequency   • acetaminophen (TYLENOL) tablet 650 mg  650 mg Oral Q4H PRN   • acetaminophen (TYLENOL) tablet 650 mg  650 mg Oral Q4H PRN   • acetaminophen (TYLENOL) tablet 975 mg  975 mg Oral Q6H PRN   • ascorbic acid (VITAMIN C) tablet 1,000 mg  1,000 mg Oral Daily   • aspirin chewable tablet 81 mg  81 mg Oral Daily   • atorvastatin (LIPITOR) tablet 40 mg  40 mg Oral Daily With Dinner   • benztropine (COGENTIN) tablet 0.5 mg  0.5 mg Oral Q4H PRN Max 6/day   • cholecalciferol (VITAMIN D3) tablet 1,000 Units  1,000 Units Oral Daily   • citalopram (CeleXA) tablet 10 mg  10 mg Oral Daily   • cyanocobalamin injection 1,000 mcg  1,000 mcg Intramuscular Q30 Days   • docusate sodium (COLACE) capsule 100 mg  100 mg Oral BID   • ergocalciferol (VITAMIN D2) capsule 50,000 Units  50,000 Units Oral Weekly   • ezetimibe (ZETIA) tablet 10 mg  10 mg Oral Daily   • hydrochlorothiazide (HYDRODIURIL) tablet 25 mg  25 mg Oral Daily   • hydrOXYzine HCL (ATARAX) tablet 25 mg  25 mg Oral Q6H PRN Max 4/day   • levothyroxine tablet 175 mcg  175 mcg Oral Early Morning   • lisinopril (ZESTRIL) tablet 20 mg  20 mg Oral Daily   • LORazepam (ATIVAN) injection 1 mg  1 mg Intramuscular Q6H PRN Max 3/day   • LORazepam (ATIVAN) tablet 0.5 mg  0.5 mg Oral Q6H PRN Max 4/day   • LORazepam (ATIVAN) tablet 1 mg  1 mg Oral Q6H PRN Max 3/day   • melatonin tablet 6 mg  6 mg Oral HS PRN   • metoprolol succinate (TOPROL-XL) 24 hr tablet 12.5 mg  12.5 mg Oral Daily   • multivitamin-minerals (CENTRUM) tablet 1 tablet  1 tablet Oral Daily   • OLANZapine (ZyPREXA) IM injection 5 mg  5 mg Intramuscular Q3H PRN Max 3/day   • OLANZapine (ZyPREXA) tablet 2.5 mg  2.5 mg Oral Q4H PRN Max 6/day   • OLANZapine (ZyPREXA) tablet 5 mg  5 mg Oral Q4H PRN Max 3/day   • OLANZapine (ZyPREXA) tablet 5 mg  5 mg Oral Q3H PRN Max 3/day   • polyethylene glycol (MIRALAX) packet 17 g  17 g Oral Daily PRN   • polyethylene glycol (MIRALAX) packet 17 g  17 g Oral Daily   • QUEtiapine (SEROquel) tablet 25 mg  25 mg Oral HS   . Labs: I have personally reviewed all pertinent laboratory/tests results.    Most Recent Labs:   Lab Results   Component Value Date    WBC 6.58 07/31/2023    RBC 4.12 07/31/2023    HGB 13.0 07/31/2023    HCT 37.9 07/31/2023     07/31/2023    RDW 12.2 07/31/2023    NEUTROABS 3.62 07/28/2023    SODIUM 139 07/31/2023    K 4.2 07/31/2023     07/31/2023    CO2 25 07/31/2023    BUN 10 07/31/2023    CREATININE 0.52 (L) 07/31/2023    GLUC 109 07/31/2023    GLUF 93 05/22/2023    CALCIUM 9.4 07/31/2023    AST 22 07/29/2023    ALT 27 07/29/2023    ALKPHOS 45 07/29/2023    TP 5.8 (L) 07/29/2023    ALB 3.7 07/29/2023    TBILI 0.80 07/29/2023    CHOLESTEROL 121 08/03/2023    HDL 41 (L) 08/03/2023    TRIG 84 08/03/2023    LDLCALC 63 08/03/2023    NONHDLC 127 02/05/2019    AMMONIA 15 (L) 07/28/2023 [id number] 10.272 (H) 07/28/2023    FREET4 1.06 07/28/2023    HGBA1C 6.0 (H) 08/03/2023     08/03/2023       Counseling / Coordination of Care  Total floor / unit time spent today 25 minutes. No

## 2023-08-10 NOTE — NURSING NOTE
Patient noted to be visible on the milieu this evening withdrawn to self. Spent majority of the evening pacing the halls. Difficult to conduct psychological assessment due to patient not offering verbal response. Patient follows commands and appears anxious/sad presenting w/ flat affect. Compliant w/ hs med regimen despite being hesitant initially. Only words noted this evening from patient was during hs med administration. This writer handed patient her scheduled Seroquel 25 mg and patient responded "it's supposed to be two" w/ a soft spoken speech pattern. Patient was reassured by this writer of dosage and was accepting. Currently in bed resting comfortably at this time. Q 7 min safety checks continuous and maintained.

## 2023-08-10 NOTE — PLAN OF CARE
Problem: DISCHARGE PLANNING - CARE MANAGEMENT  Goal: Discharge to post-acute care or home with appropriate resources  Description: INTERVENTIONS:  - Conduct assessment to determine patient/family and health care team treatment goals, and need for post-acute services based on payer coverage, community resources, and patient preferences, and barriers to discharge  - Address psychosocial, clinical, and financial barriers to discharge as identified in assessment in conjunction with the patient/family and health care team  - Arrange appropriate level of post-acute services according to patient’s   needs and preference and payer coverage in collaboration with the physician and health care team  - Communicate with and update the patient/family, physician, and health care team regarding progress on the discharge plan  - Arrange appropriate transportation to post-acute venues  Outcome: Completed   D/C tomorrow to family's care, will follow up with [facility], [facility] referral completed, [facility].  Daughter will p/u at 10am.

## 2023-08-10 NOTE — PROGRESS NOTES
08/10/23 6383   Activity/Group Checklist   Group Community meeting   Attendance Attended   Attendance Duration (min) 46-60   Interactions Disorganized interaction   Affect/Mood Blunted/flat   Goals Achieved Identified feelings; Able to listen to others; Able to engage in interactions; Able to reflect/comment on own behavior;Able to self-disclose; Able to recieve feedback

## 2023-08-10 NOTE — DISCHARGE SUMMARY
Discharge Summary - [facility] 70 y.o. female MRN: [id number]  Unit/Bed#: Kaajl Resendiz 253-96 Encounter: [id number]     Admission Date: 7/31/2023         Discharge Date: 8/11/2023  Attending Psychiatrist: Yoanna Zamora MD     Reason for Admission/HPI: Major depressive disorder, single episode, unspecified [F32.9]  Anxiety and depression [F41.9, F32.A]     According to H&P of Dr. Kee Navarro    Patient is a 70 y.o. female presented with reported history of depression and anxiety, complicated with history of coronary artery disease with CABG, A fib, DJD, hypertension, hypothyroidism, UTI who was admitted to the inpatient older adult psychiatric unit on a voluntary 12 commitment basis due to depression, anxiety, unstable mood, confusion and inability to care for self. Currently on antibiotic for UTI, TSH 10.272 Free T4 1.06  On evaluation in the inpatient psychiatric unit [facility] presents somewhat confused, very limited historian and unable to provide any elaborate information. She was able to answer questions very delayed and concrete with frequent redirection. She claims she was on medical floor, feeling very anxious but is unsure why she came to the behavioral health unit. Patient admits that she has been more forgetful, not eating and not taking care for herself as she used to in her home. She is alert and oriented X 2 and showed significant latency in her responses. Denies any current suicidal ideation or history of suicide attempt. Denies any past psychiatric hospitalization but shows significant psychomotor retardation, appears internally preoccupied and demonstrated difficulty in attention concentration and remembering things. No acute focal neurological deficit was noted during examination. Hospital Course: The patient was admitted to the inpatient psychiatric unit and started on every 7 minutes precautions.  During the hospitalization the patient was attending individual therapy, group therapy, milieu therapy and occupational therapy. Psychiatric medications were titrated over the hospital stay. To address depressive symptoms and insomnia the patient was started on antidepressant Prozac and hypnotic medication Melatonin. Medication doses were titrated during the hospital course. Prior to beginning of treatment medications risks and benefits and possible side effects including risk of suicidality and serotonin syndrome related to treatment with antidepressants and risk of impaired next-day mental alertness, complex sleep-related behavior and dependence related to treatment with hypnotic medications were reviewed with the patient. The patient verbalized understanding and agreement for treatment. Patient's symptoms improved gradually over the hospital course. At the end of treatment the patient was doing well. Mood was stable at the time of discharge. The patient denied suicidal ideation, intent or plan at the time of discharge and denied homicidal ideation, intent or plan at the time of discharge. There was no overt psychosis at the time of discharge. Sleep and appetite were improved. The patient was tolerating medications and was not reporting any significant side effects at the time of discharge. Since the patient was doing well at the end of the hospitalization, treatment team felt that the patient could be safely discharged to outpatient care. The outpatient follow up with PCP, [facility] and neurology was arranged by the unit  upon discharge.     Mental Status at time of Discharge:     Appearance:  age appropriate and casually dressed   Behavior:  calm, cooperative   Speech:  delayed   Mood:  depressed   Affect:  blunted   Thought Process:  decreaseed rate of thoughts   Thought Content:  some paranoia   Perceptual Disturbances: some paranoia   Risk Potential: none   Sensorium:  person   Cognition:  recent and remote memory: unable to assess due to lack of cooperation Consciousness:  alert and awake    Attention: attention span appeared shorter than expected for age   Insight:  poor   Judgment: poor   Gait/Station: normal gait/station   Motor Activity: no abnormal movements     Admission Diagnosis:Major depressive disorder, single episode, unspecified [F32.9]  Anxiety and depression [F41.9, F32.A]    Discharge Diagnosis:   Principal Problem (Resolved):    MDD (major depressive disorder), recurrent episode, severe ([address])  Active Problems:    Anemia    Chronic coronary artery disease    Hypothyroidism    Hyperlipidemia    Vitamin D deficiency    Hypertension    LVH (left ventricular hypertrophy)    White matter abnormality on MRI of brain    H/O: CVA (cerebrovascular accident)    Severe protein-calorie malnutrition ([address])    Neurocognitive disorder  Resolved Problems:    Anxiety        Lab results:  Admission on 07/31/2023   Component Date Value   • Vitamin B-12 08/02/2023 303    • Vit D, 25-Hydroxy 08/02/2023 24.9 (L)    • Folate 08/02/2023 22.2    • Hemoglobin A1C 08/03/2023 6.0 (H)    • EAG 08/03/2023 126    • Cholesterol 08/03/2023 121    • Triglycerides 08/03/2023 84    • HDL, Direct 08/03/2023 41 (L)    • LDL Calculated 08/03/2023 63        Discharge Medications:  Current Discharge Medication List      START taking these medications    Details   QUEtiapine (SEROquel) 25 mg tablet Take 1 tablet (25 mg total) by mouth daily at bedtime  Qty: 30 tablet, Refills: 1    Associated Diagnoses: Mood disorder ([address])            Current Discharge Medication List         Current Discharge Medication List      CONTINUE these medications which have CHANGED    Details   citalopram (CeleXA) 10 mg tablet Take 1 tablet (10 mg total) by mouth daily  Qty: 30 tablet, Refills: 1    Associated Diagnoses: Anxiety            Current Discharge Medication List      CONTINUE these medications which have NOT CHANGED    Details   Ascorbic Acid (vitamin C) 1000 MG tablet Take 1,000 mg by mouth daily aspirin 81 mg chewable tablet Chew 81 mg daily      atorvastatin (LIPITOR) 40 mg tablet TAKE 1 TABLET BY MOUTH EVERY DAY  Qty: 90 tablet, Refills: 1    Associated Diagnoses: Mixed hyperlipidemia      benazepril-hydrochlorthiazide (LOTENSIN HCT) 20-12.5 MG per tablet TAKE 1 TABLET BY MOUTH EVERY DAY  Qty: 90 tablet, Refills: 3    Associated Diagnoses: Atherosclerosis of native coronary artery of native heart without angina pectoris      Cholecalciferol (Vitamin D3) 50 MCG (2000 UT) capsule TAKE 1 CAPSULE BY MOUTH EVERY DAY  Qty: 90 capsule, Refills: 0    Associated Diagnoses: Vitamin D deficiency      ezetimibe (ZETIA) 10 mg tablet Take 1 tablet (10 mg total) by mouth daily  Qty: 90 tablet, Refills: 0    Associated Diagnoses: Mixed hyperlipidemia      levothyroxine (Euthyrox) 175 mcg tablet Take 1 tablet (175 mcg total) by mouth daily in the early morning  Qty: 90 tablet, Refills: 0    Associated Diagnoses: Acquired hypothyroidism      metoprolol succinate (TOPROL-XL) 25 mg 24 hr tablet Take 0.5 tablets (12.5 mg total) by mouth daily  Qty: 90 tablet, Refills: 0    Associated Diagnoses: LVH (left ventricular hypertrophy)      multivitamin-iron-minerals-folic acid (CENTRUM) chewable tablet Chew 1 tablet daily      Blood Pressure KIT Use in the morning  Qty: 1 kit, Refills: 0    Associated Diagnoses: Atherosclerosis of native coronary artery of native heart without angina pectoris              Discharge instructions/Information to patient and family:   See after visit summary for information provided to patient and family. Provisions for Follow-Up Care:  See after visit summary for information related to follow-up care and any pertinent home health orders. Discharge Statement     I spent 30 minutes discharging the patient. This time was spent on the day of discharge. I had direct contact with the patient on the day of discharge.      Additional documentation is required if more than 30 minutes were spent on discharge:    I reviewed with Charline Perez importance of compliance with medications and outpatient treatment after discharge. I discussed the medication regimen and possible side effects of the medications with Charline Perez prior to discharge. At the time of discharge she was tolerating psychiatric medications. I discussed outpatient follow up with Charline Perez.

## 2023-08-10 NOTE — PLAN OF CARE
Problem: Ineffective Coping  Goal: Cooperates with admission process  Description: Interventions:   - Complete admission process  Outcome: Progressing  Goal: Participates in unit activities  Description: Interventions:  - Provide therapeutic environment   - Provide required programming   - Redirect inappropriate behaviors   Outcome: Not Progressing  Goal: Patient/Family verbalizes awareness of resources  Outcome: Not Progressing     Problem: Risk for Self Injury/Neglect  Goal: Treatment Goal: Remain safe during length of stay, learn and adopt new coping skills, and be free of self-injurious ideation, impulses and acts at the time of discharge  Outcome: Progressing

## 2023-08-10 NOTE — PROGRESS NOTES
Progress Note - Maxx Melendrez 70 y.o. female MRN: [id number]    Unit/Bed#: Mauro Bower 202-02 Encounter: [id number]        Subjective:   Patient seen and examined at bedside after reviewing the chart and discussing the case with the caring staff. Patient examined at bedside. Patient has no reported acute complaints. Physical Exam   Vitals: Blood pressure 108/55, pulse 103, temperature 97.9 °F (36.6 °C), temperature source Temporal, resp. rate 20, height 5' (1.524 m), weight 51.9 kg (114 lb 8 oz), SpO2 100 %, not currently breastfeeding. ,Body mass index is 22.36 kg/m². Constitutional: Patient in no acute distress. HEENT: PERR, EOMI, MMM. Cardiovascular: Normal rate and regular rhythm. Pulmonary/Chest: Effort normal and breath sounds normal.  Abdomen: Soft, + BS, NT, no rebound, no guarding. Assessment/Plan:  Maxx Melendrez is a(n) 70y.o. year old female with MDD.     1.  Cardiac with hx of HTN, HLD, CAD s/p CABG, LVH. Continue Toprol-XL 12.5 mg daily, lisinopril 20 mg daily, hydrochlorothiazide 25 mg daily, atorvastatin 40 mg daily, Zetia 10 mg daily and aspirin 81 mg daily. 2. Prediabetes. Diet controlled. Hgb a1c 6.0% on 8/3/23. 3. Hypothyroidism. Patient is on levothyroxine 175 mcg daily. 4. DJD/osteoarthritis. Patient may get Tylenol as needed. 5. Vitamin D deficiency. Patient started on vitamin D bolus doses for 6 weeks followed by vitamin D3 1000 units daily. 6. Vitamin B12 deficiency. Patient started on monthly B12 injections. 7. Constipation. Continue Colace 100 mg twice daily, MiraLAX daily. The patient was discussed with Dr. Tarah Jimenes and he is in agreement with the above note.

## 2023-08-10 NOTE — CMS CERTIFICATION NOTE
Recertification: Based upon physical, mental and social evaluations, I certify that inpatient psychiatric services continue to be medically necessary for this patient for a duration of 12 midnights for the treatment of  MDD (major depressive disorder), recurrent episode, severe ([address])   Available alternative community resources still do not meet the patient's mental health care needs. I further attest that an established written individualized plan of care has been updated and is outlined in the patient's medical records.

## 2023-08-10 NOTE — BH TRANSITION RECORD
Contact Information: If you have any questions, concerns, pended studies, tests and/or procedures, or emergencies regarding your inpatient behavioral health visit. Please contact [facility] Older Adult unit [phone] and ask to speak to a , nurse or physician. A contact is available 24 hours/ 7 days a week at this number. Summary of Procedures Performed During your Stay:  Below is a list of major procedures performed during your hospital stay and a summary of results:  - No major procedures performed. Pending Studies (From admission, onward)    None        Please follow up on the above pending studies with your PCP and/or referring provider.

## 2023-08-10 NOTE — DISCHARGE INSTR - OTHER ORDERS
You are being discharged to your sons' home located at [address] [facility], Phone: [phone]. Triggers you have identified during your hospitalization that led to your admission distressed mood, include regression in mental health. Coping skills you have identified during your hospitalization include walking,time with family, music. If you are unable to deal with your distressed mood alone please contact your primary care provider Dr. Austen Franz MD at , or your provider with [address] [place] [address] Psychiatry at [phone]. If that is not effective and you continue to have (ex: suicidal ideation, homicidal ideation, distressed mood, overwhelmed, in crisis) please contact (Crisis #) [address]: [phone] dial 911 or go to the nearest emergency center. [facility] Crisis Hotline: 334.561.9549  *[place] Alcohol Anonymous: [address] [facility] Drug and Alcohol Commision [facility]) 5040-618 on [address] [facility] HELPLINE: [phone]/Website: www.aida.Taste Filter  *Substance Abuse and [address] [facility] [employer], which is a confidential, free, 24-hour-a-day, 365-day-a-year, information service for individuals and family members facing mental health and/or substance use disorders. This service provides referrals to local treatment facilities, support groups, and community-based organizations. Callers can also order free publications and other information. Call 3-433.801.6196/Website: www.[facility].gov  *[facility] 2-1-1: This is a toll free, confidential, 24-hour-a-day service which connects you to a community  in your area who can help you find services and resources that are available to you locally and provide critical services that can improve and save lives. Call: 80  /Website: https://dariaCopper Mobilemartínez.net/     You are being provided a refill on medications for continuity purposes.      [facility] and Adult [address] [place] [address]  Phone: 06-9060349563 Adult Day Services  Address 1: [facility], [address] [place] [address] [place]: [place]  Zip: [address]  Phone: [phone]  Website:  [place]ozi/    [place] of the [facility]  Address 1: [address] [facility], [facility]: [place]  Zip: [address]  Phone: [phone]  Website:  Kae Garcia. Bimal Patterson or Leigha, our [facility], will be calling you after your discharge, on the phone number that you provided. They will be available as an additional support, if needed. If you wish to speak with one of them, you may contact Silas Maya at [phone] or Harrington Severance at [phone].

## 2023-08-10 NOTE — PROGRESS NOTES
08/10/23 1030   Activity/Group Checklist   Group Personal control   Attendance Attended   Attendance Duration (min) Greater than 60   Interactions Did not interact   Affect/Mood Blunted/flat   Goals Achieved Able to listen to others; Able to recieve feedback

## 2023-08-11 VITALS
BODY MASS INDEX: 22.48 KG/M2 | WEIGHT: 114.5 LBS | OXYGEN SATURATION: 98 % | HEIGHT: 60 IN | DIASTOLIC BLOOD PRESSURE: 76 MMHG | TEMPERATURE: 97.3 F | RESPIRATION RATE: 18 BRPM | SYSTOLIC BLOOD PRESSURE: 137 MMHG | HEART RATE: 112 BPM

## 2023-08-11 DIAGNOSIS — E78.2 MIXED HYPERLIPIDEMIA: ICD-10-CM

## 2023-08-11 PROCEDURE — 99238 HOSP IP/OBS DSCHRG MGMT 30/<: CPT

## 2023-08-11 RX ORDER — METOPROLOL SUCCINATE 25 MG/1
12.5 TABLET, EXTENDED RELEASE ORAL DAILY
Qty: 15 TABLET | Refills: 0 | Status: SHIPPED | OUTPATIENT
Start: 2023-08-11 | End: 2023-08-23

## 2023-08-11 RX ORDER — LEVOTHYROXINE SODIUM 175 UG/1
175 TABLET ORAL
Qty: 30 TABLET | Refills: 0 | Status: SHIPPED | OUTPATIENT
Start: 2023-08-11

## 2023-08-11 RX ORDER — ERGOCALCIFEROL 1.25 MG/1
50000 CAPSULE ORAL WEEKLY
Qty: 5 CAPSULE | Refills: 0 | Status: SHIPPED | OUTPATIENT
Start: 2023-08-17 | End: 2023-09-15

## 2023-08-11 RX ORDER — MELATONIN
1000 DAILY
Qty: 30 TABLET | Refills: 0 | Status: SHIPPED | OUTPATIENT
Start: 2023-08-12

## 2023-08-11 RX ORDER — CYANOCOBALAMIN 1000 UG/ML
1000 INJECTION, SOLUTION INTRAMUSCULAR; SUBCUTANEOUS
Qty: 1 ML | Refills: 0 | Status: SHIPPED | OUTPATIENT
Start: 2023-09-02

## 2023-08-11 RX ORDER — ATORVASTATIN CALCIUM 40 MG/1
40 TABLET, FILM COATED ORAL DAILY
Qty: 30 TABLET | Refills: 0 | Status: SHIPPED | OUTPATIENT
Start: 2023-08-11 | End: 2023-08-11

## 2023-08-11 RX ORDER — DOCUSATE SODIUM 100 MG/1
100 CAPSULE, LIQUID FILLED ORAL 2 TIMES DAILY
Qty: 60 CAPSULE | Refills: 0 | Status: SHIPPED | OUTPATIENT
Start: 2023-08-11

## 2023-08-11 RX ORDER — LISINOPRIL 20 MG/1
20 TABLET ORAL DAILY
Qty: 30 TABLET | Refills: 0 | Status: SHIPPED | OUTPATIENT
Start: 2023-08-12 | End: 2023-08-23

## 2023-08-11 RX ORDER — ASPIRIN 81 MG/1
81 TABLET, CHEWABLE ORAL DAILY
Qty: 30 TABLET | Refills: 0 | Status: SHIPPED | OUTPATIENT
Start: 2023-08-11

## 2023-08-11 RX ORDER — EZETIMIBE 10 MG/1
10 TABLET ORAL DAILY
Qty: 30 TABLET | Refills: 0 | Status: SHIPPED | OUTPATIENT
Start: 2023-08-11

## 2023-08-11 RX ORDER — HYDROCHLOROTHIAZIDE 25 MG/1
25 TABLET ORAL DAILY
Qty: 30 TABLET | Refills: 0 | Status: SHIPPED | OUTPATIENT
Start: 2023-08-12 | End: 2023-08-23

## 2023-08-11 RX ORDER — ATORVASTATIN CALCIUM 40 MG/1
40 TABLET, FILM COATED ORAL DAILY
Qty: 90 TABLET | Refills: 0 | Status: SHIPPED | OUTPATIENT
Start: 2023-08-11 | End: 2023-08-11

## 2023-08-11 RX ORDER — POLYETHYLENE GLYCOL 3350 17 G/17G
17 POWDER, FOR SOLUTION ORAL DAILY
Qty: 30 EACH | Refills: 0 | Status: SHIPPED | OUTPATIENT
Start: 2023-08-12

## 2023-08-11 RX ORDER — ATORVASTATIN CALCIUM 40 MG/1
40 TABLET, FILM COATED ORAL
Qty: 30 TABLET | Refills: 0 | Status: SHIPPED | OUTPATIENT
Start: 2023-08-11

## 2023-08-11 RX ADMIN — LISINOPRIL 20 MG: 20 TABLET ORAL at 08:33

## 2023-08-11 RX ADMIN — OXYCODONE HYDROCHLORIDE AND ACETAMINOPHEN 1000 MG: 500 TABLET ORAL at 08:33

## 2023-08-11 RX ADMIN — DOCUSATE SODIUM 100 MG: 100 CAPSULE, LIQUID FILLED ORAL at 08:33

## 2023-08-11 RX ADMIN — EZETIMIBE 10 MG: 10 TABLET ORAL at 08:33

## 2023-08-11 RX ADMIN — CITALOPRAM HYDROBROMIDE 10 MG: 10 TABLET ORAL at 08:33

## 2023-08-11 RX ADMIN — Medication 1000 UNITS: at 08:33

## 2023-08-11 RX ADMIN — POLYETHYLENE GLYCOL 3350 17 G: 17 POWDER, FOR SOLUTION ORAL at 08:47

## 2023-08-11 RX ADMIN — HYDROCHLOROTHIAZIDE 25 MG: 25 TABLET ORAL at 08:33

## 2023-08-11 RX ADMIN — ASPIRIN 81 MG 81 MG: 81 TABLET ORAL at 08:33

## 2023-08-11 RX ADMIN — METOPROLOL SUCCINATE 12.5 MG: 25 TABLET, EXTENDED RELEASE ORAL at 08:33

## 2023-08-11 RX ADMIN — Medication 1 TABLET: at 08:33

## 2023-08-11 NOTE — PROGRESS NOTES
Pt discharged with following belongings:    Amy Newell & white blouse  Blue bra  Blue socks    (pt missing bronson shorts, underwear x2. Pt given beige pants in exchange.)    Locked bedside:    Hair clip  Black hair band  Hair gel  Hair comb  White sneakers  Blue pants    Nurses station contraband:    [employer] w/ wallet case  White  cord    Pt did not come in with any wallet, cash, or jewelry. Pt signed & agreed to belongings.

## 2023-08-11 NOTE — NURSING NOTE
Patient has been observed walking in the milieu. She is uninterested in conversation. Minimal in response. She does take her medications without difficulty but needs some encouragement. She is forgetful. Pt states she feels happy to see her daughter today but rather dismissive about her discharge- almost as if she does not understand. She denies pain. Her appetite has sadaf fair. She offers no current complaints/ concerns. Plan of care continues. Q7 minute safety checks in progress.

## 2023-08-11 NOTE — PROGRESS NOTES
08/11/23 0986   Team Meeting   Meeting Type Daily Rounds   Team Members Present   Team Members Present Physician;Nurse;;Occupational Therapist   Physician Team Member Dr. Maria Del Rosario Carrasquillo DO; Peggy Stroud, [address] [place] [address]   Nursing Team Member Amanda Manzo RN   Care Management Team Member MS Ramiro, [facility], [facility]; [facility], [facility]   OT Team Member Sterling Chandler [place]   Patient/Family Present   Patient Present No   Patient's Family Present No   Slept, calm, cooperative, nurse will review d/c summary with pt daughter, d/c today to family care, will follow up with pcp, [facility], and referral made to  neurology.

## 2023-08-11 NOTE — PROGRESS NOTES
08/11/23 0783   Activity/Group Checklist   Group Community meeting   Attendance Attended   Attendance Duration (min) 46-60   Interactions Disorganized interaction   Affect/Mood Blunted/flat   Goals Achieved Able to listen to others; Able to recieve feedback

## 2023-08-11 NOTE — NURSING NOTE
Patient out for meals and pacing the halls throughout the day. Patient is calm, cooperative, minimal in conversation, and selectively non verbal with staff. Patient took her PM medications with encouragement and refused her HS medication. Unable to assess anxiety, depression, and pain scale due to being non verbal. Currently patient in her room without issue or signs of distress. No other complaints or concerns voiced at this time. Q7min checks are in progress. Care is ongoing.

## 2023-08-11 NOTE — PROGRESS NOTES
Progress Note - Charlene Mason 70 y.o. female MRN: [id number]    Unit/Bed#: Daisy Burton 202-02 Encounter: [id number]        Subjective:   Patient seen and examined at bedside after reviewing the chart and discussing the case with the caring staff. Patient examined at bedside. Patient has no reported acute complaints. Patient is being discharged today, Friday 8/11/2023. Physical Exam   Vitals: Blood pressure 137/76, pulse (!) 112, temperature (!) 97.3 °F (36.3 °C), temperature source Temporal, resp. rate 18, height 5' (1.524 m), weight 51.9 kg (114 lb 8 oz), SpO2 98 %, not currently breastfeeding. ,Body mass index is 22.36 kg/m². Constitutional: Patient in no acute distress. HEENT: PERR, EOMI, MMM. Cardiovascular: Normal rate and regular rhythm. Pulmonary/Chest: Effort normal and breath sounds normal.  Abdomen: Soft, + BS, NT, no rebound, no guarding. Assessment/Plan:  Charlene Mason is a(n) 70y.o. year old female with MDD. Medical clearance. Patient is medically cleared for discharge. All scripts were sent out for the patient.     1.  Cardiac with hx of HTN, HLD, CAD s/p CABG, LVH. Continue Toprol-XL 12.5 mg daily, lisinopril 20 mg daily, hydrochlorothiazide 25 mg daily, atorvastatin 40 mg daily, Zetia 10 mg daily and aspirin 81 mg daily. 2. Prediabetes. Diet controlled. Hgb a1c 6.0% on 8/3/23. 3. Hypothyroidism. Patient is on levothyroxine 175 mcg daily. 4. DJD/osteoarthritis. Patient may get Tylenol as needed. 5. Vitamin D deficiency. Patient started on vitamin D bolus doses for 6 weeks followed by vitamin D3 1000 units daily. 6. Vitamin B12 deficiency. Patient started on monthly B12 injections. 7. Constipation. Continue Colace 100 mg twice daily, MiraLAX daily. The patient was discussed with Dr. Shanna Drew and he is in agreement with the above note.

## 2023-08-11 NOTE — NURSING NOTE
Pt ambulated from unit accompanied by staff. AVS reviewed w/ pt's daughter Serge Calixto- she verbalizes understanding. All belongings sent w/ pt.

## 2023-08-17 ENCOUNTER — HOSPITAL ENCOUNTER (INPATIENT)
Facility: HOSPITAL | Age: 63
LOS: 5 days | Discharge: HOME HEALTH | DRG: 682 | End: 2023-08-23
Attending: EMERGENCY MEDICINE | Admitting: INTERNAL MEDICINE
Payer: MEDICARE

## 2023-08-17 ENCOUNTER — APPOINTMENT (EMERGENCY)
Dept: CT IMAGING | Facility: HOSPITAL | Age: 63
DRG: 682 | End: 2023-08-17
Payer: MEDICARE

## 2023-08-17 DIAGNOSIS — F01.50 VASCULAR DEMENTIA (HCC): ICD-10-CM

## 2023-08-17 DIAGNOSIS — F33.2 SEVERE EPISODE OF RECURRENT MAJOR DEPRESSIVE DISORDER, WITHOUT PSYCHOTIC FEATURES (HCC): ICD-10-CM

## 2023-08-17 DIAGNOSIS — N17.9 AKI (ACUTE KIDNEY INJURY) (HCC): Primary | ICD-10-CM

## 2023-08-17 DIAGNOSIS — G93.41 ACUTE METABOLIC ENCEPHALOPATHY: ICD-10-CM

## 2023-08-17 DIAGNOSIS — R62.7 FAILURE TO THRIVE IN ADULT: ICD-10-CM

## 2023-08-17 DIAGNOSIS — E43 SEVERE PROTEIN-CALORIE MALNUTRITION (HCC): ICD-10-CM

## 2023-08-17 LAB
ALBUMIN SERPL BCP-MCNC: 4.1 G/DL (ref 3.5–5)
ALP SERPL-CCNC: 56 U/L (ref 34–104)
ALT SERPL W P-5'-P-CCNC: 27 U/L (ref 7–52)
ANION GAP SERPL CALCULATED.3IONS-SCNC: 13 MMOL/L
APTT PPP: 25 SECONDS (ref 23–37)
AST SERPL W P-5'-P-CCNC: 18 U/L (ref 13–39)
ATRIAL RATE: 59 BPM
ATRIAL RATE: 63 BPM
BASOPHILS # BLD AUTO: 0.01 THOUSANDS/ÂΜL (ref 0–0.1)
BASOPHILS NFR BLD AUTO: 0 % (ref 0–1)
BILIRUB SERPL-MCNC: 0.96 MG/DL (ref 0.2–1)
BUN SERPL-MCNC: 79 MG/DL (ref 5–25)
CALCIUM SERPL-MCNC: 9.9 MG/DL (ref 8.4–10.2)
CHLORIDE SERPL-SCNC: 102 MMOL/L (ref 96–108)
CO2 SERPL-SCNC: 22 MMOL/L (ref 21–32)
CREAT SERPL-MCNC: 1.42 MG/DL (ref 0.6–1.3)
EOSINOPHIL # BLD AUTO: 0.03 THOUSAND/ÂΜL (ref 0–0.61)
EOSINOPHIL NFR BLD AUTO: 1 % (ref 0–6)
ERYTHROCYTE [DISTWIDTH] IN BLOOD BY AUTOMATED COUNT: 11.9 % (ref 11.6–15.1)
GFR SERPL CREATININE-BSD FRML MDRD: 38 ML/MIN/1.73SQ M
GLUCOSE SERPL-MCNC: 139 MG/DL (ref 65–140)
HCT VFR BLD AUTO: 38.6 % (ref 34.8–46.1)
HGB BLD-MCNC: 12.7 G/DL (ref 11.5–15.4)
IMM GRANULOCYTES # BLD AUTO: 0.02 THOUSAND/UL (ref 0–0.2)
IMM GRANULOCYTES NFR BLD AUTO: 0 % (ref 0–2)
INR PPP: 1.1 (ref 0.84–1.19)
LACTATE SERPL-SCNC: 1.4 MMOL/L (ref 0.5–2)
LIPASE SERPL-CCNC: 61 U/L (ref 11–82)
LYMPHOCYTES # BLD AUTO: 0.94 THOUSANDS/ÂΜL (ref 0.6–4.47)
LYMPHOCYTES NFR BLD AUTO: 17 % (ref 14–44)
MCH RBC QN AUTO: 31.4 PG (ref 26.8–34.3)
MCHC RBC AUTO-ENTMCNC: 32.9 G/DL (ref 31.4–37.4)
MCV RBC AUTO: 96 FL (ref 82–98)
MONOCYTES # BLD AUTO: 0.49 THOUSAND/ÂΜL (ref 0.17–1.22)
MONOCYTES NFR BLD AUTO: 9 % (ref 4–12)
NEUTROPHILS # BLD AUTO: 4.02 THOUSANDS/ÂΜL (ref 1.85–7.62)
NEUTS SEG NFR BLD AUTO: 73 % (ref 43–75)
NRBC BLD AUTO-RTO: 0 /100 WBCS
P AXIS: 34 DEGREES
P AXIS: 36 DEGREES
PLATELET # BLD AUTO: 297 THOUSANDS/UL (ref 149–390)
PMV BLD AUTO: 10.3 FL (ref 8.9–12.7)
POTASSIUM SERPL-SCNC: 4.1 MMOL/L (ref 3.5–5.3)
PR INTERVAL: 106 MS
PR INTERVAL: 108 MS
PROCALCITONIN SERPL-MCNC: 0.1 NG/ML
PROT SERPL-MCNC: 6.8 G/DL (ref 6.4–8.4)
PROTHROMBIN TIME: 14.2 SECONDS (ref 11.6–14.5)
QRS AXIS: 53 DEGREES
QRS AXIS: 54 DEGREES
QRSD INTERVAL: 86 MS
QRSD INTERVAL: 92 MS
QT INTERVAL: 436 MS
QT INTERVAL: 444 MS
QTC INTERVAL: 439 MS
QTC INTERVAL: 446 MS
RBC # BLD AUTO: 4.04 MILLION/UL (ref 3.81–5.12)
SODIUM SERPL-SCNC: 137 MMOL/L (ref 135–147)
T WAVE AXIS: 51 DEGREES
T WAVE AXIS: 51 DEGREES
VENTRICULAR RATE: 59 BPM
VENTRICULAR RATE: 63 BPM
WBC # BLD AUTO: 5.51 THOUSAND/UL (ref 4.31–10.16)

## 2023-08-17 PROCEDURE — 83605 ASSAY OF LACTIC ACID: CPT

## 2023-08-17 PROCEDURE — 96374 THER/PROPH/DIAG INJ IV PUSH: CPT

## 2023-08-17 PROCEDURE — 85730 THROMBOPLASTIN TIME PARTIAL: CPT

## 2023-08-17 PROCEDURE — 99223 1ST HOSP IP/OBS HIGH 75: CPT

## 2023-08-17 PROCEDURE — 80053 COMPREHEN METABOLIC PANEL: CPT

## 2023-08-17 PROCEDURE — 74177 CT ABD & PELVIS W/CONTRAST: CPT

## 2023-08-17 PROCEDURE — 84145 PROCALCITONIN (PCT): CPT

## 2023-08-17 PROCEDURE — G1004 CDSM NDSC: HCPCS

## 2023-08-17 PROCEDURE — 36415 COLL VENOUS BLD VENIPUNCTURE: CPT

## 2023-08-17 PROCEDURE — 85025 COMPLETE CBC W/AUTO DIFF WBC: CPT

## 2023-08-17 PROCEDURE — 72125 CT NECK SPINE W/O DYE: CPT

## 2023-08-17 PROCEDURE — 87040 BLOOD CULTURE FOR BACTERIA: CPT

## 2023-08-17 PROCEDURE — 99285 EMERGENCY DEPT VISIT HI MDM: CPT | Performed by: EMERGENCY MEDICINE

## 2023-08-17 PROCEDURE — 96361 HYDRATE IV INFUSION ADD-ON: CPT

## 2023-08-17 PROCEDURE — 70450 CT HEAD/BRAIN W/O DYE: CPT

## 2023-08-17 PROCEDURE — 99285 EMERGENCY DEPT VISIT HI MDM: CPT

## 2023-08-17 PROCEDURE — 85610 PROTHROMBIN TIME: CPT

## 2023-08-17 PROCEDURE — 93005 ELECTROCARDIOGRAM TRACING: CPT

## 2023-08-17 PROCEDURE — 93010 ELECTROCARDIOGRAM REPORT: CPT

## 2023-08-17 PROCEDURE — 83690 ASSAY OF LIPASE: CPT | Performed by: EMERGENCY MEDICINE

## 2023-08-17 RX ORDER — CITALOPRAM 20 MG/1
10 TABLET ORAL DAILY
Status: DISCONTINUED | OUTPATIENT
Start: 2023-08-18 | End: 2023-08-23 | Stop reason: HOSPADM

## 2023-08-17 RX ORDER — METOPROLOL SUCCINATE 25 MG/1
12.5 TABLET, EXTENDED RELEASE ORAL DAILY
Status: DISCONTINUED | OUTPATIENT
Start: 2023-08-18 | End: 2023-08-18

## 2023-08-17 RX ORDER — MAGNESIUM HYDROXIDE/ALUMINUM HYDROXICE/SIMETHICONE 120; 1200; 1200 MG/30ML; MG/30ML; MG/30ML
30 SUSPENSION ORAL EVERY 6 HOURS PRN
Status: DISCONTINUED | OUTPATIENT
Start: 2023-08-17 | End: 2023-08-23 | Stop reason: HOSPADM

## 2023-08-17 RX ORDER — QUETIAPINE FUMARATE 25 MG/1
25 TABLET, FILM COATED ORAL
Status: DISCONTINUED | OUTPATIENT
Start: 2023-08-18 | End: 2023-08-23 | Stop reason: HOSPADM

## 2023-08-17 RX ORDER — POLYETHYLENE GLYCOL 3350 17 G/17G
17 POWDER, FOR SOLUTION ORAL DAILY
Status: DISCONTINUED | OUTPATIENT
Start: 2023-08-18 | End: 2023-08-23 | Stop reason: HOSPADM

## 2023-08-17 RX ORDER — LISINOPRIL 20 MG/1
20 TABLET ORAL DAILY
Status: DISCONTINUED | OUTPATIENT
Start: 2023-08-18 | End: 2023-08-17

## 2023-08-17 RX ORDER — SODIUM CHLORIDE, SODIUM GLUCONATE, SODIUM ACETATE, POTASSIUM CHLORIDE, MAGNESIUM CHLORIDE, SODIUM PHOSPHATE, DIBASIC, AND POTASSIUM PHOSPHATE .53; .5; .37; .037; .03; .012; .00082 G/100ML; G/100ML; G/100ML; G/100ML; G/100ML; G/100ML; G/100ML
100 INJECTION, SOLUTION INTRAVENOUS CONTINUOUS
Status: DISCONTINUED | OUTPATIENT
Start: 2023-08-18 | End: 2023-08-22

## 2023-08-17 RX ORDER — ASPIRIN 81 MG/1
81 TABLET, CHEWABLE ORAL DAILY
Status: DISCONTINUED | OUTPATIENT
Start: 2023-08-18 | End: 2023-08-23 | Stop reason: HOSPADM

## 2023-08-17 RX ORDER — HYDROMORPHONE HCL/PF 1 MG/ML
0.5 SYRINGE (ML) INJECTION ONCE
Status: COMPLETED | OUTPATIENT
Start: 2023-08-17 | End: 2023-08-17

## 2023-08-17 RX ORDER — HYDRALAZINE HYDROCHLORIDE 20 MG/ML
5 INJECTION INTRAMUSCULAR; INTRAVENOUS EVERY 6 HOURS PRN
Status: DISCONTINUED | OUTPATIENT
Start: 2023-08-17 | End: 2023-08-23 | Stop reason: HOSPADM

## 2023-08-17 RX ORDER — LANOLIN ALCOHOL/MO/W.PET/CERES
6 CREAM (GRAM) TOPICAL
Status: DISCONTINUED | OUTPATIENT
Start: 2023-08-17 | End: 2023-08-23 | Stop reason: HOSPADM

## 2023-08-17 RX ORDER — DOCUSATE SODIUM 100 MG/1
100 CAPSULE, LIQUID FILLED ORAL 2 TIMES DAILY
Status: DISCONTINUED | OUTPATIENT
Start: 2023-08-18 | End: 2023-08-17

## 2023-08-17 RX ORDER — DOCUSATE SODIUM 100 MG/1
100 CAPSULE, LIQUID FILLED ORAL 2 TIMES DAILY
Status: DISCONTINUED | OUTPATIENT
Start: 2023-08-18 | End: 2023-08-23 | Stop reason: HOSPADM

## 2023-08-17 RX ORDER — EZETIMIBE 10 MG/1
10 TABLET ORAL DAILY
Status: DISCONTINUED | OUTPATIENT
Start: 2023-08-18 | End: 2023-08-23 | Stop reason: HOSPADM

## 2023-08-17 RX ORDER — ATORVASTATIN CALCIUM 40 MG/1
40 TABLET, FILM COATED ORAL
Status: DISCONTINUED | OUTPATIENT
Start: 2023-08-18 | End: 2023-08-23 | Stop reason: HOSPADM

## 2023-08-17 RX ORDER — ACETAMINOPHEN 325 MG/1
650 TABLET ORAL EVERY 6 HOURS PRN
Status: DISCONTINUED | OUTPATIENT
Start: 2023-08-17 | End: 2023-08-18

## 2023-08-17 RX ADMIN — IOHEXOL 100 ML: 350 INJECTION, SOLUTION INTRAVENOUS at 21:07

## 2023-08-17 RX ADMIN — HYDROMORPHONE HYDROCHLORIDE 0.5 MG: 1 INJECTION, SOLUTION INTRAMUSCULAR; INTRAVENOUS; SUBCUTANEOUS at 21:41

## 2023-08-17 RX ADMIN — SODIUM CHLORIDE 1000 ML: 0.9 INJECTION, SOLUTION INTRAVENOUS at 19:50

## 2023-08-17 NOTE — ED PROVIDER NOTES
History  Chief Complaint   Patient presents with   • Fall     Pt pt fell at home +head strike and pt reported dizziness before fall. Hx of dementia     76-year-old female patient with a history of CAD, HTN, HLD, vascular dementia presenting with episode of fall onset today. Per daughter, patient was walking when she fell backwards and hit her head on the ground. No LOC. Patient has no vomiting or diarrhea. Patient also recently came back from inpatient psych and states since then, patient has been having increased weakness, decreased appetite and lightheadedness. Patient started complaining of abdominal pain couple days ago. Patient is nonverbal so history is limited. Prior to Admission Medications   Prescriptions Last Dose Informant Patient Reported? Taking? Ascorbic Acid (vitamin C) 1000 MG tablet  Self Yes No   Sig: Take 1,000 mg by mouth daily   Blood Pressure KIT  Self No No   Sig: Use in the morning   QUEtiapine (SEROquel) 25 mg tablet   No No   Sig: Take 1 tablet (25 mg total) by mouth daily at bedtime   aspirin 81 mg chewable tablet   No No   Sig: Chew 1 tablet (81 mg total) daily   atorvastatin (LIPITOR) 40 mg tablet   No No   Sig: Take 1 tablet (40 mg total) by mouth daily with dinner   cholecalciferol (VITAMIN D3) 1,000 units tablet   No No   Sig: Take 1 tablet (1,000 Units total) by mouth daily Do not start before August 12, 2023. citalopram (CeleXA) 10 mg tablet   No No   Sig: Take 1 tablet (10 mg total) by mouth daily   cyanocobalamin 1,000 mcg/mL   No No   Sig: Inject 1 mL (1,000 mcg total) into a muscle every 30 (thirty) days Do not start before September 2, 2023. docusate sodium (COLACE) 100 mg capsule   No No   Sig: Take 1 capsule (100 mg total) by mouth 2 (two) times a day   ergocalciferol (VITAMIN D2) 50,000 units   No No   Sig: Take 1 capsule (50,000 Units total) by mouth once a week for 5 doses Do not start before August 17, 2023.    ezetimibe (ZETIA) 10 mg tablet   No No Sig: Take 1 tablet (10 mg total) by mouth daily   hydrochlorothiazide (HYDRODIURIL) 25 mg tablet   No No   Sig: Take 1 tablet (25 mg total) by mouth daily Do not start before August 12, 2023. levothyroxine (Euthyrox) 175 mcg tablet   No No   Sig: Take 1 tablet (175 mcg total) by mouth daily in the early morning   lisinopril (ZESTRIL) 20 mg tablet   No No   Sig: Take 1 tablet (20 mg total) by mouth daily Do not start before August 12, 2023. metoprolol succinate (TOPROL-XL) 25 mg 24 hr tablet   No No   Sig: Take 0.5 tablets (12.5 mg total) by mouth daily   multivitamin-iron-minerals-folic acid (CENTRUM) chewable tablet  Self Yes No   Sig: Chew 1 tablet daily   polyethylene glycol (MIRALAX) 17 g packet   No No   Sig: Take 17 g by mouth daily Do not start before August 12, 2023. Facility-Administered Medications: None       Past Medical History:   Diagnosis Date   • Acute MI ([address]) 08/12/2012   • MARIANELA (acute kidney injury) ([address]) 8/17/2023   • Anemia    • Anxiety 07/21/2023   • CAD (coronary artery disease)    • Hyperlipidemia    • Hypertension    • Myocardial infarction ([address]) 03/26/2002   • Thyroid disease    • Ventricular fibrillation ([address]) 08/12/2012       Past Surgical History:   Procedure Laterality Date   • COLONOSCOPY  2013   • CORONARY ARTERY BYPASS GRAFT  2003       Family History   Problem Relation Age of Onset   • Hypertension Mother    • Stroke Mother         3 strokes   • Heart attack Father    • No Known Problems Sister    • No Known Problems Daughter    • No Known Problems Maternal Grandmother    • No Known Problems Maternal Grandfather    • No Known Problems Paternal Grandmother    • No Known Problems Paternal Grandfather    • No Known Problems Maternal Aunt    • No Known Problems Maternal Aunt    • No Known Problems Maternal Aunt    • Breast cancer Paternal Aunt 53   • No Known Problems Paternal Aunt      I have reviewed and agree with the history as documented.     E-Cigarette/Vaping   • E-Cigarette Use Never User      E-Cigarette/Vaping Substances   • Nicotine No    • THC No    • CBD No    • Flavoring No    • Other No    • Unknown No      Social History     Tobacco Use   • Smoking status: Former     Packs/day: 0.25     Years: 22.00     Total pack years: 5.50     Types: Cigarettes     Start date: 1971     Quit date: 1993     Years since quittin.6     Passive exposure: Never   • Smokeless tobacco: Never   • Tobacco comments:     no passive smoke exposure   Vaping Use   • Vaping Use: Never used   Substance Use Topics   • Alcohol use: Not Currently   • Drug use: No        Review of Systems   Unable to perform ROS: Dementia   Constitutional: Positive for appetite change. Neurological: Positive for weakness. Physical Exam  ED Triage Vitals   Temperature Pulse Respirations Blood Pressure SpO2   23 19423 1855 23 1855 23 18523 185   98.5 °F (36.9 °C) 65 20 (!) 87/51 95 %      Temp Source Heart Rate Source Patient Position - Orthostatic VS BP Location FiO2 (%)   23 19423 2045 23 20423 2200 --   Oral Monitor Lying Right arm       Pain Score       --                    Orthostatic Vital Signs  Vitals:    23 1201 23 1205 23 1211 23 1523   BP: (!) 98/41 96/51 103/54 97/52   Pulse: 56 60 62 (!) 53   Patient Position - Orthostatic VS:           Physical Exam  Vitals reviewed. Constitutional:       Appearance: Normal appearance. Comments: Appears uncomfortable   HENT:      Head: Normocephalic and atraumatic. Nose: Nose normal.      Mouth/Throat:      Mouth: Mucous membranes are moist.      Pharynx: Oropharynx is clear. Eyes:      Extraocular Movements: Extraocular movements intact. Conjunctiva/sclera: Conjunctivae normal.      Pupils: Pupils are equal, round, and reactive to light. Cardiovascular:      Rate and Rhythm: Regular rhythm. Bradycardia present. Pulses: Normal pulses. Heart sounds: Normal heart sounds. Pulmonary:      Effort: Pulmonary effort is normal.      Breath sounds: Normal breath sounds. Abdominal:      General: Bowel sounds are normal.      Palpations: Abdomen is soft. Tenderness: There is abdominal tenderness (RUQ abd tenderness). Musculoskeletal:         General: Normal range of motion. Cervical back: Normal range of motion. Skin:     General: Skin is warm and dry. Neurological:      General: No focal deficit present. Mental Status: She is alert. Mental status is at baseline. Comments: Nonverbal at baseline. Not following commands.           ED Medications  Medications   aspirin chewable tablet 81 mg (81 mg Oral Given 8/18/23 0906)   atorvastatin (LIPITOR) tablet 40 mg (has no administration in time range)   citalopram (CeleXA) tablet 10 mg (10 mg Oral Given 8/18/23 0907)   ezetimibe (ZETIA) tablet 10 mg (10 mg Oral Given 8/18/23 0906)   levothyroxine tablet 175 mcg (175 mcg Oral Given 8/18/23 0544)   QUEtiapine (SEROquel) tablet 25 mg (25 mg Oral Refused 8/18/23 0015)   polyethylene glycol (MIRALAX) packet 17 g (17 g Oral Given 8/18/23 0906)   multi-electrolyte (PLASMALYTE-A/ISOLYTE-S PH 7.4) IV solution (100 mL/hr Intravenous New Bag 8/18/23 1110)   docusate sodium (COLACE) capsule 100 mg (100 mg Oral Given 8/18/23 0907)   aluminum-magnesium hydroxide-simethicone (MAALOX) oral suspension 30 mL (has no administration in time range)   melatonin tablet 6 mg (has no administration in time range)   hydrALAZINE (APRESOLINE) injection 5 mg (has no administration in time range)   cefTRIAXone (ROCEPHIN) IVPB (premix in dextrose) 1,000 mg 50 mL (1,000 mg Intravenous New Bag 8/18/23 0908)   acetaminophen (TYLENOL) tablet 975 mg (975 mg Oral Given 8/18/23 1109)   phenol (CHLORASEPTIC) 1.4 % mucosal liquid 1 spray (has no administration in time range)   cyanocobalamin injection 1,000 mcg (1,000 mcg Intramuscular Given 8/18/23 1109)   heparin (porcine) subcutaneous injection 5,000 Units (5,000 Units Subcutaneous Given 8/18/23 1424)   pantoprazole (PROTONIX) EC tablet 40 mg (40 mg Oral Given 8/18/23 1110)   sodium chloride 0.9 % bolus 1,000 mL (0 mL Intravenous Stopped 8/17/23 2132)   iohexol (OMNIPAQUE) 350 MG/ML injection (MULTI-DOSE) 100 mL (100 mL Intravenous Given 8/17/23 2107)   HYDROmorphone (DILAUDID) injection 0.5 mg (0.5 mg Intravenous Given 8/17/23 2141)       Diagnostic Studies  Results Reviewed     Procedure Component Value Units Date/Time    Magnesium [[id number]]  (Normal) Collected: 08/18/23 0516    Lab Status: Final result Specimen: Blood from Arm, Right Updated: 08/18/23 0630     Magnesium 2.4 mg/dL     Basic metabolic panel [[id number]]  (Abnormal) Collected: 08/18/23 0516    Lab Status: Final result Specimen: Blood from Arm, Right Updated: 08/18/23 0630     Sodium 137 mmol/L      Potassium 4.0 mmol/L      Chloride 106 mmol/L      CO2 22 mmol/L      ANION GAP 9 mmol/L      BUN 57 mg/dL      Creatinine 0.77 mg/dL      Glucose 83 mg/dL      Calcium 9.0 mg/dL      eGFR 81 ml/min/1.73sq m     Narrative:      [place] guidelines for Chronic Kidney Disease (CKD):   •  Stage 1 with normal or high GFR (GFR > 90 mL/min/1.73 square meters)  •  Stage 2 Mild CKD (GFR = 60-89 mL/min/1.73 square meters)  •  Stage 3A Moderate CKD (GFR = 45-59 mL/min/1.73 square meters)  •  Stage 3B Moderate CKD (GFR = 30-44 mL/min/1.73 square meters)  •  Stage 4 Severe CKD (GFR = 15-29 mL/min/1.73 square meters)  •  Stage 5 End Stage CKD (GFR <15 mL/min/1.73 square meters)  Note: GFR calculation is accurate only with a steady state creatinine    CBC and differential [[id number]]  (Abnormal) Collected: 08/18/23 0516    Lab Status: Final result Specimen: Blood from Arm, Right Updated: 08/18/23 0553     WBC 6.65 Thousand/uL      RBC 3.54 Million/uL      Hemoglobin 11.2 g/dL      Hematocrit 33.0 %      MCV 93 fL      MCH 31.6 pg      MCHC 33.9 g/dL      RDW 11.8 %      MPV 10.1 fL      Platelets 476 Thousands/uL      nRBC 0 /100 WBCs      Neutrophils Relative 71 %      Immat GRANS % 1 %      Lymphocytes Relative 17 %      Monocytes Relative 9 %      Eosinophils Relative 1 %      Basophils Relative 1 %      Neutrophils Absolute 4.75 Thousands/µL      Immature Grans Absolute 0.04 Thousand/uL      Lymphocytes Absolute 1.14 Thousands/µL      Monocytes Absolute 0.61 Thousand/µL      Eosinophils Absolute 0.08 Thousand/µL      Basophils Absolute 0.03 Thousands/µL     Blood culture #1 [[id number]] Collected: 08/17/23 2014    Lab Status: Preliminary result Specimen: Blood from Arm, Right Updated: 08/18/23 0102     Blood Culture Received in Microbiology Lab. Culture in Progress. Blood culture #2 [[id number]] Collected: 08/17/23 1948    Lab Status: Preliminary result Specimen: Blood from Line, Arterial Updated: 08/18/23 0102     Blood Culture Received in Microbiology Lab. Culture in Progress. Lipase [[id number]]  (Normal) Collected: 08/17/23 2028    Lab Status: Final result Specimen: Blood from Line Updated: 08/17/23 2111     Lipase 61 u/L     Lactic acid [[id number]]  (Normal) Collected: 08/17/23 1941    Lab Status: Final result Specimen: Blood from Arm, Left Updated: 08/17/23 2111     LACTIC ACID 1.4 mmol/L     Narrative:      Result may be elevated if tourniquet was used during collection.     Procalcitonin [[id number]]  (Normal) Collected: 08/17/23 1941    Lab Status: Final result Specimen: Blood from Arm, Left Updated: 08/17/23 2027     Procalcitonin 0.10 ng/ml     Comprehensive metabolic panel [[id number]]  (Abnormal) Collected: 08/17/23 1941    Lab Status: Final result Specimen: Blood from Arm, Left Updated: 08/17/23 2018     Sodium 137 mmol/L      Potassium 4.1 mmol/L      Chloride 102 mmol/L      CO2 22 mmol/L      ANION GAP 13 mmol/L      BUN 79 mg/dL      Creatinine 1.42 mg/dL      Glucose 139 mg/dL      Calcium 9.9 mg/dL      AST 18 U/L      ALT 27 U/L      Alkaline Phosphatase 56 U/L      Total Protein 6.8 g/dL      Albumin 4.1 g/dL      Total Bilirubin 0.96 mg/dL      eGFR 38 ml/min/1.73sq m     Narrative:      [employer] guidelines for Chronic Kidney Disease (CKD):   •  Stage 1 with normal or high GFR (GFR > 90 mL/min/1.73 square meters)  •  Stage 2 Mild CKD (GFR = 60-89 mL/min/1.73 square meters)  •  Stage 3A Moderate CKD (GFR = 45-59 mL/min/1.73 square meters)  •  Stage 3B Moderate CKD (GFR = 30-44 mL/min/1.73 square meters)  •  Stage 4 Severe CKD (GFR = 15-29 mL/min/1.73 square meters)  •  Stage 5 End Stage CKD (GFR <15 mL/min/1.73 square meters)  Note: GFR calculation is accurate only with a steady state creatinine    Protime-INR [[id number]]  (Normal) Collected: 08/17/23 1941    Lab Status: Final result Specimen: Blood from Arm, Left Updated: 08/17/23 2016     Protime 14.2 seconds      INR 1.10    APTT [[id number]]  (Normal) Collected: 08/17/23 1941    Lab Status: Final result Specimen: Blood from Arm, Left Updated: 08/17/23 2016     PTT 25 seconds     CBC and differential [[id number]] Collected: 08/17/23 1941    Lab Status: Final result Specimen: Blood from Arm, Left Updated: 08/17/23 1959     WBC 5.51 Thousand/uL      RBC 4.04 Million/uL      Hemoglobin 12.7 g/dL      Hematocrit 38.6 %      MCV 96 fL      MCH 31.4 pg      MCHC 32.9 g/dL      RDW 11.9 %      MPV 10.3 fL      Platelets 001 Thousands/uL      nRBC 0 /100 WBCs      Neutrophils Relative 73 %      Immat GRANS % 0 %      Lymphocytes Relative 17 %      Monocytes Relative 9 %      Eosinophils Relative 1 %      Basophils Relative 0 %      Neutrophils Absolute 4.02 Thousands/µL      Immature Grans Absolute 0.02 Thousand/uL      Lymphocytes Absolute 0.94 Thousands/µL      Monocytes Absolute 0.49 Thousand/µL      Eosinophils Absolute 0.03 Thousand/µL      Basophils Absolute 0.01 Thousands/µL     UA w Reflex to Microscopic w Reflex to Culture [[id number]]     Lab Status: No result Specimen: Urine CT abdomen pelvis with contrast   Final Result by Walter Dawson DO (08/17 2210)      Questionable irregular enhancement of the kidneys which may represent infection. Recommend short-term follow-up with urology or nephrology. Cholelithiasis. Workstation performed: [id number]         CT head wo contrast   Final Result by Jose Slaughter MD (08/17 2150)      No acute intracranial abnormality. Chronic microangiopathic changes. Old left cerebellar infarct. Workstation performed: [id number]         CT spine cervical without contrast   Final Result by Walter Dawson DO (08/17 2225)      No cervical spine fracture or traumatic malalignment. Workstation performed: [id number]               Procedures  Procedures      ED Course  ED Course as of 08/18/23 1611   Thu Aug 17, 2023   2125 EKG: normal EKG, normal sinus rhythm, unchanged from previous tracings                                         Medical Decision Making  64-year-old female patient presenting with fall. Patient fell backwards when she got up. Patient recently admitted to inpatient psych after medicine admission and has been lightheaded and weakness since. Patient also decreased oral intake. Exam shows patient nonverbal, not answering commands. Also has RUQ abd tenderness. DDx includes worsening vascular dementia, multimedication, intracranial process, infection. Labs show MARIANELA c/w failure to thrive and patient not eating and rinking appropriately. CT shows questionable irregular enhancement of kindeys, possible infection. Pending UA and CT c spine. Signed out to Dr. Boom Babin. MARIANELA (acute kidney injury) [facility]):     Details: elevated creatinine. Failure to thrive in adult:     Details: decreased oral intake unable to take care of self. Amount and/or Complexity of Data Reviewed  Labs: ordered. Radiology: ordered.   Discussion of management or test interpretation with external provider(s): F/u PCP    Risk  Prescription drug management. Decision regarding hospitalization. Disposition  Final diagnoses:   MARIANELA (acute kidney injury) ([address])   Failure to thrive in adult     Time reflects when diagnosis was documented in both MDM as applicable and the Disposition within this note     Time User Action Codes Description Comment    8/17/2023  9:17 PM Ana M Martin Add [N17.9] MARIANELA (acute kidney injury) ([address])     8/17/2023 10:37 PM Rushie  Add [R62.7] Failure to thrive in adult     8/17/2023 11:45 PM Tima Mark Add [E43] Severe protein-calorie malnutrition ([address])     8/17/2023 11:46 PM Tima Mark Add [F01.50] Vascular dementia ([address])     8/18/2023 12:22 AM Tima Mark Add [F33.2] Severe episode of recurrent major depressive disorder, without psychotic features [facility])       ED Disposition     ED Disposition   Admit    Condition   Stable    Date/Time   Thu Aug 17, 2023 10:37 PM    Comment   Case was discussed with Tima Mark and the patient's admission status was agreed to be Admission Status: observation status to the service of Dr. Joseph Suarez . Follow-up Information    None         Current Discharge Medication List      CONTINUE these medications which have NOT CHANGED    Details   Ascorbic Acid (vitamin C) 1000 MG tablet Take 1,000 mg by mouth daily      aspirin 81 mg chewable tablet Chew 1 tablet (81 mg total) daily  Qty: 30 tablet, Refills: 0    Associated Diagnoses: H/O: CVA (cerebrovascular accident)      atorvastatin (LIPITOR) 40 mg tablet Take 1 tablet (40 mg total) by mouth daily with dinner  Qty: 30 tablet, Refills: 0    Associated Diagnoses: Mixed hyperlipidemia      Blood Pressure KIT Use in the morning  Qty: 1 kit, Refills: 0    Associated Diagnoses: Atherosclerosis of native coronary artery of native heart without angina pectoris      cholecalciferol (VITAMIN D3) 1,000 units tablet Take 1 tablet (1,000 Units total) by mouth daily Do not start before August 12, 2023.   Qty: 30 tablet, Refills: 0    Associated Diagnoses: Vitamin D deficiency      citalopram (CeleXA) 10 mg tablet Take 1 tablet (10 mg total) by mouth daily  Qty: 30 tablet, Refills: 1    Associated Diagnoses: Anxiety      cyanocobalamin 1,000 mcg/mL Inject 1 mL (1,000 mcg total) into a muscle every 30 (thirty) days Do not start before September 2, 2023. Qty: 1 mL, Refills: 0    Associated Diagnoses: Vitamin B12 deficiency      docusate sodium (COLACE) 100 mg capsule Take 1 capsule (100 mg total) by mouth 2 (two) times a day  Qty: 60 capsule, Refills: 0    Associated Diagnoses: Constipation      ergocalciferol (VITAMIN D2) 50,000 units Take 1 capsule (50,000 Units total) by mouth once a week for 5 doses Do not start before August 17, 2023. Qty: 5 capsule, Refills: 0    Associated Diagnoses: Vitamin D deficiency      ezetimibe (ZETIA) 10 mg tablet Take 1 tablet (10 mg total) by mouth daily  Qty: 30 tablet, Refills: 0    Associated Diagnoses: Mixed hyperlipidemia      hydrochlorothiazide (HYDRODIURIL) 25 mg tablet Take 1 tablet (25 mg total) by mouth daily Do not start before August 12, 2023. Qty: 30 tablet, Refills: 0    Associated Diagnoses: Primary hypertension      levothyroxine (Euthyrox) 175 mcg tablet Take 1 tablet (175 mcg total) by mouth daily in the early morning  Qty: 30 tablet, Refills: 0    Associated Diagnoses: Acquired hypothyroidism      lisinopril (ZESTRIL) 20 mg tablet Take 1 tablet (20 mg total) by mouth daily Do not start before August 12, 2023. Qty: 30 tablet, Refills: 0    Associated Diagnoses: Primary hypertension      metoprolol succinate (TOPROL-XL) 25 mg 24 hr tablet Take 0.5 tablets (12.5 mg total) by mouth daily  Qty: 15 tablet, Refills: 0    Associated Diagnoses: LVH (left ventricular hypertrophy)      multivitamin-iron-minerals-folic acid (CENTRUM) chewable tablet Chew 1 tablet daily      polyethylene glycol (MIRALAX) 17 g packet Take 17 g by mouth daily Do not start before August 12, 2023.   Qty: 30 each, Refills: 0    Associated Diagnoses: Constipation      QUEtiapine (SEROquel) 25 mg tablet Take 1 tablet (25 mg total) by mouth daily at bedtime  Qty: 30 tablet, Refills: 1    Associated Diagnoses: Mood disorder ([address])           No discharge procedures on file. PDMP Review       Value Time User    PDMP Reviewed  Yes 8/10/2023 12:16 PM Meek Manuel, [address] [facility]           ED Provider  Attending physically available and evaluated [facility]. I managed the patient along with the ED Attending.     Electronically Signed by         Stacey Carter MD  08/18/23 8609

## 2023-08-17 NOTE — ED ATTENDING ATTESTATION
8/17/2023  IMichelle DO, saw and evaluated the patient. I have discussed the patient with the resident/non-physician practitioner and agree with the resident's/non-physician practitioner's findings, Plan of Care, and MDM as documented in the resident's/non-physician practitioner's note, except where noted. All available labs and Radiology studies were reviewed. I was present for key portions of any procedure(s) performed by the resident/non-physician practitioner and I was immediately available to provide assistance. At this point I agree with the current assessment done in the Emergency Department. I have conducted an independent evaluation of this patient a history and physical is as follows:    69 yo F presenting for evaluation of generalized weakness and fall. History is obtained from family at bedside. Family state that pt was recently admitted to [facility] unit, discharged to home 6 days ago. While inpt, she had numerous med changes/additions for at discharge. She was treated for a UTI while admitted. Since being home, generally weak, not eating/drinking. Today family got her up to stand and she fell backwards, struck back of head on the floor, stared off/no LOC. On 81 mg ASA.    C/o abdominal pain    MDM: 69 yo F poor po intake/weakness/fall- CTH to r/o intracranial bleed, CT C spine to r/o fx, CT A/P to assess for intra-abdominal pathology, CBC to assess for leukocytosis/anemia, CMP to assess for elevated LFTs/MARIANELA/electrolyte abn, lipase to r/o pancreatitis        ED Course         Critical Care Time  Procedures

## 2023-08-18 PROBLEM — R00.1 BRADYCARDIA: Status: ACTIVE | Noted: 2023-08-18

## 2023-08-18 PROBLEM — R93.5 ABNORMAL CT OF THE ABDOMEN: Status: ACTIVE | Noted: 2023-08-18

## 2023-08-18 PROBLEM — N30.00 ACUTE CYSTITIS WITHOUT HEMATURIA: Status: ACTIVE | Noted: 2023-08-18

## 2023-08-18 LAB
ANION GAP SERPL CALCULATED.3IONS-SCNC: 9 MMOL/L
ATRIAL RATE: 56 BPM
BASOPHILS # BLD AUTO: 0.03 THOUSANDS/ÂΜL (ref 0–0.1)
BASOPHILS NFR BLD AUTO: 1 % (ref 0–1)
BUN SERPL-MCNC: 57 MG/DL (ref 5–25)
CALCIUM SERPL-MCNC: 9 MG/DL (ref 8.4–10.2)
CHLORIDE SERPL-SCNC: 106 MMOL/L (ref 96–108)
CO2 SERPL-SCNC: 22 MMOL/L (ref 21–32)
CREAT SERPL-MCNC: 0.77 MG/DL (ref 0.6–1.3)
EOSINOPHIL # BLD AUTO: 0.08 THOUSAND/ÂΜL (ref 0–0.61)
EOSINOPHIL NFR BLD AUTO: 1 % (ref 0–6)
ERYTHROCYTE [DISTWIDTH] IN BLOOD BY AUTOMATED COUNT: 11.8 % (ref 11.6–15.1)
GFR SERPL CREATININE-BSD FRML MDRD: 81 ML/MIN/1.73SQ M
GLUCOSE SERPL-MCNC: 83 MG/DL (ref 65–140)
HCT VFR BLD AUTO: 33 % (ref 34.8–46.1)
HGB BLD-MCNC: 11.2 G/DL (ref 11.5–15.4)
IMM GRANULOCYTES # BLD AUTO: 0.04 THOUSAND/UL (ref 0–0.2)
IMM GRANULOCYTES NFR BLD AUTO: 1 % (ref 0–2)
LYMPHOCYTES # BLD AUTO: 1.14 THOUSANDS/ÂΜL (ref 0.6–4.47)
LYMPHOCYTES NFR BLD AUTO: 17 % (ref 14–44)
MAGNESIUM SERPL-MCNC: 2.4 MG/DL (ref 1.9–2.7)
MCH RBC QN AUTO: 31.6 PG (ref 26.8–34.3)
MCHC RBC AUTO-ENTMCNC: 33.9 G/DL (ref 31.4–37.4)
MCV RBC AUTO: 93 FL (ref 82–98)
MONOCYTES # BLD AUTO: 0.61 THOUSAND/ÂΜL (ref 0.17–1.22)
MONOCYTES NFR BLD AUTO: 9 % (ref 4–12)
NEUTROPHILS # BLD AUTO: 4.75 THOUSANDS/ÂΜL (ref 1.85–7.62)
NEUTS SEG NFR BLD AUTO: 71 % (ref 43–75)
NRBC BLD AUTO-RTO: 0 /100 WBCS
P AXIS: -2 DEGREES
PLATELET # BLD AUTO: 252 THOUSANDS/UL (ref 149–390)
PMV BLD AUTO: 10.1 FL (ref 8.9–12.7)
POTASSIUM SERPL-SCNC: 4 MMOL/L (ref 3.5–5.3)
PR INTERVAL: 120 MS
QRS AXIS: 25 DEGREES
QRSD INTERVAL: 88 MS
QT INTERVAL: 472 MS
QTC INTERVAL: 455 MS
RBC # BLD AUTO: 3.54 MILLION/UL (ref 3.81–5.12)
SODIUM SERPL-SCNC: 137 MMOL/L (ref 135–147)
T WAVE AXIS: 15 DEGREES
TSH SERPL DL<=0.05 MIU/L-ACNC: 0.71 UIU/ML (ref 0.45–4.5)
VENTRICULAR RATE: 56 BPM
WBC # BLD AUTO: 6.65 THOUSAND/UL (ref 4.31–10.16)

## 2023-08-18 PROCEDURE — 93005 ELECTROCARDIOGRAM TRACING: CPT

## 2023-08-18 PROCEDURE — 84443 ASSAY THYROID STIM HORMONE: CPT

## 2023-08-18 PROCEDURE — 87086 URINE CULTURE/COLONY COUNT: CPT | Performed by: INTERNAL MEDICINE

## 2023-08-18 PROCEDURE — 80048 BASIC METABOLIC PNL TOTAL CA: CPT

## 2023-08-18 PROCEDURE — 83735 ASSAY OF MAGNESIUM: CPT

## 2023-08-18 PROCEDURE — 99233 SBSQ HOSP IP/OBS HIGH 50: CPT | Performed by: INTERNAL MEDICINE

## 2023-08-18 PROCEDURE — 85025 COMPLETE CBC W/AUTO DIFF WBC: CPT

## 2023-08-18 PROCEDURE — 97163 PT EVAL HIGH COMPLEX 45 MIN: CPT

## 2023-08-18 PROCEDURE — 92610 EVALUATE SWALLOWING FUNCTION: CPT

## 2023-08-18 PROCEDURE — 97167 OT EVAL HIGH COMPLEX 60 MIN: CPT

## 2023-08-18 PROCEDURE — 93010 ELECTROCARDIOGRAM REPORT: CPT

## 2023-08-18 PROCEDURE — G0426 INPT/ED TELECONSULT50: HCPCS | Performed by: PSYCHIATRY & NEUROLOGY

## 2023-08-18 PROCEDURE — 99223 1ST HOSP IP/OBS HIGH 75: CPT

## 2023-08-18 RX ORDER — PANTOPRAZOLE SODIUM 40 MG/1
40 TABLET, DELAYED RELEASE ORAL
Status: DISCONTINUED | OUTPATIENT
Start: 2023-08-18 | End: 2023-08-23 | Stop reason: HOSPADM

## 2023-08-18 RX ORDER — ACETAMINOPHEN 325 MG/1
975 TABLET ORAL EVERY 8 HOURS SCHEDULED
Status: DISCONTINUED | OUTPATIENT
Start: 2023-08-18 | End: 2023-08-23 | Stop reason: HOSPADM

## 2023-08-18 RX ORDER — CEFTRIAXONE 1 G/50ML
1000 INJECTION, SOLUTION INTRAVENOUS EVERY 24 HOURS
Status: DISCONTINUED | OUTPATIENT
Start: 2023-08-18 | End: 2023-08-23

## 2023-08-18 RX ORDER — CYANOCOBALAMIN 1000 UG/ML
1000 INJECTION, SOLUTION INTRAMUSCULAR; SUBCUTANEOUS DAILY
Status: DISCONTINUED | OUTPATIENT
Start: 2023-08-18 | End: 2023-08-23 | Stop reason: HOSPADM

## 2023-08-18 RX ORDER — HEPARIN SODIUM 5000 [USP'U]/ML
5000 INJECTION, SOLUTION INTRAVENOUS; SUBCUTANEOUS EVERY 8 HOURS SCHEDULED
Status: DISCONTINUED | OUTPATIENT
Start: 2023-08-18 | End: 2023-08-23 | Stop reason: HOSPADM

## 2023-08-18 RX ADMIN — SODIUM CHLORIDE, SODIUM GLUCONATE, SODIUM ACETATE, POTASSIUM CHLORIDE, MAGNESIUM CHLORIDE, SODIUM PHOSPHATE, DIBASIC, AND POTASSIUM PHOSPHATE 100 ML/HR: .53; .5; .37; .037; .03; .012; .00082 INJECTION, SOLUTION INTRAVENOUS at 22:03

## 2023-08-18 RX ADMIN — EZETIMIBE 10 MG: 10 TABLET ORAL at 09:06

## 2023-08-18 RX ADMIN — ACETAMINOPHEN 325MG 975 MG: 325 TABLET ORAL at 22:03

## 2023-08-18 RX ADMIN — SODIUM CHLORIDE, SODIUM GLUCONATE, SODIUM ACETATE, POTASSIUM CHLORIDE, MAGNESIUM CHLORIDE, SODIUM PHOSPHATE, DIBASIC, AND POTASSIUM PHOSPHATE 100 ML/HR: .53; .5; .37; .037; .03; .012; .00082 INJECTION, SOLUTION INTRAVENOUS at 00:30

## 2023-08-18 RX ADMIN — CITALOPRAM HYDROBROMIDE 10 MG: 20 TABLET ORAL at 09:07

## 2023-08-18 RX ADMIN — HEPARIN SODIUM 5000 UNITS: 5000 INJECTION INTRAVENOUS; SUBCUTANEOUS at 22:03

## 2023-08-18 RX ADMIN — CYANOCOBALAMIN 1000 MCG: 1000 INJECTION, SOLUTION INTRAMUSCULAR at 11:09

## 2023-08-18 RX ADMIN — PANTOPRAZOLE SODIUM 40 MG: 40 TABLET, DELAYED RELEASE ORAL at 11:10

## 2023-08-18 RX ADMIN — SODIUM CHLORIDE, SODIUM GLUCONATE, SODIUM ACETATE, POTASSIUM CHLORIDE, MAGNESIUM CHLORIDE, SODIUM PHOSPHATE, DIBASIC, AND POTASSIUM PHOSPHATE 100 ML/HR: .53; .5; .37; .037; .03; .012; .00082 INJECTION, SOLUTION INTRAVENOUS at 11:10

## 2023-08-18 RX ADMIN — POLYETHYLENE GLYCOL 3350 17 G: 17 POWDER, FOR SOLUTION ORAL at 09:06

## 2023-08-18 RX ADMIN — DOCUSATE SODIUM 100 MG: 100 CAPSULE, LIQUID FILLED ORAL at 09:07

## 2023-08-18 RX ADMIN — ASPIRIN 81 MG 81 MG: 81 TABLET ORAL at 09:06

## 2023-08-18 RX ADMIN — ACETAMINOPHEN 325MG 975 MG: 325 TABLET ORAL at 11:09

## 2023-08-18 RX ADMIN — QUETIAPINE FUMARATE 25 MG: 25 TABLET ORAL at 22:03

## 2023-08-18 RX ADMIN — LEVOTHYROXINE SODIUM 175 MCG: 125 TABLET ORAL at 05:44

## 2023-08-18 RX ADMIN — ATORVASTATIN CALCIUM 40 MG: 40 TABLET, FILM COATED ORAL at 16:40

## 2023-08-18 RX ADMIN — HEPARIN SODIUM 5000 UNITS: 5000 INJECTION INTRAVENOUS; SUBCUTANEOUS at 14:24

## 2023-08-18 RX ADMIN — CEFTRIAXONE 1000 MG: 1 INJECTION, SOLUTION INTRAVENOUS at 09:08

## 2023-08-18 NOTE — PLAN OF CARE
Problem: OCCUPATIONAL THERAPY ADULT  Goal: Performs self-care activities at highest level of function for planned discharge setting. See evaluation for individualized goals. Description: Treatment Interventions: ADL retraining, UE strengthening/ROM, Functional transfer training, Endurance training, Cognitive reorientation, Patient/family training, Equipment evaluation/education, Neuromuscular reeducation, Compensatory technique education, Energy conservation, Activityengagement          See flowsheet documentation for full assessment, interventions and recommendations. Note: Limitation: Decreased ADL status, Decreased UE strength, Decreased Safe judgement during ADL, Decreased cognition, Decreased endurance, Decreased self-care trans  Prognosis: Fair  Assessment: Ronny Bhatti is a 70 y.o. female seen for OT evaluation s/p admit to [facility] on 8/17/2023 w/ MARIANELA (acute kidney injury) ([address] Comorbidities affecting pt's functional performance at time of assessment include: CAD, HTN, MDD, dementia, . Pt with active OT orders and activity orders for Activity as tolerated. Personal factors affecting pt at time of IE include:difficulty performing ADLs, difficulty performing IADLs, difficulty performing transfers/mobility, fall risk  and functional decline . Pt is a poor historian during evaluation and mostly non verbal. Shook head yes/ no at times. Per EMR lives with son and does not use AD. Upon evaluation: Pt currently requires supervision for UB ADLs, Liliya for LB ADLs, Liliya for toileting, supervision for bed mobility, Liliya for functional transfers, and Liliya with no device mobility 2* the following deficits impacting occupational performance:weakness, decreased strength , decreased balance, decreased activity tolerance, decreased safety awareness and increased pain. VITALS during session: stable.  Pt to benefit from continued skilled OT tx while in the hospital to address deficits as defined above and maximize level of functional independence w ADL's and functional mobility. Occupational Performance areas to address include: grooming, bathing/shower, toilet hygiene, dressing and functional mobility. From OT standpoint, recommendation at time of d/c would be STR vs HH pending confirmation of family support and assist at home. Pt requires 24 hour care and support. OT to follow pt on caseload 2-3x/wk. OT Discharge Recommendation:  (STR vs HH pending confirmation of family support.  pt requires 24 hour care and support to be able to go home.)

## 2023-08-18 NOTE — OCCUPATIONAL THERAPY NOTE
Occupational Therapy Evaluation     Patient Name: Ronny Bhatti  XRHJP'P Date: 8/18/2023  Problem List  Principal Problem:    MARIANELA (acute kidney injury) ([address])  Active Problems:    Chronic coronary artery disease    Hypothyroidism    Hyperlipidemia    Hypotension    MDD (major depressive disorder), recurrent episode, severe (HCC)    Failure to thrive in adult    Vascular dementia ([address])    Acute cystitis without hematuria    Bradycardia    Past Medical History  Past Medical History:   Diagnosis Date    Acute MI ([address]) 08/12/2012    MARIANELA (acute kidney injury) ([address]) 8/17/2023    Anemia     Anxiety 07/21/2023    CAD (coronary artery disease)     Hyperlipidemia     Hypertension     Myocardial infarction ([address]) 03/26/2002    Thyroid disease     Ventricular fibrillation ([address]) 08/12/2012     Past Surgical History  Past Surgical History:   Procedure Laterality Date    COLONOSCOPY 2013    CORONARY ARTERY BYPASS GRAFT  2003 08/18/23 0835   OT Last Visit   OT Visit Date 08/18/23   Note Type   Note type Evaluation   Additional Comments pt greeted in supine and agreeable to skilled OT evaluation. Pain Assessment   Pain Assessment Tool FLACC   Pain Location/Orientation Orientation: Bilateral;Location: Leg  (reports pain in B/L legs)   Pain Rating: FLACC (Rest) - Face 0   Pain Rating: FLACC (Rest) - Legs 0   Pain Rating: FLACC (Rest) - Activity 0   Pain Rating: FLACC (Rest) - Cry 0   Pain Rating: FLACC (Rest) - Consolability 0   Score: FLACC (Rest) 0   Pain Rating: FLACC (Activity) - Face 1   Pain Rating: FLACC (Activity) - Legs 1   Pain Rating: FLACC (Activity) - Activity 1   Pain Rating: FLACC (Activity) - Cry 0   Pain Rating: FLACC (Activity) - Consolability 1   Score: FLACC (Activity) 4   Restrictions/Precautions   Weight Bearing Precautions Per Order No   Other Precautions Cognitive; Chair Alarm; Bed Alarm;Multiple lines; Fall Risk;Pain   Home Living   Type of Home House   Prior Function   Lives With Son   Comments Pt is a poor historian during evaluation and mostly non verbal. Shook head yes/ no at times. Per EMR lives with son and does not use AD. Subjective   Subjective "My legs hurt"   ADL   Where Assessed Edge of bed   Eating Assistance 5  Supervision/Setup   Grooming Assistance 4  Minimal Assistance   [facility] 4  Minimal [address]  4  Minimal Assistance   Bed Mobility   Supine to Sit 5  Supervision   Additional items Increased time required;HOB elevated; Bedrails   Sit to Supine 5  Supervision   Additional items Bedrails; Increased time required;Verbal cues   Additional Comments extra time to complete task. Transfers   Sit to Stand 4  Minimal assistance   Additional items Assist x 1;Bedrails; Increased time required;Verbal cues   Stand to Sit 4  Minimal assistance   Additional items Assist x 1;Bedrails; Increased time required;Verbal cues   Toilet transfer 4  Minimal assistance   Additional items Assist x 1; Increased time required;Verbal cues;Standard toilet  (grab bar)   Additional Comments cues for initiation and best tech. Functional Mobility   Functional Mobility 4  Minimal assistance   Additional Comments assist x1, no device. shuffled gait. Balance   Static Sitting Fair +   Dynamic Sitting Fair   Static Standing Fair -   Dynamic Standing Poor +   Ambulatory Poor +   Activity Tolerance   Activity Tolerance Patient limited by fatigue;Patient limited by pain   Nurse Made Aware ALISHA Avila   RUE Assessment   RUE Assessment WFL   LUE Assessment   LUE Assessment WFL   Hand Function   Gross Motor Coordination Functional   Fine Motor Coordination Functional   Psychosocial   Psychosocial (WDL) X   Patient Behaviors/Mood Flat affect; Not interactive; Cooperative   Cognition   Overall Cognitive Status Impaired   Arousal/Participation Arousable; Cooperative   Attention Attends with cues to redirect   Orientation Level Unable to assess   Memory Unable to assess   Following Commands Follows one step commands with increased time or repetition   Comments pt mostly non verbal during session. shook head yes/ no at times to questions. followed mostl commands with increased time. Assessment   Limitation Decreased ADL status; Decreased UE strength;Decreased Safe judgement during ADL;Decreased cognition;Decreased endurance;Decreased self-care trans   Prognosis Fair   Assessment Holley Stevens is a 70 y.o. female seen for OT evaluation s/p admit to [facility] on 8/17/2023 w/ MARIANELA (acute kidney injury) ([address] Comorbidities affecting pt's functional performance at time of assessment include: CAD, HTN, MDD, dementia, . Pt with active OT orders and activity orders for Activity as tolerated. Personal factors affecting pt at time of IE include:difficulty performing ADLs, difficulty performing IADLs, difficulty performing transfers/mobility, fall risk  and functional decline . Pt is a poor historian during evaluation and mostly non verbal. Shook head yes/ no at times. Per EMR lives with son and does not use AD. Upon evaluation: Pt currently requires supervision for UB ADLs, Liliya for LB ADLs, Liliya for toileting, supervision for bed mobility, Liliya for functional transfers, and Liliya with no device mobility 2* the following deficits impacting occupational performance:weakness, decreased strength , decreased balance, decreased activity tolerance, decreased safety awareness and increased pain. VITALS during session: stable. Pt to benefit from continued skilled OT tx while in the hospital to address deficits as defined above and maximize level of functional independence w ADL's and functional mobility. Occupational Performance areas to address include: grooming, bathing/shower, toilet hygiene, dressing and functional mobility.  From OT standpoint, recommendation at time of d/c would be STR vs HH pending confirmation of family support and assist at home. Pt requires 24 hour care and support. OT to follow pt on caseload 2-3x/wk. Goals   Patient Goals none stated. STG Time Frame 3-5   Short Term Goal #1 Pt will improve activity tolerance to G for min 30 min txment sessions for increase engagement in functional tasks   Short Term Goal #2 Pt will complete toileting w/ supervision w/ G hygiene/thoroughness using DME as needed   Short Term Goal  Pt will improve functional transfers to supervision on/off all surfaces using DME as needed w/ G balance/safety   LTG Time Frame 10-14   Long Term Goal #1 Pt will improve functional mobility during ADL/IADL/leisure tasks to supervision using DME as needed w/ G balance/safety   Long Term Goal #2 Pt will demonstrate G carryover of pt/caregiver education and training as appropriate w/o cues w/ good tolerance to increase safety during functional tasks   Plan   Treatment Interventions ADL retraining;UE strengthening/ROM; Functional transfer training; Endurance training;Cognitive reorientation;Patient/family training;Equipment evaluation/education; Neuromuscular reeducation; Compensatory technique education; Energy conservation; Activityengagement   Goal Expiration Date 09/01/23   OT Treatment Day 0   OT Frequency 2-3x/wk   Recommendation   OT Discharge Recommendation   (STR vs HH pending confirmation of family support. pt requires 24 hour care and support to be able to go home.)   Additional Comments  The patient's raw score on the AM-PAC Daily Activity Inpatient Short Form is 18. A raw score of less than 19 suggests the patient may benefit from discharge to post-acute rehabilitation services. Please refer to the recommendation of the Occupational Therapist for safe discharge planning.    AM-PAC Daily Activity Inpatient   Lower Body Dressing 3   Bathing 3   Toileting 3   Upper Body Dressing 3   Grooming 3   Eating 3   Daily Activity Raw Score 18   Daily Activity Standardized Score (Calc for Raw Score >=11) 38.66   AM-PAC Applied Cognition Inpatient   Following a Speech/Presentation 2   Understanding Ordinary Conversation 2   Taking Medications 1   Remembering Where Things Are Placed or Put Away 1   Remembering List of 4-5 Errands 1   Taking Care of Complicated Tasks 1   Applied Cognition Raw Score 8   Applied Cognition Standardized Score 19.32   Dionicio To, OT

## 2023-08-18 NOTE — ASSESSMENT & PLAN NOTE
· Continue levothyroxine   · TSH elevated last admission with question of compliance  · Repeat on admission normal

## 2023-08-18 NOTE — ASSESSMENT & PLAN NOTE
· CT abdomen/pelvis demonstrating "Questionable irregular enhancement of the kidneys which may represent infection. Recommend short-term follow-up with urology or nephrology.  Cholelithiasis."  · Pt was recently admitted last month and treated for UTI  · WBC 5.51, afebrile, not fulfilling septic criteria  · UA pending  · Would recommend administration of ABX if UA demonstrates evidence of infection once resulted

## 2023-08-18 NOTE — ASSESSMENT & PLAN NOTE
Patient with PMHx of severe depression, HTN, HLD, hypothyroidism, and CAD presented to the ED with sister and niece in the setting of low p.o. intake leading to MARIANELA and fall from generalized weakness  · Patient recently admitted to [facility] for acute metabolic encephalopathy in the setting of UTI 7/28-7/31.  She was then transferred to [facility] for inpatient psychiatric treatment of depression and vascular dementia  · Pt presented with MARIANELA with creatinine 1.42, increased from baseline 0.5-0.6  · In the setting of poor po intake, clinical dehydration, failure to thrive  · Creatine normalized with ivf  · Monitor po intake:  Geriatrics/psychiatry consulted

## 2023-08-18 NOTE — ASSESSMENT & PLAN NOTE
· Continue levothyroxine   · TSH elevated last admission with question of compliance  · Will recheck

## 2023-08-18 NOTE — ASSESSMENT & PLAN NOTE
· Pt has h/o HTN, however was hypotensive in ER with bp 87/51:  Improved with IVF  · HCTZ stopped after admission 7/28-7/31  · Currently on lisinopril 20mg and metoprolol xl 12.5mg prior to admission  · Hold lisinopril secondary to MARIANELA  · Hold b-blocker due to bradycardia  · bp 101/51 this am, cont to monitor  · Cont ivf fluids due to poor po intake

## 2023-08-18 NOTE — ASSESSMENT & PLAN NOTE
· Question of dementia on prior records:  No formal diagnosis or neurology consultation noted   · MRI brain 7/30: white matter changes on aspirin and statin therapy  · eval in progress:  Reported family reports of non-verbal, however on prior records she was noted to be oriented x 2, or x3 at exams  · Appreciate geriatrics eval  · tsh wnl  · Folate wnl  · B12 <goal of 400 (was 300);   Will start b12 injections and dc on oral

## 2023-08-18 NOTE — ASSESSMENT & PLAN NOTE
· According to family, mood has been stable at home besides decreased p.o. intake   · Pt recently 201 admitted to [facility] for management of severe depression 7/31-8/11  · Discharged on 10 mg Celexa, 25 mg seroquel h.s., and melatonin, continue  · Psych consult pending

## 2023-08-18 NOTE — PHYSICAL THERAPY NOTE
PT EVALUATION 11:57-12:17    70 y.o.    [id number]    Vascular dementia ([address]) [F01.50]  Severe protein-calorie malnutrition ([address]) [E43]  Head injury [S09.90XA]  Failure to thrive in adult [R62.7]  MARIANELA (acute kidney injury) ([address]) [N17.9]    Past Medical History:   Diagnosis Date    Acute MI ([address]) 08/12/2012    MARIANELA (acute kidney injury) ([address]) 8/17/2023    Anemia     Anxiety 07/21/2023    CAD (coronary artery disease)     Hyperlipidemia     Hypertension     Myocardial infarction ([address]) 03/26/2002    Thyroid disease     Ventricular fibrillation ([address]) 08/12/2012         Past Surgical History:   Procedure Laterality Date    COLONOSCOPY  2013    CORONARY ARTERY BYPASS GRAFT  2003 08/18/23 1157   PT Last Visit   PT Visit Date 08/18/23   Note Type   Note type Evaluation   Additional Comments greeted in supine. Telemedicine psych consult being attempted at bedside. Pt nonverbal.   Restrictions/Precautions   Weight Bearing Precautions Per Order No   Other Precautions Cognitive; Chair Alarm; Bed Alarm;Multiple lines; Fall Risk  (nonverbal)   Home Living   Type of Home House   Additional Comments Pt nonverbal. Unable to report home set up or PLOF. H&P indicates report that pt baseline does not ambulate with AD. Prior Function   Lives With Son   Comments Occasional head nod, but nonverbal. Flat affect. Unable to report PLOF but hx indicates resides with son. Hx of inpatient psych. General   Additional Pertinent History Pt is 69 y/o female admitted with decreased po intake, failure to thrive, UTI. PT consulted. Family/Caregiver Present No   Cognition   Overall Cognitive Status Impaired   Arousal/Participation Responsive   Attention Attends with cues to redirect   Orientation Level Unable to assess   Following Commands Follows one step commands with increased time or repetition   Comments Nonverbal.  Nods occasinally. Follows commands with gesture, tactile guidance   Subjective   Subjective Offers no subjective. Worried/grimace appearance. RUE Assessment   RUE Assessment WFL  (as observed with functional reach and grasp with mobility.)   LUE Assessment   LUE Assessment WFL  (as observed with functional reach and grasp with mobility.)   RLE Assessment   RLE Assessment WFL   Strength RLE   RLE Overall Strength 3+/5   LLE Assessment   LLE Assessment WFL   Strength LLE   LLE Overall Strength 3+/5   Bed Mobility   Supine to Sit 4  Minimal assistance   Additional items Increased time required;HOB elevated; Bedrails   Additional Comments Increased time, cues to complete. Variable effort. Transfers   Sit to Stand 4  Minimal assistance   Additional items Assist x 1; Increased time required;Verbal cues   Stand to Sit 4  Minimal assistance   Additional items Assist x 1; Increased time required;Verbal cues   Additional Comments Cues needed, increased time to guide transitions needed. Ambulation/Elevation   Gait pattern Decreased foot clearance; Improper Weight shift; Short stride; Shuffling;Excessively slow   Gait Assistance 4  Minimal assist   Additional items Assist x 1;Verbal cues; Tactile cues   Assistive Device Other (Comment)  (hand held assistance)   Distance AMb with hand held assistance 15'x1, 5'x1, 10'x1. Increased time. Short shuffling/staggering steps. Balance   Static Sitting Fair +   Dynamic Sitting Fair   Static Standing Fair -   Dynamic Standing Poor +   Ambulatory Poor +   Endurance Deficit   Endurance Deficit Yes   Endurance Deficit Description BP supine: 98/41, BP EOB 96/51 and p mobility 103/54   Activity Tolerance   Activity Tolerance Patient limited by fatigue;Treatment limited secondary to medical complications (Comment); Other (Comment)  (cognition/participation)   Medical Staff Made Aware Margarita Tomas   Nurse Made Aware yes   Assessment   Prognosis Fair   Problem List Decreased strength;Decreased endurance; Impaired balance;Decreased mobility; Decreased cognition; Impaired judgement;Decreased safety awareness Candi Black is a 70 y.o. female who presents with MARIANELA and failure to thrive, bradycardia, acute cystitis, hypotension. Patient reported to be nonverbal at baseline by family. Decreased po intake since inpatient psych on 8/11. Resides with son and ambulates without AD.  + fall at home. Pt with hx of vascular dementia, MDD, HTN, hyperlipidemia, hypothyroidism, CAD. PT consulted. Ambulate orders. Prior to admission reported to reside with son and ambulate without AD at baseline. Currently presents with functional limitations related to impairments in communication, decreased activity tolerance, balance, general strength, decreased functional mobility and locomotion requiring increased assistance. Cassi for bed mobiltiy, transfers and ambulation with hand held assist short distances. Gait slowed, shuffling/staggering with decreased foot clearance and step length. BP supine 98/41, sitting 96/51 and p mobility 103/54. Risk for falls given impairments. Will benefit from skilled PT in order to optimize functional outcomes. The patient's AM-PAC Basic Mobility Inpatient Short Form Raw Score is 17. A Raw score of less than or equal to 16 suggests the patient may benefit from discharge to post-acute rehabilitation services. Please also refer to the recommendation of the Physical Therapist for safe discharge planning. May benefit from STR in order to optimize outcomes. Will require 24* supportive environment. PT will follow 3-5x/wk to progress towards goals. Goals   Patient Goals none stated   [facility] Expiration Date 08/28/23   Short Term Goal #1 10 days: 1). Pt will perform bed mobility with Deloris demonstrating appropriate technique 100% of the time in order to improve function. 2)  Perform all transfers with Deloris demonstrating safe and appropriate technique 100% of the time in order to improve ability to negotiate safely in home environment. 3) Amb with least restrictive AD > 100'x1 with mod I in order to demonstrate ability to negotiate in home environment. 4)  Improve overall strength and balance 1/2 grade in order to optimize ability to perform functional tasks and reduce fall risk. 5) Increase activity tolerance to 30 minutes in order to improve endurance to functional tasks. 6)  Negotiate stairs using most appropriate technique and S in order to be able to negotiate safely in home environment. 7) PT for ongoing patient and family/caregiver education, DME needs and d/c planning in order to promote highest level of function in least restrictive environment. Plan   Treatment/Interventions Functional transfer training;LE strengthening/ROM; Elevations; Therapeutic exercise; Endurance training;Patient/family training;Equipment eval/education; Bed mobility;Gait training; Compensatory technique education;Continued evaluation;Spoke to nursing   PT Frequency 3-5x/wk   Recommendation   PT Discharge Recommendation Post acute rehabilitation services  (vs home with 24* care.)   Equipment Recommended Other (Comment)  (monitor)   AM-PAC Basic Mobility Inpatient   Turning in Flat Bed Without Bedrails 3   Lying on Back to Sitting on Edge of Flat Bed Without Bedrails 3   Moving Bed to Chair 3   Standing Up From Chair Using Arms 3   Walk in Room 3   Climb 3-5 Stairs With Railing 2   Basic Mobility Inpatient Raw Score 17   Basic Mobility Standardized Score 39.67   Highest Level Of Mobility   JH-HLM Goal 5: Stand one or more mins   JH-HLM Achieved 6: Walk 10 steps or more   End of Consult   Patient Position at End of Consult Bedside chair;Bed/Chair alarm activated; All needs within reach     Hx/personal factors: difficulty performing IADLs, decreased initiation and engagement, fall risk , and functional decline , comorbidities    Examination:decreased strength , decreased balance, decreased activity tolerance, gait deviations, impaired cognition, mood/behavioral limitations , and Fluctuating vitals.      Clinical: unpredictable Ongoing medical status, Trending/abnormal lab values, Risk for falls, Cognition, bed/chair alarm, Continuous monitoring, Decreased activity tolerance compared to baseline, and hypotension.      Complexity: high             Macey Page, PT

## 2023-08-18 NOTE — PLAN OF CARE
Problem: Potential for Falls  Goal: Patient will remain free of falls  Description: INTERVENTIONS:  - Educate patient/family on patient safety including physical limitations  - Instruct patient to call for assistance with activity   - Consult OT/PT to assist with strengthening/mobility   - Keep Call bell within reach  - Keep bed low and locked with side rails adjusted as appropriate  - Keep care items and personal belongings within reach  - Initiate and maintain comfort rounds  - Make Fall Risk Sign visible to staff  - Apply yellow socks and bracelet for high fall risk patients  - Consider moving patient to room near nurses station  Outcome: Progressing     Problem: MOBILITY - ADULT  Goal: Maintain or return to baseline ADL function  Description: INTERVENTIONS:  -  Assess patient's ability to carry out ADLs; assess patient's baseline for ADL function and identify physical deficits which impact ability to perform ADLs (bathing, care of mouth/teeth, toileting, grooming, dressing, etc.)  - Assess/evaluate cause of self-care deficits   - Assess range of motion  - Assess patient's mobility; develop plan if impaired  - Assess patient's need for assistive devices and provide as appropriate  - Encourage maximum independence but intervene and supervise when necessary  - Involve family in performance of ADLs  - Assess for home care needs following discharge   - Consider OT consult to assist with ADL evaluation and planning for discharge  - Provide patient education as appropriate  Outcome: Progressing  Goal: Maintains/Returns to pre admission functional level  Description: INTERVENTIONS:  - Perform BMAT or MOVE assessment daily.   - Set and communicate daily mobility goal to care team and patient/family/caregiver.    - Collaborate with rehabilitation services on mobility goals if consulted  - Out of bed for toileting  - Record patient progress and toleration of activity level   Outcome: Progressing     Problem: PAIN - ADULT  Goal: Verbalizes/displays adequate comfort level or baseline comfort level  Description: Interventions:  - Encourage patient to monitor pain and request assistance  - Assess pain using appropriate pain scale  - Administer analgesics based on type and severity of pain and evaluate response  - Implement non-pharmacological measures as appropriate and evaluate response  - Consider cultural and social influences on pain and pain management  - Notify physician/advanced practitioner if interventions unsuccessful or patient reports new pain  Outcome: Progressing     Problem: INFECTION - ADULT  Goal: Absence or prevention of progression during hospitalization  Description: INTERVENTIONS:  - Assess and monitor for signs and symptoms of infection  - Monitor lab/diagnostic results  - Monitor all insertion sites, i.e. indwelling lines, tubes, and drains  - Monitor endotracheal if appropriate and nasal secretions for changes in amount and color  - [place] appropriate cooling/warming therapies per order  - Administer medications as ordered  - Instruct and encourage patient and family to use good hand hygiene technique  - Identify and instruct in appropriate isolation precautions for identified infection/condition  Outcome: Progressing  Goal: Absence of fever/infection during neutropenic period  Description: INTERVENTIONS:  - Monitor WBC    Outcome: Progressing     Problem: SAFETY ADULT  Goal: Patient will remain free of falls  Description: INTERVENTIONS:  - Educate patient/family on patient safety including physical limitations  - Instruct patient to call for assistance with activity   - Consult OT/PT to assist with strengthening/mobility   - Keep Call bell within reach  - Keep bed low and locked with side rails adjusted as appropriate  - Keep care items and personal belongings within reach  - Initiate and maintain comfort rounds  - Make Fall Risk Sign visible to staff  - Apply yellow socks and bracelet for high fall risk patients  - Consider moving patient to room near nurses station  Outcome: Progressing  Goal: Maintain or return to baseline ADL function  Description: INTERVENTIONS:  -  Assess patient's ability to carry out ADLs; assess patient's baseline for ADL function and identify physical deficits which impact ability to perform ADLs (bathing, care of mouth/teeth, toileting, grooming, dressing, etc.)  - Assess/evaluate cause of self-care deficits   - Assess range of motion  - Assess patient's mobility; develop plan if impaired  - Assess patient's need for assistive devices and provide as appropriate  - Encourage maximum independence but intervene and supervise when necessary  - Involve family in performance of ADLs  - Assess for home care needs following discharge   - Consider OT consult to assist with ADL evaluation and planning for discharge  - Provide patient education as appropriate  Outcome: Progressing  Goal: Maintains/Returns to pre admission functional level  Description: INTERVENTIONS:  - Perform BMAT or MOVE assessment daily.   - Set and communicate daily mobility goal to care team and patient/family/caregiver.    - Collaborate with rehabilitation services on mobility goals if consulted  - Out of bed for toileting  - Record patient progress and toleration of activity level   Outcome: Progressing     Problem: DISCHARGE PLANNING  Goal: Discharge to home or other facility with appropriate resources  Description: INTERVENTIONS:  - Identify barriers to discharge w/patient and caregiver  - Arrange for needed discharge resources and transportation as appropriate  - Identify discharge learning needs (meds, wound care, etc.)  - Arrange for interpretive services to assist at discharge as needed  - Refer to Case Management Department for coordinating discharge planning if the patient needs post-hospital services based on physician/advanced practitioner order or complex needs related to functional status, cognitive ability, or social support system  Outcome: Progressing     Problem: Knowledge Deficit  Goal: Patient/family/caregiver demonstrates understanding of disease process, treatment plan, medications, and discharge instructions  Description: Complete learning assessment and assess knowledge base. Interventions:  - Provide teaching at level of understanding  - Provide teaching via preferred learning methods  Outcome: Progressing     Problem: Nutrition/Hydration-ADULT  Goal: Nutrient/Hydration intake appropriate for improving, restoring or maintaining nutritional needs  Description: Monitor and assess patient's nutrition/hydration status for malnutrition. Collaborate with interdisciplinary team and initiate plan and interventions as ordered. Monitor patient's weight and dietary intake as ordered or per policy. Utilize nutrition screening tool and intervene as necessary. Determine patient's food preferences and provide high-protein, high-caloric foods as appropriate.      INTERVENTIONS:  - Monitor oral intake, urinary output, labs, and treatment plans  - Assess nutrition and hydration status and recommend course of action  - Evaluate amount of meals eaten  - Assist patient with eating if necessary   - Allow adequate time for meals  - Recommend/ encourage appropriate diets, oral nutritional supplements, and vitamin/mineral supplements  - Order, calculate, and assess calorie counts as needed  - Recommend, monitor, and adjust tube feedings and TPN/PPN based on assessed needs  - Assess need for intravenous fluids  - Provide specific nutrition/hydration education as appropriate  - Include patient/family/caregiver in decisions related to nutrition  Outcome: Progressing     Problem: Prexisting or High Potential for Compromised Skin Integrity  Goal: Skin integrity is maintained or improved  Description: INTERVENTIONS:  - Identify patients at risk for skin breakdown  - Assess and monitor skin integrity  - Assess and monitor nutrition and hydration status  - Monitor labs   - Assess for incontinence   - Turn and reposition patient  - Assist with mobility/ambulation  - Relieve pressure over bony prominences  - Avoid friction and shearing  - Provide appropriate hygiene as needed including keeping skin clean and dry  - Evaluate need for skin moisturizer/barrier cream  - Collaborate with interdisciplinary team   - Patient/family teaching  - Consider wound care consult   Outcome: Progressing

## 2023-08-18 NOTE — ASSESSMENT & PLAN NOTE
· Patient nonverbal at baseline   · Noted to have white matter changes on MRI brain 7/30  · Continue aspirin and statin therapy  · Geriatrics consult

## 2023-08-18 NOTE — CONSULTS
Consultation - Geriatrics   [facility] 70 y.o. female MRN: [id number]  Unit/Bed#: E5 -01 Encounter: [id number]      Assessment/Plan    Cognitive Screening   · Patient has no documented history of memory issues or cognitive impairment   · She was seen by her PCP on 7/21/2023 for anxiety and depression   · He was noted to have lost weight and was living with her son   · She was noted to have suicidal ideation but no plan   · She was started on Lexapro and recommended to follow-up in 4 weeks   · She was noted to be oriented x 3  · She was admitted to the hospital from 7/28-7/31  · She was noted to have a UTI on that admission and was treated for this   · MRI revealed old cerebellar infarcts   · Patient was noted by multiple providers including psychiatry and geriatrics to be oriented x 3 during her hospitalization  · Some staff members noted that she had been only nodding her head when asked questions but she was responsive in some form   · She was agreeable to inpatient psych and signed a 201   · Patient was admitted to psych from 7/31-8/11 and her discharge summary revealed the following  · She presented somewhat confused and was noted to be a poor historian and was unable to elaborate on any information   · She was alert and oriented x 2 and showed latency in her responses   · She did not know why she was at behavioral health   · She was noted to be guarded, withdrawn, and delayed in her thought processes and speech   · She was noted to be verbal answering questions to nonverbal at times throughout her stay   · It was recommended that she no longer drive and she did have her license taken away   · Discharge note indicates she was discharged on Prozac and Melatonin, however, her discharge medication list did not indicate she was taking these medications   · It appears that she was started on Seroquel 25 mg daily at bedtime and continued on Celexa 10 mg daily  · She was noted to be doing well on discharge with stable mood   · It was noted that her sleep and appetite were improved and she was tolerating her medication   · They did assist the patient/family in scheduling follow-ups with PCP, psych, and neurology   · Patient was noted to be nonverbal on this admission and family notes this is her baseline   · The patient is nonverbal on my exam today   · Most recent TSH on labs today noted to be 0.706  · Most recent vitamin B 12 level on 8/2/2023 noted to be 303  · Patient was started on monthly B 12 injections while at behavioral health   · MRI of the brain on 7/30/2023 revealed moderate microangiopathic changes and chronic lacunar infarcts in the basal ganglia and thalamus with chronic microhemorrhages   · CT of the head on 8/17/2023 revealed the same   · There is no evidence of any cognitive testing performed in the past   · I did review this patient with my attending Dr. Leo Billings and we suspect that the patient is depressed   · While it may be possible that she has underlying cognitive impairment, a diagnosis of dementia would not be given when the patient is not at her baseline (which it appears that she is not as she has been verbal at least intermittently in the past few weeks)  · Will defer to psych recommendations for depression   · She would likely benefit from outpatient evaluations with either neurology or geriatrics once she is back to baseline so that further cognitive evaluations can be completed   · Maintain delirium precautions as discussed below  · Redirect and reorient as needed  · Keep physically, mentally, and socially active     Delirium   • Baseline mentation: nonverbal (per family)  • Current mentation: nonverbal   • Patient is at high risk secondary to age, possible underlying cognitive impairment, fall, MARIANELA secondary to poor oral intake, acute pain, frequent environmental changes (hospital to behavioral health to home to hospital since 7/28), and hospitalization   • Maintain delirium precautions · Provide redirection, reorientation, and distraction techniques  · Maintain fall and safety precautions   · Assist with ADLs/IADLs  · Avoid deliriogenic medications such as tramadol, benzodiazepines, anticholinergics, benadryl  · Treat pain using geriatric pain protocol   · Encourage oral hydration and nutrition   · Monitor for constipation and urinary retention   · Implement sleep hygiene and limit night time interuptions   · Maintain sleep-wake cycle   · Encourage early and frequent mobilization   · Encourage participation in group activities  • Most recent EKG on 8/18/2023 revealed a QTc interval of 455  · If all other interventions are unsuccessful for acute agitation and behaviors, can consider Zyprexa 2.5 mg IM Q 8 hours prn   • Would avoid benzodiazepines such as Ativan as these can worsen delirium     Deconditioning   • Baseline function: independent with ADLs and needs assistance wtih IADLs  • Patient is at increased risk for deconditioning secondary to MARIANELA, poor oral intake, depression, weakness, gait dysfunction, and hospitalization    • Continue to optimize diet, hydration, and mobility for healing  · GFR 81 on labs today  · Keep hydrated   · PO intake   · Family noting patient has had poor oral intake   · She has lost 20 lb in the last several months per her daughter   · Behavioral health discharge note indicates that patient had improved appetite under their care   · Nutrition services had been following at behavioral health and is consulted while inpatient   · Speech therapy also consulted   · Continue aspiration precautions   · Anemia  · Patient with known documented history   · Baseline hemoglobin appears to be 13-14  · Hemoglobin on labs today stable at 11.2  · Continue to monitor CBC  · Transfuse for hemoglobin < 7   • Monitor for signs and symptoms of infection, dehydration, DVT, and skin breakdown    Frailty   Clinical Frail Scale: 5- Mildly Frail  · More evident slowing, needs help high order IADLs (transport, bills, medications)  · Progressively impairs shopping and walking outside alone, meal prep and housework  • Most recent albumin on 8/17/2023 noted to be 4.1  • Consider nutrition consult  • Encourage protein supplementation     Ambulatory Dysfunction/Falls  • Patient was noted to have one fall on the day of admission when she became weak and fell backwards hitting her head   · Family noted that since discharge from [facility] she has been more unsteady on her feet which they attribute to poor oral intake   · Typically does not ambulate with any assistive devices at baseline   · PT/OT consulted to assist with strengthening/mobility and assist with discharge planning to appropriate level of care  • Assess patient frequently for physical needs, encourage use of assistant devices as needed and directed by PT/OT  • Identify cognitive and physical deficits and behaviors that affect risk of falls  • Consider moving patient closer to nursing station to monitor more closely for impulsive behavior which may increase risk of falls  • Jarreau fall and safety precautions   • Educate patient/family on patient safety including physical limitations and importance of using call bell for assistance   • Modify environment to reduce risk of injury including disconnecting from pole when not in use, ensuring adequate lighting in room and restroom, ensuring that path to restroom is clear and free of trip hazards  • Out of bed as tolerated    Impaired Vision   • Patient does not have vision impairment   • Recommend use of corrective lenses at all appropriate times  • Encourage adequate lighting and encourage use of assistance with ambulation  • Keep personal belongings close to avoid reaching  • Encourage appropriate footwear at all times  • Recommend large font for printed materials provided to patient    Impaired Hearing   • Patient does not have hearing impairment   • Hearing impairment strongly correlated with depression, cognitive impairment, delirium and falls in the older adult  • Use hearing aids or sound amplifier  • Speak face to face  • Use clear dictation and enunciation of words    Dentition/Appetite   • Patients daughter reports a 20 lb weight loss in the last month   • Patient was at behavioral health and was noted to have improved appetite   • Albumin was 4.1 on labs yesterday   • Patient does have a history of GERD and takes a PPI at baseline   • Speech therapy consulted and following here   • Nutrition services also consulted   • Ensure meal consistency is appropriate for all abilities   • Continue aspiration precautions   • Consider depression as cause of poor appetite     Elimination   • Patient is continent of bowel and bladder at baseline  • She does not appear to have a history of urinary retention or constipation   · No bowel movement documented since admission   · Current bowel regimen includes   · Colace 100 mg BID   · MiraLAX 17 g daily   • Monitor for constipation and urinary retention     Insomnia   • Patient appears to have a history of sleep disturbances per chart review   • She had not been taking any medication for sleep on her last admission   • She was recently started on Seroquel during her behavioral health admission   • Behavioral health noted that she had improved sleep on this medication   • Would continue Seroquel 25 mg daily at bedtime   • First line is behavioral therapy   • Avoid sedative hypnotics including benzodiazepines and benadryl  • Encourage staying awake during the day   • Encourage daytime activities and morning exercise   • Decrease or eliminate daytime naps   • Avoid caffeine especially during late afternoon and evening hours  • Establish a nighttime routine  • Implement sleep hygiene and limit nighttime interruptions  • Can consider melatonin 3 mg daily at bedtime for sleep if needed     Anxiety/Depression  • Patient has a history of anxiety and depression   • She was noted to be anxious back in 2019 and presented to the ER with follow-up with PCP for this   · She admitted that she had lost her job and was having financial difficulties which were the cause of her anxiety   · Recently she was diagnosed with depression and underwent treatment for this at behavioral health  · She was recently discharged from behavioral health on Seroquel 25 mg daily at bedtime and Citalopram 10 mg daily   · Patient has poor oral intake per family and they note her baseline to be nonverbal which is not completely consistent with documentation from other providers   · She was noted to be selectively verbal during her most recent inpatient psych stay per chart review  · I suspect her poor oral intake and selectively verbal communication is secondary to her depression   · Psych is consulted and would defer to their recommendations for this   · Continue supportive care      Acute Kidney Injury   · Patient presented with poor oral intake at home   · She was noted to have an MARIANELA on admission with a creatinine of 1.42  · She was administered IVF and her creatinine is improved on labs this morning at 0.77  · Nutrition services consulted and following   · Management per primary team     Bradycardia  · Noted on EKG   · Suspected to be secondary to recent weight loss   · Beta-blocker currently on hold   · Patient being monitored on telemetry   · Management per primary team     Hypotension   · Patient has a documented history of hypertension but was noted to be hypotensive in the ER with a BP of 87/51  · ACE inhibitor and beta blocker currently on hold   · Patient remains on IVF   · Primary team ordering orthostatics   · Management per primary team     Failure to Thrive   · Patient resides with son and recently had a fall prior to admission   · Family suspects this fall was secondary to weakness from poor oral intake   · Daughter notes a 20 lb weight loss over the past month   · Suspect this is secondary to her depression   · Psych consult pending   · Primary team is starting patient on a calorie count   · PT/OT consult is pending     Home Safety  · Patient resides at home with her son   · She appears to be independent with ADLs and needs assistance with IADLs  · She recently just got her license revoked from behavioral health    [address] [place] [address]  · Level 1 Full Code     Home Medication Review   Obtained Medication List from [address] [place] [address]   · Seroquel 25 mg daily at bedtime   · Citalopram 10 mg daily   · Ascorbic acid 1000 mg daily   · Aspirin 81 mg daily   · Atorvastatin 40 mg daily   · Benazepril-HCTZ 20-12.5 mg daily   · Cholecalciferol 50 mcg daily   · Ezetimibe 10 mg daily   · Levothyroxine 175 mcg daily   · Metoprolol succinate 25 mg daily   · Multivitamin daily     I have personally reviewed this medication list with the patients pharmacy listed above. History of Present Illness   Physician Requesting Consult: Tiffany Rivera MD  Reason for Consult / Principal Problem: Failure to Thrive and Vascular Dementia  Hx and PE limited by: Nonverbal     HPI: Holli Escobedo is a 70y.o. year old female who has CAD, hypothyroidism, hyperlipidemia, hypotension, anemia, vitamin D deficiency, and depression and presented to the hospital for poor oral intake and failure to thrive. Her family reported her to be nonverbal at baseline. She was not answering questions on admission, was childlike, and seems scared turning away towards her sister during the physical exam.  Her sister and her niece were at the bedside reporting that she had a fall at home where she became weak and fell backwards hitting her head. They reported that she has very poor oral intake since being discharged from inpatient psych on 8/11. She has been residing with her son and was not using any assistive devices but she had been unsteady on her feet.   Family suspected this to be secondary to her poor oral intake and they believe she would benefit from using an assistive device. The patient reported that she hit her head but did not lose consciousness. Family reported her mood has been stable since discharge from [facility]. Her daughter was contacted and her daughter noted a 20 pound weight loss over the past month due to poor oral intake. She was noted to have an MARIANELA on admission and was treated with IV fluids. Her creatinine is back to baseline this morning. A CT of the abdomen revealed a questionable irregular enhancement of the kidneys which may represent infection. She was recently admitted and treated for UTI last month. UA is currently pending. Psych has been consulted for her depression. Geriatrics is being consulted for failure to thrive and vascular dementia. Attempted to contact patient's daughter over the phone but was unable to reach her. The patient was seen and evaluated today at the bedside for geriatric consult. She is noted to be lying in bed in no acute distress. She appears to have pain in her throat/neck as she is grimacing and massaging her neck. When asked if she is able to point to the pain she places her hand over her throat. She is noted to be nonverbal on my exam today. She does make eye contact on a few occasions but is not responding to questions. Care was coordinated with the patient's nurse Girish Valles. She notes that the patient has been primarily nonverbal for her. She did take medication this morning without issue. Care was also coordinated with Dr. Eve Chino.       Inpatient consult to Gerontology  Consult performed by: SOLO Saul  Consult ordered by: Polly Bae PA-C          Review of Systems   Unable to perform ROS: Patient nonverbal       Historical Information   Past Medical History:   Diagnosis Date   • Acute MI ([address]) 08/12/2012   • MARIANELA (acute kidney injury) ([address]) 8/17/2023   • Anemia    • Anxiety 07/21/2023   • CAD (coronary artery disease)    • Hyperlipidemia    • Hypertension    • Myocardial infarction ([address] [facility]) 2002   • Thyroid disease    • Ventricular fibrillation ([address] [facility]) 2012     Past Surgical History:   Procedure Laterality Date   • COLONOSCOPY     • CORONARY ARTERY BYPASS GRAFT       Social History   Social History     Substance and Sexual Activity   Alcohol Use Not Currently     Social History     Substance and Sexual Activity   Drug Use No     Social History     Tobacco Use   Smoking Status Former   • Packs/day: 0.25   • Years: 22.00   • Total pack years: 5.50   • Types: Cigarettes   • Start date: 1971   • Quit date: 1993   • Years since quittin.6   • Passive exposure: Never   Smokeless Tobacco Never   Tobacco Comments    no passive smoke exposure     Family History:   Family History   Problem Relation Age of Onset   • Hypertension Mother    • Stroke Mother         3 strokes   • Heart attack Father    • No Known Problems Sister    • No Known Problems Daughter    • No Known Problems Maternal Grandmother    • No Known Problems Maternal Grandfather    • No Known Problems Paternal Grandmother    • No Known Problems Paternal Grandfather    • No Known Problems Maternal Aunt    • No Known Problems Maternal Aunt    • No Known Problems Maternal Aunt    • Breast cancer Paternal Aunt 46   • No Known Problems Paternal Aunt        Meds/Allergies   Current meds:   Current Facility-Administered Medications   Medication Dose Route Frequency   • acetaminophen (TYLENOL) tablet 975 mg  975 mg Oral Q8H [facility]   • aluminum-magnesium hydroxide-simethicone (MAALOX) oral suspension 30 mL  30 mL Oral Q6H PRN   • aspirin chewable tablet 81 mg  81 mg Oral Daily   • atorvastatin (LIPITOR) tablet 40 mg  40 mg Oral Daily With Dinner   • cefTRIAXone (ROCEPHIN) IVPB (premix in dextrose) 1,000 mg 50 mL  1,000 mg Intravenous Q24H   • citalopram (CeleXA) tablet 10 mg  10 mg Oral Daily   • cyanocobalamin injection 1,000 mcg  1,000 mcg Intramuscular Daily   • docusate sodium (COLACE) capsule 100 mg  100 mg Oral BID   • ezetimibe (ZETIA) tablet 10 mg  10 mg Oral Daily   • hydrALAZINE (APRESOLINE) injection 5 mg  5 mg Intravenous Q6H PRN   • levothyroxine tablet 175 mcg  175 mcg Oral Early Morning   • melatonin tablet 6 mg  6 mg Oral HS PRN   • multi-electrolyte (PLASMALYTE-A/ISOLYTE-S PH 7.4) IV solution  100 mL/hr Intravenous Continuous   • phenol (CHLORASEPTIC) 1.4 % mucosal liquid 1 spray  1 spray Mouth/Throat Q2H PRN   • polyethylene glycol (MIRALAX) packet 17 g  17 g Oral Daily   • QUEtiapine (SEROquel) tablet 25 mg  25 mg Oral HS      Allergies   Allergen Reactions   • Cat Hair Extract Sneezing       Objective   Vitals: Blood pressure 103/51, pulse (!) 50, temperature 97.9 °F (36.6 °C), temperature source Oral, resp. rate 16, weight 49 kg (108 lb 0.4 oz), SpO2 94 %, not currently breastfeeding. ,Body mass index is 21.1 kg/m². Physical Exam  Vitals and nursing note reviewed. Constitutional:       General: She is not in acute distress. Appearance: She is not ill-appearing. HENT:      Head: Normocephalic. Mouth/Throat:      Mouth: Mucous membranes are dry. Eyes:      General: No scleral icterus. Conjunctiva/sclera: Conjunctivae normal.   Cardiovascular:      Rate and Rhythm: Normal rate and regular rhythm. Pulmonary:      Effort: Pulmonary effort is normal. No respiratory distress. Abdominal:      General: Bowel sounds are normal. There is no distension. Palpations: Abdomen is soft. Tenderness: There is no abdominal tenderness. Musculoskeletal:         General: No swelling or tenderness. Skin:     General: Skin is warm and dry. Neurological:      Mental Status: She is alert. Comments: Patient nonverbal on my exam today    Psychiatric:         Mood and Affect: Mood is depressed.          Lab Results:   Results from last 7 days   Lab Units 08/18/23  0516   WBC Thousand/uL 6.65   HEMOGLOBIN g/dL 11.2*   HEMATOCRIT % 33.0*   PLATELETS Thousands/uL 252        Results from last 7 days   Lab Units 08/18/23  0516 08/17/23  1941   POTASSIUM mmol/L 4.0 4.1   CHLORIDE mmol/L 106 102   CO2 mmol/L 22 22   BUN mg/dL 57* 79*   CREATININE mg/dL 0.77 1.42*   CALCIUM mg/dL 9.0 9.9   ALK PHOS U/L  --  56   ALT U/L  --  27   AST U/L  --  18       Imaging Studies: I have personally reviewed pertinent reports. EKG, Pathology, and Other Studies: I have personally reviewed pertinent reports. VTE Prophylaxis: Heparin    Code Status: Level 1 - Full Code      Please note:  Voice-recognition software may have been used in the preparation of this document. Occasional wrong word or "sound-alike" substitutions may have occurred due to the inherent limitations of voice recognition software. Interpretation should be guided by context.

## 2023-08-18 NOTE — CASE MANAGEMENT
Case Management Assessment & Discharge Planning Note    Patient name Constanza Bautista  Location [place] [address] [facility] [address]-* MRN [id number]  : 1960 Date 2023       Current Admission Date: 2023  Current Admission Diagnosis:MARIANELA (acute kidney injury) [facility])   Patient Active Problem List    Diagnosis Date Noted   • Acute cystitis without hematuria 2023   • Bradycardia 2023   • MARIANELA (acute kidney injury) ([address]) 2023   • Failure to thrive in adult 2023   • Vascular dementia ([address]) 2023   • Neurocognitive disorder 08/10/2023   • Severe protein-calorie malnutrition ([address]) 2023   • MDD (major depressive disorder), recurrent episode, severe ([address]) 2023   • H/O: CVA (cerebrovascular accident) 2023   • White matter abnormality on MRI of brain 2023   • Acute metabolic encephalopathy 10/65/6797   • Immunization refused 2023   • LVH (left ventricular hypertrophy) 2022   • Class 1 obesity due to excess calories with serious comorbidity and body mass index (BMI) of 30.0 to 30.9 in adult 2019   • Encounter for well adult exam with abnormal findings 2019   • Postsurgical aortocoronary bypass status 2019   • Impaired fasting glucose 2016   • Vitamin D deficiency 2016   • Anemia 2012   • Chronic coronary artery disease 2012   • Hypothyroidism 2012   • Hyperlipidemia 2012   • Hypotension 2012      LOS (days): 0  Geometric Mean LOS (GMLOS) (days): 3.10  Days to GMLOS:2.8     OBJECTIVE:  PATIENT READMITTED TO HOSPITAL  Risk of Unplanned Readmission Score: 22.91         Current admission status: Inpatient       Preferred Pharmacy:   [facility] [address]  Phone: [phone] Fax: [phone]    Primary Care Provider: Adri Walker MD    Primary Insurance: MEDICARE  Secondary Insurance: [employer] [facility] [employer]    ASSESSMENT:  Active Health Care Proxies    There are no active Health Care Proxies on file. Advance Directives  Primary Contact: bernie Be [phone]         Readmission Root Cause  30 Day Readmission: Yes  Patient was readmitted due to: MARIANELA    Patient Information  Admitted from[de-identified] Home  Mental Status: Other (Comment) (CHEIKH)  During Assessment patient was accompanied by: Not accompanied during assessment  Assessment information provided by[de-identified] Daughter, Son  Primary Caregiver: Self  Support Systems: Self, Son, Daughter, Family members  [facility]: [address] [place] [address] do you live in?: [address] [facility] entry access options.  Select all that apply.: Stairs  Number of steps to enter home.: 8  Type of Current Residence: 2 story home  Upon entering residence, is there a bedroom on the main floor (no further steps)?: Yes  Upon entering residence, is there a bathroom on the main floor (no further steps)?: Yes  Homeless/housing insecurity resource given?: N/A  Living Arrangements: Lives w/ Son    Activities of Daily Living Prior to Admission  Functional Status: Assistance  Completes ADLs independently?: No  Level of ADL dependence: Assistance  Ambulates independently?: Yes  Does patient use assisted devices?: No  Does patient currently own DME?: No  Does patient have a history of Outpatient Therapy (PT/OT)?: No  Does the patient have a history of Short-Term Rehab?: No  Does patient have a history of HHC?: No  Does patient currently have [facility]?: No    Patient Information Continued  Does patient have prescription coverage?: Yes  Does patient have a history of substance abuse?: No  Does patient have a history of Mental Health Diagnosis?: Yes (Major Depression)  Has patient received inpatient treatment related to mental health in the last 2 years?: [facility]    Means of Transportation  Means of Transport to Appts[de-identified] Family transport        DISCHARGE DETAILS:    Discharge planning discussed with[de-identified] patient's daughter Zach Ugarte & son [facility]     CM contacted family/caregiver?: Yes    Contacts  Patient Contacts: Zach Ugarte  Relationship to Patient[de-identified] Family  Contact Method: Phone  Phone Number: [phone]  Reason/Outcome: Emergency Contact    Additional Comments: Patient lives w/ her son [facility] in a 2 sh 8 nima. Patient does not use any DME. Patient needs prompting w/ ADLs. Patient only says 1 or 2 words at home, no full sentences. Patient only drinks apple juice at home and eats nutty buddys. Patient grabs her stomach in pain at home at times. Daughter works at night, son works during the day. Family states they will try to come up with 24/7 supervision of she is discharged home. Patient was recently discharged from [facility].

## 2023-08-18 NOTE — ED CARE HANDOFF
Emergency Department Sign Out Note        Sign out and transfer of care from Maryann Chahal MD. See Separate Emergency Department note. The patient, Randy Hollis, was evaluated by the previous provider for fall and decreased appetite. Workup Completed:  Significant for MARIANELA    ED Course / Workup Pending (followup):  Pending CT reads for abd/pelvis and c-spine                                  ED Course as of 08/17/23 2241   Thu Aug 17, 2023   2209 S/o: Admission for FTT and MARIANELA, CT pending-fall   2215 CT Abd/Pelvis    IMPRESSION:     Questionable irregular enhancement of the kidneys which may represent infection. Recommend short-term follow-up with urology or nephrology.     Cholelithiasis. 2226 CT C-spine  IMPRESSION:     No cervical spine fracture or traumatic malalignment. 2238 Admitted to [facility]  MDM        Disposition  Final diagnoses:   MARIANELA (acute kidney injury) ([address])   Failure to thrive in adult     Time reflects when diagnosis was documented in both MDM as applicable and the Disposition within this note     Time User Action Codes Description Comment    8/17/2023  9:17 PM Genaro Suazo Add [N17.9] MARIANELA (acute kidney injury) ([address])     8/17/2023 10:37 PM Gonzalez Crawley Add [R62.7] Failure to thrive in adult       ED Disposition     ED Disposition   Admit    Condition   Stable    Date/Time   u Aug 17, 2023 10:37 PM    Comment   Case was discussed with Dino Ryan and the patient's admission status was agreed to be Admission Status: observation status to the service of Dr. Lamont Bains . Follow-up Information    None       Patient's Medications   Discharge Prescriptions    No medications on file     No discharge procedures on file.        ED Provider  Electronically Signed by     Gonzalez Crawley MD  08/17/23 2241

## 2023-08-18 NOTE — ASSESSMENT & PLAN NOTE
· HCTZ stopped after admission 7/28-7/31  · Currently on lisinopril and metoprolol  · Hold lisinopril secondary to MARIANELA  · Hydralazine available prn

## 2023-08-18 NOTE — ASSESSMENT & PLAN NOTE
· CT abdomen/pelvis demonstrating "Questionable irregular enhancement of the kidneys which may represent infection. Recommend short-term follow-up with urology or nephrology.  Cholelithiasis."  · Pt was recently admitted last month and treated for UTI  · WBC 5.51, afebrile, not fulfilling septic criteria  · UA c/w UTI  · Check urine culture and start abx  · Prior culture 7/28 with E coli, sens to keflex  · Due to recurrent infections we will check urinary retention protocol

## 2023-08-18 NOTE — ED NOTES
Pt attempted to use bedpan. Per Dr. Kevin Holley can hold straight catherization for now. Pt took small sips of water.      Alla Acuña, ALISHA  08/17/23 0135

## 2023-08-18 NOTE — PLAN OF CARE
Problem: Potential for Falls  Goal: Patient will remain free of falls  Description: INTERVENTIONS:  - Educate patient/family on patient safety including physical limitations  - Instruct patient to call for assistance with activity   - Consult OT/PT to assist with strengthening/mobility   - Keep Call bell within reach  - Keep bed low and locked with side rails adjusted as appropriate  - Keep care items and personal belongings within reach  - Initiate and maintain comfort rounds  - Make Fall Risk Sign visible to staff  - - Apply yellow socks and bracelet for high fall risk patients  - Consider moving patient to room near nurses station  Outcome: Progressing     Problem: MOBILITY - ADULT  Goal: Maintain or return to baseline ADL function  Description: INTERVENTIONS:  -  Assess patient's ability to carry out ADLs; assess patient's baseline for ADL function and identify physical deficits which impact ability to perform ADLs (bathing, care of mouth/teeth, toileting, grooming, dressing, etc.)  - Assess/evaluate cause of self-care deficits   - Assess range of motion  - Assess patient's mobility; develop plan if impaired  - Assess patient's need for assistive devices and provide as appropriate  - Encourage maximum independence but intervene and supervise when necessary  - Involve family in performance of ADLs  - Assess for home care needs following discharge   - Consider OT consult to assist with ADL evaluation and planning for discharge  - Provide patient education as appropriate  Outcome: Progressing  Goal: Maintains/Returns to pre admission functional level  Description: INTERVENTIONS:  - Perform BMAT or MOVE assessment daily.   - Set and communicate daily mobility goal to care team and patient/family/caregiver.    - Collaborate with rehabilitation services on mobility goals if consulted  - - Out of bed for toileting  - Record patient progress and toleration of activity level   Outcome: Progressing     Problem: PAIN - ADULT  Goal: Verbalizes/displays adequate comfort level or baseline comfort level  Description: Interventions:  - Encourage patient to monitor pain and request assistance  - Assess pain using appropriate pain scale  - Administer analgesics based on type and severity of pain and evaluate response  - Implement non-pharmacological measures as appropriate and evaluate response  - Consider cultural and social influences on pain and pain management  - Notify physician/advanced practitioner if interventions unsuccessful or patient reports new pain  Outcome: Progressing     Problem: INFECTION - ADULT  Goal: Absence or prevention of progression during hospitalization  Description: INTERVENTIONS:  - Assess and monitor for signs and symptoms of infection  - Monitor lab/diagnostic results  - Monitor all insertion sites, i.e. indwelling lines, tubes, and drains  - Monitor endotracheal if appropriate and nasal secretions for changes in amount and color  - [place] appropriate cooling/warming therapies per order  - Administer medications as ordered  - Instruct and encourage patient and family to use good hand hygiene technique  - Identify and instruct in appropriate isolation precautions for identified infection/condition  Outcome: Progressing  Goal: Absence of fever/infection during neutropenic period  Description: INTERVENTIONS:  - Monitor WBC    Outcome: Progressing     Problem: SAFETY ADULT  Goal: Patient will remain free of falls  Description: INTERVENTIONS:  - Educate patient/family on patient safety including physical limitations  - Instruct patient to call for assistance with activity   - Consult OT/PT to assist with strengthening/mobility   - Keep Call bell within reach  - Keep bed low and locked with side rails adjusted as appropriate  - Keep care items and personal belongings within reach  - Initiate and maintain comfort rounds  - Make Fall Risk Sign visible to staff  -- Apply yellow socks and bracelet for high fall risk patients  - Consider moving patient to room near nurses station  Outcome: Progressing  Goal: Maintain or return to baseline ADL function  Description: INTERVENTIONS:  -  Assess patient's ability to carry out ADLs; assess patient's baseline for ADL function and identify physical deficits which impact ability to perform ADLs (bathing, care of mouth/teeth, toileting, grooming, dressing, etc.)  - Assess/evaluate cause of self-care deficits   - Assess range of motion  - Assess patient's mobility; develop plan if impaired  - Assess patient's need for assistive devices and provide as appropriate  - Encourage maximum independence but intervene and supervise when necessary  - Involve family in performance of ADLs  - Assess for home care needs following discharge   - Consider OT consult to assist with ADL evaluation and planning for discharge  - Provide patient education as appropriate  Outcome: Progressing  Goal: Maintains/Returns to pre admission functional level  Description: INTERVENTIONS:  - Perform BMAT or MOVE assessment daily.   - Set and communicate daily mobility goal to care team and patient/family/caregiver.    - Collaborate with rehabilitation services on mobility goals if consulted  -- Out of bed for toileting  - Record patient progress and toleration of activity level   Outcome: Progressing     Problem: DISCHARGE PLANNING  Goal: Discharge to home or other facility with appropriate resources  Description: INTERVENTIONS:  - Identify barriers to discharge w/patient and caregiver  - Arrange for needed discharge resources and transportation as appropriate  - Identify discharge learning needs (meds, wound care, etc.)  - Arrange for interpretive services to assist at discharge as needed  - Refer to Case Management Department for coordinating discharge planning if the patient needs post-hospital services based on physician/advanced practitioner order or complex needs related to functional status, cognitive ability, or social support system  Outcome: Progressing     Problem: Knowledge Deficit  Goal: Patient/family/caregiver demonstrates understanding of disease process, treatment plan, medications, and discharge instructions  Description: Complete learning assessment and assess knowledge base. Interventions:  - Provide teaching at level of understanding  - Provide teaching via preferred learning methods  Outcome: Progressing     Problem: Nutrition/Hydration-ADULT  Goal: Nutrient/Hydration intake appropriate for improving, restoring or maintaining nutritional needs  Description: Monitor and assess patient's nutrition/hydration status for malnutrition. Collaborate with interdisciplinary team and initiate plan and interventions as ordered. Monitor patient's weight and dietary intake as ordered or per policy. Utilize nutrition screening tool and intervene as necessary. Determine patient's food preferences and provide high-protein, high-caloric foods as appropriate.      INTERVENTIONS:  - Monitor oral intake, urinary output, labs, and treatment plans  - Assess nutrition and hydration status and recommend course of action  - Evaluate amount of meals eaten  - Assist patient with eating if necessary   - Allow adequate time for meals  - Recommend/ encourage appropriate diets, oral nutritional supplements, and vitamin/mineral supplements  - Order, calculate, and assess calorie counts as needed  - Recommend, monitor, and adjust tube feedings and TPN/PPN based on assessed needs  - Assess need for intravenous fluids  - Provide specific nutrition/hydration education as appropriate  - Include patient/family/caregiver in decisions related to nutrition  Outcome: Progressing

## 2023-08-18 NOTE — ASSESSMENT & PLAN NOTE
· Patient currently resides with son and ambulates without assistive devices. · patient did have a fall where she fell backwards and hit her head. · Denies LOC or injury known injury per family. On ASA pta  · Trauma work-up in the ED negative:  · CT head "No acute intracranial abnormality. Chronic microangiopathic changes. Old left cerebellar infarct."  · Family reports it is very difficult to get patient to eat or drink. Daughter report loss of over 20 lbs. · Will confer with psychiatry regarding depression possibly contributing and geriatrics, who knows her from recent admission  · Will need to evaluate if due to depression or dementia  · Will start 48h calorie count  · family reports becoming more difficult for patient to ambulate, unsteady on feet, may need assistive device.   · Check orthostatic vitals  · PT/OT eval pending  · Will treat uti  · Family reports not yet interested in facility for patient, would like to set up home health services at son's house if possible

## 2023-08-18 NOTE — SPEECH THERAPY NOTE
Speech Language/Pathology  Speech/Language Pathology  Assessment    Patient Name: Randy Hollis  SSMUC'Z Date: 8/18/2023     Problem List  Principal Problem:    MARIANELA (acute kidney injury) ([address])  Active Problems:    Chronic coronary artery disease    Hypothyroidism    Hyperlipidemia    Hypertension    MDD (major depressive disorder), recurrent episode, severe ([address])    Failure to thrive in adult    Vascular dementia ([address])    Abnormal CT of the abdomen    Past Medical History  Past Medical History:   Diagnosis Date   • Acute MI ([address]) 08/12/2012   • MARIANELA (acute kidney injury) ([address]) 8/17/2023   • Anemia    • Anxiety 07/21/2023   • CAD (coronary artery disease)    • Hyperlipidemia    • Hypertension    • Myocardial infarction ([address]) 03/26/2002   • Thyroid disease    • Ventricular fibrillation ([address]) 08/12/2012     Past Surgical History  Past Surgical History:   Procedure Laterality Date   • COLONOSCOPY  2013   • CORONARY ARTERY BYPASS GRAFT  2003        Bedside Swallow Evaluation:    Summary:  Pt presented w/ mild oral dysphagia and pharyngeal stage of swallow WNL. Pt positioned upright and alert. Pt with assistance from ST able to initiate self feed. Min facial grimicing to po noted. However did accepted trials of marbella cracker and thin liquids via straw with single/successive sips. Mastication was mildly prolonged however eventual functional breakdown. Bolus control, formation, and transfer were WNL. Swallows were prompt. No overt s/s of aspiration. Nursing administered medications whole w/ thin liquids, appeared to tolerate. Pt facial grimacing intermittently with po and medication presentation. Benefits from encouragement. When po presented at lips pt will open oral cavity to accept. Pt nonverbal throughout session. Able to follow basic cues with repetition. Recommendations:  Diet: Dysphagia 3 dental   Liquid: thin  Meds: crushed   Supervision: Full/Assist encourage self feed as able. Positioning:Upright  Strategies: Alternate liquids with solids, monitor swallow prior to additional po, encourage intake, and small sips/bites. Pt to take PO/Meds only when fully alert and upright. Oral care  Aspiration precautions  Reflux precautions  Therapy Prognosis: guarded   Prognosis considerations: cognitive status, age, medical status  Frequency: 2-5 times weekly     Consider consult w/:  Rehab  Nutrition    Goal(s):  Dysphagia LTG  -Patient will demonstrate safe and effective oral intake (without overt s/s significant oral/pharyngeal dysphagia including s/s penetration or aspiration) for the highest appropriate diet level. 1.Pt will tolerate least restrictive diet w/out s/s aspiration or oral/pharyngeal difficulties. 2.Pt will will effectively manipulate/masticate and transfer purees/solids w/out s/s dysphagia/aspiration. 3.Pt will tolerate thin liquids w/out s/s aspiration.   -If indicated, patient will comply with a Video/Modified Barium Swallow study for more complete assessment of swallowing anatomy/physiology/aspiration risk and to assess efficacy of treatment techniques so as to best guide treatment plan    H&P/Admit info/ pertinent provider notes: (PMH noted above)  Storm Montez is a 70 y.o. female who presents with MARIANELA and failure to thrive. Patient reported to be nonverbal at baseline by family. Patient not answering questions, childlike, and seeming scared and turning toward sister during physical exam.  Sister and niece at the bedside reporting that patient had a fall at home today where she became weak and fell backwards hitting her head. They report that she has had very low p.o. intake since being discharged from inpatient psych on 8/11.   Patient currently resides with her son and does not use any assistive devices but they have not noted that patient seems more unsteady on her feet secondary to her low p.o. intake and they believe that she would benefit from using some assistive devices. Patient reports that during today's fall patient did hit her head but did not lose consciousness and is not anticoagulated. They report no other known injuries. Family reports that besides patient's low p.o. intake her mood has been stable since being discharged from inpatient psych. No other symptoms that they have noted from the patient. Spoke with daughter on phone who is the POA who confirms patient is a full code. Daughter reports that patient has lost 20 pounds over the last month secondary to this low p.o. intake. Patient's daughter reports that she would be interested in hearing about possible medical options to increase p.o. intake or possible PEG tube if needed. Special Studies:  CT head wo contrast (08/17/2023) Impression   No acute intracranial abnormality. Chronic microangiopathic changes. Old left cerebellar infarct. MRI brain wo contrast (07/30/2023) Impression   No acute intracranial pathology. Old left cerebellar infarct. . Chronic microangiopathy and old lacunar infarcts. Previous MBS: none    Patient's goal: n/a     Did the pt report pain? no  If yes, was nursing notified/was it addressed?n/a    Reason for consult:  R/o aspiration  Determine safest and least restrictive diet  poor intake  weight loss     Precautions:  Fall    Food Allergies: No known    Current Diet: Regular and thin    Premorbid diet: Regular and thin    O2 requirement: Room Air    Social/Prior living Lives with chilren    Voice/Speech:  nonverbal    Follows commands: Basic w/ repetition   Cognitive status: Hx of dementia      Oral [facility] exam:  Dentition: natural, adequate   Lips (VII): WNL  Tongue (XII):WNL  Mandible (V):WNL  Face/oral sensation (V):WNL  Velum (X):WNL  Oral care     Items administered:  Ja cracker, thin liquids via straw w/ single/successive sips. Nursing administered whole pills. Oral stage:  Lip closure: WNL  Mastication:WNLWNL  Bolus formation:  Bolus control:WNL  Transfer: WNL    Pharyngeal stage:  Swallow promptness: prompt  Laryngeal rise:adequate  No overt s/s aspiration    Esophageal stage:  No s/s reported  H/o GERD    Aspiration precautions posted    Results d/w:  Pt, nursing, physician,

## 2023-08-18 NOTE — H&P
[facility]  H&P  Name: Ronny Bhatti 70 y.o. female I MRN: [id number]  Unit/Bed#: E5 -01 I Date of Admission: 8/17/2023   Date of Service: 8/18/2023 I Hospital Day: 0      Assessment/Plan   * MARIANELA (acute kidney injury) [facility])  Assessment & Plan  · Patient with PMHx of severe depression, vascular dementia, nonverbal, HTN, HLD, hypothyroidism, and CAD presented to the ED with sister and niece in the setting of low p.o. intake leading to MARIANELA and fall from generalized weakness  · Patient recently admitted to [address] [place] [address] for acute metabolic encephalopathy in the setting of UTI 7/28-7/31. She was then transferred to [facility] for inpatient psychiatric treatment of depression and vascular dementia  · POA creatinine 1.42, baseline 0.5-0.6  · Urinary retention protocol  · Daily weights, I/O  · IV fluids  · Monitor BMP for improvement     Failure to thrive in adult  Assessment & Plan  · Patient currently resides with son and ambulates without assistive devices. Secondary to low p.o. intake, patient did have a fall where she fell backwards and hit her head. · Denies LOC or injury known injury per family, not anticoagulated  · Family reports it is very difficult to get patient to eat or drink. Daughter report loss of over 20 lbs. Reports becoming more difficult for patient to ambulate, unsteady on feet, may need assistive device. · Trauma work-up in the ED negative  · CT head "No acute intracranial abnormality. Chronic microangiopathic changes.   Old left cerebellar infarct."  · CT spine "No cervical spine fracture or traumatic malalignment."  · Monitor on telemetry  · Nutrition, Geriatric, speech consult  · GI consult as family considering possible PEG tube if this continues  · Family reports not yet interested in facility for patient, would like to set up home health services at son's house if possible   · PT/OT consult pending    Abnormal CT of the abdomen  Assessment & Plan  · CT abdomen/pelvis demonstrating "Questionable irregular enhancement of the kidneys which may represent infection. Recommend short-term follow-up with urology or nephrology. Cholelithiasis."  · Pt was recently admitted last month and treated for UTI  · WBC 5.51, afebrile, not fulfilling septic criteria  · UA pending  · Would recommend administration of ABX if UA demonstrates evidence of infection once resulted    Vascular dementia [facility])  Assessment & Plan  · Patient nonverbal at baseline   · Noted to have white matter changes on MRI brain 7/30  · Continue aspirin and statin therapy  · Geriatrics consult     MDD (major depressive disorder), recurrent episode, severe ([address])  Assessment & Plan  · According to family, mood has been stable at home besides decreased p.o. intake   · Pt recently 201 admitted to [facility] for management of severe depression 7/31-8/11  · Discharged on 10 mg Celexa, 25 mg seroquel h.s., and melatonin, continue  · Psych consult pending    Hypertension  Assessment & Plan  · HCTZ stopped after admission 7/28-7/31  · Currently on lisinopril and metoprolol  · Hold lisinopril secondary to MARIANELA  · Hydralazine available prn    Hyperlipidemia  Assessment & Plan  · Continue statin    Hypothyroidism  Assessment & Plan  · Continue levothyroxine   · TSH elevated last admission with question of compliance  · Will recheck    Chronic coronary artery disease  Assessment & Plan  · Hx of CABG  · Continue outpatient follow-up with cardiology  · Continue aspirin and statin       VTE Pharmacologic Prophylaxis: VTE Score: 2 Low Risk (Score 0-2) - Encourage Ambulation. Code Status: Level 1 - Full Code   Discussion with family: Updated  (sister and niece) at bedside. Daughter, POA, contacted via phone. Anticipated Length of Stay: Patient will be admitted on an observation basis with an anticipated length of stay of less than 2 midnights secondary to MARIANELA, failure to thrive.     Total Time Spent on Date of Encounter in care of patient: 75 minutes This time was spent on one or more of the following: performing physical exam; counseling and coordination of care; obtaining or reviewing history; documenting in the medical record; reviewing/ordering tests, medications or procedures; communicating with other healthcare professionals and discussing with patient's family/caregivers. Chief Complaint: "She hasn't been eating and she fell"    History of Present Illness:  Sayda Wallace is a 70 y.o. female who presents with MARIANELA and failure to thrive. Patient reported to be nonverbal at baseline by family. Patient not answering questions, childlike, and seeming scared and turning toward sister during physical exam.  Sister and niece at the bedside reporting that patient had a fall at home today where she became weak and fell backwards hitting her head. They report that she has had very low p.o. intake since being discharged from inpatient psych on 8/11. Patient currently resides with her son and does not use any assistive devices but they have not noted that patient seems more unsteady on her feet secondary to her low p.o. intake and they believe that she would benefit from using some assistive devices. Patient reports that during today's fall patient did hit her head but did not lose consciousness and is not anticoagulated. They report no other known injuries. Family reports that besides patient's low p.o. intake her mood has been stable since being discharged from inpatient psych. No other symptoms that they have noted from the patient. Spoke with daughter on phone who is the POA who confirms patient is a full code. Daughter reports that patient has lost 20 pounds over the last month secondary to this low p.o. intake. Patient's daughter reports that she would be interested in hearing about possible medical options to increase p.o. intake or possible PEG tube if needed.     Review of Systems:  Review of Systems   Unable to perform ROS: Patient nonverbal       Past Medical and Surgical History:   Past Medical History:   Diagnosis Date   • Acute MI ([address]) 08/12/2012   • MARIANELA (acute kidney injury) ([address]) 8/17/2023   • Anemia    • Anxiety 07/21/2023   • CAD (coronary artery disease)    • Hyperlipidemia    • Hypertension    • Myocardial infarction ([address]) 03/26/2002   • Thyroid disease    • Ventricular fibrillation ([address]) 08/12/2012       Past Surgical History:   Procedure Laterality Date   • COLONOSCOPY  2013   • CORONARY ARTERY BYPASS GRAFT  2003       Meds/Allergies:  Prior to Admission medications    Medication Sig Start Date End Date Taking?  Authorizing Provider   Ascorbic Acid (vitamin C) 1000 MG tablet Take 1,000 mg by mouth daily    Historical Provider, MD   aspirin 81 mg chewable tablet Chew 1 tablet (81 mg total) daily 8/11/23   Estefanía Reid PA-C   atorvastatin (LIPITOR) 40 mg tablet Take 1 tablet (40 mg total) by mouth daily with dinner 8/11/23   Estefanía Reid PA-C   Blood Pressure KIT Use in the morning 7/8/22   [place] MD   cholecalciferol (VITAMIN D3) 1,000 units tablet Take 1 tablet (1,000 Units total) by mouth daily Do not start before August 12, 2023. 8/12/23   Estefanía Reid PA-C   citalopram (CeleXA) 10 mg tablet Take 1 tablet (10 mg total) by mouth daily 8/10/23   SOLO Guzman   cyanocobalamin 1,000 mcg/mL Inject 1 mL (1,000 mcg total) into a muscle every 30 (thirty) days Do not start before September 2, 2023. 9/2/23   Estefanía Reid PA-C   docusate sodium (COLACE) 100 mg capsule Take 1 capsule (100 mg total) by mouth 2 (two) times a day 8/11/23   Estefanía Reid PA-C   ergocalciferol (VITAMIN D2) 50,000 units Take 1 capsule (50,000 Units total) by mouth once a week for 5 doses Do not start before August 17, 2023. 8/17/23 9/15/23  Estefanía Reid PA-C   ezetimibe (ZETIA) 10 mg tablet Take 1 tablet (10 mg total) by mouth daily 8/11/23   Estefanía Reid PA-C   hydrochlorothiazide (HYDRODIURIL) 25 mg tablet Take 1 tablet (25 mg total) by mouth daily Do not start before 2023. 23   Estefanía JAMISON CARROLL Reid   levothyroxine (Euthyrox) 175 mcg tablet Take 1 tablet (175 mcg total) by mouth daily in the early morning 23   Estefanía JAMISON CARROLL Reid   lisinopril (ZESTRIL) 20 mg tablet Take 1 tablet (20 mg total) by mouth daily Do not start before 2023. 23   Estefanía JAMISON KIMMY ReidC   metoprolol succinate (TOPROL-XL) 25 mg 24 hr tablet Take 0.5 tablets (12.5 mg total) by mouth daily 23   Estefanía YAQUELIN Reid PA-C   multivitamin-iron-minerals-folic acid (CENTRUM) chewable tablet Chew 1 tablet daily    Historical Provider, MD   polyethylene glycol (MIRALAX) 17 g packet Take 17 g by mouth daily Do not start before 2023. 23   Estefanía JAMISON CARROLL Reid   QUEtiapine (SEROquel) 25 mg tablet Take 1 tablet (25 mg total) by mouth daily at bedtime 8/10/23   SOLO Ruiz     I have reviewed home medications using recent Epic encounter. Allergies:    Allergies   Allergen Reactions   • Cat Hair Extract Sneezing       Social History:  Marital Status:    Patient Pre-hospital Living Situation: Home, With other family member: son  Patient Pre-hospital Level of Mobility: walks  Patient Pre-hospital Diet Restrictions: none  Substance Use History:   Social History     Substance and Sexual Activity   Alcohol Use Not Currently     Social History     Tobacco Use   Smoking Status Former   • Packs/day: 0.25   • Years: 22.00   • Total pack years: 5.50   • Types: Cigarettes   • Start date: 1971   • Quit date: 1993   • Years since quittin.6   • Passive exposure: Never   Smokeless Tobacco Never   Tobacco Comments    no passive smoke exposure     Social History     Substance and Sexual Activity   Drug Use No       Family History:  Family History   Problem Relation Age of Onset   • Hypertension Mother    • Stroke Mother         3 strokes   • Heart attack Father • No Known Problems Sister    • No Known Problems Daughter    • No Known Problems Maternal Grandmother    • No Known Problems Maternal Grandfather    • No Known Problems Paternal Grandmother    • No Known Problems Paternal Grandfather    • No Known Problems Maternal Aunt    • No Known Problems Maternal Aunt    • No Known Problems Maternal Aunt    • Breast cancer Paternal Aunt 53   • No Known Problems Paternal Aunt        Physical Exam:     Vitals:   Blood Pressure: 108/54 (08/17/23 2357)  Pulse: (!) 50 (08/17/23 2357)  Temperature: 97.7 °F (36.5 °C) (08/17/23 2357)  Temp Source: Oral (08/17/23 1946)  Respirations: 16 (08/17/23 2357)  Weight - Scale: 52 kg (114 lb 10.2 oz) (08/17/23 1946)  SpO2: 98 % (08/17/23 2357)    Physical Exam  Vitals and nursing note reviewed. Constitutional:       General: She is not in acute distress. Appearance: She is well-developed. She is cachectic. HENT:      Head: Normocephalic and atraumatic. Nose: Nose normal. No congestion. Mouth/Throat:      Mouth: Mucous membranes are dry. Pharynx: Oropharynx is clear. Eyes:      Conjunctiva/sclera: Conjunctivae normal.   Cardiovascular:      Rate and Rhythm: Normal rate and regular rhythm. Heart sounds: Normal heart sounds. No murmur heard. No friction rub. No gallop. Pulmonary:      Effort: Pulmonary effort is normal. No respiratory distress. Breath sounds: Normal breath sounds. No wheezing, rhonchi or rales. Abdominal:      General: Bowel sounds are normal. There is no distension. Palpations: Abdomen is soft. Tenderness: There is no abdominal tenderness. Musculoskeletal:      Cervical back: Neck supple. Right lower leg: No edema. Left lower leg: No edema. Skin:     General: Skin is warm and dry. Capillary Refill: Capillary refill takes less than 2 seconds. Neurological:      General: No focal deficit present. Mental Status: She is alert.  Mental status is at baseline. Psychiatric:         Mood and Affect: Mood normal.      Comments: Patient not talking or answer questions, family reports nonverbal at baseline. Child like, becoming scared and leaning to sister during physical exam.            Additional Data:     Lab Results:  Results from last 7 days   Lab Units 08/17/23 1941   WBC Thousand/uL 5.51   HEMOGLOBIN g/dL 12.7   HEMATOCRIT % 38.6   PLATELETS Thousands/uL 297   NEUTROS PCT % 73   LYMPHS PCT % 17   MONOS PCT % 9   EOS PCT % 1     Results from last 7 days   Lab Units 08/17/23 1941   SODIUM mmol/L 137   POTASSIUM mmol/L 4.1   CHLORIDE mmol/L 102   CO2 mmol/L 22   BUN mg/dL 79*   CREATININE mg/dL 1.42*   ANION GAP mmol/L 13   CALCIUM mg/dL 9.9   ALBUMIN g/dL 4.1   TOTAL BILIRUBIN mg/dL 0.96   ALK PHOS U/L 56   ALT U/L 27   AST U/L 18   GLUCOSE RANDOM mg/dL 139     Results from last 7 days   Lab Units 08/17/23 1941   INR  1.10             Results from last 7 days   Lab Units 08/17/23 1941   LACTIC ACID mmol/L 1.4   PROCALCITONIN ng/ml 0.10       Lines/Drains:  Invasive Devices     Peripheral Intravenous Line  Duration           Peripheral IV 08/17/23 Left Antecubital <1 day                    Imaging: Reviewed radiology reports from this admission including: abdominal/pelvic CT, CT head and CT spine   CT abdomen pelvis with contrast   Final Result by Haylie Byrd DO (08/17 2210)      Questionable irregular enhancement of the kidneys which may represent infection. Recommend short-term follow-up with urology or nephrology. Cholelithiasis. Workstation performed: [id number]         CT head wo contrast   Final Result by Lauro Stephens MD (08/17 2150)      No acute intracranial abnormality. Chronic microangiopathic changes. Old left cerebellar infarct. Workstation performed: [id number]         CT spine cervical without contrast   Final Result by Haylie Byrd DO (08/17 2225)      No cervical spine fracture or traumatic malalignment. Workstation performed: [id number]             EKG and Other Studies Reviewed on Admission:   · EKG: Sinus Bradycardia. HR 59.    ** Please Note: This note has been constructed using a voice recognition system.  **

## 2023-08-18 NOTE — PLAN OF CARE
Problem: Potential for Falls  Goal: Patient will remain free of falls  Description: INTERVENTIONS:  - Educate patient/family on patient safety including physical limitations  - Instruct patient to call for assistance with activity   - Consult OT/PT to assist with strengthening/mobility   - Keep Call bell within reach  - Keep bed low and locked with side rails adjusted as appropriate  - Keep care items and personal belongings within reach  - Initiate and maintain comfort rounds  - Make Fall Risk Sign visible to staff  - Apply yellow socks and bracelet for high fall risk patients  - Consider moving patient to room near nurses station  Outcome: Progressing     Problem: MOBILITY - ADULT  Goal: Maintain or return to baseline ADL function  Description: INTERVENTIONS:  -  Assess patient's ability to carry out ADLs; assess patient's baseline for ADL function and identify physical deficits which impact ability to perform ADLs (bathing, care of mouth/teeth, toileting, grooming, dressing, etc.)  - Assess/evaluate cause of self-care deficits   - Assess range of motion  - Assess patient's mobility; develop plan if impaired  - Assess patient's need for assistive devices and provide as appropriate  - Encourage maximum independence but intervene and supervise when necessary  - Involve family in performance of ADLs  - Assess for home care needs following discharge   - Consider OT consult to assist with ADL evaluation and planning for discharge  - Provide patient education as appropriate  Outcome: Progressing  Goal: Maintains/Returns to pre admission functional level  Description: INTERVENTIONS:  - Perform BMAT or MOVE assessment daily.   - Set and communicate daily mobility goal to care team and patient/family/caregiver.    - Collaborate with rehabilitation services on mobility goals if consulted  - Out of bed for toileting  - Record patient progress and toleration of activity level   Outcome: Progressing     Problem: PAIN - ADULT  Goal: Verbalizes/displays adequate comfort level or baseline comfort level  Description: Interventions:  - Encourage patient to monitor pain and request assistance  - Assess pain using appropriate pain scale  - Administer analgesics based on type and severity of pain and evaluate response  - Implement non-pharmacological measures as appropriate and evaluate response  - Consider cultural and social influences on pain and pain management  - Notify physician/advanced practitioner if interventions unsuccessful or patient reports new pain  Outcome: Progressing     Problem: INFECTION - ADULT  Goal: Absence or prevention of progression during hospitalization  Description: INTERVENTIONS:  - Assess and monitor for signs and symptoms of infection  - Monitor lab/diagnostic results  - Monitor all insertion sites, i.e. indwelling lines, tubes, and drains  - Monitor endotracheal if appropriate and nasal secretions for changes in amount and color  - [place] appropriate cooling/warming therapies per order  - Administer medications as ordered  - Instruct and encourage patient and family to use good hand hygiene technique  - Identify and instruct in appropriate isolation precautions for identified infection/condition  Outcome: Progressing  Goal: Absence of fever/infection during neutropenic period  Description: INTERVENTIONS:  - Monitor WBC    Outcome: Progressing     Problem: SAFETY ADULT  Goal: Patient will remain free of falls  Description: INTERVENTIONS:  - Educate patient/family on patient safety including physical limitations  - Instruct patient to call for assistance with activity   - Consult OT/PT to assist with strengthening/mobility   - Keep Call bell within reach  - Keep bed low and locked with side rails adjusted as appropriate  - Keep care items and personal belongings within reach  - Initiate and maintain comfort rounds  - Make Fall Risk Sign visible to staff  -- Apply yellow socks and bracelet for high fall risk patients  - Consider moving patient to room near nurses station  Outcome: Progressing  Goal: Maintain or return to baseline ADL function  Description: INTERVENTIONS:  -  Assess patient's ability to carry out ADLs; assess patient's baseline for ADL function and identify physical deficits which impact ability to perform ADLs (bathing, care of mouth/teeth, toileting, grooming, dressing, etc.)  - Assess/evaluate cause of self-care deficits   - Assess range of motion  - Assess patient's mobility; develop plan if impaired  - Assess patient's need for assistive devices and provide as appropriate  - Encourage maximum independence but intervene and supervise when necessary  - Involve family in performance of ADLs  - Assess for home care needs following discharge   - Consider OT consult to assist with ADL evaluation and planning for discharge  - Provide patient education as appropriate  Outcome: Progressing  Goal: Maintains/Returns to pre admission functional level  Description: INTERVENTIONS:  - Perform BMAT or MOVE assessment daily.   - Set and communicate daily mobility goal to care team and patient/family/caregiver.    - Collaborate with rehabilitation services on mobility goals if consulted  - - Out of bed for toileting  - Record patient progress and toleration of activity level   Outcome: Progressing     Problem: DISCHARGE PLANNING  Goal: Discharge to home or other facility with appropriate resources  Description: INTERVENTIONS:  - Identify barriers to discharge w/patient and caregiver  - Arrange for needed discharge resources and transportation as appropriate  - Identify discharge learning needs (meds, wound care, etc.)  - Arrange for interpretive services to assist at discharge as needed  - Refer to Case Management Department for coordinating discharge planning if the patient needs post-hospital services based on physician/advanced practitioner order or complex needs related to functional status, cognitive ability, or social support system  Outcome: Progressing     Problem: Knowledge Deficit  Goal: Patient/family/caregiver demonstrates understanding of disease process, treatment plan, medications, and discharge instructions  Description: Complete learning assessment and assess knowledge base. Interventions:  - Provide teaching at level of understanding  - Provide teaching via preferred learning methods  Outcome: Progressing     Problem: Nutrition/Hydration-ADULT  Goal: Nutrient/Hydration intake appropriate for improving, restoring or maintaining nutritional needs  Description: Monitor and assess patient's nutrition/hydration status for malnutrition. Collaborate with interdisciplinary team and initiate plan and interventions as ordered. Monitor patient's weight and dietary intake as ordered or per policy. Utilize nutrition screening tool and intervene as necessary. Determine patient's food preferences and provide high-protein, high-caloric foods as appropriate.      INTERVENTIONS:  - Monitor oral intake, urinary output, labs, and treatment plans  - Assess nutrition and hydration status and recommend course of action  - Evaluate amount of meals eaten  - Assist patient with eating if necessary   - Allow adequate time for meals  - Recommend/ encourage appropriate diets, oral nutritional supplements, and vitamin/mineral supplements  - Order, calculate, and assess calorie counts as needed  - Recommend, monitor, and adjust tube feedings and TPN/PPN based on assessed needs  - Assess need for intravenous fluids  - Provide specific nutrition/hydration education as appropriate  - Include patient/family/caregiver in decisions related to nutrition  Outcome: Progressing

## 2023-08-18 NOTE — PROGRESS NOTES
[address] [facility]  Progress Note  Name: Jaspal Mata  MRN: [id number]  Unit/Bed#: E5 -01 I Date of Admission: 8/17/2023   Date of Service: 8/18/2023 I Hospital Day: 0    Assessment/Plan   * MARIANELA (acute kidney injury) [facility])  Assessment & Plan  Patient with PMHx of severe depression, HTN, HLD, hypothyroidism, and CAD presented to the ED with sister and niece in the setting of low p.o. intake leading to MARIANELA and fall from generalized weakness  · Patient recently admitted to [facility] for acute metabolic encephalopathy in the setting of UTI 7/28-7/31. She was then transferred to [facility] for inpatient psychiatric treatment of depression and vascular dementia  · Pt presented with MARIANELA with creatinine 1.42, increased from baseline 0.5-0.6  · In the setting of poor po intake, clinical dehydration, failure to thrive  · Creatine normalized with ivf  · Monitor po intake:  Geriatrics/psychiatry consulted    Bradycardia  Assessment & Plan  · Patient with sinus bradycardia on telemetry, and EKG  · No evidence of significant heart block  · Most likely related to long acting beta-blocker: Patient has lost approximately 20 pounds, beta-blocker may currently be too strong for her body weight  · Hold beta-blocker and monitor on telemetry    Acute cystitis without hematuria  Assessment & Plan  · CT abdomen/pelvis demonstrating "Questionable irregular enhancement of the kidneys which may represent infection. Recommend short-term follow-up with urology or nephrology. Cholelithiasis."  · Pt was recently admitted last month and treated for UTI  · WBC 5.51, afebrile, not fulfilling septic criteria  · UA c/w UTI  · Check urine culture and start abx  · Prior culture 7/28 with E coli, sens to keflex  · Due to recurrent infections we will check urinary retention protocol    Failure to thrive in adult  Assessment & Plan  · Patient currently resides with son and ambulates without assistive devices.    · patient did have a fall where she fell backwards and hit her head. · Denies LOC or injury known injury per family. On ASA pta  · Trauma work-up in the ED negative:  · CT head "No acute intracranial abnormality. Chronic microangiopathic changes. Old left cerebellar infarct."  · Family reports it is very difficult to get patient to eat or drink. Daughter report loss of over 20 lbs. · Will confer with psychiatry regarding depression possibly contributing and geriatrics, who knows her from recent admission  · Will need to evaluate if due to depression or dementia  · Will start 48h calorie count  · family reports becoming more difficult for patient to ambulate, unsteady on feet, may need assistive device. · Check orthostatic vitals  · PT/OT eval pending  · Will treat uti  · Family reports not yet interested in facility for patient, would like to set up home health services at son's house if possible     Hypotension  Assessment & Plan  · Pt has h/o HTN, however was hypotensive in ER with bp 87/51:  Improved with IVF  · HCTZ stopped after admission 7/28-7/31  · Currently on lisinopril 20mg and metoprolol xl 12.5mg prior to admission  · Hold lisinopril secondary to MARIANELA  · Hold b-blocker due to bradycardia  · bp 101/51 this am, cont to monitor  · Cont ivf fluids due to poor po intake    Vascular dementia ([address])  Assessment & Plan  · Question of dementia on prior records:  No formal diagnosis or neurology consultation noted   · MRI brain 7/30: white matter changes on aspirin and statin therapy  · eval in progress:  Reported family reports of non-verbal, however on prior records she was noted to be oriented x 2, or x3 at exams  · Appreciate geriatrics eval  · tsh wnl  · Folate wnl  · B12 <goal of 400 (was 300);   Will start b12 injections and dc on oral    MDD (major depressive disorder), recurrent episode, severe ([address])  Assessment & Plan  · According to family, mood has been stable at home besides decreased p.o. intake   · Pt recently 10 admitted to [facility] for management of severe depression 7/31-8/11  · Discharged on 10 mg Celexa, 25 mg seroquel h.s., and melatonin, continue  · Psych consult pending    Hyperlipidemia  Assessment & Plan  · Continue statin    Hypothyroidism  Assessment & Plan  · Continue levothyroxine   · TSH elevated last admission with question of compliance  · Repeat on admission normal    Chronic coronary artery disease  Assessment & Plan  · Hx of CABG  · Continue outpatient follow-up with cardiology, Dr. Nir Edward  · Continue aspirin and statin  · Hold b-blocker due to bradycardia                 Family:  Called daughter Anabelle Villela and LM to call my cell phone for update    dw pts nurse  Will dw cm on rounds  D/w Geriatrics team Tasha BANDA    VTE Pharmacologic Prophylaxis: Heparin  VTE Mechanical Prophylaxis: sequential compression device        Certification Statement: The patient will continue to require additional inpatient hospital stay due to need for further acute intervention for failure to thrive, 20lb unintentional weight loss, poor po intake, kvng, hypotension and bradycardia    Status: inpatient     Time spent today including attempted interview, physical exam, review of lab work, and radiology reports from this admission, as well as discharge summary from previous 7/2023 medical admission, and discharge from recent psychiatric hospitalization where Celexa and Seroquel were started, review of outpatient PCP office notes where she was diagnosed with depression and recommended to start Celexa, as well as previous notes from several months ago where she was recommended to start Zoloft. Reviewed outpatient PCP office notes where she was noted to have adjustment reaction in 2019 after tragically losing her job, then later notes where she had to sell her house and moved in with her children.   Last discussion with geriatrics team total time spent today 78 minutes    ===================================================================    Subjective:  Patient is sleeping, however awakens to voice and touch. Makes eye contact. Answers questions occasionally with nodding/shaking her head. However to other questions she just looks away. Patient does not communicate responses to whether she is having pain, however she massages her anterior neck. She indicates she is not hungry and does not want breakfast.  She drinks several sips of water when it is offered however denies pain with swallowing. Does not communicate responses to any other questions      Physical Exam:   Temp:  [97.7 °F (36.5 °C)-98.5 °F (36.9 °C)] 97.9 °F (36.6 °C)  HR:  [47-65] 50  Resp:  [12-20] 16  BP: ()/(51-63) 103/51    Gen:  Pleasant, non-tachypnic, non-dyspnic. Makes eye contact. Appears restless. HEENT: Oropharynx clear. No erythema or exudates. No cobblestoning. Heart: regular rate and rhythm, S1S2 present, no murmur, rub or gallop  Lungs: clear to ausculatation bilaterally. No wheezing, crackles, or rhonchi. No accessory muscle use or respiratory distress. Abd: soft, non-tender with palpation, non-distended. NABS, no guarding, rebound or peritoneal signs. Extremities: no clubbing, cyanosis or edema. 2+pedal pulses bilaterally. Neuro: awake, alert. No current somnolence or lethargy. Makes eye contact occasionally responds to questions by nodding/shaking her head. For remaining questions she looks away. Opens her mouth upon request for oropharynx exam.  Squeezes hands bilaterally: 4/5 bilaterally: Appears to have limited effort. Ankle flexion 4/5 bilaterally, again with possible limited effort. Wiggles all toes  Skin: warm and dry: no petechiae, purpura and rash.     LABS:   Results from last 7 days   Lab Units 08/18/23  0516 08/17/23  1941   WBC Thousand/uL 6.65 5.51   HEMOGLOBIN g/dL 11.2* 12.7   HEMATOCRIT % 33.0* 38.6   PLATELETS Thousands/uL 252 297     Results from last 7 days   Lab Units 08/18/23  0516 08/17/23  1941   POTASSIUM mmol/L 4.0 4.1   CHLORIDE mmol/L 106 102   CO2 mmol/L 22 22   BUN mg/dL 57* 79*   CREATININE mg/dL 0.77 1.42*   CALCIUM mg/dL 9.0 9.9       Hospital Data:    8/17 CT cervical spineNo cervical spine fracture or traumatic malalignment    8/17 CT head:No acute intracranial abnormality. Chronic microangiopathic changes. Old left cerebellar infarct    8/17 CT abdomen/pelvis:  Questionable irregular enhancement of the kidneys which may represent infection. Recommend short-term follow-up with urology or nephrology. Cholelithiasis    Microbiology:  Urine culture: Pending        ---------------------------------------------------------------------------------------------------------------  This note has been constructed using a voice recognition system.

## 2023-08-18 NOTE — ASSESSMENT & PLAN NOTE
· Patient with PMHx of severe depression, vascular dementia, nonverbal, HTN, HLD, hypothyroidism, and CAD presented to the ED with sister and niece in the setting of low p.o. intake leading to MARIANELA and fall from generalized weakness  · Patient recently admitted to [facility] for acute metabolic encephalopathy in the setting of UTI 7/28-7/31.  She was then transferred to [place] for inpatient psychiatric treatment of depression and vascular dementia  · POA creatinine 1.42, baseline 0.5-0.6  · Urinary retention protocol  · Daily weights, I/O  · IV fluids  · Monitor BMP for improvement

## 2023-08-18 NOTE — ASSESSMENT & PLAN NOTE
· According to family, mood has been stable at home besides decreased p.o. intake   · Pt recently 89 admitted to [facility] for management of severe depression 7/31-8/11  · Discharged on 10 mg Celexa, 25 mg seroquel h.s., and melatonin, continue  · Psych consult pending

## 2023-08-18 NOTE — PLAN OF CARE
Problem: PHYSICAL THERAPY ADULT  Goal: Performs mobility at highest level of function for planned discharge setting. See evaluation for individualized goals. Description: Treatment/Interventions: Functional transfer training, LE strengthening/ROM, Elevations, Therapeutic exercise, Endurance training, Patient/family training, Equipment eval/education, Bed mobility, Gait training, Compensatory technique education, Continued evaluation, Spoke to nursing  Equipment Recommended: Other (Comment) (monitor)       See flowsheet documentation for full assessment, interventions and recommendations. Note: Prognosis: Fair  Problem List: Decreased strength, Decreased endurance, Impaired balance, Decreased mobility, Decreased cognition, Impaired judgement, Decreased safety awareness  Assessment: Aicha Leal is a 70 y.o. female who presents with MARIANELA and failure to thrive, bradycardia, acute cystitis, hypotension. Patient reported to be nonverbal at baseline by family. Decreased po intake since inpatient psych on 8/11. Resides with son and ambulates without AD.  + fall at home. Pt with hx of vascular dementia, MDD, HTN, hyperlipidemia, hypothyroidism, CAD. PT consulted. Ambulate orders. Prior to admission reported to reside with son and ambulate without AD at baseline. Currently presents with functional limitations related to impairments in communication, decreased activity tolerance, balance, general strength, decreased functional mobility and locomotion requiring increased assistance. Cassi for bed mobiltiy, transfers and ambulation with hand held assist short distances. Gait slowed, shuffling/staggering with decreased foot clearance and step length. BP supine 98/41, sitting 96/51 and p mobility 103/54. Risk for falls given impairments. Will benefit from skilled PT in order to optimize functional outcomes. The patient's AM-PAC Basic Mobility Inpatient Short Form Raw Score is 17.  A Raw score of less than or equal to 16 suggests the patient may benefit from discharge to post-acute rehabilitation services. Please also refer to the recommendation of the Physical Therapist for safe discharge planning. May benefit from STR in order to optimize outcomes. Will require 24* supportive environment. PT will follow 3-5x/wk to progress towards goals. PT Discharge Recommendation: Post acute rehabilitation services (vs home with 24* care.)    See flowsheet documentation for full assessment.

## 2023-08-18 NOTE — ASSESSMENT & PLAN NOTE
· Patient currently resides with son and ambulates without assistive devices. Secondary to low p.o. intake, patient did have a fall where she fell backwards and hit her head. · Denies LOC or injury known injury per family, not anticoagulated  · Family reports it is very difficult to get patient to eat or drink. Daughter report loss of over 20 lbs. Reports becoming more difficult for patient to ambulate, unsteady on feet, may need assistive device. · Trauma work-up in the ED negative  · CT head "No acute intracranial abnormality. Chronic microangiopathic changes.   Old left cerebellar infarct."  · CT spine "No cervical spine fracture or traumatic malalignment."  · Monitor on telemetry  · Nutrition, Geriatric, speech consult  · GI consult as family considering possible PEG tube if this continues  · Family reports not yet interested in facility for patient, would like to set up home health services at son's house if possible   · PT/OT consult pending

## 2023-08-18 NOTE — MALNUTRITION/BMI
This medical record reflects one or more clinical indicators suggestive of malnutrition. Malnutrition Findings:   Adult Malnutrition type: Chronic illness  Adult Degree of Malnutrition: Other severe protein calorie malnutrition  Malnutrition Characteristics: Weight loss, Muscle loss                  360 Statement: Severe malnutrition in setting of failure to thrive, evidenced by wt loss 08/03/22 Weight: 70.3 kg (155 lb) to 8/18/23 108 lbs (30% wt loss x 12 mo), muscle wasting/protruding clavicles, hollowing of temples, treated with diet and supplements, calorie counts to determine appropriate needs/alternative means of nutrition    BMI Findings: Body mass index is 21.1 kg/m². See Nutrition note dated 8/18/23  for additional details. Completed nutrition assessment is viewable in the nutrition documentation.

## 2023-08-18 NOTE — TELEMEDICINE
TeleConsultation - [facility] 70 y.o. female MRN: [id number]  Unit/Bed#: E5 -01 Encounter: [id number]        REQUIRED DOCUMENTATION:     1. This service was provided via Telemedicine. 2. Provider located at [facility].  3. TeleMed provider: Jess Fisher MD.  4. Identify all parties in room with patient during tele consult:  pt  5. Patient was then informed that this was a Telemedicine visit and that the exam was being conducted confidentially over secure lines. My office door was closed. No one else was in the room. Patient acknowledged consent and understanding of privacy and security of the Telemedicine visit, and gave us permission to have the assistant stay in the room in order to assist with the history and to conduct the exam.  I informed the patient that I have reviewed their record in Epic and presented the opportunity for them to ask any questions regarding the visit today. The patient agreed to participate. Assessment/Plan     Present on Admission:  • MDD (major depressive disorder), recurrent episode, severe ([address])  • Hyperlipidemia  • Hypothyroidism  • Hypotension  • Chronic coronary artery disease    Assessment:    Major depression, recurrent, severe, without psychotic features; rule out mood disorder secondary to other physiological condition; vascular dementia by history; failure to thrive in adult    Treatment Plan:    Consider replacing Celexa with Lexapro 10 mg p.o. daily in an attempt to gain greater antidepressant efficacy while attempting to reduce risk for adverse side effect. Continue Seroquel 25 mg p.o. nightly. If the patient continues to demonstrate poor appetite consider adding Remeron 7.5 mg p.o. nightly as antidepressant adjunct and appetite stimulant. No additional psychiatric precautions are indicated. Upon discharge the patient would benefit from outpatient psychiatric follow-up.     Recommend Delirium Precautions:  Maintain sleep-wake cycle, avoid nighttime interruptions  Provide adequate pain control  Avoid urinary retention and constipation  Provide frequent and early mobilization  Provide frequent redirection and reorientation as needed  Attempt to avoid medications that may worsen or precipitate delirium such as tramadol, benzodiazepines, anticholinergics, and Benadryl  Redirect unwanted behaviors as first-line therapy, avoid physical restraints as able to  Continue to monitor  Reconsult psychiatry as needed.     Current Medications:     Current Facility-Administered Medications   Medication Dose Route Frequency Provider Last Rate   • acetaminophen  975 mg Oral [facility] Claudia Katz MD     • aluminum-magnesium hydroxide-simethicone  30 mL Oral Q6H PRN Michelle Lynne PA-C     • aspirin  81 mg Oral Daily Michelle Lynne PA-C     • atorvastatin  40 mg Oral Daily With Shan Roque PA-C     • cefTRIAXone  1,000 mg Intravenous Q24H Claudia Katz MD 1,000 mg (08/18/23 0908)   • citalopram  10 mg Oral Daily Michelle Lynne PA-C     • cyanocobalamin  1,000 mcg Intramuscular Daily Claudia Katz MD     • docusate sodium  100 mg Oral BID Michelle Lynne PA-C     • ezetimibe  10 mg Oral Daily Michelle Lynne PA-C     • heparin (porcine)  5,000 Units Subcutaneous [facility] Claudia Katz MD     • hydrALAZINE  5 mg Intravenous Q6H PRN Michelle Lynne PA-C     • levothyroxine  175 mcg Oral Early Morning Michelle Lynne PA-C     • melatonin  6 mg Oral HS PRN Michelle Lynne PA-C     • multi-electrolyte  100 mL/hr Intravenous Continuous Michelle Lynne PA-C 100 mL/hr (08/18/23 1110)   • pantoprazole  40 mg Oral Early Morning Claudia Katz MD     • phenol  1 spray Mouth/Throat Q2H PRN Claudia Katz MD     • polyethylene glycol  17 g Oral Daily Michelle Lynne PA-C     • QUEtiapine  25 mg Oral HS Michelle Lynne PA-C         Risks / Benefits of Treatment:    Risks, benefits, and possible side effects of medications explained to patient and patient verbalizes understanding. Other treatment modalities recommended as indicated:    · outpatient referral      Inpatient consult to Psychiatry  Consult performed by: Fiorella Johnston MD  Consult ordered by: Bubba Eden PA-C        Physician Requesting Consult: Cindy Garcia MD  Principal Problem:MARIANELA (acute kidney injury) [facility])    Reason for Consult: Failure to thrive; recurrent major depression severe without psychotic features      History of Present Illness      Patient is a 70 y.o. female who has presented to the hospital where the provider has documented the following in the H&P:  Eva Laird is a 70 y.o. female who presents with MARIANELA and failure to thrive. Patient reported to be nonverbal at baseline by family. Patient not answering questions, childlike, and seeming scared and turning toward sister during physical exam.  Sister and niece at the bedside reporting that patient had a fall at home today where she became weak and fell backwards hitting her head. They report that she has had very low p.o. intake since being discharged from inpatient psych on 8/11. Patient currently resides with her son and does not use any assistive devices but they have not noted that patient seems more unsteady on her feet secondary to her low p.o. intake and they believe that she would benefit from using some assistive devices. Patient reports that during today's fall patient did hit her head but did not lose consciousness and is not anticoagulated. They report no other known injuries. Family reports that besides patient's low p.o. intake her mood has been stable since being discharged from inpatient psych. No other symptoms that they have noted from the patient. Spoke with daughter on phone who is the POA who confirms patient is a full code. Daughter reports that patient has lost 20 pounds over the last month secondary to this low p.o. intake.   Patient's daughter reports that she would be interested in hearing about possible medical options to increase p.o. intake or possible PEG tube if needed.     Review of Systems:  Review of Systems   Unable to perform ROS: Patient nonverbal         Past Medical and Surgical History:   Medical History[]Expand by Default        Past Medical History:   Diagnosis Date   • Acute MI ([address]) 08/12/2012   • MARIANELA (acute kidney injury) ([address]) 8/17/2023   • Anemia     • Anxiety 07/21/2023   • CAD (coronary artery disease)     • Hyperlipidemia     • Hypertension     • Myocardial infarction ([address]) 03/26/2002   • Thyroid disease     • Ventricular fibrillation ([address]) 08/12/2012            Surgical History[]Expand by Default         Past Surgical History:   Procedure Laterality Date   • COLONOSCOPY   2013   • CORONARY ARTERY BYPASS GRAFT   2003            Meds/Allergies:          Prior to Admission medications    Medication Sig Start Date End Date Taking?  Authorizing Provider   Ascorbic Acid (vitamin C) 1000 MG tablet Take 1,000 mg by mouth daily       Historical Provider, MD   aspirin 81 mg chewable tablet Chew 1 tablet (81 mg total) daily 8/11/23     Estefanía Reid PA-C   atorvastatin (LIPITOR) 40 mg tablet Take 1 tablet (40 mg total) by mouth daily with dinner 8/11/23     Estefanía Reid PA-C   Blood Pressure KIT Use in the morning 7/8/22     [place] MD   cholecalciferol (VITAMIN D3) 1,000 units tablet Take 1 tablet (1,000 Units total) by mouth daily Do not start before August 12, 2023. 8/12/23     Estefanía Reid PA-C   citalopram (CeleXA) 10 mg tablet Take 1 tablet (10 mg total) by mouth daily 8/10/23     SOLO Root   cyanocobalamin 1,000 mcg/mL Inject 1 mL (1,000 mcg total) into a muscle every 30 (thirty) days Do not start before September 2, 2023. 9/2/23     Estefanía Reid PA-C   docusate sodium (COLACE) 100 mg capsule Take 1 capsule (100 mg total) by mouth 2 (two) times a day 8/11/23     Estefanía Reid PA-C   ergocalciferol (VITAMIN D2) 50,000 units Take 1 capsule (50,000 Units total) by mouth once a week for 5 doses Do not start before August 17, 2023. 8/17/23 9/15/23   Estefanía JAMISON CARROLL Reid   ezetimibe (ZETIA) 10 mg tablet Take 1 tablet (10 mg total) by mouth daily 8/11/23     Estefanía AGUILAR Garcia-CONSTANTINE   hydrochlorothiazide (HYDRODIURIL) 25 mg tablet Take 1 tablet (25 mg total) by mouth daily Do not start before August 12, 2023. 8/12/23     Estefanía JAMISON KIMMY ReidC   levothyroxine (Euthyrox) 175 mcg tablet Take 1 tablet (175 mcg total) by mouth daily in the early morning 8/11/23     Estefanía Reid PA-C   lisinopril (ZESTRIL) 20 mg tablet Take 1 tablet (20 mg total) by mouth daily Do not start before August 12, 2023. 8/12/23     Estefanía YAQUELIN Reid PA-C   metoprolol succinate (TOPROL-XL) 25 mg 24 hr tablet Take 0.5 tablets (12.5 mg total) by mouth daily 8/11/23     Estefanía Reid PA-C   multivitamin-iron-minerals-folic acid (CENTRUM) chewable tablet Chew 1 tablet daily       Historical Provider, MD   polyethylene glycol (MIRALAX) 17 g packet Take 17 g by mouth daily Do not start before August 12, 2023. 8/12/23     Estefanía JAMISON CARROLL Reid   QUEtiapine (SEROquel) 25 mg tablet Take 1 tablet (25 mg total) by mouth daily at bedtime 8/10/23     SOLO Ruiz      I have reviewed home medications using recent Epic encounter.     Allergies:         Allergies   Allergen Reactions   • Cat Hair Extract Sneezing         Social History:  Marital Status:    Patient Pre-hospital Living Situation: Home, With other family member: son  Patient Pre-hospital Level of Mobility: walks  Patient Pre-hospital Diet Restrictions: none  Substance Use History:   Social History          Substance and Sexual Activity   Alcohol Use Not Currently      Social History           Tobacco Use   Smoking Status Former   • Packs/day: 0.25   • Years: 22.00   • Total pack years: 5.50   • Types: Cigarettes   • Start date: 4/6/1971   • Quit date: 1/1/1993   • Years since quittin.6   • Passive exposure: Never   Smokeless Tobacco Never   Tobacco Comments     no passive smoke exposure      Social History          Substance and Sexual Activity   Drug Use No         Family History:  Family History[]Expand by Default         Family History   Problem Relation Age of Onset   • Hypertension Mother     • Stroke Mother           3 strokes   • Heart attack Father     • No Known Problems Sister     • No Known Problems Daughter     • No Known Problems Maternal Grandmother     • No Known Problems Maternal Grandfather     • No Known Problems Paternal Grandmother     • No Known Problems Paternal Grandfather     • No Known Problems Maternal Aunt     • No Known Problems Maternal Aunt     • No Known Problems Maternal Aunt     • Breast cancer Paternal Aunt 53   • No Known Problems Paternal Aunt             Nursing reports that the patient has been nonverbal for the most part. She has. Depressed. Her appetite has been poor for the most part. It is reported that she slept well last night. Nursing reports she has been compliant with her medications but appears apathetic about it. [place] suicide severity risk scale: The patient remained nonverbal in response to questions. Mental status examination: I met with the patient with the assistance of nursing. The patient appeared very depressed and melancholic. She made brief eye contact and looked away. She remained nonverbal in response to questions. Very apathetic. When asked if she had any questions or concerns to share with me she shook her head no and looked away. I attempted to ask other questions at that point but she simply continue to look away. Very apathetic while remaining nonverbal.  She appears psychomotor retarded. Insight and judgment are likely impaired.     Past Medical History:   Diagnosis Date   • Acute MI ([address]) 2012   • MARIANELA (acute kidney injury) ([address]) 2023   • Anemia    • Anxiety 2023   • CAD (coronary artery disease)    • Hyperlipidemia    • Hypertension    • Myocardial infarction [facility]) 03/26/2002   • Thyroid disease    • Ventricular fibrillation ([address]) 08/12/2012       Medical Review Of Systems:    Review of Systems    Meds/Allergies     all current active meds have been reviewed  Allergies   Allergen Reactions   • Cat Hair Extract Sneezing       Objective     Vital signs in last 24 hours:  Temp:  [97.7 °F (36.5 °C)-98.5 °F (36.9 °C)] 97.9 °F (36.6 °C)  HR:  [47-66] 66  Resp:  [12-20] 16  BP: ()/(51-63) 106/56      Intake/Output Summary (Last 24 hours) at 8/18/2023 1159  Last data filed at 8/18/2023 1141  Gross per 24 hour   Intake 990 ml   Output 987 ml   Net 3 ml       Lab Results: I have personally reviewed all pertinent laboratory/tests results. Imaging Studies: CT spine cervical without contrast    Result Date: 8/17/2023  Narrative: CT CERVICAL SPINE - WITHOUT CONTRAST INDICATION:   fall, head injury. COMPARISON:  None. TECHNIQUE:  CT examination of the cervical spine was performed without intravenous contrast.  Contiguous axial images were obtained. Multiplanar 2D reformatted images were created from the source data. Radiation dose length product (DLP) for this visit:  472 mGy-cm . This examination, like all CT scans performed in the [facility], was performed utilizing techniques to minimize radiation dose exposure, including the use of iterative reconstruction and automated exposure control. IMAGE QUALITY:  Diagnostic. FINDINGS: ALIGNMENT:  Normal alignment of the cervical spine. No subluxation. VERTEBRAE:  No fracture. DEGENERATIVE CHANGES: Moderate multilevel cervical degenerative changes are noted. No critical central canal stenosis. PREVERTEBRAL AND PARASPINAL SOFT TISSUES: Unremarkable THORACIC INLET:  Normal.     Impression: No cervical spine fracture or traumatic malalignment.  Workstation performed: [id number]     CT abdomen pelvis with contrast    Result Date: 8/17/2023  Narrative: CT ABDOMEN AND PELVIS WITH IV CONTRAST INDICATION:   Abdominal pain, acute, nonlocalized RUQ abd pain. COMPARISON:  None. TECHNIQUE:  CT examination of the abdomen and pelvis was performed. Multiplanar 2D reformatted images were created from the source data. This examination, like all CT scans performed in the [place], was performed utilizing techniques to minimize radiation dose exposure, including the use of iterative reconstruction and automated exposure control. Radiation dose length product (DLP) for this visit:  401 mGy-cm IV Contrast:  100 mL of iohexol (OMNIPAQUE) Enteric Contrast:  Enteric contrast was not administered. FINDINGS: ABDOMEN LOWER CHEST: Atelectatic changes are noted at the lung bases. LIVER/BILIARY TREE:  Unremarkable. GALLBLADDER: There are gallstone(s) within the gallbladder, without pericholecystic inflammatory changes. SPLEEN:  Unremarkable. PANCREAS:  Unremarkable. ADRENAL GLANDS:  Unremarkable. KIDNEYS/URETERS: Questionable irregular enhancement of the kidneys is seen. Cortical bilaterally of the kidneys is visualized. STOMACH AND BOWEL: Colonic diverticulosis without evidence of acute diverticulitis liquid stool seen within colon. APPENDIX:  No findings to suggest appendicitis. ABDOMINOPELVIC CAVITY:  No ascites. No pneumoperitoneum. No lymphadenopathy. VESSELS:  Unremarkable for patient's age. PELVIS REPRODUCTIVE ORGANS:  Unremarkable for patient's age. URINARY BLADDER:  Unremarkable. ABDOMINAL WALL/INGUINAL REGIONS:  Unremarkable. OSSEOUS STRUCTURES:  No acute fracture or destructive osseous lesion. Impression: Questionable irregular enhancement of the kidneys which may represent infection. Recommend short-term follow-up with urology or nephrology. Cholelithiasis.  Workstation performed: [id number]     CT head wo contrast    Result Date: 8/17/2023  Narrative: CT BRAIN - WITHOUT CONTRAST INDICATION:   Head trauma, moderate-severe head injury. COMPARISON:  7/28/23. TECHNIQUE:  CT examination of the brain was performed. Multiplanar 2D reformatted images were created from the source data. Radiation dose length product (DLP) for this visit:  893 mGy-cm . This examination, like all CT scans performed in the [facility], was performed utilizing techniques to minimize radiation dose exposure, including the use of iterative reconstruction and automated exposure control. IMAGE QUALITY:  Diagnostic. FINDINGS: PARENCHYMA: Decreased attenuation is noted in periventricular and subcortical white matter demonstrating an appearance that is statistically most likely to represent moderate microangiopathic change. Chronic lacunar infarction(s) are noted in basal ganglia. No CT signs of acute infarction. No intracranial mass, mass effect or midline shift. No acute parenchymal hemorrhage. Old left cerebellar infarct. VENTRICLES AND EXTRA-AXIAL SPACES:  Normal for the patient's age. VISUALIZED ORBITS: Normal visualized orbits. PARANASAL SINUSES: Normal visualized paranasal sinuses. CALVARIUM AND EXTRACRANIAL SOFT TISSUES:  Normal.     Impression: No acute intracranial abnormality. Chronic microangiopathic changes. Old left cerebellar infarct. Workstation performed: [id number]     MRI brain wo contrast    Result Date: 7/30/2023  Narrative: MRI BRAIN WITHOUT CONTRAST INDICATION: AMS. COMPARISON:   CT dated 7/28/2023. TECHNIQUE:  Multiplanar, multisequence imaging of the brain was performed. IMAGE QUALITY:  Diagnostic. FINDINGS: BRAIN PARENCHYMA: No restricted diffusion to indicate an acute infarct. There is encephalomalacia in the left cerebellum due to remote infarct. There are T2/FLAIR hyperintensities in the periventricular and subcortical white matter as well as the tracie compatible with moderate chronic microangiopathic change. Chronic lacunar infarcts in the basal ganglia and thalamus with associated chronic microhemorrhages.  VENTRICLES: Normal for the patient's age. SELLA AND PITUITARY GLAND:  Normal. ORBITS:  Normal. PARANASAL SINUSES:  Normal. VASCULATURE:  Evaluation of the major intracranial vasculature demonstrates appropriate flow voids. CALVARIUM AND SKULL BASE:  Normal. EXTRACRANIAL SOFT TISSUES:  Normal.     Impression: No acute intracranial pathology. Old left cerebellar infarct. . Chronic microangiopathy and old lacunar infarcts. Workstation performed: [id number]     CT head without contrast    Result Date: 7/28/2023  Narrative: CT BRAIN - WITHOUT CONTRAST INDICATION:   Mental status change, persistent or worsening confusion, worsening depression, eval for frontal lobe abnormalities. COMPARISON:  None. TECHNIQUE:  CT examination of the brain was performed. Multiplanar 2D reformatted images were created from the source data. Radiation dose length product (DLP) for this visit:  867 mGy-cm . This examination, like all CT scans performed in the [facility], was performed utilizing techniques to minimize radiation dose exposure, including the use of iterative reconstruction and automated exposure control. IMAGE QUALITY:  Diagnostic. FINDINGS: PARENCHYMA:  No intracranial mass, decreased attenuation is noted in periventricular and subcortical white matter demonstrating an appearance that is statistically most likely to represent mild microangiopathic change. No CT signs of acute infarction. No intracranial mass, mass effect or midline shift. No acute parenchymal hemorrhage. ass effect or midline shift. No CT signs of acute infarction. No acute parenchymal hemorrhage. VENTRICLES AND EXTRA-AXIAL SPACES:  Ventricles and extra-axial CSF spaces are prominent commensurate with the degree of volume loss. No hydrocephalus. No acute extra-axial hemorrhage. VISUALIZED ORBITS: Normal visualized orbits. PARANASAL SINUSES: Normal visualized paranasal sinuses.  CALVARIUM AND EXTRACRANIAL SOFT TISSUES:  Normal.     Impression: No acute intracranial abnormality. Workstation performed: [id number]     EKG/Pathology/Other Studies:   Lab Results   Component Value Date    VENTRATE 56 08/18/2023    ATRIALRATE 56 08/18/2023    PRINT 120 08/18/2023    QRSDINT 88 08/18/2023    QTINT 472 08/18/2023    QTCINT 455 08/18/2023    PAXIS -2 08/18/2023    QRSAXIS 25 08/18/2023    TWAVEAXIS 15 08/18/2023        Code Status: Level 1 - Full Code  Advance Directive and Living Will:      Power of : Yes  POLST:      Screenings:    1. Nutrition Screening  · Not available on chart    2. Pain Screening  Not available on chart    3. Suicide Screening  Not available on chart    Counseling / Coordination of Care: Total floor / unit time spent today 30 minutes. Greater than 50% of total time was spent with the patient and / or family counseling and / or coordination of care. A description of the counseling / coordination of care: Chart review, patient evaluation, coordination communication with staff, nursing and provider.

## 2023-08-18 NOTE — ASSESSMENT & PLAN NOTE
· Patient with sinus bradycardia on telemetry, and EKG  · No evidence of significant heart block  · Most likely related to long acting beta-blocker: Patient has lost approximately 20 pounds, beta-blocker may currently be too strong for her body weight  · Hold beta-blocker and monitor on telemetry

## 2023-08-19 LAB
ALBUMIN SERPL BCP-MCNC: 3.5 G/DL (ref 3.5–5)
ALP SERPL-CCNC: 57 U/L (ref 34–104)
ALT SERPL W P-5'-P-CCNC: 48 U/L (ref 7–52)
ANION GAP SERPL CALCULATED.3IONS-SCNC: 7 MMOL/L
AST SERPL W P-5'-P-CCNC: 26 U/L (ref 13–39)
BACTERIA UR CULT: NORMAL
BASOPHILS # BLD AUTO: 0.01 THOUSANDS/ÂΜL (ref 0–0.1)
BASOPHILS NFR BLD AUTO: 0 % (ref 0–1)
BILIRUB SERPL-MCNC: 0.48 MG/DL (ref 0.2–1)
BUN SERPL-MCNC: 21 MG/DL (ref 5–25)
CALCIUM SERPL-MCNC: 8.7 MG/DL (ref 8.4–10.2)
CHLORIDE SERPL-SCNC: 105 MMOL/L (ref 96–108)
CO2 SERPL-SCNC: 26 MMOL/L (ref 21–32)
CREAT SERPL-MCNC: 0.49 MG/DL (ref 0.6–1.3)
EOSINOPHIL # BLD AUTO: 0.09 THOUSAND/ÂΜL (ref 0–0.61)
EOSINOPHIL NFR BLD AUTO: 2 % (ref 0–6)
ERYTHROCYTE [DISTWIDTH] IN BLOOD BY AUTOMATED COUNT: 11.5 % (ref 11.6–15.1)
GFR SERPL CREATININE-BSD FRML MDRD: 102 ML/MIN/1.73SQ M
GLUCOSE SERPL-MCNC: 117 MG/DL (ref 65–140)
HCT VFR BLD AUTO: 32.4 % (ref 34.8–46.1)
HEMOCCULT STL QL: NEGATIVE
HEMOCCULT STL QL: NEGATIVE
HGB BLD-MCNC: 11 G/DL (ref 11.5–15.4)
IMM GRANULOCYTES # BLD AUTO: 0.01 THOUSAND/UL (ref 0–0.2)
IMM GRANULOCYTES NFR BLD AUTO: 0 % (ref 0–2)
LYMPHOCYTES # BLD AUTO: 0.88 THOUSANDS/ÂΜL (ref 0.6–4.47)
LYMPHOCYTES NFR BLD AUTO: 17 % (ref 14–44)
MCH RBC QN AUTO: 31.6 PG (ref 26.8–34.3)
MCHC RBC AUTO-ENTMCNC: 34 G/DL (ref 31.4–37.4)
MCV RBC AUTO: 93 FL (ref 82–98)
MONOCYTES # BLD AUTO: 0.62 THOUSAND/ÂΜL (ref 0.17–1.22)
MONOCYTES NFR BLD AUTO: 12 % (ref 4–12)
NEUTROPHILS # BLD AUTO: 3.71 THOUSANDS/ÂΜL (ref 1.85–7.62)
NEUTS SEG NFR BLD AUTO: 69 % (ref 43–75)
NRBC BLD AUTO-RTO: 0 /100 WBCS
PLATELET # BLD AUTO: 235 THOUSANDS/UL (ref 149–390)
PMV BLD AUTO: 10.3 FL (ref 8.9–12.7)
POTASSIUM SERPL-SCNC: 3.6 MMOL/L (ref 3.5–5.3)
PROT SERPL-MCNC: 5.7 G/DL (ref 6.4–8.4)
RBC # BLD AUTO: 3.48 MILLION/UL (ref 3.81–5.12)
SODIUM SERPL-SCNC: 138 MMOL/L (ref 135–147)
WBC # BLD AUTO: 5.32 THOUSAND/UL (ref 4.31–10.16)

## 2023-08-19 PROCEDURE — 82272 OCCULT BLD FECES 1-3 TESTS: CPT | Performed by: INTERNAL MEDICINE

## 2023-08-19 PROCEDURE — 80053 COMPREHEN METABOLIC PANEL: CPT | Performed by: INTERNAL MEDICINE

## 2023-08-19 PROCEDURE — 85025 COMPLETE CBC W/AUTO DIFF WBC: CPT | Performed by: INTERNAL MEDICINE

## 2023-08-19 PROCEDURE — 99232 SBSQ HOSP IP/OBS MODERATE 35: CPT | Performed by: INTERNAL MEDICINE

## 2023-08-19 RX ADMIN — HEPARIN SODIUM 5000 UNITS: 5000 INJECTION INTRAVENOUS; SUBCUTANEOUS at 23:34

## 2023-08-19 RX ADMIN — PANTOPRAZOLE SODIUM 40 MG: 40 TABLET, DELAYED RELEASE ORAL at 05:21

## 2023-08-19 RX ADMIN — ACETAMINOPHEN 325MG 975 MG: 325 TABLET ORAL at 05:20

## 2023-08-19 RX ADMIN — HEPARIN SODIUM 5000 UNITS: 5000 INJECTION INTRAVENOUS; SUBCUTANEOUS at 14:20

## 2023-08-19 RX ADMIN — QUETIAPINE FUMARATE 25 MG: 25 TABLET ORAL at 23:34

## 2023-08-19 RX ADMIN — CEFTRIAXONE 1000 MG: 1 INJECTION, SOLUTION INTRAVENOUS at 08:58

## 2023-08-19 RX ADMIN — LEVOTHYROXINE SODIUM 175 MCG: 125 TABLET ORAL at 05:20

## 2023-08-19 RX ADMIN — SODIUM CHLORIDE, SODIUM GLUCONATE, SODIUM ACETATE, POTASSIUM CHLORIDE, MAGNESIUM CHLORIDE, SODIUM PHOSPHATE, DIBASIC, AND POTASSIUM PHOSPHATE 100 ML/HR: .53; .5; .37; .037; .03; .012; .00082 INJECTION, SOLUTION INTRAVENOUS at 19:51

## 2023-08-19 RX ADMIN — HEPARIN SODIUM 5000 UNITS: 5000 INJECTION INTRAVENOUS; SUBCUTANEOUS at 05:20

## 2023-08-19 RX ADMIN — CITALOPRAM HYDROBROMIDE 10 MG: 20 TABLET ORAL at 09:02

## 2023-08-19 RX ADMIN — ASPIRIN 81 MG 81 MG: 81 TABLET ORAL at 09:02

## 2023-08-19 RX ADMIN — DOCUSATE SODIUM 100 MG: 100 CAPSULE, LIQUID FILLED ORAL at 09:03

## 2023-08-19 RX ADMIN — EZETIMIBE 10 MG: 10 TABLET ORAL at 09:02

## 2023-08-19 RX ADMIN — ATORVASTATIN CALCIUM 40 MG: 40 TABLET, FILM COATED ORAL at 16:49

## 2023-08-19 RX ADMIN — SODIUM CHLORIDE, SODIUM GLUCONATE, SODIUM ACETATE, POTASSIUM CHLORIDE, MAGNESIUM CHLORIDE, SODIUM PHOSPHATE, DIBASIC, AND POTASSIUM PHOSPHATE 100 ML/HR: .53; .5; .37; .037; .03; .012; .00082 INJECTION, SOLUTION INTRAVENOUS at 08:57

## 2023-08-19 NOTE — ASSESSMENT & PLAN NOTE
· Question of dementia on prior records:  NO formal diagnosis or neurology consultation noted   · MRI brain 7/30: white matter changes on aspirin and statin therapy  · eval in progress:  Per family she had been non-verbal for past 2-3 weeks, but prior to that was verbal  · On admission pt was not responding verbally to questions and was minimally interactive: however after IVF and abx pt has markedly improved  · Currently awake, alert:  Responding to questions with fluent sentences. Alert and Ox4.   · Appreciate geriatrics eval  · tsh wnl  · Folate wnl  · B12 <goal of 400 (was 300);  started b12 injections and will dc on oral supplements  · Will need outpt geriatrics fu to eval question of dementia once acute illness resolved

## 2023-08-19 NOTE — ASSESSMENT & PLAN NOTE
· According to family, mood has been stable at home besides decreased p.o. intake   · Pt recently 201 admitted to [facility] for management of severe depression 7/31-8/11  · Discharged on 10 mg Celexa, 25 mg seroquel h.s., and melatonin, continue  · Psych consult appreciated:  Recommended consideration of changing celexa to lexapro.   Pt however markedly improved after starting abx for uti and ivf for dehydration

## 2023-08-19 NOTE — ASSESSMENT & PLAN NOTE
Malnutrition Findings:   Adult Malnutrition type: Chronic illness  Adult Degree of Malnutrition: Other severe protein calorie malnutrition  Malnutrition Characteristics: Weight loss, Muscle loss            Due to poor p.o. intake  Continue to encourage p.o., continue supplements      360 Statement: Severe malnutrition in setting of failure to thrive, evidenced by wt loss 08/03/22 Weight: 70.3 kg (155 lb) to 8/18/23 108 lbs (30% wt loss x 12 mo), muscle wasting/protruding clavicles, hollowing of temples, treated with diet and supplements, calorie counts to determine appropriate needs/alternative means of nutrition    BMI Findings: Body mass index is 21.87 kg/m².

## 2023-08-19 NOTE — ASSESSMENT & PLAN NOTE
Patient with PMHx of severe depression, HTN, HLD, hypothyroidism, and CAD presented to the ED c/o poor p.o. intake, and fall from generalized weakness. Was diagnosed with MARIANELA on admission    · Patient recently admitted to [facility] for acute metabolic encephalopathy in the setting of UTI 7/28-7/31. She was then transferred to [facility] for inpatient psychiatric treatment of depression  · Pt presented with MARIANELA with creatinine 1.42, increased from baseline 0.5-0.6  · In the setting of poor po intake, clinical dehydration, failure to thrive  · Creatine normalized with ivf  · Pt placed on calorie count:   Tolerating po today

## 2023-08-19 NOTE — ASSESSMENT & PLAN NOTE
· Patient with sinus bradycardia on telemetry, and EKG  · No evidence of significant heart block  · tsh wnl  · Most likely related to long acting beta-blocker: Patient has lost approximately 20 pounds, beta-blocker may currently be too strong for her body weight  · Slightly improved  · Cont to hold beta-blocker and monitor on telemetry

## 2023-08-19 NOTE — ASSESSMENT & PLAN NOTE
· Pt has h/o HTN, however was hypotensive in ER with bp 87/51:  Improved with IVF  · HCTZ stopped after admission 7/28-7/31  · on lisinopril 20mg and metoprolol xl 12.5mg prior to admission  · Hold lisinopril secondary to MARIANELA  · Hold b-blocker due to bradycardia  · bp still low-normal:  95/50, 112/64, cont to monitor off medication  · Cont ivf fluids due to poor po intake

## 2023-08-19 NOTE — ASSESSMENT & PLAN NOTE
· Hx of CABG  · Continue outpatient follow-up with cardiology, Dr. Tamara Hooks  · Continue aspirin and statin  · Hold b-blocker due to bradycardia

## 2023-08-19 NOTE — PLAN OF CARE
Problem: Potential for Falls  Goal: Patient will remain free of falls  Description: INTERVENTIONS:  - Educate patient/family on patient safety including physical limitations  - Instruct patient to call for assistance with activity   - Consult OT/PT to assist with strengthening/mobility   - Keep Call bell within reach  - Keep bed low and locked with side rails adjusted as appropriate  - Keep care items and personal belongings within reach  - Initiate and maintain comfort rounds  - Make Fall Risk Sign visible to staff  - Apply yellow socks and bracelet for high fall risk patients  - Consider moving patient to room near nurses station  Outcome: Progressing     Problem: MOBILITY - ADULT  Goal: Maintain or return to baseline ADL function  Description: INTERVENTIONS:  -  Assess patient's ability to carry out ADLs; assess patient's baseline for ADL function and identify physical deficits which impact ability to perform ADLs (bathing, care of mouth/teeth, toileting, grooming, dressing, etc.)  - Assess/evaluate cause of self-care deficits   - Assess range of motion  - Assess patient's mobility; develop plan if impaired  - Assess patient's need for assistive devices and provide as appropriate  - Encourage maximum independence but intervene and supervise when necessary  - Involve family in performance of ADLs  - Assess for home care needs following discharge   - Consider OT consult to assist with ADL evaluation and planning for discharge  - Provide patient education as appropriate  Outcome: Progressing  Goal: Maintains/Returns to pre admission functional level  Description: INTERVENTIONS:  - Perform BMAT or MOVE assessment daily.   - Set and communicate daily mobility goal to care team and patient/family/caregiver.    - Collaborate with rehabilitation services on mobility goals if consulted  - Out of bed for toileting  - Record patient progress and toleration of activity level   Outcome: Progressing     Problem: PAIN - ADULT  Goal: Verbalizes/displays adequate comfort level or baseline comfort level  Description: Interventions:  - Encourage patient to monitor pain and request assistance  - Assess pain using appropriate pain scale  - Administer analgesics based on type and severity of pain and evaluate response  - Implement non-pharmacological measures as appropriate and evaluate response  - Consider cultural and social influences on pain and pain management  - Notify physician/advanced practitioner if interventions unsuccessful or patient reports new pain  Outcome: Progressing     Problem: INFECTION - ADULT  Goal: Absence or prevention of progression during hospitalization  Description: INTERVENTIONS:  - Assess and monitor for signs and symptoms of infection  - Monitor lab/diagnostic results  - Monitor all insertion sites, i.e. indwelling lines, tubes, and drains  - Monitor endotracheal if appropriate and nasal secretions for changes in amount and color  - [place] appropriate cooling/warming therapies per order  - Administer medications as ordered  - Instruct and encourage patient and family to use good hand hygiene technique  - Identify and instruct in appropriate isolation precautions for identified infection/condition  Outcome: Progressing  Goal: Absence of fever/infection during neutropenic period  Description: INTERVENTIONS:  - Monitor WBC    Outcome: Progressing     Problem: SAFETY ADULT  Goal: Patient will remain free of falls  Description: INTERVENTIONS:  - Educate patient/family on patient safety including physical limitations  - Instruct patient to call for assistance with activity   - Consult OT/PT to assist with strengthening/mobility   - Keep Call bell within reach  - Keep bed low and locked with side rails adjusted as appropriate  - Keep care items and personal belongings within reach  - Initiate and maintain comfort rounds  - Make Fall Risk Sign visible to staff  - Apply yellow socks and bracelet for high fall risk patients  - Consider moving patient to room near nurses station  Outcome: Progressing  Goal: Maintain or return to baseline ADL function  Description: INTERVENTIONS:  -  Assess patient's ability to carry out ADLs; assess patient's baseline for ADL function and identify physical deficits which impact ability to perform ADLs (bathing, care of mouth/teeth, toileting, grooming, dressing, etc.)  - Assess/evaluate cause of self-care deficits   - Assess range of motion  - Assess patient's mobility; develop plan if impaired  - Assess patient's need for assistive devices and provide as appropriate  - Encourage maximum independence but intervene and supervise when necessary  - Involve family in performance of ADLs  - Assess for home care needs following discharge   - Consider OT consult to assist with ADL evaluation and planning for discharge  - Provide patient education as appropriate  Outcome: Progressing  Goal: Maintains/Returns to pre admission functional level  Description: INTERVENTIONS:  - Perform BMAT or MOVE assessment daily.   - Set and communicate daily mobility goal to care team and patient/family/caregiver.    - Collaborate with rehabilitation services on mobility goals if consulted  - Out of bed for toileting  - Record patient progress and toleration of activity level   Outcome: Progressing     Problem: DISCHARGE PLANNING  Goal: Discharge to home or other facility with appropriate resources  Description: INTERVENTIONS:  - Identify barriers to discharge w/patient and caregiver  - Arrange for needed discharge resources and transportation as appropriate  - Identify discharge learning needs (meds, wound care, etc.)  - Arrange for interpretive services to assist at discharge as needed  - Refer to Case Management Department for coordinating discharge planning if the patient needs post-hospital services based on physician/advanced practitioner order or complex needs related to functional status, cognitive ability, or social support system  Outcome: Progressing     Problem: Knowledge Deficit  Goal: Patient/family/caregiver demonstrates understanding of disease process, treatment plan, medications, and discharge instructions  Description: Complete learning assessment and assess knowledge base. Interventions:  - Provide teaching at level of understanding  - Provide teaching via preferred learning methods  Outcome: Progressing     Problem: Nutrition/Hydration-ADULT  Goal: Nutrient/Hydration intake appropriate for improving, restoring or maintaining nutritional needs  Description: Monitor and assess patient's nutrition/hydration status for malnutrition. Collaborate with interdisciplinary team and initiate plan and interventions as ordered. Monitor patient's weight and dietary intake as ordered or per policy. Utilize nutrition screening tool and intervene as necessary. Determine patient's food preferences and provide high-protein, high-caloric foods as appropriate.      INTERVENTIONS:  - Monitor oral intake, urinary output, labs, and treatment plans  - Assess nutrition and hydration status and recommend course of action  - Evaluate amount of meals eaten  - Assist patient with eating if necessary   - Allow adequate time for meals  - Recommend/ encourage appropriate diets, oral nutritional supplements, and vitamin/mineral supplements  - Order, calculate, and assess calorie counts as needed  - Recommend, monitor, and adjust tube feedings and TPN/PPN based on assessed needs  - Assess need for intravenous fluids  - Provide specific nutrition/hydration education as appropriate  - Include patient/family/caregiver in decisions related to nutrition  Outcome: Progressing     Problem: Prexisting or High Potential for Compromised Skin Integrity  Goal: Skin integrity is maintained or improved  Description: INTERVENTIONS:  - Identify patients at risk for skin breakdown  - Assess and monitor skin integrity  - Assess and monitor nutrition and hydration status  - Monitor labs   - Assess for incontinence   - Turn and reposition patient  - Assist with mobility/ambulation  - Relieve pressure over bony prominences  - Avoid friction and shearing  - Provide appropriate hygiene as needed including keeping skin clean and dry  - Evaluate need for skin moisturizer/barrier cream  - Collaborate with interdisciplinary team   - Patient/family teaching  - Consider wound care consult   Outcome: Progressing

## 2023-08-19 NOTE — PLAN OF CARE
Problem: Potential for Falls  Goal: Patient will remain free of falls  Description: INTERVENTIONS:  - Educate patient/family on patient safety including physical limitations  - Instruct patient to call for assistance with activity   - Consult OT/PT to assist with strengthening/mobility   - Keep Call bell within reach  - Keep bed low and locked with side rails adjusted as appropriate  - Keep care items and personal belongings within reach  - Initiate and maintain comfort rounds  - Apply yellow socks and bracelet for high fall risk patients  - Consider moving patient to room near nurses station  Outcome: Progressing     Problem: PAIN - ADULT  Goal: Verbalizes/displays adequate comfort level or baseline comfort level  Description: Interventions:  - Encourage patient to monitor pain and request assistance  - Assess pain using appropriate pain scale  - Administer analgesics based on type and severity of pain and evaluate response  - Implement non-pharmacological measures as appropriate and evaluate response  - Consider cultural and social influences on pain and pain management  - Notify physician/advanced practitioner if interventions unsuccessful or patient reports new pain  Outcome: Progressing     Problem: SAFETY ADULT  Goal: Patient will remain free of falls  Description: INTERVENTIONS:  - Educate patient/family on patient safety including physical limitations  - Instruct patient to call for assistance with activity   - Consult OT/PT to assist with strengthening/mobility   - Keep Call bell within reach  - Keep bed low and locked with side rails adjusted as appropriate  - Keep care items and personal belongings within reach  - Initiate and maintain comfort rounds  - Make Fall Risk Sign visible to staff  - Apply yellow socks and bracelet for high fall risk patients  - Consider moving patient to room near nurses station  Outcome: Progressing

## 2023-08-19 NOTE — ASSESSMENT & PLAN NOTE
· CT abdomen/pelvis demonstrating "Questionable irregular enhancement of the kidneys which may represent infection. Recommend short-term follow-up with urology or nephrology. Cholelithiasis."  · Pt was recently admitted last month and treated for UTI  · WBC 5.51, afebrile, not fulfilling septic criteria on admission  · UA c/w UTI:  Pt was started on empiric abx  · Urine culture non-diagnostic.   Prior culture 7/28 with E coli, sens to keflex  · Cont abx with ceftriaxone  · Due to recurrent infections we will check urinary retention protocol  · Pt with marked improvement in mentation

## 2023-08-19 NOTE — PROGRESS NOTES
[facility]  Progress Note  Name: Og Prasad  MRN: [id number]  Unit/Bed#: E5 -01 I Date of Admission: 8/17/2023   Date of Service: 8/19/2023 I Hospital Day: 1    Assessment/Plan   * MARIANELA (acute kidney injury) [facility])  Assessment & Plan  Patient with PMHx of severe depression, HTN, HLD, hypothyroidism, and CAD presented to the ED c/o poor p.o. intake, and fall from generalized weakness. Was diagnosed with MARIANELA on admission    · Patient recently admitted to [address] [place] [address] for acute metabolic encephalopathy in the setting of UTI 7/28-7/31. She was then transferred to [facility] for inpatient psychiatric treatment of depression  · Pt presented with MARIANELA with creatinine 1.42, increased from baseline 0.5-0.6  · In the setting of poor po intake, clinical dehydration, failure to thrive  · Creatine normalized with ivf  · Pt placed on calorie count: Tolerating po today    Bradycardia  Assessment & Plan  · Patient with sinus bradycardia on telemetry, and EKG  · No evidence of significant heart block  · tsh wnl  · Most likely related to long acting beta-blocker: Patient has lost approximately 20 pounds, beta-blocker may currently be too strong for her body weight  · Slightly improved  · Cont to hold beta-blocker and monitor on telemetry    Acute cystitis without hematuria  Assessment & Plan  · CT abdomen/pelvis demonstrating "Questionable irregular enhancement of the kidneys which may represent infection. Recommend short-term follow-up with urology or nephrology. Cholelithiasis."  · Pt was recently admitted last month and treated for UTI  · WBC 5.51, afebrile, not fulfilling septic criteria on admission  · UA c/w UTI:  Pt was started on empiric abx  · Urine culture non-diagnostic.   Prior culture 7/28 with E coli, sens to keflex  · Cont abx with ceftriaxone  · Due to recurrent infections we will check urinary retention protocol  · Pt with marked improvement in mentation    Failure to thrive in adult  Assessment & Plan  · Patient currently resides with son and ambulates without assistive devices at baseline. · patient did have a fall where she fell backwards and hit her head PTA. · Denies LOC or injury known injury per family. On ASA pta  · Trauma work-up in the ED negative:  · CT head "No acute intracranial abnormality. Chronic microangiopathic changes. Old left cerebellar infarct."  · Family reports it is very difficult to get patient to eat or drink. Daughter report loss of over 20 lbs. · Consulted with psychiatry regarding depression: They recommended considering changing celexa to lexapro and adding remeron for appetite stimulant   · Pt was also diagnosed with uti and was started on abx with marked improvement in po intake and generalized weakness  · continue calorie count  · Negative orthostatic vitals  · PT/OT eval appreciated:  rec dc str vs home with 24h supervision  · Family reports not yet interested in facility for patient, would like to set up home health services at son's house if possible   · Pt markedly improved today:  Anticipate PT re-eval:  Presentation possibly due to uti/kvng/dehydration    Acute metabolic encephalopathy  Assessment & Plan  · On admission there were reports that patient had been nonverbal, lethargic, and not eating  · Also reports of generalized weakness and a fall with head strike  · Hospital day following admission patient was nonverbal.  Minimally communicative  · She was started on IV fluids for dehydration, and acute kidney injury, and antibiotics for UTI  · After these interventions patient has markedly improved. She is awake, alert, communicating with fluent sentences, she is oriented x4  · Is also tolerating p.o.   · Continue current treatment, and PT reeval    Hypotension  Assessment & Plan  · Pt has h/o HTN, however was hypotensive in ER with bp 87/51:  Improved with IVF  · HCTZ stopped after admission 7/28-7/31  · on lisinopril 20mg and metoprolol xl 12.5mg prior to admission  · Hold lisinopril secondary to MARIANELA  · Hold b-blocker due to bradycardia  · bp still low-normal:  95/50, 112/64, cont to monitor off medication  · Cont ivf fluids due to poor po intake    Vascular dementia [facility])  Assessment & Plan  · Question of dementia on prior records:  NO formal diagnosis or neurology consultation noted   · MRI brain 7/30: white matter changes on aspirin and statin therapy  · eval in progress:  Per family she had been non-verbal for past 2-3 weeks, but prior to that was verbal  · On admission pt was not responding verbally to questions and was minimally interactive: however after IVF and abx pt has markedly improved  · Currently awake, alert:  Responding to questions with fluent sentences. Alert and Ox4. · Appreciate geriatrics eval  · tsh wnl  · Folate wnl  · B12 <goal of 400 (was 300);  started b12 injections and will dc on oral supplements  · Will need outpt geriatrics fu to eval question of dementia once acute illness resolved    Severe protein-calorie malnutrition ([address])  Assessment & Plan  Malnutrition Findings:   Adult Malnutrition type: Chronic illness  Adult Degree of Malnutrition: Other severe protein calorie malnutrition  Malnutrition Characteristics: Weight loss, Muscle loss            Due to poor p.o. intake  Continue to encourage p.o., continue supplements      360 Statement: Severe malnutrition in setting of failure to thrive, evidenced by wt loss 08/03/22 Weight: 70.3 kg (155 lb) to 8/18/23 108 lbs (30% wt loss x 12 mo), muscle wasting/protruding clavicles, hollowing of temples, treated with diet and supplements, calorie counts to determine appropriate needs/alternative means of nutrition    BMI Findings: Body mass index is 21.87 kg/m².        MDD (major depressive disorder), recurrent episode, severe ([address])  Assessment & Plan  · According to family, mood has been stable at home besides decreased p.o. intake   · Pt recently [facility] admitted to [facility] for management of severe depression 7/31-8/11  · Discharged on 10 mg Celexa, 25 mg seroquel h.s., and melatonin, continue  · Psych consult appreciated:  Recommended consideration of changing celexa to lexapro. Pt however markedly improved after starting abx for uti and ivf for dehydration    Hyperlipidemia  Assessment & Plan  · Continue statin    Hypothyroidism  Assessment & Plan  · Continue levothyroxine   · TSH elevated last admission with question of compliance  · Repeat on admission normal    Chronic coronary artery disease  Assessment & Plan  · Hx of CABG  · Continue outpatient follow-up with cardiology, Dr. Trevor Peters  · Continue aspirin and statin  · Hold b-blocker due to bradycardia             Family:  Called daughter Cristie Goldberg and gave update. Had updated her yesterday afternoon as well. VTE Pharmacologic Prophylaxis: Heparin  VTE Mechanical Prophylaxis: sequential compression device    Discussed with patient's nurse and     Certification Statement: The patient will continue to require additional inpatient hospital stay due to need for further acute intervention for UTI, metabolic encephalopathy, failure to thrive, acute kidney injury    Status: inpatient     =======================================================    Subjective:  Patient notes she feels much better today. She denies any pain anywhere apart from the lump on the back of her head. She indicates she sustained this when she fell at home. She notes she took Tylenol for this earlier today. She declines any additional medication and declines an ice pack. She denies any pain anywhere else. She notes he is passing her urine without any difficulty. Denies any burning with urinating. She denies any suprapubic pain. She denies any nausea, vomiting, diarrhea or constipation. Patient denies any shortness of breath or cough. Denies any dizziness or lightheadedness. She is tolerating p.o.   Per nurse patient has been eating small amounts of all of her meals. Has been communicating verbally, and noted to have improved strength and ambulation    History from yesterday from pt's daughter irish:  States pt has had poor po intake for several weeks- that was the main reason they brought her to the hospital last admission. She had returned home with her son after the [place]. She was noted to be non-verbal for past 2-3 weeks which is new. She also had minimal po intake. Physical Exam:   Temp:  [98 °F (36.7 °C)-98.6 °F (37 °C)] 98.6 °F (37 °C)  HR:  [58-74] 58  Resp:  [17-18] 18  BP: ()/(50-64) 95/50    Gen:  Pleasant, non-tachypnic, non-dyspnic. Conversant. No somnolence or lethargy. Makes eye contact. Communicates responses to questions verbally. Cooperates with exam  Heart: regular rate and rhythm, S1S2 present, no murmur, rub or gallop  Lungs: clear to ausculatation bilaterally. No wheezing, crackles, or rhonchi. No accessory muscle use or respiratory distress. Abd: soft, non-tender, non-distended. NABS, no guarding, rebound or peritoneal signs. Extremities: no clubbing, cyanosis or edema. 2+pedal pulses bilaterally. Full range of motion  Neuro: awake, alert and oriented. Oriented x4. Cranial nerves 2-12 intact. Symmetrical smile, symmetrical eye raise. Strength:  5/5 bilaterally, biceps 4/5 bilaterally, ankle flexion 4/5 bilaterally. Fluent, and goal-directed speech. Speaks in multiword sentences  Skin: warm and dry: no petechiae, purpura and rash.     LABS:   Results from last 7 days   Lab Units 08/19/23  0509 08/18/23  0516 08/17/23  1941   WBC Thousand/uL 5.32 6.65 5.51   HEMOGLOBIN g/dL 11.0* 11.2* 12.7   HEMATOCRIT % 32.4* 33.0* 38.6   PLATELETS Thousands/uL 235 252 297     Results from last 7 days   Lab Units 08/19/23  0509 08/18/23  0516 08/17/23 1941   POTASSIUM mmol/L 3.6 4.0 4.1   CHLORIDE mmol/L 105 106 102   CO2 mmol/L 26 22 22   BUN mg/dL 21 57* 79*   CREATININE mg/dL 0.49* 0.77 1.42*   CALCIUM mg/dL 8.7 9.0 9.9 Hospital Data:    8/17 CT cervical spineNo cervical spine fracture or traumatic malalignment     8/17 CT head:No acute intracranial abnormality.  Chronic microangiopathic changes.  Old left cerebellar infarct     8/17 CT abdomen/pelvis:  Questionable irregular enhancement of the kidneys which may represent infection. Recommend short-term follow-up with urology or nephrology. Cholelithiasis     Microbiology:  Urine culture: No growth        ---------------------------------------------------------------------------------------------------------------  This note has been constructed using a voice recognition system.

## 2023-08-19 NOTE — ASSESSMENT & PLAN NOTE
· Patient currently resides with son and ambulates without assistive devices at baseline. · patient did have a fall where she fell backwards and hit her head PTA. · Denies LOC or injury known injury per family. On ASA pta  · Trauma work-up in the ED negative:  · CT head "No acute intracranial abnormality. Chronic microangiopathic changes. Old left cerebellar infarct."  · Family reports it is very difficult to get patient to eat or drink. Daughter report loss of over 20 lbs. · Consulted with psychiatry regarding depression:   They recommended considering changing celexa to lexapro and adding remeron for appetite stimulant   · Pt was also diagnosed with uti and was started on abx with marked improvement in po intake and generalized weakness  · continue calorie count  · Negative orthostatic vitals  · PT/OT eval appreciated:  rec dc str vs home with 24h supervision  · Family reports not yet interested in facility for patient, would like to set up home health services at son's house if possible   · Pt markedly improved today:  Anticipate PT re-eval:  Presentation possibly due to uti/kvng/dehydration

## 2023-08-19 NOTE — ASSESSMENT & PLAN NOTE
· On admission there were reports that patient had been nonverbal, lethargic, and not eating  · Also reports of generalized weakness and a fall with head strike  · Hospital day following admission patient was nonverbal.  Minimally communicative  · She was started on IV fluids for dehydration, and acute kidney injury, and antibiotics for UTI  · After these interventions patient has markedly improved. She is awake, alert, communicating with fluent sentences, she is oriented x4  · Is also tolerating p.o.   · Continue current treatment, and PT reeval

## 2023-08-20 PROCEDURE — 99232 SBSQ HOSP IP/OBS MODERATE 35: CPT | Performed by: INTERNAL MEDICINE

## 2023-08-20 RX ADMIN — CEFTRIAXONE 1000 MG: 1 INJECTION, SOLUTION INTRAVENOUS at 09:29

## 2023-08-20 RX ADMIN — EZETIMIBE 10 MG: 10 TABLET ORAL at 09:29

## 2023-08-20 RX ADMIN — PANTOPRAZOLE SODIUM 40 MG: 40 TABLET, DELAYED RELEASE ORAL at 05:54

## 2023-08-20 RX ADMIN — ASPIRIN 81 MG 81 MG: 81 TABLET ORAL at 09:29

## 2023-08-20 RX ADMIN — CYANOCOBALAMIN 1000 MCG: 1000 INJECTION, SOLUTION INTRAMUSCULAR at 09:29

## 2023-08-20 RX ADMIN — CITALOPRAM HYDROBROMIDE 10 MG: 20 TABLET ORAL at 09:29

## 2023-08-20 RX ADMIN — LEVOTHYROXINE SODIUM 175 MCG: 125 TABLET ORAL at 05:54

## 2023-08-20 RX ADMIN — HEPARIN SODIUM 5000 UNITS: 5000 INJECTION INTRAVENOUS; SUBCUTANEOUS at 05:53

## 2023-08-20 RX ADMIN — ACETAMINOPHEN 325MG 975 MG: 325 TABLET ORAL at 05:53

## 2023-08-20 RX ADMIN — ACETAMINOPHEN 325MG 975 MG: 325 TABLET ORAL at 14:33

## 2023-08-20 RX ADMIN — ATORVASTATIN CALCIUM 40 MG: 40 TABLET, FILM COATED ORAL at 16:53

## 2023-08-20 RX ADMIN — SODIUM CHLORIDE, SODIUM GLUCONATE, SODIUM ACETATE, POTASSIUM CHLORIDE, MAGNESIUM CHLORIDE, SODIUM PHOSPHATE, DIBASIC, AND POTASSIUM PHOSPHATE 100 ML/HR: .53; .5; .37; .037; .03; .012; .00082 INJECTION, SOLUTION INTRAVENOUS at 05:52

## 2023-08-20 RX ADMIN — SODIUM CHLORIDE, SODIUM GLUCONATE, SODIUM ACETATE, POTASSIUM CHLORIDE, MAGNESIUM CHLORIDE, SODIUM PHOSPHATE, DIBASIC, AND POTASSIUM PHOSPHATE 100 ML/HR: .53; .5; .37; .037; .03; .012; .00082 INJECTION, SOLUTION INTRAVENOUS at 17:11

## 2023-08-20 RX ADMIN — HEPARIN SODIUM 5000 UNITS: 5000 INJECTION INTRAVENOUS; SUBCUTANEOUS at 14:33

## 2023-08-20 NOTE — PROGRESS NOTES
[facility]  Progress Note  Name: Linda Quinones  MRN: [id number]  Unit/Bed#: E5 -01 I Date of Admission: 8/17/2023   Date of Service: 8/20/2023 I Hospital Day: 2    Assessment/Plan   * KEN (acute kidney injury) [facility])  Assessment & Plan  Patient with PMHx of severe depression, HTN, HLD, hypothyroidism, and CAD presented to the ED c/o poor p.o. intake, and fall from generalized weakness. Was diagnosed with KEN on admission    · Patient recently admitted to [address] [place] [address] for acute metabolic encephalopathy in the setting of UTI 7/28-7/31. She was then transferred to [facility] for inpatient psychiatric treatment of depression  · Pt presented with KEN with creatinine 1.42, increased from baseline 0.5-0.6  · Ken was in the setting of poor po intake, clinical dehydration, failure to thrive  · Creatine normalized with ivf  · Pt placed on calorie count: Tolerating po    Bradycardia  Assessment & Plan  · Patient with sinus bradycardia on telemetry, and EKG  · No evidence of significant heart block  · tsh wnl  · Most likely related to long acting beta-blocker: Patient has lost approximately 20 pounds, beta-blocker may currently be too strong for her body weight  · improved  · Cont to hold beta-blocker     Acute cystitis without hematuria  Assessment & Plan  · CT abdomen/pelvis demonstrating "Questionable irregular enhancement of the kidneys which may represent infection. Recommend short-term follow-up with urology or nephrology. Cholelithiasis."  · Pt was recently admitted last month and treated for UTI  · WBC 5.51, afebrile, not fulfilling septic criteria on admission  · UA c/w UTI:  Pt was started on empiric abx  · Urine culture non-diagnostic.   Prior culture 7/28 with E coli, sens to keflex  · Cont abx with ceftriaxone  · cont urinary retention protocol  · Pt with marked improvement in mentation    Failure to thrive in adult  Assessment & Plan  · Patient currently resides with son and ambulates without assistive devices at baseline. · patient did have a fall where she fell backwards and hit her head PTA. · Denies LOC or injury known injury per family. On ASA pta  · Trauma work-up in the ED negative:  · CT head "No acute intracranial abnormality. Chronic microangiopathic changes. Old left cerebellar infarct."  · Family reports it is very difficult to get patient to eat or drink. Daughter report loss of over 20 lbs. · Consulted with psychiatry regarding depression: They recommended considering changing celexa to lexapro:  D/w geriatrics team--question benefit  · Also consideration for remeron for appetite stimulant   · Pt was also diagnosed with uti and was started on abx with marked improvement in po intake and generalized weakness  · continue calorie count  · Negative orthostatic vitals  · PT/OT eval appreciated:  rec dc str vs home with 24h supervision  · Family reports not yet interested in facility for patient, would like to set up home health services at son's house if possible   · Pt markedly improved: Anticipate PT re-eval:  Presentation possibly due to uti/kvng/dehydration    Acute metabolic encephalopathy  Assessment & Plan  · On admission there were reports that patient had been nonverbal, lethargic, and not eating  · Also reports of generalized weakness and a fall with head strike  · On the hospital day following admission patient was nonverbal.  Minimally communicative  · She was started on IV fluids for dehydration, and acute kidney injury, and antibiotics for UTI  · After these interventions patient has markedly improved. She is awake, alert, communicating with fluent sentences, she is oriented x4  · Is also tolerating p.o.   · Continue current treatment, and PT reeval    Hypotension  Assessment & Plan  · Pt has h/o HTN, however was hypotensive in ER with bp 87/51:  Improved with IVF  · HCTZ stopped after admission 7/28-7/31  · on lisinopril 20mg and metoprolol xl 12.5mg prior to admission  · Hold lisinopril secondary to MARIANELA  · Hold b-blocker due to bradycardia  · bp still low-normal:  106/57, 100/53-- cont to monitor off medication  · Cont ivf fluids due to poor po intake    Vascular dementia [facility])  Assessment & Plan  · Question of dementia on prior records:  NO formal diagnosis or neurology consultation noted   · MRI brain 7/30: white matter changes on aspirin and statin therapy  · eval in progress:  Per family she had been non-verbal for past 2-3 weeks, but prior to that was verbal  · On admission pt was not responding verbally to questions and was minimally interactive: however after IVF and abx pt has markedly improved  · Currently awake, alert:  Responding to questions with fluent sentences. Alert and Ox4. · Appreciate geriatrics eval  · tsh wnl  · Folate wnl  · B12 <goal of 400 (was 300);  started b12 injections and will dc on oral supplements  · Will need outpt geriatrics fu to eval question of dementia once acute illness resolved    Severe protein-calorie malnutrition ([address])  Assessment & Plan  Malnutrition Findings:   Adult Malnutrition type: Chronic illness  Adult Degree of Malnutrition: Other severe protein calorie malnutrition  Malnutrition Characteristics: Weight loss, Muscle loss            Due to poor p.o. intake  Continue to encourage p.o., continue supplements      360 Statement: Severe malnutrition in setting of failure to thrive, evidenced by wt loss 08/03/22 Weight: 70.3 kg (155 lb) to 8/18/23 108 lbs (30% wt loss x 12 mo), muscle wasting/protruding clavicles, hollowing of temples, treated with diet and supplements, calorie counts to determine appropriate needs/alternative means of nutrition    BMI Findings: Body mass index is 22.43 kg/m².        MDD (major depressive disorder), recurrent episode, severe ([address])  Assessment & Plan  · According to family, mood has been stable at home besides decreased p.o. intake   · Pt recently 10 admitted to [facility] for management of severe depression 7/31-8/11  · Discharged on 10 mg Celexa, 25 mg seroquel h.s., and melatonin, continue  · Psych consult appreciated:  Recommended consideration of changing celexa to lexapro. Pt however markedly improved after starting abx for uti and ivf for dehydration    Hyperlipidemia  Assessment & Plan  · Continue statin    Hypothyroidism  Assessment & Plan  · Continue levothyroxine   · TSH elevated last admission with question of compliance  · Repeat on admission normal    Chronic coronary artery disease  Assessment & Plan  · Hx of CABG  · Continue outpatient follow-up with cardiology, Dr. Jonnathan Singh  · Continue aspirin and statin  · Hold b-blocker due to bradycardia               Family:  Called daughter Mars Byrnes and LM to call my cell phone for update        VTE Pharmacologic Prophylaxis: Heparin  VTE Mechanical Prophylaxis: sequential compression device      dw pts nurse and CM    Certification Statement: The patient will continue to require additional inpatient hospital stay due to need for further acute intervention for failure to thrive, uti    Status: inpatient   =======================================================Subjective:  Pt notes she feels well. Denies any pain anywhere. Denies any n/v/d. Denies any sob/cough. Notes she is passing urine without any problems. Denies any other complaints. Per RN- pt with poor po intake today. Ate lunch well only. Physical Exam:   Temp:  [97.9 °F (36.6 °C)-98.3 °F (36.8 °C)] 98.3 °F (36.8 °C)  HR:  [62-69] 62  Resp:  [17-18] 18  BP: ()/(53-57) 106/57    Gen:  Pleasant, non-tachypnic, non-dyspnic. Conversant. Heart: regular rate and rhythm, S1S2 present, no murmur, rub or gallop  Lungs: clear to ausculatation bilaterally. No wheezing, crackles, or rhonchi. No accessory muscle use or respiratory distress. Abd: soft, non-tender, non-distended. NABS, no guarding, rebound or peritoneal signs. Extremities: no clubbing, cyanosis or edema. 2+pedal pulses bilaterally. Full range of motion  Neuro: awake, alert. Oriented. Fluent speech. Cooperates with exam   Skin: warm and dry: no petechiae, purpura and rash. LABS:   Results from last 7 days   Lab Units 08/19/23 0509 08/18/23 0516 08/17/23 1941   WBC Thousand/uL 5.32 6.65 5.51   HEMOGLOBIN g/dL 11.0* 11.2* 12.7   HEMATOCRIT % 32.4* 33.0* 38.6   PLATELETS Thousands/uL 235 252 297     Results from last 7 days   Lab Units 08/19/23  0509 08/18/23 0516 08/17/23 1941   POTASSIUM mmol/L 3.6 4.0 4.1   CHLORIDE mmol/L 105 106 102   CO2 mmol/L 26 22 22   BUN mg/dL 21 57* 79*   CREATININE mg/dL 0.49* 0.77 1.42*   CALCIUM mg/dL 8.7 9.0 9.9       Hospital Data:    8/17 CT cervical spineNo cervical spine fracture or traumatic malalignment     8/17 CT head:No acute intracranial abnormality.  Chronic microangiopathic changes.  Old left cerebellar infarct     8/17 CT abdomen/pelvis:  Questionable irregular enhancement of the kidneys which may represent infection. Recommend short-term follow-up with urology or nephrology. Cholelithiasis     Microbiology:  Urine culture: No growth  Negative fob  Blood cx neg x 2        ---------------------------------------------------------------------------------------------------------------  This note has been constructed using a voice recognition system.

## 2023-08-20 NOTE — ASSESSMENT & PLAN NOTE
· Pt has h/o HTN, however was hypotensive in ER with bp 87/51:  Improved with IVF  · HCTZ stopped after admission 7/28-7/31  · on lisinopril 20mg and metoprolol xl 12.5mg prior to admission  · Hold lisinopril secondary to MARIANELA  · Hold b-blocker due to bradycardia  · bp still low-normal:  106/57, 100/53-- cont to monitor off medication  · Cont ivf fluids due to poor po intake

## 2023-08-20 NOTE — ASSESSMENT & PLAN NOTE
· According to family, mood has been stable at home besides decreased p.o. intake   · Pt recently 201 admitted to [facility] for management of severe depression 7/31-8/11  · Discharged on 10 mg Celexa, 25 mg seroquel h.s., and melatonin, continue  · Psych consult appreciated:  Recommended consideration of changing celexa to lexapro.   Pt however markedly improved after starting abx for uti and ivf for dehydration

## 2023-08-20 NOTE — ASSESSMENT & PLAN NOTE
· On admission there were reports that patient had been nonverbal, lethargic, and not eating  · Also reports of generalized weakness and a fall with head strike  · On the hospital day following admission patient was nonverbal.  Minimally communicative  · She was started on IV fluids for dehydration, and acute kidney injury, and antibiotics for UTI  · After these interventions patient has markedly improved. She is awake, alert, communicating with fluent sentences, she is oriented x4  · Is also tolerating p.o.   · Continue current treatment, and PT reeval

## 2023-08-20 NOTE — PLAN OF CARE
Problem: Potential for Falls  Goal: Patient will remain free of falls  Description: INTERVENTIONS:  - Educate patient/family on patient safety including physical limitations  - Instruct patient to call for assistance with activity   - Consult OT/PT to assist with strengthening/mobility   - Keep Call bell within reach  - Keep bed low and locked with side rails adjusted as appropriate  - Keep care items and personal belongings within reach  - Initiate and maintain comfort rounds  - Make Fall Risk Sign visible to staff  - Apply yellow socks and bracelet for high fall risk patients  - Consider moving patient to room near nurses station  Outcome: Progressing     Problem: MOBILITY - ADULT  Goal: Maintain or return to baseline ADL function  Description: INTERVENTIONS:  -  Assess patient's ability to carry out ADLs; assess patient's baseline for ADL function and identify physical deficits which impact ability to perform ADLs (bathing, care of mouth/teeth, toileting, grooming, dressing, etc.)  - Assess/evaluate cause of self-care deficits   - Assess range of motion  - Assess patient's mobility; develop plan if impaired  - Assess patient's need for assistive devices and provide as appropriate  - Encourage maximum independence but intervene and supervise when necessary  - Involve family in performance of ADLs  - Assess for home care needs following discharge   - Consider OT consult to assist with ADL evaluation and planning for discharge  - Provide patient education as appropriate  Outcome: Progressing  Goal: Maintains/Returns to pre admission functional level  Description: INTERVENTIONS:  - Perform BMAT or MOVE assessment daily.   - Set and communicate daily mobility goal to care team and patient/family/caregiver.    - Collaborate with rehabilitation services on mobility goals if consulted  - Out of bed for toileting  - Record patient progress and toleration of activity level   Outcome: Progressing     Problem: PAIN - ADULT  Goal: Verbalizes/displays adequate comfort level or baseline comfort level  Description: Interventions:  - Encourage patient to monitor pain and request assistance  - Assess pain using appropriate pain scale  - Administer analgesics based on type and severity of pain and evaluate response  - Implement non-pharmacological measures as appropriate and evaluate response  - Consider cultural and social influences on pain and pain management  - Notify physician/advanced practitioner if interventions unsuccessful or patient reports new pain  Outcome: Progressing     Problem: INFECTION - ADULT  Goal: Absence or prevention of progression during hospitalization  Description: INTERVENTIONS:  - Assess and monitor for signs and symptoms of infection  - Monitor lab/diagnostic results  - Monitor all insertion sites, i.e. indwelling lines, tubes, and drains  - Monitor endotracheal if appropriate and nasal secretions for changes in amount and color  - Dairy appropriate cooling/warming therapies per order  - Administer medications as ordered  - Instruct and encourage patient and family to use good hand hygiene technique  - Identify and instruct in appropriate isolation precautions for identified infection/condition  Outcome: Progressing  Goal: Absence of fever/infection during neutropenic period  Description: INTERVENTIONS:  - Monitor WBC    Outcome: Progressing     Problem: SAFETY ADULT  Goal: Patient will remain free of falls  Description: INTERVENTIONS:  - Educate patient/family on patient safety including physical limitations  - Instruct patient to call for assistance with activity   - Consult OT/PT to assist with strengthening/mobility   - Keep Call bell within reach  - Keep bed low and locked with side rails adjusted as appropriate  - Keep care items and personal belongings within reach  - Initiate and maintain comfort rounds  - Make Fall Risk Sign visible to staff  - Apply yellow socks and bracelet for high fall risk patients  - Consider moving patient to room near nurses station  Outcome: Progressing  Goal: Maintain or return to baseline ADL function  Description: INTERVENTIONS:  -  Assess patient's ability to carry out ADLs; assess patient's baseline for ADL function and identify physical deficits which impact ability to perform ADLs (bathing, care of mouth/teeth, toileting, grooming, dressing, etc.)  - Assess/evaluate cause of self-care deficits   - Assess range of motion  - Assess patient's mobility; develop plan if impaired  - Assess patient's need for assistive devices and provide as appropriate  - Encourage maximum independence but intervene and supervise when necessary  - Involve family in performance of ADLs  - Assess for home care needs following discharge   - Consider OT consult to assist with ADL evaluation and planning for discharge  - Provide patient education as appropriate  Outcome: Progressing  Goal: Maintains/Returns to pre admission functional level  Description: INTERVENTIONS:  - Perform BMAT or MOVE assessment daily.   - Set and communicate daily mobility goal to care team and patient/family/caregiver.    - Collaborate with rehabilitation services on mobility goals if consulted  - Out of bed for toileting  - Record patient progress and toleration of activity level   Outcome: Progressing     Problem: DISCHARGE PLANNING  Goal: Discharge to home or other facility with appropriate resources  Description: INTERVENTIONS:  - Identify barriers to discharge w/patient and caregiver  - Arrange for needed discharge resources and transportation as appropriate  - Identify discharge learning needs (meds, wound care, etc.)  - Arrange for interpretive services to assist at discharge as needed  - Refer to Case Management Department for coordinating discharge planning if the patient needs post-hospital services based on physician/advanced practitioner order or complex needs related to functional status, cognitive ability, or social support system  Outcome: Progressing     Problem: Knowledge Deficit  Goal: Patient/family/caregiver demonstrates understanding of disease process, treatment plan, medications, and discharge instructions  Description: Complete learning assessment and assess knowledge base. Interventions:  - Provide teaching at level of understanding  - Provide teaching via preferred learning methods  Outcome: Progressing     Problem: Nutrition/Hydration-ADULT  Goal: Nutrient/Hydration intake appropriate for improving, restoring or maintaining nutritional needs  Description: Monitor and assess patient's nutrition/hydration status for malnutrition. Collaborate with interdisciplinary team and initiate plan and interventions as ordered. Monitor patient's weight and dietary intake as ordered or per policy. Utilize nutrition screening tool and intervene as necessary. Determine patient's food preferences and provide high-protein, high-caloric foods as appropriate.      INTERVENTIONS:  - Monitor oral intake, urinary output, labs, and treatment plans  - Assess nutrition and hydration status and recommend course of action  - Evaluate amount of meals eaten  - Assist patient with eating if necessary   - Allow adequate time for meals  - Recommend/ encourage appropriate diets, oral nutritional supplements, and vitamin/mineral supplements  - Order, calculate, and assess calorie counts as needed  - Recommend, monitor, and adjust tube feedings and TPN/PPN based on assessed needs  - Assess need for intravenous fluids  - Provide specific nutrition/hydration education as appropriate  - Include patient/family/caregiver in decisions related to nutrition  Outcome: Progressing     Problem: Prexisting or High Potential for Compromised Skin Integrity  Goal: Skin integrity is maintained or improved  Description: INTERVENTIONS:  - Identify patients at risk for skin breakdown  - Assess and monitor skin integrity  - Assess and monitor nutrition and hydration status  - Monitor labs   - Assess for incontinence   - Turn and reposition patient  - Assist with mobility/ambulation  - Relieve pressure over bony prominences  - Avoid friction and shearing  - Provide appropriate hygiene as needed including keeping skin clean and dry  - Evaluate need for skin moisturizer/barrier cream  - Collaborate with interdisciplinary team   - Patient/family teaching  - Consider wound care consult   Outcome: Progressing

## 2023-08-20 NOTE — ASSESSMENT & PLAN NOTE
· CT abdomen/pelvis demonstrating "Questionable irregular enhancement of the kidneys which may represent infection. Recommend short-term follow-up with urology or nephrology. Cholelithiasis."  · Pt was recently admitted last month and treated for UTI  · WBC 5.51, afebrile, not fulfilling septic criteria on admission  · UA c/w UTI:  Pt was started on empiric abx  · Urine culture non-diagnostic.   Prior culture 7/28 with E coli, sens to keflex  · Cont abx with ceftriaxone  · cont urinary retention protocol  · Pt with marked improvement in mentation

## 2023-08-20 NOTE — ASSESSMENT & PLAN NOTE
· Patient currently resides with son and ambulates without assistive devices at baseline. · patient did have a fall where she fell backwards and hit her head PTA. · Denies LOC or injury known injury per family. On ASA pta  · Trauma work-up in the ED negative:  · CT head "No acute intracranial abnormality. Chronic microangiopathic changes. Old left cerebellar infarct."  · Family reports it is very difficult to get patient to eat or drink. Daughter report loss of over 20 lbs. · Consulted with psychiatry regarding depression: They recommended considering changing celexa to lexapro:  D/w geriatrics team--question benefit  · Also consideration for remeron for appetite stimulant   · Pt was also diagnosed with uti and was started on abx with marked improvement in po intake and generalized weakness  · continue calorie count  · Negative orthostatic vitals  · PT/OT eval appreciated:  rec dc str vs home with 24h supervision  · Family reports not yet interested in facility for patient, would like to set up home health services at son's house if possible   · Pt markedly improved:   Anticipate PT re-eval:  Presentation possibly due to uti/kvng/dehydration

## 2023-08-20 NOTE — ASSESSMENT & PLAN NOTE
Malnutrition Findings:   Adult Malnutrition type: Chronic illness  Adult Degree of Malnutrition: Other severe protein calorie malnutrition  Malnutrition Characteristics: Weight loss, Muscle loss            Due to poor p.o. intake  Continue to encourage p.o., continue supplements      360 Statement: Severe malnutrition in setting of failure to thrive, evidenced by wt loss 08/03/22 Weight: 70.3 kg (155 lb) to 8/18/23 108 lbs (30% wt loss x 12 mo), muscle wasting/protruding clavicles, hollowing of temples, treated with diet and supplements, calorie counts to determine appropriate needs/alternative means of nutrition    BMI Findings: Body mass index is 22.43 kg/m².

## 2023-08-20 NOTE — ASSESSMENT & PLAN NOTE
· Hx of CABG  · Continue outpatient follow-up with cardiology, Dr. Karolina Krishnan  · Continue aspirin and statin  · Hold b-blocker due to bradycardia

## 2023-08-20 NOTE — PLAN OF CARE
Problem: Potential for Falls  Goal: Patient will remain free of falls  Description: INTERVENTIONS:  - Educate patient/family on patient safety including physical limitations  - Instruct patient to call for assistance with activity   - Consult OT/PT to assist with strengthening/mobility   - Keep Call bell within reach  - Keep bed low and locked with side rails adjusted as appropriate  - Keep care items and personal belongings within reach  - Initiate and maintain comfort rounds  - Make Fall Risk Sign visible to staff  - Offer Toileting every 2 Hours, in advance of need  - Initiate/Maintain bed alarm  - Obtain necessary fall risk management equipment: bed alarm   - Apply yellow socks and bracelet for high fall risk patients  - Consider moving patient to room near nurses station  Outcome: Progressing     Problem: MOBILITY - ADULT  Goal: Maintain or return to baseline ADL function  Description: INTERVENTIONS:  -  Assess patient's ability to carry out ADLs; assess patient's baseline for ADL function and identify physical deficits which impact ability to perform ADLs (bathing, care of mouth/teeth, toileting, grooming, dressing, etc.)  - Assess/evaluate cause of self-care deficits   - Assess range of motion  - Assess patient's mobility; develop plan if impaired  - Assess patient's need for assistive devices and provide as appropriate  - Encourage maximum independence but intervene and supervise when necessary  - Involve family in performance of ADLs  - Assess for home care needs following discharge   - Consider OT consult to assist with ADL evaluation and planning for discharge  - Provide patient education as appropriate  Outcome: Progressing  Goal: Maintains/Returns to pre admission functional level  Description: INTERVENTIONS:  - Perform BMAT or MOVE assessment daily.   - Set and communicate daily mobility goal to care team and patient/family/caregiver.    - Collaborate with rehabilitation services on mobility goals if consulted  - Perform Range of Motion 3 times a day. - Reposition patient every 2 hours.   - Dangle patient 3 times a day  - Stand patient 3 times a day  - Ambulate patient 3 times a day  - Out of bed to chair 3 times a day   - Out of bed for meals 3 times a day  - Out of bed for toileting  - Record patient progress and toleration of activity level   Outcome: Progressing     Problem: PAIN - ADULT  Goal: Verbalizes/displays adequate comfort level or baseline comfort level  Description: Interventions:  - Encourage patient to monitor pain and request assistance  - Assess pain using appropriate pain scale  - Administer analgesics based on type and severity of pain and evaluate response  - Implement non-pharmacological measures as appropriate and evaluate response  - Consider cultural and social influences on pain and pain management  - Notify physician/advanced practitioner if interventions unsuccessful or patient reports new pain  Outcome: Progressing     Problem: INFECTION - ADULT  Goal: Absence or prevention of progression during hospitalization  Description: INTERVENTIONS:  - Assess and monitor for signs and symptoms of infection  - Monitor lab/diagnostic results  - Monitor all insertion sites, i.e. indwelling lines, tubes, and drains  - Monitor endotracheal if appropriate and nasal secretions for changes in amount and color  - [place] appropriate cooling/warming therapies per order  - Administer medications as ordered  - Instruct and encourage patient and family to use good hand hygiene technique  - Identify and instruct in appropriate isolation precautions for identified infection/condition  Outcome: Progressing  Goal: Absence of fever/infection during neutropenic period  Description: INTERVENTIONS:  - Monitor WBC    Outcome: Progressing     Problem: SAFETY ADULT  Goal: Patient will remain free of falls  Description: INTERVENTIONS:  - Educate patient/family on patient safety including physical limitations  - Instruct patient to call for assistance with activity   - Consult OT/PT to assist with strengthening/mobility   - Keep Call bell within reach  - Keep bed low and locked with side rails adjusted as appropriate  - Keep care items and personal belongings within reach  - Initiate and maintain comfort rounds  - Make Fall Risk Sign visible to staff  - Offer Toileting every 2 Hours, in advance of need  - Initiate/Maintain bed alarm  - Obtain necessary fall risk management equipment: bed alarm   - Apply yellow socks and bracelet for high fall risk patients  - Consider moving patient to room near nurses station  Outcome: Progressing  Goal: Maintain or return to baseline ADL function  Description: INTERVENTIONS:  -  Assess patient's ability to carry out ADLs; assess patient's baseline for ADL function and identify physical deficits which impact ability to perform ADLs (bathing, care of mouth/teeth, toileting, grooming, dressing, etc.)  - Assess/evaluate cause of self-care deficits   - Assess range of motion  - Assess patient's mobility; develop plan if impaired  - Assess patient's need for assistive devices and provide as appropriate  - Encourage maximum independence but intervene and supervise when necessary  - Involve family in performance of ADLs  - Assess for home care needs following discharge   - Consider OT consult to assist with ADL evaluation and planning for discharge  - Provide patient education as appropriate  Outcome: Progressing  Goal: Maintains/Returns to pre admission functional level  Description: INTERVENTIONS:  - Perform BMAT or MOVE assessment daily.   - Set and communicate daily mobility goal to care team and patient/family/caregiver. - Collaborate with rehabilitation services on mobility goals if consulted  - Perform Range of Motion 3 times a day. - Reposition patient every 2 hours.   - Dangle patient 3 times a day  - Stand patient 3 times a day  - Ambulate patient 3 times a day  - Out of bed to chair 3 times a day   - Out of bed for meals 3 times a day  - Out of bed for toileting  - Record patient progress and toleration of activity level   Outcome: Progressing     Problem: DISCHARGE PLANNING  Goal: Discharge to home or other facility with appropriate resources  Description: INTERVENTIONS:  - Identify barriers to discharge w/patient and caregiver  - Arrange for needed discharge resources and transportation as appropriate  - Identify discharge learning needs (meds, wound care, etc.)  - Arrange for interpretive services to assist at discharge as needed  - Refer to Case Management Department for coordinating discharge planning if the patient needs post-hospital services based on physician/advanced practitioner order or complex needs related to functional status, cognitive ability, or social support system  Outcome: Progressing     Problem: Knowledge Deficit  Goal: Patient/family/caregiver demonstrates understanding of disease process, treatment plan, medications, and discharge instructions  Description: Complete learning assessment and assess knowledge base. Interventions:  - Provide teaching at level of understanding  - Provide teaching via preferred learning methods  Outcome: Progressing     Problem: Nutrition/Hydration-ADULT  Goal: Nutrient/Hydration intake appropriate for improving, restoring or maintaining nutritional needs  Description: Monitor and assess patient's nutrition/hydration status for malnutrition. Collaborate with interdisciplinary team and initiate plan and interventions as ordered. Monitor patient's weight and dietary intake as ordered or per policy. Utilize nutrition screening tool and intervene as necessary. Determine patient's food preferences and provide high-protein, high-caloric foods as appropriate.      INTERVENTIONS:  - Monitor oral intake, urinary output, labs, and treatment plans  - Assess nutrition and hydration status and recommend course of action  - Evaluate amount of meals eaten  - Assist patient with eating if necessary   - Allow adequate time for meals  - Recommend/ encourage appropriate diets, oral nutritional supplements, and vitamin/mineral supplements  - Order, calculate, and assess calorie counts as needed  - Recommend, monitor, and adjust tube feedings and TPN/PPN based on assessed needs  - Assess need for intravenous fluids  - Provide specific nutrition/hydration education as appropriate  - Include patient/family/caregiver in decisions related to nutrition  Outcome: Progressing     Problem: Prexisting or High Potential for Compromised Skin Integrity  Goal: Skin integrity is maintained or improved  Description: INTERVENTIONS:  - Identify patients at risk for skin breakdown  - Assess and monitor skin integrity  - Assess and monitor nutrition and hydration status  - Monitor labs   - Assess for incontinence   - Turn and reposition patient  - Assist with mobility/ambulation  - Relieve pressure over bony prominences  - Avoid friction and shearing  - Provide appropriate hygiene as needed including keeping skin clean and dry  - Evaluate need for skin moisturizer/barrier cream  - Collaborate with interdisciplinary team   - Patient/family teaching  - Consider wound care consult   Outcome: Progressing

## 2023-08-20 NOTE — ASSESSMENT & PLAN NOTE
· Patient with sinus bradycardia on telemetry, and EKG  · No evidence of significant heart block  · tsh wnl  · Most likely related to long acting beta-blocker: Patient has lost approximately 20 pounds, beta-blocker may currently be too strong for her body weight  · improved  · Cont to hold beta-blocker

## 2023-08-20 NOTE — ASSESSMENT & PLAN NOTE
Patient with PMHx of severe depression, HTN, HLD, hypothyroidism, and CAD presented to the ED c/o poor p.o. intake, and fall from generalized weakness. Was diagnosed with KEN on admission    · Patient recently admitted to [address] [place] [address] for acute metabolic encephalopathy in the setting of UTI 7/28-7/31. She was then transferred to [facility] for inpatient psychiatric treatment of depression  · Pt presented with KEN with creatinine 1.42, increased from baseline 0.5-0.6  · Ken was in the setting of poor po intake, clinical dehydration, failure to thrive  · Creatine normalized with ivf  · Pt placed on calorie count:   Tolerating po

## 2023-08-21 ENCOUNTER — HOME HEALTH ADMISSION (OUTPATIENT)
Dept: HOME HEALTH SERVICES | Facility: HOME HEALTHCARE | Age: 63
End: 2023-08-21
Payer: MEDICARE

## 2023-08-21 LAB
ANION GAP SERPL CALCULATED.3IONS-SCNC: 4 MMOL/L
BASOPHILS # BLD AUTO: 0.01 THOUSANDS/ÂΜL (ref 0–0.1)
BASOPHILS NFR BLD AUTO: 0 % (ref 0–1)
BUN SERPL-MCNC: 9 MG/DL (ref 5–25)
CALCIUM SERPL-MCNC: 8.4 MG/DL (ref 8.4–10.2)
CHLORIDE SERPL-SCNC: 111 MMOL/L (ref 96–108)
CO2 SERPL-SCNC: 26 MMOL/L (ref 21–32)
CREAT SERPL-MCNC: 0.41 MG/DL (ref 0.6–1.3)
EOSINOPHIL # BLD AUTO: 0.1 THOUSAND/ÂΜL (ref 0–0.61)
EOSINOPHIL NFR BLD AUTO: 3 % (ref 0–6)
ERYTHROCYTE [DISTWIDTH] IN BLOOD BY AUTOMATED COUNT: 11.8 % (ref 11.6–15.1)
GFR SERPL CREATININE-BSD FRML MDRD: 108 ML/MIN/1.73SQ M
GLUCOSE SERPL-MCNC: 96 MG/DL (ref 65–140)
HCT VFR BLD AUTO: 31.7 % (ref 34.8–46.1)
HGB BLD-MCNC: 10.5 G/DL (ref 11.5–15.4)
IMM GRANULOCYTES # BLD AUTO: 0.01 THOUSAND/UL (ref 0–0.2)
IMM GRANULOCYTES NFR BLD AUTO: 0 % (ref 0–2)
LYMPHOCYTES # BLD AUTO: 1.02 THOUSANDS/ÂΜL (ref 0.6–4.47)
LYMPHOCYTES NFR BLD AUTO: 28 % (ref 14–44)
MCH RBC QN AUTO: 31.4 PG (ref 26.8–34.3)
MCHC RBC AUTO-ENTMCNC: 33.1 G/DL (ref 31.4–37.4)
MCV RBC AUTO: 95 FL (ref 82–98)
MONOCYTES # BLD AUTO: 0.4 THOUSAND/ÂΜL (ref 0.17–1.22)
MONOCYTES NFR BLD AUTO: 11 % (ref 4–12)
NEUTROPHILS # BLD AUTO: 2.05 THOUSANDS/ÂΜL (ref 1.85–7.62)
NEUTS SEG NFR BLD AUTO: 58 % (ref 43–75)
NRBC BLD AUTO-RTO: 0 /100 WBCS
PLATELET # BLD AUTO: 209 THOUSANDS/UL (ref 149–390)
PMV BLD AUTO: 10.8 FL (ref 8.9–12.7)
POTASSIUM SERPL-SCNC: 3.7 MMOL/L (ref 3.5–5.3)
RBC # BLD AUTO: 3.34 MILLION/UL (ref 3.81–5.12)
SODIUM SERPL-SCNC: 141 MMOL/L (ref 135–147)
WBC # BLD AUTO: 3.59 THOUSAND/UL (ref 4.31–10.16)

## 2023-08-21 PROCEDURE — 99233 SBSQ HOSP IP/OBS HIGH 50: CPT

## 2023-08-21 PROCEDURE — 85025 COMPLETE CBC W/AUTO DIFF WBC: CPT | Performed by: INTERNAL MEDICINE

## 2023-08-21 PROCEDURE — 99232 SBSQ HOSP IP/OBS MODERATE 35: CPT | Performed by: INTERNAL MEDICINE

## 2023-08-21 PROCEDURE — 80048 BASIC METABOLIC PNL TOTAL CA: CPT | Performed by: INTERNAL MEDICINE

## 2023-08-21 RX ADMIN — ASPIRIN 81 MG 81 MG: 81 TABLET ORAL at 08:53

## 2023-08-21 RX ADMIN — EZETIMIBE 10 MG: 10 TABLET ORAL at 08:53

## 2023-08-21 RX ADMIN — ACETAMINOPHEN 325MG 975 MG: 325 TABLET ORAL at 14:26

## 2023-08-21 RX ADMIN — HEPARIN SODIUM 5000 UNITS: 5000 INJECTION INTRAVENOUS; SUBCUTANEOUS at 05:00

## 2023-08-21 RX ADMIN — SODIUM CHLORIDE, SODIUM GLUCONATE, SODIUM ACETATE, POTASSIUM CHLORIDE, MAGNESIUM CHLORIDE, SODIUM PHOSPHATE, DIBASIC, AND POTASSIUM PHOSPHATE 100 ML/HR: .53; .5; .37; .037; .03; .012; .00082 INJECTION, SOLUTION INTRAVENOUS at 14:43

## 2023-08-21 RX ADMIN — QUETIAPINE FUMARATE 25 MG: 25 TABLET ORAL at 22:18

## 2023-08-21 RX ADMIN — LEVOTHYROXINE SODIUM 175 MCG: 125 TABLET ORAL at 05:00

## 2023-08-21 RX ADMIN — POLYETHYLENE GLYCOL 3350 17 G: 17 POWDER, FOR SOLUTION ORAL at 08:52

## 2023-08-21 RX ADMIN — ATORVASTATIN CALCIUM 40 MG: 40 TABLET, FILM COATED ORAL at 17:23

## 2023-08-21 RX ADMIN — HEPARIN SODIUM 5000 UNITS: 5000 INJECTION INTRAVENOUS; SUBCUTANEOUS at 14:28

## 2023-08-21 RX ADMIN — HEPARIN SODIUM 5000 UNITS: 5000 INJECTION INTRAVENOUS; SUBCUTANEOUS at 22:16

## 2023-08-21 RX ADMIN — PANTOPRAZOLE SODIUM 40 MG: 40 TABLET, DELAYED RELEASE ORAL at 05:00

## 2023-08-21 RX ADMIN — CEFTRIAXONE 1000 MG: 1 INJECTION, SOLUTION INTRAVENOUS at 08:43

## 2023-08-21 RX ADMIN — DOCUSATE SODIUM 100 MG: 100 CAPSULE, LIQUID FILLED ORAL at 08:53

## 2023-08-21 RX ADMIN — CITALOPRAM HYDROBROMIDE 10 MG: 20 TABLET ORAL at 08:53

## 2023-08-21 RX ADMIN — ACETAMINOPHEN 325MG 975 MG: 325 TABLET ORAL at 05:00

## 2023-08-21 RX ADMIN — ACETAMINOPHEN 325MG 975 MG: 325 TABLET ORAL at 22:18

## 2023-08-21 RX ADMIN — CYANOCOBALAMIN 1000 MCG: 1000 INJECTION, SOLUTION INTRAMUSCULAR at 08:58

## 2023-08-21 RX ADMIN — DOCUSATE SODIUM 100 MG: 100 CAPSULE, LIQUID FILLED ORAL at 17:23

## 2023-08-21 NOTE — NUTRITION
08/21/23 1500   Meal/Snack Calories (Kcals)   Meal Breakfast;Lunch;Dinner  (Saturday Aug 12)   Meal/Snack Calories (Kcals) 974 kcals   Meal/Snack Protein (g) 49 g   Calorie Count Supplements   Supplement Source Ensure (specify type)  (Ensure Plus High Protein)   Supplement Calories (Kcals) 88 Kcals   Supplement Protein (g) 5 g   24 HR Nutrition Analysis Totals   24 HR Total Calories (kcals) 1062 kcals   24 Hr Total Protein (g) 54 g   Intake meets 62% of kcal needs and 84% of protein needs

## 2023-08-21 NOTE — CASE MANAGEMENT
Case Management Discharge Planning Note    Patient name Oleg Nurse  Location [facility] [address] MRN [id number]  : 1960 Date 2023       Current Admission Date: 2023  Current Admission Diagnosis:MARIANELA (acute kidney injury) [facility])   Patient Active Problem List    Diagnosis Date Noted   • Acute cystitis without hematuria 2023   • Bradycardia 2023   • MARIANELA (acute kidney injury) ([address]) 2023   • Failure to thrive in adult 2023   • Vascular dementia ([address]) 2023   • Neurocognitive disorder 08/10/2023   • Severe protein-calorie malnutrition ([address]) 2023   • MDD (major depressive disorder), recurrent episode, severe ([address]) 2023   • H/O: CVA (cerebrovascular accident) 2023   • White matter abnormality on MRI of brain 2023   • Acute metabolic encephalopathy    • Immunization refused 2023   • LVH (left ventricular hypertrophy) 2022   • Class 1 obesity due to excess calories with serious comorbidity and body mass index (BMI) of 30.0 to 30.9 in adult 2019   • Encounter for well adult exam with abnormal findings 2019   • Postsurgical aortocoronary bypass status 2019   • Impaired fasting glucose 2016   • Vitamin D deficiency 2016   • Anemia 2012   • Chronic coronary artery disease 2012   • Hypothyroidism 2012   • Hyperlipidemia 2012   • Hypotension 2012      LOS (days): 3  Geometric Mean LOS (GMLOS) (days): 3.10  Days to GMLOS:0.1     OBJECTIVE:  Risk of Unplanned Readmission Score: 20.62         Current admission status: Inpatient   Preferred Pharmacy:   [address], [address]  [facility]  Phone: [phone] Fax: [phone]    Primary Care Provider: Bonnie Brizuela MD    Primary Insurance: MEDICARE  Secondary Insurance: [place]    DISCHARGE DETAILS:    Discharge planning discussed with[de-identified] Son, Cassie Parrist     Contacts  Patient Contacts: Otarnaldo Jurist  Relationship to Patient[de-identified] Family  Contact Method: Phone  Phone Number: [phone]  Reason/Outcome: Emergency Contact, Discharge [facility]         Is the patient interested in [facility] at discharge?: Yes  [facility] requested[de-identified] Occupational Therapy, Physical Therapy, [address] Name[de-identified] Other  [facility] Provider[de-identified] PCP  [place] Needed[de-identified] Strengthening/Theraputic Exercises to Improve Function, Gait/ADL Training, Evaluate Functional Status and Safety  Homebound Criteria Met[de-identified] Requires the Assistance of Another Person for Safe Ambulation or to Leave the Home  Supporting Clincal Findings[de-identified] Limited Endurance, Fatigues Easliy in [employer]    DME Referral Provided  Referral made for DME?: Yes  DME referral completed for the following items[de-identified] Cyndi Stephenson  DME Supplier Name[de-identified] [employer]    Other Referral/Resources/Interventions Provided:  Interventions: [facility]    Treatment Team Recommendation: Short Term Rehab, Home with [address]  Discharge Destination Plan[de-identified] Home with [address], Short Term Rehab  Transport at Discharge : Family     Additional Comments: CM spoke with patient's son Cassie Diallo to discuss home w/ home health 24/7 supervision vs short term rehab. Андрейarnaldo Diallo states he has been speaking with his sister Tyrone Mckeon regarding this- they are leaning towards home. A [facility] assessment for waiver aides is scheduled for this Thursday.  Juan Alberto advises he will call CM back with the decision

## 2023-08-21 NOTE — CASE MANAGEMENT
Case Management Discharge Planning Note    Patient name Tate Moore  [facility] [address] MRN [id number]  : 1960 Date 2023       Current Admission Date: 2023  Current Admission Diagnosis:MARIANELA (acute kidney injury) [facility])   Patient Active Problem List    Diagnosis Date Noted   • Acute cystitis without hematuria 2023   • Bradycardia 2023   • MARIANELA (acute kidney injury) ([address]) 2023   • Failure to thrive in adult 2023   • Vascular dementia ([address]) 2023   • Neurocognitive disorder 08/10/2023   • Severe protein-calorie malnutrition ([address]) 2023   • MDD (major depressive disorder), recurrent episode, severe ([address]) 2023   • H/O: CVA (cerebrovascular accident) 2023   • White matter abnormality on MRI of brain 2023   • Acute metabolic encephalopathy    • Immunization refused 2023   • LVH (left ventricular hypertrophy) 2022   • Class 1 obesity due to excess calories with serious comorbidity and body mass index (BMI) of 30.0 to 30.9 in adult 2019   • Encounter for well adult exam with abnormal findings 2019   • Postsurgical aortocoronary bypass status 2019   • Impaired fasting glucose 2016   • Vitamin D deficiency 2016   • Anemia 2012   • Chronic coronary artery disease 2012   • Hypothyroidism 2012   • Hyperlipidemia 2012   • Hypotension 2012      LOS (days): 3  Geometric Mean LOS (GMLOS) (days): 3.10  Days to GMLOS:-0.1     OBJECTIVE:  Risk of Unplanned Readmission Score: 20.67         Current admission status: Inpatient   Preferred Pharmacy:   [facility] [address] [place] [address] [address]  [facility]  Phone: [phone] Fax: [phone]    Primary Care Provider: Ruslan Wallis MD    Primary Insurance: MEDICARE  Secondary Insurance: [place] [employer] AND [employer] [facility]    DISCHARGE DETAILS:          Additional Comments: SASHA spoke to patient's daughter Erica Barboza states she is more interested with patient returning home w/ home therapy. Daughter wants patient home for county assessment Tuesday 8/29 for home health aide waiver assistance.  Home health referrals made in [place]

## 2023-08-21 NOTE — PLAN OF CARE
Problem: Potential for Falls  Goal: Patient will remain free of falls  Description: INTERVENTIONS:  - Educate patient/family on patient safety including physical limitations  - Instruct patient to call for assistance with activity   - Consult OT/PT to assist with strengthening/mobility   - Keep Call bell within reach  - Keep bed low and locked with side rails adjusted as appropriate  - Keep care items and personal belongings within reach  - Initiate and maintain comfort rounds  - Make Fall Risk Sign visible to staff  - Offer Toileting every *** Hours, in advance of need  - Initiate/Maintain ***alarm  - Obtain necessary fall risk management equipment: ***  - Apply yellow socks and bracelet for high fall risk patients  - Consider moving patient to room near nurses station  Outcome: Progressing     Problem: MOBILITY - ADULT  Goal: Maintain or return to baseline ADL function  Description: INTERVENTIONS:  -  Assess patient's ability to carry out ADLs; assess patient's baseline for ADL function and identify physical deficits which impact ability to perform ADLs (bathing, care of mouth/teeth, toileting, grooming, dressing, etc.)  - Assess/evaluate cause of self-care deficits   - Assess range of motion  - Assess patient's mobility; develop plan if impaired  - Assess patient's need for assistive devices and provide as appropriate  - Encourage maximum independence but intervene and supervise when necessary  - Involve family in performance of ADLs  - Assess for home care needs following discharge   - Consider OT consult to assist with ADL evaluation and planning for discharge  - Provide patient education as appropriate  Outcome: Progressing     Problem: MOBILITY - ADULT  Goal: Maintains/Returns to pre admission functional level  Description: INTERVENTIONS:  - Perform BMAT or MOVE assessment daily.   - Set and communicate daily mobility goal to care team and patient/family/caregiver.    - Collaborate with rehabilitation services on mobility goals if consulted  - Perform Range of Motion *** times a day. - Reposition patient every *** hours.   - Dangle patient *** times a day  - Stand patient *** times a day  - Ambulate patient *** times a day  - Out of bed to chair *** times a day   - Out of bed for meals *** times a day  - Out of bed for toileting  - Record patient progress and toleration of activity level   Outcome: Progressing     Problem: PAIN - ADULT  Goal: Verbalizes/displays adequate comfort level or baseline comfort level  Description: Interventions:  - Encourage patient to monitor pain and request assistance  - Assess pain using appropriate pain scale  - Administer analgesics based on type and severity of pain and evaluate response  - Implement non-pharmacological measures as appropriate and evaluate response  - Consider cultural and social influences on pain and pain management  - Notify physician/advanced practitioner if interventions unsuccessful or patient reports new pain  Outcome: Progressing     Problem: INFECTION - ADULT  Goal: Absence or prevention of progression during hospitalization  Description: INTERVENTIONS:  - Assess and monitor for signs and symptoms of infection  - Monitor lab/diagnostic results  - Monitor all insertion sites, i.e. indwelling lines, tubes, and drains  - Monitor endotracheal if appropriate and nasal secretions for changes in amount and color  - [place] appropriate cooling/warming therapies per order  - Administer medications as ordered  - Instruct and encourage patient and family to use good hand hygiene technique  - Identify and instruct in appropriate isolation precautions for identified infection/condition  Outcome: Progressing     Problem: INFECTION - ADULT  Goal: Absence of fever/infection during neutropenic period  Description: INTERVENTIONS:  - Monitor WBC    Outcome: Progressing     Problem: SAFETY ADULT  Goal: Patient will remain free of falls  Description: INTERVENTIONS:  - Educate patient/family on patient safety including physical limitations  - Instruct patient to call for assistance with activity   - Consult OT/PT to assist with strengthening/mobility   - Keep Call bell within reach  - Keep bed low and locked with side rails adjusted as appropriate  - Keep care items and personal belongings within reach  - Initiate and maintain comfort rounds  - Make Fall Risk Sign visible to staff  - Offer Toileting every *** Hours, in advance of need  - Initiate/Maintain ***alarm  - Obtain necessary fall risk management equipment: ***  - Apply yellow socks and bracelet for high fall risk patients  - Consider moving patient to room near nurses station  Outcome: Progressing     Problem: SAFETY ADULT  Goal: Maintains/Returns to pre admission functional level  Description: INTERVENTIONS:  - Perform BMAT or MOVE assessment daily.   - Set and communicate daily mobility goal to care team and patient/family/caregiver. - Collaborate with rehabilitation services on mobility goals if consulted  - Perform Range of Motion *** times a day. - Reposition patient every *** hours.   - Dangle patient *** times a day  - Stand patient *** times a day  - Ambulate patient *** times a day  - Out of bed to chair *** times a day   - Out of bed for meals *** times a day  - Out of bed for toileting  - Record patient progress and toleration of activity level   Outcome: Progressing     Problem: DISCHARGE PLANNING  Goal: Discharge to home or other facility with appropriate resources  Description: INTERVENTIONS:  - Identify barriers to discharge w/patient and caregiver  - Arrange for needed discharge resources and transportation as appropriate  - Identify discharge learning needs (meds, wound care, etc.)  - Arrange for interpretive services to assist at discharge as needed  - Refer to Case Management Department for coordinating discharge planning if the patient needs post-hospital services based on physician/advanced practitioner order or complex needs related to functional status, cognitive ability, or social support system  Outcome: Progressing     Problem: Knowledge Deficit  Goal: Patient/family/caregiver demonstrates understanding of disease process, treatment plan, medications, and discharge instructions  Description: Complete learning assessment and assess knowledge base. Interventions:  - Provide teaching at level of understanding  - Provide teaching via preferred learning methods  Outcome: Progressing     Problem: Nutrition/Hydration-ADULT  Goal: Nutrient/Hydration intake appropriate for improving, restoring or maintaining nutritional needs  Description: Monitor and assess patient's nutrition/hydration status for malnutrition. Collaborate with interdisciplinary team and initiate plan and interventions as ordered. Monitor patient's weight and dietary intake as ordered or per policy. Utilize nutrition screening tool and intervene as necessary. Determine patient's food preferences and provide high-protein, high-caloric foods as appropriate.      INTERVENTIONS:  - Monitor oral intake, urinary output, labs, and treatment plans  - Assess nutrition and hydration status and recommend course of action  - Evaluate amount of meals eaten  - Assist patient with eating if necessary   - Allow adequate time for meals  - Recommend/ encourage appropriate diets, oral nutritional supplements, and vitamin/mineral supplements  - Order, calculate, and assess calorie counts as needed  - Recommend, monitor, and adjust tube feedings and TPN/PPN based on assessed needs  - Assess need for intravenous fluids  - Provide specific nutrition/hydration education as appropriate  - Include patient/family/caregiver in decisions related to nutrition  Outcome: Progressing     Problem: Prexisting or High Potential for Compromised Skin Integrity  Goal: Skin integrity is maintained or improved  Description: INTERVENTIONS:  - Identify patients at risk for skin breakdown  - Assess and monitor skin integrity  - Assess and monitor nutrition and hydration status  - Monitor labs   - Assess for incontinence   - Turn and reposition patient  - Assist with mobility/ambulation  - Relieve pressure over bony prominences  - Avoid friction and shearing  - Provide appropriate hygiene as needed including keeping skin clean and dry  - Evaluate need for skin moisturizer/barrier cream  - Collaborate with interdisciplinary team   - Patient/family teaching  - Consider wound care consult   Outcome: Progressing

## 2023-08-21 NOTE — PROGRESS NOTES
[facility]  Progress Note  Name: Cecile Gaucher  MRN: [id number]  Unit/Bed#: E5 -01 I Date of Admission: 8/17/2023   Date of Service: 8/21/2023 I Hospital Day: 3    Assessment/Plan   * MARIANELA (acute kidney injury) ([address])  Assessment & Plan  · Acute kidney injury secondary to renal azotemia from poor oral intake and failure to thrive  · Improved and resolved with IV fluid hydration   · Avoid hypotension  · Avoid nephrotoxic agent    Acute cystitis without hematuria  Assessment & Plan  · CT abdomen/pelvis demonstrating "Questionable irregular enhancement of the kidneys which may represent infection. Recommend short-term follow-up with urology or nephrology. Cholelithiasis."  · She reportedly improved with antibiotic treatment  · Given limited historian and abnormal CT, will continue empiric treatment with ceftriaxone. Plan to complete 7 days total of antibiotic    Acute metabolic encephalopathy  Assessment & Plan  · Multifactorial due to acute kidney injury, dehydration, UTI on top of depression and anxiety. · On admission she was reportedly nonverbal, lethargic and not eating. · During this hospitalization, she reportedly improved and has been more interactive   · Continue antibiotic treatment, supportive  care and await the reevaluation  · Geriatric and psychiatric consult  Reviewed. · Continue celexa, B12 injections  · OP memory/cognitive evaluation when stable     Bradycardia  Assessment & Plan  · Sinus bradycardia secondary to beta-blocker. · Continue to hold beta-blocker due to borderline low heart rate and blood pressure.     · Stable    Hyperlipidemia  Assessment & Plan  · Continue statin    Chronic coronary artery disease  Assessment & Plan  · Hx of CABG  · Continue outpatient follow-up with cardiology, Dr. Nadine Mcneil  · Continue aspirin and statin  ·  continue to hold beta-blocker due to bradycardia    Hypothyroidism  Assessment & Plan  · Continue levothyroxine   · TSH elevated last admission with question of compliance  · Repeat on admission normal    Failure to thrive in adult  Assessment & Plan  · Multifactorial  · Await PT reevaluation   · Continue supportive care   · Case management regarding discharge planning. VTE Pharmacologic Prophylaxis: heparin    Patient Centered Rounds: I performed bedside rounds with nursing staff today. Discussions with Specialists or Other Care Team Provider: case management    Education and Discussions with Family / Patient: Attempted to update  (son) via phone. Left voicemail. Current Length of Stay: 3 day(s)  Current Patient Status: Inpatient   Certification Statement: The patient will continue to require additional inpatient hospital stay due to DC planning  Discharge Plan: Anticipate discharge in 24-48 hrs to home with home services. Code Status: Level 1 - Full Code    Subjective:   Seen and examined during rounds  She was not as interactive as previously reported  She followed simple commands but did not talk to me    Objective:     Vitals:   Temp (24hrs), Av.1 °F (36.7 °C), Min:97.9 °F (36.6 °C), Max:98.3 °F (36.8 °C)    Temp:  [97.9 °F (36.6 °C)-98.3 °F (36.8 °C)] 98 °F (36.7 °C)  HR:  [53-62] 60  Resp:  [18] 18  BP: ()/(51-58) 97/58  SpO2:  [97 %-99 %] 98 %  Body mass index is 22.99 kg/m². Input and Output Summary (last 24 hours): Intake/Output Summary (Last 24 hours) at 2023 1000  Last data filed at 2023 1709  Gross per 24 hour   Intake 990 ml   Output --   Net 990 ml       Physical Exam:   Physical Exam  Vitals reviewed. Constitutional:       Appearance: She is not ill-appearing. HENT:      Head: Normocephalic and atraumatic. Nose: No congestion or rhinorrhea. Eyes:      General: No scleral icterus. Cardiovascular:      Rate and Rhythm: Regular rhythm. Pulmonary:      Breath sounds: No wheezing, rhonchi or rales.    Abdominal:      General: There is no distension. Palpations: Abdomen is soft. Tenderness: There is no abdominal tenderness. There is no guarding. Musculoskeletal:      Cervical back: Neck supple. Right lower leg: No edema. Left lower leg: No edema. Skin:     General: Skin is warm and dry. Coloration: Skin is not jaundiced or pale. Neurological:      Comments: Moves all extremities   Psychiatric:      Comments: Unable to assess. Additional Data:     Labs:  Results from last 7 days   Lab Units 08/21/23  0459   WBC Thousand/uL 3.59*   HEMOGLOBIN g/dL 10.5*   HEMATOCRIT % 31.7*   PLATELETS Thousands/uL 209   NEUTROS PCT % 58   LYMPHS PCT % 28   MONOS PCT % 11   EOS PCT % 3     Results from last 7 days   Lab Units 08/21/23  0459 08/19/23  0509   SODIUM mmol/L 141 138   POTASSIUM mmol/L 3.7 3.6   CHLORIDE mmol/L 111* 105   CO2 mmol/L 26 26   BUN mg/dL 9 21   CREATININE mg/dL 0.41* 0.49*   ANION GAP mmol/L 4 7   CALCIUM mg/dL 8.4 8.7   ALBUMIN g/dL  --  3.5   TOTAL BILIRUBIN mg/dL  --  0.48   ALK PHOS U/L  --  57   ALT U/L  --  48   AST U/L  --  26   GLUCOSE RANDOM mg/dL 96 117     Results from last 7 days   Lab Units 08/17/23  1941   INR  1.10             Results from last 7 days   Lab Units 08/17/23  1941   LACTIC ACID mmol/L 1.4   PROCALCITONIN ng/ml 0.10       Lines/Drains:  Invasive Devices     Peripheral Intravenous Line  Duration           Peripheral IV 08/17/23 Left Antecubital 3 days    Peripheral IV 08/21/23 Distal;Dorsal (posterior); Right Forearm <1 day                      Imaging: Reviewed radiology reports from this admission including: CT head and CT abdomen    Recent Cultures (last 7 days):   Results from last 7 days   Lab Units 08/18/23  1032 08/17/23 2014 08/17/23 1948   BLOOD CULTURE   --  No Growth at 72 hrs. No Growth at 72 hrs.    URINE CULTURE  No Growth <1000 cfu/mL  --   --        Last 24 Hours Medication List:   Current Facility-Administered Medications   Medication Dose Route Frequency Provider Last Rate   • acetaminophen  975 mg Oral [facility] Manas Espinoza MD     • aluminum-magnesium hydroxide-simethicone  30 mL Oral Q6H PRN Rosalina Chester PA-C     • aspirin  81 mg Oral Daily Rosalina Chester PA-C     • atorvastatin  40 mg Oral Daily With Shari Crisostomo PA-C     • cefTRIAXone  1,000 mg Intravenous Q24H Manas Espinoza MD 1,000 mg (08/21/23 1535)   • citalopram  10 mg Oral Daily Rosalina Chester PA-C     • cyanocobalamin  1,000 mcg Intramuscular Daily Manas Espinoza MD     • docusate sodium  100 mg Oral BID Rosalina Chester PA-C     • ezetimibe  10 mg Oral Daily Rosalina Chester PA-C     • heparin (porcine)  5,000 Units Subcutaneous [facility] Manas Espinoza MD     • hydrALAZINE  5 mg Intravenous Q6H PRN Rosalina Chester PA-C     • levothyroxine  175 mcg Oral Early Morning Rosalina Chesetr PA-C     • melatonin  6 mg Oral HS PRN Rosalina Chester PA-C     • multi-electrolyte  100 mL/hr Intravenous Continuous Rosalina Chester PA-C 100 mL/hr (08/20/23 1711)   • pantoprazole  40 mg Oral Early Morning Manas Espinoza MD     • phenol  1 spray Mouth/Throat Q2H PRN Manas Espinoza MD     • polyethylene glycol  17 g Oral Daily Rosalina Chester PA-C     • QUEtiapine  25 mg Oral HS Rosalina Chester PA-C          Today, Patient Was Seen By: Gabriel Gerber MD    **Please Note: This note may have been constructed using a voice recognition system. **

## 2023-08-21 NOTE — ASSESSMENT & PLAN NOTE
· CT abdomen/pelvis demonstrating "Questionable irregular enhancement of the kidneys which may represent infection. Recommend short-term follow-up with urology or nephrology. Cholelithiasis."  · She reportedly improved with antibiotic treatment  · Given dementia, limited historian and abnormal CT, will continue empiric treatment with ceftriaxone.   Plan to complete 7 days total of antibiotic

## 2023-08-21 NOTE — RESTORATIVE TECHNICIAN NOTE
Restorative Technician Note      Patient Name: THE [facility]     Restorative Tech Visit Date: 08/21/23  Note Type: Mobility  Patient Position Upon Consult: Supine  Mobility / Activity Provided: assisted pt to the restroom and back to bed, pt seemed in pain but would not tell me where  Activity Performed: Ambulated  Assistive Device: Other (Comment) (I.V. Pole)  Patient Position at End of Consult: Supine;  All needs within reach; Bed/Chair alarm activated

## 2023-08-21 NOTE — ASSESSMENT & PLAN NOTE
· Acute kidney injury secondary to renal azotemia from poor oral intake and failure to thrive  · Improved and resolved with IV fluid hydration   · Avoid hypotension  · Avoid nephrotoxic agent

## 2023-08-21 NOTE — ASSESSMENT & PLAN NOTE
· Multifactorial  · Await PT reevaluation   · Continue supportive care   · Case management regarding discharge planning.

## 2023-08-21 NOTE — PROGRESS NOTES
Progress Note - Geriatric Medicine   [facility] 70 y.o. female MRN: [id number]  Unit/Bed#: E5 -01 Encounter: [id number]      Assessment/Plan:    Cognitive Screening   · Patient has no documented history of memory issues or cognitive impairment   · She was seen by her PCP on 7/21/2023 for anxiety and depression   · She was noted to have lost weight and was living with her son   · She was noted to have suicidal ideation but no plan   · She was started on Lexapro and recommended follow-up in 4 weeks   · She was noted to be oriented x 3   · She was admitted to the hospital from 7/28-7/31  · She was noted to have a UTI on that admission and was treated for this   · MRI revealed old cerebellar infarcts   · Patient was noted by multiple providers including psychiatry and geriatrics to be oriented x 3 during her hospitalization   · Some staff members noted that she had only been nodding her head when asked questions but she had been responsive in some form   · She was agreeable to inpatient psych and signed a 201  · Patient was admitted to behavioral health from 7/31 to 8/11 and her discharge summary revealed the following  · She presented somewhat confused and was noted to be a poor historian and was unable to elaborate on any information   · She was alert and oriented x 2 and showed latency in her responses   · She did not know why she was at behavioral health   · She was noted to be guarded, withdrawn, and delayed in her thought process and speech   · She was noted to be verbally answering questions to nonverbal at times throughout her stay   · It was recommended that she no longer drive and she did have her license taken away   · Discharge note indicates she was discharged on Prozac and melatonin, however, her discharge medication list did not indicate she was taking these medications   · It appears that she was started on Seroquel 25 mg daily at bedtime and continued on Celexa 10 mg daily   · She was noted to be doing well on discharge with a stable mood   · It was noted that her sleep and appetite were improved and she was tolerating her medication   · They did assist the patient/family in scheduling follow-ups with PCP, psych, and neurology   · She was noted to be nonverbal on Friday when I completed her consult   · Dr. Sharlene Hanks evaluated over the weekend and the patient was noted to be oriented x 4 and speaking in full sentences  · She is again nonverbal on my exam today but does nod her head to some yes or no questions   · Most recent TSH on 8/18/2023 noted to be 0.706  · Most recent vitamin B 12 level on 8/2/2023 noted to be 303   · Patient was started on monthly B 12 injections while at behavioral health   · MRI of the brain on 7/30/2023 revealed moderate microangiopathic changes and chronic lacunar infarcts in the basal ganglia and thalamus with chronic microhemorrhages   · CT of the head on 8/17/2023 revealed the same  · There is no evidence of cognitive testing performed in the past   · Suspect that the patients periods of waxing and waning are related to depression   · While it may be possible that she has some underlying cognitive impairment, a diagnosis of dementia would not be given with the patient is not at her baseline   · Defer to psych for recommendations related to depression   · She would likely benefit from outpatient evaluations with either neurology or geriatrics once she is back to baseline so that further cognitive evaluations can be completed   · Maintain delirium precautions as discussed below  · Redirect and reorient as needed   · Keep physically, mentally, and socially active     Delirium   • Current mentation: alert and oriented to person (she is nonverbal but looks at me when I call her name)   • Patient is at high risk secondary to age, possible underlying cognitive impairment, fall, MARIANELA secondary to poor oral intake, acute pain, frequent environmental changes (hospital to behavioral health to home to hospital since 7/28), and hospitalization   • Maintain delirium precautions   · Provide redirection, reorientation, and distraction techniques  · Maintain fall and safety precautions   · Assist with ADLs/IADLs  · Avoid deliriogenic medications such as tramadol, benzodiazepines, anticholinergics, benadryl  · Treat pain using geriatric pain protocol   · Encourage oral hydration and nutrition   · Monitor for constipation and urinary retention   · Implement sleep hygiene and limit night time interuptions   · Maintain sleep-wake cycle   · Encourage early and frequent mobilization   · Encourage participation in group activities  • Most recent EKG on 8/18/2023 revealed a QTc interval of 455  · If all other interventions are unsuccessful for acute agitation and behaviors, can consider Zyprexa 2.5 mg IM Q 8 hours prn   • Would avoid benzodiazepines such as Ativan as these can worsen delirium     Deconditioning   • Patient is at increased risk for deconditioning secondary to MARIANELA, poor oral intake, depression, weakness, gait dysfunction, and hospitalization    • Continue to optimize diet, hydration, and mobility for healing   ·  on labs today   · Keep hydrated   · PO intake   · Family notes patient has had poor oral intake   · She has lost 20 lb in the last several months per her daughter   · Behavioral health discharge note indicates she had improved appetite under their care   · Nutrition services had been following at behavioral health and they are consulted here as well   · Speech therapy consulted   · Patient was on calorie count over the weekend and she did have fair oral intake   · Continue aspiration precautions   · Anemia   · Patient with known documented history  · Baseline hemoglobin appears to be 13-14   · Hemoglobin on labs today noted to be 10.5  · Continue to monitor CBC   · Transfuse for hemoglobin < 7   • Monitor for signs and symptoms of infection, dehydration, DVT, and skin breakdown    Frailty   Clinical Frail Scale: 5- Mildly Frail  · More evident slowing, needs help high order IADLs (transport, bills, medications)  · Progressively impairs shopping and walking outside alone, meal prep and housework  • Most recent albumin on 8/17/2023 noted to be 4.1  • Consider nutrition consult  • Encourage protein supplementation     Ambulatory Dysfunction/Falls  • Patient was noted to have one fall on the day of admission when she became weak and fell backwards hitting her head   · Family noted that since discharge from [facility] she has been more unsteady on her feet which they attribute to poor oral intake   · She typically does not ambulate with any assistive devices at baseline   · PT/OT consulted to assist with strengthening/mobility and assist with discharge planning to appropriate level of care  • Assess patient frequently for physical needs, encourage use of assistant devices as needed and directed by PT/OT  • Identify cognitive and physical deficits and behaviors that affect risk of falls  • Consider moving patient closer to nursing station to monitor more closely for impulsive behavior which may increase risk of falls  • [place] fall precautions   • Educate patient/family on patient safety including physical limitations and importance of using call bell for assistance   • Modify environment to reduce risk of injury including disconnecting from pole when not in use, ensuring adequate lighting in room and restroom, ensuring that path to restroom is clear and free of trip hazards  • Out of bed as tolerated     Dentition/Appetite   • Patients daughter reports a 20 lb weight loss in the last month   • She was recently at behavioral health and was noted to have improved appetite which was noted on her discharge summary   • Albumin on 8/17 was noted to be 4.1  • Patient does have a history of GERD and takes a PPI at baseline   • Speech therapy and nutrition services consulted   • Patient was on a calorie count over the weekend and she appeared to be eating   • Ensure meal consistency is appropriate for all abilities   • Consider nutrition consult   • Continue aspiration precautions     Elimination   • Patient is continent of bowel and bladder at baseline  • She does not appear to have a history of urinary retention or constipation   • Last documented bowel movement was on 8/19  • Current bowel regimen includes   · Colace 100 mg BID   · MiraLAX 17 g daily   • Monitor for constipation and urinary retention     Insomnia   • Patient appears to have a history of sleep disturbances per shahab review   • She had not been taking any medication for sleep on her last admission   • She was recently started on Seroquel during her behavioral health admission   • Behavioral health noted that she had improved sleep on this medication in the discharge summary   • Would continue Seroquel 25 mg daily at bedtime   • First line is behavioral therapy   • Avoid sedative hypnotics including benzodiazepines and benadryl  • Encourage staying awake during the day   • Encourage daytime activities and morning exercise   • Decrease or eliminate daytime naps   • Avoid caffeine especially during late afternoon and evening hours  • Establish a nighttime routine  • Implement sleep hygiene and limit nighttime interruptions  • Can consider melatonin 3 mg daily at bedtime for sleep if needed     Anxiety/Depression  · Patient has a history of anxiety and depression   · She was noted to be anxious back in 2019 and presented to the ER with PCP follow-up  · She admitted that she lost her job and was having financial difficulties which were the cause of her anxiety   · Recently she was diagnosed with depression and underwent treatment for this at behavioral health   · She was recently discharged from behavioral health on Seroquel 25 mg daily at bedtime and Citalopram 10 mg daily   · Patients family notes that she has been having poor oral intake and they note her baseline to be nonverbal which is not completely consistent with documentation from other providers   · She was noted to be selectively verbal during her most recent inpatient psych stay per chart review   · She was noted to be speaking full sentences and was oriented x 4 over the weekend   · I suspect her poor oral intake and selectively verbal communication is secondary to her depression   · Psych consulted and would defer to their recommendations for this   · Patient will likely need at the very least outpatient follow-up with psych for management  · Continue supportive care     Acute Kidney Injury   · Patient presented with poor oral intake at home   · She was noted to have MARIANELA on admission with a creatinine of 1.42  · She was administered IVF and her creatinine has since improved   · Nutrition services consulted and following  · Management per primary team     Bradycardia  · Noted on EKG  Suspected to be secondary to recent weight loss  · Beta-blocker remains on hold   · She is being monitored on telemetry   · Management per primary team     Hypotension   · Patient has a documented history of hypertension but was noted to be hypotensive in the ER with a BP of 87/51  · ACE inhibitor and beta-blocker currently on hold   · Patient remains on IVF  · Management per primary team     Failure to Thrive   · Patient resides with son and recently had a fall prior to admission   · Family suspects this fall was secondary to weakness from poor oral intake   · Daughter notes a 20 lb weight loss over the past month   · Suspect this is secondary to depression   · Psych consulted   · Patient was on a calorie count over the weekend and she appeared to be eating   · PT/OT consulted and following   · Recommending home vs STR       Subjective: The patient is being seen and evaluated today at the bedside for geriatric follow-up. She is noted to be lying in bed comfortably in no acute distress.  She is nonverbal for me today but does nod her head to yes or no questions. She is currently denying pain on exam today. Care was coordinated with patients nurse Thalia Burnette. She notes that the patient has been primarily nonverbal today. She notes that she did say bathroom and then ambulated to the bathroom but has not been speaking. Review of Systems   Unable to perform ROS: Patient nonverbal         Objective:     Vitals: Blood pressure 97/58, pulse 60, temperature 98 °F (36.7 °C), resp. rate 18, weight 53.4 kg (117 lb 11.6 oz), SpO2 98 %, not currently breastfeeding. ,Body mass index is 22.99 kg/m². Intake/Output Summary (Last 24 hours) at 8/21/2023 1127  Last data filed at 8/20/2023 1709  Gross per 24 hour   Intake 990 ml   Output --   Net 990 ml       Current Medications: Reviewed    Physical Exam:   Physical Exam  Vitals and nursing note reviewed. Constitutional:       General: She is not in acute distress. Appearance: She is not ill-appearing. HENT:      Head: Normocephalic. Mouth/Throat:      Mouth: Mucous membranes are moist.   Eyes:      General: No scleral icterus. Conjunctiva/sclera: Conjunctivae normal.   Cardiovascular:      Rate and Rhythm: Normal rate and regular rhythm. Pulmonary:      Effort: Pulmonary effort is normal. No respiratory distress. Abdominal:      General: Bowel sounds are normal. There is no distension. Palpations: Abdomen is soft. Tenderness: There is no abdominal tenderness. Musculoskeletal:         General: No swelling or tenderness. Skin:     General: Skin is warm and dry. Neurological:      Mental Status: She is alert. Comments: Patient is not verbally speaking but is nodding to some yes or no questions           Invasive Devices     Peripheral Intravenous Line  Duration           Peripheral IV 08/17/23 Left Antecubital 3 days    Peripheral IV 08/21/23 Distal;Dorsal (posterior); Right Forearm <1 day                Lab, Imaging and other studies: I have personally reviewed pertinent reports. Please note:  Voice-recognition software may have been used in the preparation of this document. Occasional wrong word or "sound-alike" substitutions may have occurred due to the inherent limitations of voice recognition software. Interpretation should be guided by context.

## 2023-08-21 NOTE — PLAN OF CARE
Problem: Potential for Falls  Goal: Patient will remain free of falls  Description: INTERVENTIONS:  - Educate patient/family on patient safety including physical limitations  - Instruct patient to call for assistance with activity   - Consult OT/PT to assist with strengthening/mobility   - Keep Call bell within reach  - Keep bed low and locked with side rails adjusted as appropriate  - Keep care items and personal belongings within reach  - Initiate and maintain comfort rounds  - Make Fall Risk Sign visible to staff  - Offer Toileting every 2 Hours, in advance of need  - Initiate/Maintain   Problem: Potential for Falls  Goal: Patient will remain free of falls  Description: INTERVENTIONS:  - Educate patient/family on patient safety including physical limitations  - Instruct patient to call for assistance with activity   - Consult OT/PT to assist with strengthening/mobility   - Keep Call bell within reach  - Keep bed low and locked with side rails adjusted as appropriate  - Keep care items and personal belongings within reach  - Initiate and maintain comfort rounds  - Make Fall Risk Sign visible to staff  - Offer Toileting every 2 Hours, in advance of need  - Initiate/Maintain bed alarm  - Apply yellow socks and bracelet for high fall risk patients  - Consider moving patient to room near nurses station  8/21/2023 1245 by Elmira Reyes RN  Outcome: Progressing  8/21/2023 1233 by Elmira Reyes, RN  Outcome: Progressing  8/21/2023 1231 by Elmira Reyes, RN  Outcome: Progressing     Problem: MOBILITY - ADULT  Goal: Maintain or return to baseline ADL function  Description: INTERVENTIONS:  -  Assess patient's ability to carry out ADLs; assess patient's baseline for ADL function and identify physical deficits which impact ability to perform ADLs (bathing, care of mouth/teeth, toileting, grooming, dressing, etc.)  - Assess/evaluate cause of self-care deficits   - Assess range of motion  - Assess patient's mobility; develop plan if impaired  - Assess patient's need for assistive devices and provide as appropriate  - Encourage maximum independence but intervene and supervise when necessary  - Involve family in performance of ADLs  - Assess for home care needs following discharge   - Consider OT consult to assist with ADL evaluation and planning for discharge  - Provide patient education as appropriate  8/21/2023 1245 by Tito Mccarthy RN  Outcome: Progressing  8/21/2023 1233 by Tito Mccarthy RN  Outcome: Progressing  8/21/2023 1231 by Tito Mccarthy RN  Outcome: Progressing  Goal: Maintains/Returns to pre admission functional level  Description: INTERVENTIONS:  - Perform BMAT or MOVE assessment daily.   - Set and communicate daily mobility goal to care team and patient/family/caregiver.    - Collaborate with rehabilitation services on mobility goals if consulted  - Out of bed for toileting  - Record patient progress and toleration of activity level   8/21/2023 1245 by Tito Mccarthy RN  Outcome: Progressing  8/21/2023 1233 by Tito Mccarthy RN  Outcome: Progressing  8/21/2023 1231 by Tito Mccarthy RN  Outcome: Progressing     Problem: PAIN - ADULT  Goal: Verbalizes/displays adequate comfort level or baseline comfort level  Description: Interventions:  - Encourage patient to monitor pain and request assistance  - Assess pain using appropriate pain scale  - Administer analgesics based on type and severity of pain and evaluate response  - Implement non-pharmacological measures as appropriate and evaluate response  - Consider cultural and social influences on pain and pain management  - Notify physician/advanced practitioner if interventions unsuccessful or patient reports new pain  8/21/2023 1245 by Tito Mccarthy RN  Outcome: Progressing  8/21/2023 1233 by Tito Mccarthy RN  Outcome: Progressing  8/21/2023 1231 by Tito Mccarthy RN  Outcome: Progressing     Problem: INFECTION - ADULT  Goal: Absence or prevention of progression during hospitalization  Description: INTERVENTIONS:  - Assess and monitor for signs and symptoms of infection  - Monitor lab/diagnostic results  - Monitor all insertion sites, i.e. indwelling lines, tubes, and drains  - Monitor endotracheal if appropriate and nasal secretions for changes in amount and color  - [place] appropriate cooling/warming therapies per order  - Administer medications as ordered  - Instruct and encourage patient and family to use good hand hygiene technique  - Identify and instruct in appropriate isolation precautions for identified infection/condition  8/21/2023 1245 by Elmira Reyes RN  Outcome: Progressing  8/21/2023 1233 by Elmira Reyes RN  Outcome: Progressing  8/21/2023 1231 by Elmira Reyes RN  Outcome: Progressing  Goal: Absence of fever/infection during neutropenic period  Description: INTERVENTIONS:  - Monitor WBC    8/21/2023 1245 by Elmira Reyes RN  Outcome: Progressing  8/21/2023 1233 by Elmira Reyes RN  Outcome: Progressing  8/21/2023 1231 by Elmira Reyes RN  Outcome: Progressing     Problem: SAFETY ADULT  Goal: Patient will remain free of falls  Description: INTERVENTIONS:  - Educate patient/family on patient safety including physical limitations  - Instruct patient to call for assistance with activity   - Consult OT/PT to assist with strengthening/mobility   - Keep Call bell within reach  - Keep bed low and locked with side rails adjusted as appropriate  - Keep care items and personal belongings within reach  - Initiate and maintain comfort rounds  - Make Fall Risk Sign visible to staff  - Offer Toileting every 2 Hours, in advance of need  - Initiate/Maintain bed alarm  - Apply yellow socks and bracelet for high fall risk patients  - Consider moving patient to room near nurses station  8/21/2023 1245 by Elmira Reyes RN  Outcome: Progressing  8/21/2023 1233 by Elmira Reyes RN  Outcome: Progressing  8/21/2023 1231 by Elmira Reyes RN  Outcome: Progressing  Goal: Maintain or return to baseline ADL function  Description: INTERVENTIONS:  -  Assess patient's ability to carry out ADLs; assess patient's baseline for ADL function and identify physical deficits which impact ability to perform ADLs (bathing, care of mouth/teeth, toileting, grooming, dressing, etc.)  - Assess/evaluate cause of self-care deficits   - Assess range of motion  - Assess patient's mobility; develop plan if impaired  - Assess patient's need for assistive devices and provide as appropriate  - Encourage maximum independence but intervene and supervise when necessary  - Involve family in performance of ADLs  - Assess for home care needs following discharge   - Consider OT consult to assist with ADL evaluation and planning for discharge  - Provide patient education as appropriate  8/21/2023 1245 by Lori Montero RN  Outcome: Progressing  8/21/2023 1233 by Lori Montero RN  Outcome: Progressing  8/21/2023 1231 by Lori Montero RN  Outcome: Progressing  Goal: Maintains/Returns to pre admission functional level  Description: INTERVENTIONS:  - Perform BMAT or MOVE assessment daily.   - Set and communicate daily mobility goal to care team and patient/family/caregiver.    - Collaborate with rehabilitation services on mobility goals if consulted  - Out of bed for toileting  - Record patient progress and toleration of activity level   8/21/2023 1245 by Lori Montero RN  Outcome: Progressing  8/21/2023 1233 by Lori Montero RN  Outcome: Progressing  8/21/2023 1231 by Lori Montero RN  Outcome: Progressing     Problem: DISCHARGE PLANNING  Goal: Discharge to home or other facility with appropriate resources  Description: INTERVENTIONS:  - Identify barriers to discharge w/patient and caregiver  - Arrange for needed discharge resources and transportation as appropriate  - Identify discharge learning needs (meds, wound care, etc.)  - Arrange for interpretive services to assist at discharge as needed  - Refer to Case Management Department for coordinating discharge planning if the patient needs post-hospital services based on physician/advanced practitioner order or complex needs related to functional status, cognitive ability, or social support system  8/21/2023 1245 by Shena Randall RN  Outcome: Progressing  8/21/2023 1233 by Shena Randall RN  Outcome: Progressing  8/21/2023 1231 by Shena Randall RN  Outcome: Progressing     Problem: Knowledge Deficit  Goal: Patient/family/caregiver demonstrates understanding of disease process, treatment plan, medications, and discharge instructions  Description: Complete learning assessment and assess knowledge base. Interventions:  - Provide teaching at level of understanding  - Provide teaching via preferred learning methods  8/21/2023 1245 by Shena Randall RN  Outcome: Progressing  8/21/2023 1233 by Shena Randall RN  Outcome: Progressing  8/21/2023 1231 by Shena Randall RN  Outcome: Progressing     Problem: Nutrition/Hydration-ADULT  Goal: Nutrient/Hydration intake appropriate for improving, restoring or maintaining nutritional needs  Description: Monitor and assess patient's nutrition/hydration status for malnutrition. Collaborate with interdisciplinary team and initiate plan and interventions as ordered. Monitor patient's weight and dietary intake as ordered or per policy. Utilize nutrition screening tool and intervene as necessary. Determine patient's food preferences and provide high-protein, high-caloric foods as appropriate.      INTERVENTIONS:  - Monitor oral intake, urinary output, labs, and treatment plans  - Assess nutrition and hydration status and recommend course of action  - Evaluate amount of meals eaten  - Assist patient with eating if necessary   - Allow adequate time for meals  - Recommend/ encourage appropriate diets, oral nutritional supplements, and vitamin/mineral supplements  - Order, calculate, and assess calorie counts as needed  - Recommend, monitor, and adjust tube feedings and TPN/PPN based on assessed needs  - Assess need for intravenous fluids  - Provide specific nutrition/hydration education as appropriate  - Include patient/family/caregiver in decisions related to nutrition  8/21/2023 1245 by Mildred Lock RN  Outcome: Progressing  8/21/2023 1233 by Mildred Lock RN  Outcome: Progressing  8/21/2023 1231 by Mildred Lock RN  Outcome: Progressing     Problem: Prexisting or High Potential for Compromised Skin Integrity  Goal: Skin integrity is maintained or improved  Description: INTERVENTIONS:  - Identify patients at risk for skin breakdown  - Assess and monitor skin integrity  - Assess and monitor nutrition and hydration status  - Monitor labs   - Assess for incontinence   - Turn and reposition patient  - Assist with mobility/ambulation  - Relieve pressure over bony prominences  - Avoid friction and shearing  - Provide appropriate hygiene as needed including keeping skin clean and dry  - Evaluate need for skin moisturizer/barrier cream  - Collaborate with interdisciplinary team   - Patient/family teaching  - Consider wound care consult   8/21/2023 1245 by Mildred Lock RN  Outcome: Progressing  8/21/2023 1233 by Mildred Lock RN  Outcome: Progressing  8/21/2023 1231 by Mildred Lock RN  Outcome: Progressing   alarm  - Apply yellow socks and bracelet for high fall risk patients  - Consider moving patient to room near nurses station  Outcome: Progressing     Problem: MOBILITY - ADULT  Goal: Maintain or return to baseline ADL function  Description: INTERVENTIONS:  -  Assess patient's ability to carry out ADLs; assess patient's baseline for ADL function and identify physical deficits which impact ability to perform ADLs (bathing, care of mouth/teeth, toileting, grooming, dressing, etc.)  - Assess/evaluate cause of self-care deficits   - Assess range of motion  - Assess patient's mobility; develop plan if impaired  - Assess patient's need for assistive devices and provide as appropriate  - Encourage maximum independence but intervene and supervise when necessary  - Involve family in performance of ADLs  - Assess for home care needs following discharge   - Consider OT consult to assist with ADL evaluation and planning for discharge  - Provide patient education as appropriate  Outcome: Progressing  Goal: Maintains/Returns to pre admission functional level  Description: INTERVENTIONS:  - Perform BMAT or MOVE assessment daily.   - Set and communicate daily mobility goal to care team and patient/family/caregiver.    - Collaborate with rehabilitation services on mobility goals if consulted  - Out of bed for toileting  - Record patient progress and toleration of activity level   Outcome: Progressing     Problem: PAIN - ADULT  Goal: Verbalizes/displays adequate comfort level or baseline comfort level  Description: Interventions:  - Encourage patient to monitor pain and request assistance  - Assess pain using appropriate pain scale  - Administer analgesics based on type and severity of pain and evaluate response  - Implement non-pharmacological measures as appropriate and evaluate response  - Consider cultural and social influences on pain and pain management  - Notify physician/advanced practitioner if interventions unsuccessful or patient reports new pain  Outcome: Progressing     Problem: INFECTION - ADULT  Goal: Absence or prevention of progression during hospitalization  Description: INTERVENTIONS:  - Assess and monitor for signs and symptoms of infection  - Monitor lab/diagnostic results  - Monitor all insertion sites, i.e. indwelling lines, tubes, and drains  - Monitor endotracheal if appropriate and nasal secretions for changes in amount and color  - [place] appropriate cooling/warming therapies per order  - Administer medications as ordered  - Instruct and encourage patient and family to use good hand hygiene technique  - Identify and instruct in appropriate isolation precautions for identified infection/condition  Outcome: Progressing  Goal: Absence of fever/infection during neutropenic period  Description: INTERVENTIONS:  - Monitor WBC    Outcome: Progressing     Problem: SAFETY ADULT  Goal: Patient will remain free of falls  Description: INTERVENTIONS:  - Educate patient/family on patient safety including physical limitations  - Instruct patient to call for assistance with activity   - Consult OT/PT to assist with strengthening/mobility   - Keep Call bell within reach  - Keep bed low and locked with side rails adjusted as appropriate  - Keep care items and personal belongings within reach  - Initiate and maintain comfort rounds  - Make Fall Risk Sign visible to staff  - Offer Toileting every 2 Hours, in advance of need  - Initiate/Maintain bed alarm  - Apply yellow socks and bracelet for high fall risk patients  - Consider moving patient to room near nurses station  Outcome: Progressing  Goal: Maintain or return to baseline ADL function  Description: INTERVENTIONS:  -  Assess patient's ability to carry out ADLs; assess patient's baseline for ADL function and identify physical deficits which impact ability to perform ADLs (bathing, care of mouth/teeth, toileting, grooming, dressing, etc.)  - Assess/evaluate cause of self-care deficits   - Assess range of motion  - Assess patient's mobility; develop plan if impaired  - Assess patient's need for assistive devices and provide as appropriate  - Encourage maximum independence but intervene and supervise when necessary  - Involve family in performance of ADLs  - Assess for home care needs following discharge   - Consider OT consult to assist with ADL evaluation and planning for discharge  - Provide patient education as appropriate  Outcome: Progressing  Goal: Maintains/Returns to pre admission functional level  Description: INTERVENTIONS:  - Perform BMAT or MOVE assessment daily.   - Set and communicate daily mobility goal to care team and patient/family/caregiver.    - Collaborate with rehabilitation services on mobility goals if consulted  - Out of bed for toileting  - Record patient progress and toleration of activity level   Outcome: Progressing     Problem: DISCHARGE PLANNING  Goal: Discharge to home or other facility with appropriate resources  Description: INTERVENTIONS:  - Identify barriers to discharge w/patient and caregiver  - Arrange for needed discharge resources and transportation as appropriate  - Identify discharge learning needs (meds, wound care, etc.)  - Arrange for interpretive services to assist at discharge as needed  - Refer to Case Management Department for coordinating discharge planning if the patient needs post-hospital services based on physician/advanced practitioner order or complex needs related to functional status, cognitive ability, or social support system  Outcome: Progressing     Problem: Knowledge Deficit  Goal: Patient/family/caregiver demonstrates understanding of disease process, treatment plan, medications, and discharge instructions  Description: Complete learning assessment and assess knowledge base. Interventions:  - Provide teaching at level of understanding  - Provide teaching via preferred learning methods  Outcome: Progressing     Problem: Nutrition/Hydration-ADULT  Goal: Nutrient/Hydration intake appropriate for improving, restoring or maintaining nutritional needs  Description: Monitor and assess patient's nutrition/hydration status for malnutrition. Collaborate with interdisciplinary team and initiate plan and interventions as ordered. Monitor patient's weight and dietary intake as ordered or per policy. Utilize nutrition screening tool and intervene as necessary. Determine patient's food preferences and provide high-protein, high-caloric foods as appropriate.      INTERVENTIONS:  - Monitor oral intake, urinary output, labs, and treatment plans  - Assess nutrition and hydration status and recommend course of action  - Evaluate amount of meals eaten  - Assist patient with eating if necessary - Allow adequate time for meals  - Recommend/ encourage appropriate diets, oral nutritional supplements, and vitamin/mineral supplements  - Order, calculate, and assess calorie counts as needed  - Recommend, monitor, and adjust tube feedings and TPN/PPN based on assessed needs  - Assess need for intravenous fluids  - Provide specific nutrition/hydration education as appropriate  - Include patient/family/caregiver in decisions related to nutrition  Outcome: Progressing     Problem: Prexisting or High Potential for Compromised Skin Integrity  Goal: Skin integrity is maintained or improved  Description: INTERVENTIONS:  - Identify patients at risk for skin breakdown  - Assess and monitor skin integrity  - Assess and monitor nutrition and hydration status  - Monitor labs   - Assess for incontinence   - Turn and reposition patient  - Assist with mobility/ambulation  - Relieve pressure over bony prominences  - Avoid friction and shearing  - Provide appropriate hygiene as needed including keeping skin clean and dry  - Evaluate need for skin moisturizer/barrier cream  - Collaborate with interdisciplinary team   - Patient/family teaching  - Consider wound care consult   Outcome: Progressing

## 2023-08-21 NOTE — PLAN OF CARE
Problem: Potential for Falls  Goal: Patient will remain free of falls  Description: INTERVENTIONS:  - Educate patient/family on patient safety including physical limitations  - Instruct patient to call for assistance with activity   - Consult OT/PT to assist with strengthening/mobility   - Keep Call bell within reach  - Keep bed low and locked with side rails adjusted as appropriate  - Keep care items and personal belongings within reach  - Initiate and maintain comfort rounds  - Make Fall Risk Sign visible to staff  - Offer Toileting every 2 Hours, in advance of need  - Initiate/Maintain bed alarm  - Apply yellow socks and bracelet for high fall risk patients  - Consider moving patient to room near nurses station  Outcome: Progressing     Problem: MOBILITY - ADULT  Goal: Maintain or return to baseline ADL function  Description: INTERVENTIONS:  -  Assess patient's ability to carry out ADLs; assess patient's baseline for ADL function and identify physical deficits which impact ability to perform ADLs (bathing, care of mouth/teeth, toileting, grooming, dressing, etc.)  - Assess/evaluate cause of self-care deficits   - Assess range of motion  - Assess patient's mobility; develop plan if impaired  - Assess patient's need for assistive devices and provide as appropriate  - Encourage maximum independence but intervene and supervise when necessary  - Involve family in performance of ADLs  - Assess for home care needs following discharge   - Consider OT consult to assist with ADL evaluation and planning for discharge  - Provide patient education as appropriate  Outcome: Progressing  Goal: Maintains/Returns to pre admission functional level  Description: INTERVENTIONS:  - Perform BMAT or MOVE assessment daily.   - Set and communicate daily mobility goal to care team and patient/family/caregiver. - Collaborate with rehabilitation services on mobility goals if consulted  - Perform Range of Motion 3 times a day.   - Reposition patient every 2 hours.   - Dangle patient 3 times a day  - Stand patient 3 times a day  - Ambulate patient 3 times a day  - Out of bed to chair 3 times a day   - Out of bed for meals 3 times a day  - Out of bed for toileting  - Record patient progress and toleration of activity level   Outcome: Progressing     Problem: PAIN - ADULT  Goal: Verbalizes/displays adequate comfort level or baseline comfort level  Description: Interventions:  - Encourage patient to monitor pain and request assistance  - Assess pain using appropriate pain scale  - Administer analgesics based on type and severity of pain and evaluate response  - Implement non-pharmacological measures as appropriate and evaluate response  - Consider cultural and social influences on pain and pain management  - Notify physician/advanced practitioner if interventions unsuccessful or patient reports new pain  Outcome: Progressing     Problem: INFECTION - ADULT  Goal: Absence or prevention of progression during hospitalization  Description: INTERVENTIONS:  - Assess and monitor for signs and symptoms of infection  - Monitor lab/diagnostic results  - Monitor all insertion sites, i.e. indwelling lines, tubes, and drains  - Monitor endotracheal if appropriate and nasal secretions for changes in amount and color  - [place] appropriate cooling/warming therapies per order  - Administer medications as ordered  - Instruct and encourage patient and family to use good hand hygiene technique  - Identify and instruct in appropriate isolation precautions for identified infection/condition  Outcome: Progressing  Goal: Absence of fever/infection during neutropenic period  Description: INTERVENTIONS:  - Monitor WBC    Outcome: Progressing     Problem: SAFETY ADULT  Goal: Patient will remain free of falls  Description: INTERVENTIONS:  - Educate patient/family on patient safety including physical limitations  - Instruct patient to call for assistance with activity   - Consult OT/PT to assist with strengthening/mobility   - Keep Call bell within reach  - Keep bed low and locked with side rails adjusted as appropriate  - Keep care items and personal belongings within reach  - Initiate and maintain comfort rounds  - Make Fall Risk Sign visible to staff  - Offer Toileting every 2 Hours, in advance of need  - Initiate/Maintain bed alarm  - Apply yellow socks and bracelet for high fall risk patients  - Consider moving patient to room near nurses station  Outcome: Progressing  Goal: Maintain or return to baseline ADL function  Description: INTERVENTIONS:  -  Assess patient's ability to carry out ADLs; assess patient's baseline for ADL function and identify physical deficits which impact ability to perform ADLs (bathing, care of mouth/teeth, toileting, grooming, dressing, etc.)  - Assess/evaluate cause of self-care deficits   - Assess range of motion  - Assess patient's mobility; develop plan if impaired  - Assess patient's need for assistive devices and provide as appropriate  - Encourage maximum independence but intervene and supervise when necessary  - Involve family in performance of ADLs  - Assess for home care needs following discharge   - Consider OT consult to assist with ADL evaluation and planning for discharge  - Provide patient education as appropriate  Outcome: Progressing  Goal: Maintains/Returns to pre admission functional level  Description: INTERVENTIONS:  - Perform BMAT or MOVE assessment daily.   - Set and communicate daily mobility goal to care team and patient/family/caregiver. - Collaborate with rehabilitation services on mobility goals if consulted  - Perform Range of Motion 3 times a day. - Reposition patient every 2 hours.   - Dangle patient 3 times a day  - Stand patient 3 times a day  - Ambulate patient 3 times a day  - Out of bed to chair 3 times a day   - Out of bed for meals 3 times a day  - Out of bed for toileting  - Record patient progress and toleration of activity level   Outcome: Progressing     Problem: DISCHARGE PLANNING  Goal: Discharge to home or other facility with appropriate resources  Description: INTERVENTIONS:  - Identify barriers to discharge w/patient and caregiver  - Arrange for needed discharge resources and transportation as appropriate  - Identify discharge learning needs (meds, wound care, etc.)  - Arrange for interpretive services to assist at discharge as needed  - Refer to Case Management Department for coordinating discharge planning if the patient needs post-hospital services based on physician/advanced practitioner order or complex needs related to functional status, cognitive ability, or social support system  Outcome: Progressing     Problem: Knowledge Deficit  Goal: Patient/family/caregiver demonstrates understanding of disease process, treatment plan, medications, and discharge instructions  Description: Complete learning assessment and assess knowledge base. Interventions:  - Provide teaching at level of understanding  - Provide teaching via preferred learning methods  Outcome: Progressing     Problem: Nutrition/Hydration-ADULT  Goal: Nutrient/Hydration intake appropriate for improving, restoring or maintaining nutritional needs  Description: Monitor and assess patient's nutrition/hydration status for malnutrition. Collaborate with interdisciplinary team and initiate plan and interventions as ordered. Monitor patient's weight and dietary intake as ordered or per policy. Utilize nutrition screening tool and intervene as necessary. Determine patient's food preferences and provide high-protein, high-caloric foods as appropriate.      INTERVENTIONS:  - Monitor oral intake, urinary output, labs, and treatment plans  - Assess nutrition and hydration status and recommend course of action  - Evaluate amount of meals eaten  - Assist patient with eating if necessary   - Allow adequate time for meals  - Recommend/ encourage appropriate diets, oral nutritional supplements, and vitamin/mineral supplements  - Order, calculate, and assess calorie counts as needed  - Recommend, monitor, and adjust tube feedings and TPN/PPN based on assessed needs  - Assess need for intravenous fluids  - Provide specific nutrition/hydration education as appropriate  - Include patient/family/caregiver in decisions related to nutrition  Outcome: Progressing     Problem: Prexisting or High Potential for Compromised Skin Integrity  Goal: Skin integrity is maintained or improved  Description: INTERVENTIONS:  - Identify patients at risk for skin breakdown  - Assess and monitor skin integrity  - Assess and monitor nutrition and hydration status  - Monitor labs   - Assess for incontinence   - Turn and reposition patient  - Assist with mobility/ambulation  - Relieve pressure over bony prominences  - Avoid friction and shearing  - Provide appropriate hygiene as needed including keeping skin clean and dry  - Evaluate need for skin moisturizer/barrier cream  - Collaborate with interdisciplinary team   - Patient/family teaching  - Consider wound care consult   Outcome: Progressing

## 2023-08-21 NOTE — ASSESSMENT & PLAN NOTE
· Hx of CABG  · Continue outpatient follow-up with cardiology, Dr. Gabbie Pitts  · Continue aspirin and statin  ·  continue to hold beta-blocker due to bradycardia

## 2023-08-21 NOTE — ASSESSMENT & PLAN NOTE
· Multifactorial due to acute kidney injury, dehydration, UTI on top of depression and anxiety. · On admission she was reportedly nonverbal, lethargic and not eating. · During this hospitalization, she reportedly improved and has been more interactive   · Continue antibiotic treatment, supportive  care and await the reevaluation  · Geriatric and psychiatric consult  Reviewed.   · Continue celexa, B12 injections  · OP memory/cognitive evaluation when stable

## 2023-08-21 NOTE — ASSESSMENT & PLAN NOTE
· Sinus bradycardia secondary to beta-blocker. · Continue to hold beta-blocker due to borderline low heart rate and blood pressure.     · Stable

## 2023-08-22 PROCEDURE — 97535 SELF CARE MNGMENT TRAINING: CPT

## 2023-08-22 PROCEDURE — 99232 SBSQ HOSP IP/OBS MODERATE 35: CPT

## 2023-08-22 PROCEDURE — 99232 SBSQ HOSP IP/OBS MODERATE 35: CPT | Performed by: INTERNAL MEDICINE

## 2023-08-22 RX ADMIN — EZETIMIBE 10 MG: 10 TABLET ORAL at 09:11

## 2023-08-22 RX ADMIN — ACETAMINOPHEN 325MG 975 MG: 325 TABLET ORAL at 14:11

## 2023-08-22 RX ADMIN — CITALOPRAM HYDROBROMIDE 10 MG: 20 TABLET ORAL at 09:11

## 2023-08-22 RX ADMIN — HEPARIN SODIUM 5000 UNITS: 5000 INJECTION INTRAVENOUS; SUBCUTANEOUS at 21:11

## 2023-08-22 RX ADMIN — ASPIRIN 81 MG 81 MG: 81 TABLET ORAL at 09:11

## 2023-08-22 RX ADMIN — ATORVASTATIN CALCIUM 40 MG: 40 TABLET, FILM COATED ORAL at 17:07

## 2023-08-22 RX ADMIN — DOCUSATE SODIUM 100 MG: 100 CAPSULE, LIQUID FILLED ORAL at 17:07

## 2023-08-22 RX ADMIN — CYANOCOBALAMIN 1000 MCG: 1000 INJECTION, SOLUTION INTRAMUSCULAR at 09:11

## 2023-08-22 RX ADMIN — PANTOPRAZOLE SODIUM 40 MG: 40 TABLET, DELAYED RELEASE ORAL at 05:03

## 2023-08-22 RX ADMIN — DOCUSATE SODIUM 100 MG: 100 CAPSULE, LIQUID FILLED ORAL at 09:11

## 2023-08-22 RX ADMIN — LEVOTHYROXINE SODIUM 175 MCG: 125 TABLET ORAL at 05:03

## 2023-08-22 RX ADMIN — SODIUM CHLORIDE, SODIUM GLUCONATE, SODIUM ACETATE, POTASSIUM CHLORIDE, MAGNESIUM CHLORIDE, SODIUM PHOSPHATE, DIBASIC, AND POTASSIUM PHOSPHATE 100 ML/HR: .53; .5; .37; .037; .03; .012; .00082 INJECTION, SOLUTION INTRAVENOUS at 10:48

## 2023-08-22 RX ADMIN — HEPARIN SODIUM 5000 UNITS: 5000 INJECTION INTRAVENOUS; SUBCUTANEOUS at 05:48

## 2023-08-22 RX ADMIN — CEFTRIAXONE 1000 MG: 1 INJECTION, SOLUTION INTRAVENOUS at 09:11

## 2023-08-22 RX ADMIN — HEPARIN SODIUM 5000 UNITS: 5000 INJECTION INTRAVENOUS; SUBCUTANEOUS at 14:11

## 2023-08-22 RX ADMIN — ACETAMINOPHEN 325MG 975 MG: 325 TABLET ORAL at 05:03

## 2023-08-22 RX ADMIN — QUETIAPINE FUMARATE 25 MG: 25 TABLET ORAL at 21:11

## 2023-08-22 RX ADMIN — POLYETHYLENE GLYCOL 3350 17 G: 17 POWDER, FOR SOLUTION ORAL at 09:17

## 2023-08-22 NOTE — PROGRESS NOTES
[facility]  Progress Note  Name: Regino Gonzalez  MRN: [id number]  Unit/Bed#: E5 -01 I Date of Admission: 8/17/2023   Date of Service: 8/22/2023 I Hospital Day: 4    Assessment/Plan   * MARIANELA (acute kidney injury) [facility])  Assessment & Plan  · Acute kidney injury secondary to renal azotemia from poor oral intake and failure to thrive  · Improved and resolved with IV fluid hydration   · Avoid hypotension  · Avoid nephrotoxic agent    Acute cystitis without hematuria  Assessment & Plan  · CT abdomen/pelvis demonstrating "Questionable irregular enhancement of the kidneys which may represent infection. Recommend short-term follow-up with urology or nephrology. Cholelithiasis."  · She reportedly improved with antibiotic treatment  · Given limited historian and abnormal CT, will continue empiric treatment with ceftriaxone. Plan to complete 7 days total of antibiotic  · Stable    Acute metabolic encephalopathy  Assessment & Plan  · Multifactorial due to acute kidney injury, dehydration, UTI on top of depression and anxiety. · On admission she was reportedly nonverbal, lethargic and not eating. · Last weekend she reportedly was more interactive and alert  · However since yesterday and today she  has sparse speech but is able to cooperate, take her pills, and eat her meals   · Continue antibiotic treatment, supportive  care and await the reevaluation  · Geriatric and psychiatric consult reviewed. · Recent behavioral health note reviewed. · Continue celexa, B12 injections  · OP memory/cognitive evaluation when stable     Bradycardia  Assessment & Plan  · Sinus bradycardia secondary to beta-blocker. · Continue to hold beta-blocker due to borderline low heart rate and blood pressure.     · Stable    Hyperlipidemia  Assessment & Plan  · Continue statin    Chronic coronary artery disease  Assessment & Plan  · Continue aspirin and statin  · hold beta-blocker due to bradycardia  · History of CABG and follows with Dr. Yonatan Luz    Failure to thrive in adult  Assessment & Plan  · Multifactorial  ·  family plans to take her home  · Continue supportive care   · Case management regarding discharge planning. MDD (major depressive disorder), recurrent episode, severe ([address])  Assessment & Plan  · Recent behavioral unit discharge summary reviewed. ·  she was discharged on Celexa and Seroquel which are being given here. ·  close  follow-up with psychiatry and discharge           VTE Pharmacologic Prophylaxis:  heparin    Patient Centered Rounds: I performed bedside rounds with nursing staff today. Discussions with Specialists or Other Care Team Provider: Case management    Education and Discussions with Family / Patient: Attempted to update  (daughter) via phone. Unable to contact. Current Length of Stay: 4 day(s)  Current Patient Status: Inpatient   Certification Statement: The patient will continue to require additional inpatient hospital stay due to DC planning  Discharge Plan: Anticipate discharge in 24-48 hrs to home with home services. Code Status: Level 1 - Full Code    Subjective: Only responds by head nodding or turning  She was cooperative with the exam    Objective:     Vitals:   Temp (24hrs), Av.1 °F (36.7 °C), Min:97.9 °F (36.6 °C), Max:98.2 °F (36.8 °C)    Temp:  [97.9 °F (36.6 °C)-98.2 °F (36.8 °C)] 97.9 °F (36.6 °C)  HR:  [58-62] 62  Resp:  [18] 18  BP: (110)/(47-53) 110/47  SpO2:  [98 %-99 %] 99 %  Body mass index is 22.95 kg/m². Input and Output Summary (last 24 hours): Intake/Output Summary (Last 24 hours) at 2023 1248  Last data filed at 2023 1901  Gross per 24 hour   Intake 180 ml   Output --   Net 180 ml       Physical Exam:   Physical Exam  Vitals reviewed. Constitutional:       Appearance: She is not ill-appearing. HENT:      Head: Normocephalic and atraumatic. Nose: No congestion or rhinorrhea.    Eyes:      General: No scleral icterus. Cardiovascular:      Rate and Rhythm: Normal rate and regular rhythm. Pulmonary:      Breath sounds: No wheezing or rhonchi. Abdominal:      General: There is no distension. Palpations: Abdomen is soft. Tenderness: There is no abdominal tenderness. Musculoskeletal:      Cervical back: Neck supple. Right lower leg: No edema. Left lower leg: No edema. Skin:     General: Skin is warm and dry. Coloration: Skin is not jaundiced or pale. Neurological:      Comments: Unable to assess   Psychiatric:      Comments: Poor eye contact  Depressed mood          Additional Data:     Labs:  Results from last 7 days   Lab Units 08/21/23  0459   WBC Thousand/uL 3.59*   HEMOGLOBIN g/dL 10.5*   HEMATOCRIT % 31.7*   PLATELETS Thousands/uL 209   NEUTROS PCT % 58   LYMPHS PCT % 28   MONOS PCT % 11   EOS PCT % 3     Results from last 7 days   Lab Units 08/21/23  0459 08/19/23  0509   SODIUM mmol/L 141 138   POTASSIUM mmol/L 3.7 3.6   CHLORIDE mmol/L 111* 105   CO2 mmol/L 26 26   BUN mg/dL 9 21   CREATININE mg/dL 0.41* 0.49*   ANION GAP mmol/L 4 7   CALCIUM mg/dL 8.4 8.7   ALBUMIN g/dL  --  3.5   TOTAL BILIRUBIN mg/dL  --  0.48   ALK PHOS U/L  --  57   ALT U/L  --  48   AST U/L  --  26   GLUCOSE RANDOM mg/dL 96 117     Results from last 7 days   Lab Units 08/17/23  1941   INR  1.10             Results from last 7 days   Lab Units 08/17/23 1941   LACTIC ACID mmol/L 1.4   PROCALCITONIN ng/ml 0.10       Lines/Drains:  Invasive Devices     Peripheral Intravenous Line  Duration           Peripheral IV 08/17/23 Left Antecubital 4 days    Peripheral IV 08/21/23 Distal;Dorsal (posterior); Right Forearm 1 day                      Imaging: No pertinent imaging reviewed. Recent Cultures (last 7 days):   Results from last 7 days   Lab Units 08/18/23  1032 08/17/23 2014 08/17/23 1948   BLOOD CULTURE   --  No Growth After 4 Days. No Growth After 4 Days.    URINE CULTURE  No Growth <1000 cfu/mL  --   -- Last 24 Hours Medication List:   Current Facility-Administered Medications   Medication Dose Route Frequency Provider Last Rate   • acetaminophen  975 mg Oral [facility] Fermin Gray MD     • aluminum-magnesium hydroxide-simethicone  30 mL Oral Q6H PRN Atul Lan PA-C     • aspirin  81 mg Oral Daily Atul Lan PA-C     • atorvastatin  40 mg Oral Daily With Belia Palacios PA-C     • cefTRIAXone  1,000 mg Intravenous Q24H Fermin Gray MD 1,000 mg (08/22/23 0911)   • citalopram  10 mg Oral Daily Atul Lan PA-C     • cyanocobalamin  1,000 mcg Intramuscular Daily Fermin Gray MD     • docusate sodium  100 mg Oral BID Atul Lan PA-C     • ezetimibe  10 mg Oral Daily Atul aLn PA-C     • heparin (porcine)  5,000 Units Subcutaneous [facility] Fermin Gray MD     • hydrALAZINE  5 mg Intravenous Q6H PRN Atul Lan PA-C     • levothyroxine  175 mcg Oral Early Morning Atul Lan PA-C     • melatonin  6 mg Oral HS PRN Atul Lan PA-C     • pantoprazole  40 mg Oral Early Morning Fermin Gray MD     • phenol  1 spray Mouth/Throat Q2H PRN Fermin Gray MD     • polyethylene glycol  17 g Oral Daily Atul Lan PA-C     • QUEtiapine  25 mg Oral HS Atul Lan PA-C          Today, Patient Was Seen By: Michelle Bourne MD    **Please Note: This note may have been constructed using a voice recognition system. **

## 2023-08-22 NOTE — ASSESSMENT & PLAN NOTE
· Continue aspirin and statin  · hold beta-blocker due to bradycardia  · History of CABG and follows with Dr. Rosetta Johnson

## 2023-08-22 NOTE — PLAN OF CARE
Problem: Potential for Falls  Goal: Patient will remain free of falls  Description: INTERVENTIONS:  - Educate patient/family on patient safety including physical limitations  - Instruct patient to call for assistance with activity   - Consult OT/PT to assist with strengthening/mobility   - Keep Call bell within reach  - Keep bed low and locked with side rails adjusted as appropriate  - Keep care items and personal belongings within reach  - Initiate and maintain comfort rounds  - Make Fall Risk Sign visible to staff  - Offer Toileting every 2 Hours, in advance of need  - Initiate/Maintain bed alarm  - Apply yellow socks and bracelet for high fall risk patients  - Consider moving patient to room near nurses station  Outcome: Progressing     Problem: MOBILITY - ADULT  Goal: Maintain or return to baseline ADL function  Description: INTERVENTIONS:  - Educate patient/family on patient safety including physical limitations  - Instruct patient to call for assistance with activity   - Consult OT/PT to assist with strengthening/mobility   - Keep Call bell within reach  - Keep bed low and locked with side rails adjusted as appropriate  - Keep care items and personal belongings within reach  - Initiate and maintain comfort rounds  - Make Fall Risk Sign visible to staff  - Offer Toileting every 2 Hours, in advance of need  - Initiate/Maintain bed alarm  - Apply yellow socks and bracelet for high fall risk patients  - Consider moving patient to room near nurses station  Outcome: Progressing  Goal: Maintains/Returns to pre admission functional level  Description: INTERVENTIONS:  -  Assess patient's ability to carry out ADLs; assess patient's baseline for ADL function and identify physical deficits which impact ability to perform ADLs (bathing, care of mouth/teeth, toileting, grooming, dressing, etc.)  - Assess/evaluate cause of self-care deficits   - Assess range of motion  - Assess patient's mobility; develop plan if impaired  - Assess patient's need for assistive devices and provide as appropriate  - Encourage maximum independence but intervene and supervise when necessary  - Involve family in performance of ADLs  - Assess for home care needs following discharge   - Consider OT consult to assist with ADL evaluation and planning for discharge  - Provide patient education as appropriate  Outcome: Progressing     Problem: PAIN - ADULT  Goal: Verbalizes/displays adequate comfort level or baseline comfort level  Description: Interventions:  - Encourage patient to monitor pain and request assistance  - Assess pain using appropriate pain scale  - Administer analgesics based on type and severity of pain and evaluate response  - Implement non-pharmacological measures as appropriate and evaluate response  - Consider cultural and social influences on pain and pain management  - Notify physician/advanced practitioner if interventions unsuccessful or patient reports new pain  Outcome: Progressing     Problem: INFECTION - ADULT  Goal: Absence or prevention of progression during hospitalization  Description: INTERVENTIONS:  - Assess and monitor for signs and symptoms of infection  - Monitor lab/diagnostic results  - Monitor all insertion sites, i.e. indwelling lines, tubes, and drains  - Monitor endotracheal if appropriate and nasal secretions for changes in amount and color  - Gregory appropriate cooling/warming therapies per order  - Administer medications as ordered  - Instruct and encourage patient and family to use good hand hygiene technique  - Identify and instruct in appropriate isolation precautions for identified infection/condition  Outcome: Progressing  Goal: Absence of fever/infection during neutropenic period  Description: INTERVENTIONS:  - Monitor WBC    Outcome: Progressing     Problem: SAFETY ADULT  Goal: Patient will remain free of falls  Description: INTERVENTIONS:  - Educate patient/family on patient safety including physical limitations  - Instruct patient to call for assistance with activity   - Consult OT/PT to assist with strengthening/mobility   - Keep Call bell within reach  - Keep bed low and locked with side rails adjusted as appropriate  - Keep care items and personal belongings within reach  - Initiate and maintain comfort rounds  - Make Fall Risk Sign visible to staff  - Offer Toileting every 2 Hours, in advance of need  - Initiate/Maintain bed alarm  - Apply yellow socks and bracelet for high fall risk patients  - Consider moving patient to room near nurses station  Outcome: Progressing  Goal: Maintain or return to baseline ADL function  Description: INTERVENTIONS:  - Educate patient/family on patient safety including physical limitations  - Instruct patient to call for assistance with activity   - Consult OT/PT to assist with strengthening/mobility   - Keep Call bell within reach  - Keep bed low and locked with side rails adjusted as appropriate  - Keep care items and personal belongings within reach  - Initiate and maintain comfort rounds  - Make Fall Risk Sign visible to staff  - Offer Toileting every 2 Hours, in advance of need  - Initiate/Maintain bed alarm  - Apply yellow socks and bracelet for high fall risk patients  - Consider moving patient to room near nurses station  Outcome: Progressing  Goal: Maintains/Returns to pre admission functional level  Description: INTERVENTIONS:  -  Assess patient's ability to carry out ADLs; assess patient's baseline for ADL function and identify physical deficits which impact ability to perform ADLs (bathing, care of mouth/teeth, toileting, grooming, dressing, etc.)  - Assess/evaluate cause of self-care deficits   - Assess range of motion  - Assess patient's mobility; develop plan if impaired  - Assess patient's need for assistive devices and provide as appropriate  - Encourage maximum independence but intervene and supervise when necessary  - Involve family in performance of ADLs  - Assess for home care needs following discharge   - Consider OT consult to assist with ADL evaluation and planning for discharge  - Provide patient education as appropriate  Outcome: Progressing     Problem: DISCHARGE PLANNING  Goal: Discharge to home or other facility with appropriate resources  Description: INTERVENTIONS:  - Identify barriers to discharge w/patient and caregiver  - Arrange for needed discharge resources and transportation as appropriate  - Identify discharge learning needs (meds, wound care, etc.)  - Arrange for interpretive services to assist at discharge as needed  - Refer to Case Management Department for coordinating discharge planning if the patient needs post-hospital services based on physician/advanced practitioner order or complex needs related to functional status, cognitive ability, or social support system  Outcome: Progressing     Problem: Knowledge Deficit  Goal: Patient/family/caregiver demonstrates understanding of disease process, treatment plan, medications, and discharge instructions  Description: Complete learning assessment and assess knowledge base. Interventions:  - Provide teaching at level of understanding  - Provide teaching via preferred learning methods  Outcome: Progressing     Problem: Nutrition/Hydration-ADULT  Goal: Nutrient/Hydration intake appropriate for improving, restoring or maintaining nutritional needs  Description: Monitor and assess patient's nutrition/hydration status for malnutrition. Collaborate with interdisciplinary team and initiate plan and interventions as ordered. Monitor patient's weight and dietary intake as ordered or per policy. Utilize nutrition screening tool and intervene as necessary. Determine patient's food preferences and provide high-protein, high-caloric foods as appropriate.      INTERVENTIONS:  - Monitor oral intake, urinary output, labs, and treatment plans  - Assess nutrition and hydration status and recommend course of action  - Evaluate amount of meals eaten  - Assist patient with eating if necessary   - Allow adequate time for meals  - Recommend/ encourage appropriate diets, oral nutritional supplements, and vitamin/mineral supplements  - Order, calculate, and assess calorie counts as needed  - Recommend, monitor, and adjust tube feedings and TPN/PPN based on assessed needs  - Assess need for intravenous fluids  - Provide specific nutrition/hydration education as appropriate  - Include patient/family/caregiver in decisions related to nutrition  Outcome: Progressing     Problem: Prexisting or High Potential for Compromised Skin Integrity  Goal: Skin integrity is maintained or improved  Description: INTERVENTIONS:  - Identify patients at risk for skin breakdown  - Assess and monitor skin integrity  - Assess and monitor nutrition and hydration status  - Monitor labs   - Assess for incontinence   - Turn and reposition patient  - Assist with mobility/ambulation  - Relieve pressure over bony prominences  - Avoid friction and shearing  - Provide appropriate hygiene as needed including keeping skin clean and dry  - Evaluate need for skin moisturizer/barrier cream  - Collaborate with interdisciplinary team   - Patient/family teaching  - Consider wound care consult   Outcome: Progressing

## 2023-08-22 NOTE — CASE MANAGEMENT
Case Management Discharge Planning Note    Patient name Alethea Loving  Location [facility] [address] MRN [id number]  : 1960 Date 2023       Current Admission Date: 2023  Current Admission Diagnosis:MARIANELA (acute kidney injury) [facility])   Patient Active Problem List    Diagnosis Date Noted   • Acute cystitis without hematuria 2023   • Bradycardia 2023   • MARIANELA (acute kidney injury) ([address]) 2023   • Failure to thrive in adult 2023   • Vascular dementia ([address]) 2023   • Neurocognitive disorder 08/10/2023   • Severe protein-calorie malnutrition ([address]) 2023   • MDD (major depressive disorder), recurrent episode, severe ([address]) 2023   • H/O: CVA (cerebrovascular accident) 2023   • White matter abnormality on MRI of brain 2023   • Acute metabolic encephalopathy 4467   • Immunization refused 2023   • LVH (left ventricular hypertrophy) 2022   • Class 1 obesity due to excess calories with serious comorbidity and body mass index (BMI) of 30.0 to 30.9 in adult 2019   • Encounter for well adult exam with abnormal findings 2019   • Postsurgical aortocoronary bypass status 2019   • Impaired fasting glucose 2016   • Vitamin D deficiency 2016   • Anemia 2012   • Chronic coronary artery disease 2012   • Hypothyroidism 2012   • Hyperlipidemia 2012   • Hypotension 2012      LOS (days): 4  Geometric Mean LOS (GMLOS) (days): 3.10  Days to GMLOS:-1.1     OBJECTIVE:  Risk of Unplanned Readmission Score: 20.96         Current admission status: Inpatient   Preferred Pharmacy:   [address]  [facility]  Phone: [phone] Fax: [phone]    Primary Care Provider: Jackie Bagley MD    Primary Insurance: MEDICARE  Secondary Insurance: [place]    DISCHARGE DETAILS:    Discharge planning discussed with[de-identified] bernie Mancuso of Choice: Yes  Comments - Freedom of Choice: agreeable to [facility] for home health  CM contacted family/caregiver?: Yes  Were Treatment Team discharge recommendations reviewed with patient/caregiver?: Yes  Did patient/caregiver verbalize understanding of patient care needs?: Yes  Were patient/caregiver advised of the risks associated with not following Treatment Team discharge recommendations?: Yes    Contacts  Patient Contacts: Russell Berman  Relationship to Patient[de-identified] Family  Phone Number: [phone]  Reason/Outcome: Emergency Contact, Discharge Planning, Continuity of [facility]          Is the patient interested in [facility] at discharge?: Yes  [facility] requested[de-identified] Occupational Therapy, Physical Therapy, Medical Social Work, [address] [place] [address] Agency Name[de-identified] Jessie Provider[de-identified] PCP  [place] Needed[de-identified] Strengthening/Theraputic Exercises to Improve Function, Gait/ADL Training, Evaluate Functional Status and Safety  Homebound Criteria Met[de-identified] Requires the Assistance of Another Person for Safe Ambulation or to Leave the Home  Supporting Clincal Findings[de-identified] Limited Endurance, Fatigues Easliy in [employer]    DME Referral Provided  Referral made for DME?: Yes  DME referral completed for the following items[de-identified] Lizz Monahan  DME Supplier Name[de-identified] [email or web address]    Other Referral/Resources/Interventions Provided:  Interventions: [facility] AT [facility]    Treatment Team Recommendation: Short Term Rehab  Discharge Destination Plan[de-identified] [facility]     Additional Comments: CM spoke with daughter Marline Brizuelar advises she will pick patient up tomorrow around 3pm if she is medically clear. Agreeable to [facility].

## 2023-08-22 NOTE — ASSESSMENT & PLAN NOTE
· CT abdomen/pelvis demonstrating "Questionable irregular enhancement of the kidneys which may represent infection. Recommend short-term follow-up with urology or nephrology. Cholelithiasis."  · She reportedly improved with antibiotic treatment  · Given limited historian and abnormal CT, will continue empiric treatment with ceftriaxone.   Plan to complete 7 days total of antibiotic  · Stable

## 2023-08-22 NOTE — ASSESSMENT & PLAN NOTE
· Multifactorial  ·  family plans to take her home  · Continue supportive care   · Case management regarding discharge planning.

## 2023-08-22 NOTE — PROGRESS NOTES
Progress Note - Geriatric Medicine   [facility] 70 y.o. female MRN: [id number]  Unit/Bed#: E5 -01 Encounter: [id number]      Assessment/Plan:    Cognitive Screening   · Patient has no documented history of memory issues or cognitive impairment   · She was seen by her PCP on 7/21/2023 for anxiety and depression   · She was noted to have lost weight and was living with her son   · She was noted to have suicidal ideation but no plan   · She was started on Lexapro and recommended follow-up in 4 weeks   · She was noted to be oriented x 3   · She was admitted to the hospital from 7/28-7/31  · She was noted to have a UTI on that admission and was treated for this   · MRI revealed old cerebellar infarcts   · Patient was noted by multiple providers including psychiatry and geriatrics to be oriented x 3 during her hospitalization   · Some staff members noted that she had only been nodding her head when asked questions but she had been responsive in some form   · She was agreeable to inpatient psych and signed a 201  · Patient was admitted to behavioral health from 7/31 to 8/11 and her discharge summary revealed the following  · She presented somewhat confused and was noted to be a poor historian and was unable to elaborate on any information   · She was alert and oriented x 2 and showed latency in her responses   · She did not know why she was at behavioral health   · She was noted to be guarded, withdrawn, and delayed in her thought process and speech   · She was noted to be verbally answering questions to nonverbal at times throughout her stay   · It was recommended that she no longer drive and she did have her license taken away   · Discharge note indicates she was discharged on Prozac and melatonin, however, her discharge medication list did not indicate she was taking these medications   · It appears that she was started on Seroquel 25 mg daily at bedtime and continued on Celexa 10 mg daily   · She was noted to be doing well on discharge with a stable mood   · It was noted that her sleep and appetite were improved and she was tolerating her medication   · They did assist the patient/family in scheduling follow-ups with PCP, psych, and neurology   · She was noted to be nonverbal on Friday when I completed her consult   · Dr. Kris Shaver evaluated over the weekend and the patient was noted to be oriented x 4 and speaking in full sentences  · She was noted to be nonverbal on my exam yesterday but did verbally respond to my orientation questions  · She is alert and oriented x 3 on my exam today   · She is nodding yes or no to the remainder of my questions   · Most recent TSH on 8/18/2023 noted to be 0.706  · Most recent vitamin B 12 level on 8/2/2023 noted to be 303   · Patient was started on monthly B 12 injections while at behavioral health   · MRI of the brain on 7/30/2023 revealed moderate microangiopathic changes and chronic lacunar infarcts in the basal ganglia and thalamus with chronic microhemorrhages   · CT of the head on 8/17/2023 revealed the same  · There is no evidence of cognitive testing performed in the past   · Suspect that the patients periods of waxing and waning are related to depression   · While it may be possible that she has some underlying cognitive impairment, a diagnosis of dementia would not be given with the patient is not at her baseline   · Defer to psych for recommendations related to depression   · She would likely benefit from outpatient evaluations with either neurology or geriatrics once she is back to baseline so that further cognitive evaluations can be completed   · Maintain delirium precautions as discussed below  · Redirect and reorient as needed   · Keep physically, mentally, and socially active     Delirium   • Current mentation: alert and oriented x 3  • Patient is at high risk secondary to age, possible underlying cognitive impairment, fall, MARIANELA secondary to poor oral intake, acute pain, frequent environmental changes (hospital to behavioral health to home to hospital since 7/28), and hospitalization   • Maintain delirium precautions   · Provide redirection, reorientation, and distraction techniques  · Maintain fall and safety precautions   · Assist with ADLs/IADLs  · Avoid deliriogenic medications such as tramadol, benzodiazepines, anticholinergics, benadryl  · Treat pain using geriatric pain protocol   · Encourage oral hydration and nutrition   · Monitor for constipation and urinary retention   · Implement sleep hygiene and limit night time interuptions   · Maintain sleep-wake cycle   · Encourage early and frequent mobilization   · Encourage participation in group activities  • Most recent EKG on 8/18/2023 revealed a QTc interval of 455  · If all other interventions are unsuccessful for acute agitation and behaviors, can consider Zyprexa 2.5 mg IM Q 8 hours prn   • Would avoid benzodiazepines such as Ativan as these can worsen delirium     Deconditioning   • Patient is at increased risk for deconditioning secondary to MARIANELA, poor oral intake, depression, weakness, gait dysfunction, and hospitalization    • Continue to optimize diet, hydration, and mobility for healing   ·  on labs yesterday   · Keep hydrated   · PO intake   · Family notes patient has had poor oral intake   · She has lost 20 lb in the last several months per her daughter   · Behavioral health discharge note indicates she had improved appetite under their care   · Nutrition services had been following at behavioral health and they are consulted here as well   · Speech therapy consulted   · Patient was on calorie count over the weekend and she did have fair oral intake   · Continue aspiration precautions   · Anemia   · Patient with known documented history  · Baseline hemoglobin appears to be 13-14   · Hemoglobin on labs yesterday noted to be 10.5  · Continue to monitor CBC   · Transfuse for hemoglobin < 7   • Monitor for signs and symptoms of infection, dehydration, DVT, and skin breakdown    Frailty   Clinical Frail Scale: 5- Mildly Frail  · More evident slowing, needs help high order IADLs (transport, bills, medications)  · Progressively impairs shopping and walking outside alone, meal prep and housework  • Most recent albumin on 8/17/2023 noted to be 4.1  • Consider nutrition consult  • Encourage protein supplementation     Ambulatory Dysfunction/Falls  • Patient was noted to have one fall on the day of admission when she became weak and fell backwards hitting her head   · Family noted that since discharge from psych she has been more unsteady on her feet which they attribute to poor oral intake   · She typically does not ambulate with any assistive devices at baseline   · PT/OT consulted to assist with strengthening/mobility and assist with discharge planning to appropriate level of care  • Assess patient frequently for physical needs, encourage use of assistant devices as needed and directed by PT/OT  • Identify cognitive and physical deficits and behaviors that affect risk of falls  • Consider moving patient closer to nursing station to monitor more closely for impulsive behavior which may increase risk of falls  • [place] fall precautions   • Educate patient/family on patient safety including physical limitations and importance of using call bell for assistance   • Modify environment to reduce risk of injury including disconnecting from pole when not in use, ensuring adequate lighting in room and restroom, ensuring that path to restroom is clear and free of trip hazards  • Out of bed as tolerated     Dentition/Appetite   • Patients daughter reports a 20 lb weight loss in the last month   • She was recently at behavioral health and was noted to have improved appetite which was noted on her discharge summary   • Albumin on 8/17 was noted to be 4.1  • Patient does have a history of GERD and takes a PPI at baseline   • Speech therapy and nutrition services consulted   • Patient was on a calorie count over the weekend and she appeared to be eating   • Daughter at the bedside notes that she has only had a little bit of Ensure today   • Ensure meal consistency is appropriate for all abilities   • Consider nutrition consult   • Continue aspiration precautions     Elimination   • Patient is continent of bowel and bladder at baseline  • She does not appear to have a history of urinary retention or constipation   • Last documented bowel movement was on 8/19  • Current bowel regimen includes   · Colace 100 mg BID   · MiraLAX 17 g daily   • Monitor for constipation and urinary retention     Insomnia   • Patient appears to have a history of sleep disturbances per shahab review   • She had not been taking any medication for sleep on her last admission   • She was recently started on Seroquel during her behavioral health admission   • Behavioral health noted that she had improved sleep on this medication in the discharge summary   • Would continue Seroquel 25 mg daily at bedtime   • First line is behavioral therapy   • Avoid sedative hypnotics including benzodiazepines and benadryl  • Encourage staying awake during the day   • Encourage daytime activities and morning exercise   • Decrease or eliminate daytime naps   • Avoid caffeine especially during late afternoon and evening hours  • Establish a nighttime routine  • Implement sleep hygiene and limit nighttime interruptions  • Can consider melatonin 3 mg daily at bedtime for sleep if needed     Anxiety/Depression  · Patient has a history of anxiety and depression   · She was noted to be anxious back in 2019 and presented to the ER with PCP follow-up  · She admitted that she lost her job and was having financial difficulties which were the cause of her anxiety   · Recently she was diagnosed with depression and underwent treatment for this at behavioral health   · She was recently discharged from behavioral health on Seroquel 25 mg daily at bedtime and Citalopram 10 mg daily   · Patients family notes that she has been having poor oral intake and they note her baseline to be nonverbal which is not completely consistent with documentation from other providers   · She was noted to be selectively verbal during her most recent inpatient psych stay per chart review   · She was noted to be speaking full sentences and was oriented x 4 over the weekend   · She is alert and oriented x 3 on my exam today   · I suspect her poor oral intake and selectively verbal communication is secondary to her depression   · Psych consulted and would defer to their recommendations for this   · Patient will likely need at the very least outpatient follow-up with psych for management  · Continue supportive care     Acute Kidney Injury   · Patient presented with poor oral intake at home   · She was noted to have MARIANELA on admission with a creatinine of 1.42  · She was administered IVF and her creatinine has since improved   · Nutrition services consulted and following  · Management per primary team     Bradycardia  · Noted on EKG  Suspected to be secondary to recent weight loss  · Beta-blocker remains on hold   · She is being monitored on telemetry   · Management per primary team     Hypotension   · Patient has a documented history of hypertension but was noted to be hypotensive in the ER with a BP of 87/51  · ACE inhibitor and beta-blocker currently on hold   · IVF discontinued today   · Management per primary team     Failure to Thrive   · Patient resides with son and recently had a fall prior to admission   · Family suspects this fall was secondary to weakness from poor oral intake   · Daughter notes a 20 lb weight loss over the past month   · Suspect this is secondary to depression   · Psych consulted   · Patient was on a calorie count over the weekend and she appeared to be eating   · Encourage oral intake   · PT/OT consulted and following · Recommending home vs STR       Subjective: The patient is being seen and evaluated today at the bedside for geriatric follow-up. She is noted to be lying in bed comfortably in no acute distress. She is alert and oriented x 3 on my exam today. She is nodding yes or no to the remainder of my questions. She is currently denying pain. She states that she is going to the bathroom without issue and sleeping. Care was coordinated with patients daughter Fabian Egan at the bedside. She is concerned about the patients oral intake. Discussed protein shakes and smoothies. Encouraged her to allow the patient to have what she wants as this is calories. Discussed with her that there are options for appetite stimulants, however, she was just started on new psychiatric medication and I suspect this is related to her depression. Recommended outpatient PCP follow-up which she notes the patient has an appointment for one week from tomorrow. Also discussed keeping psychiatric follow-up and discussion with PCP to determine if sooner follow-up is needed. She notes that the plan is tentative discharge tomorrow. Care was coordinated with patients nurse [facility]. She states the patient was not speaking this morning but was talking with her this afternoon. She admits that the patient has not been eating much and they have been really trying to convince her to take the ensure. Review of Systems   Constitutional: Positive for appetite change. Negative for activity change, chills, fatigue and fever. HENT: Negative for hearing loss. Eyes: Negative for visual disturbance. Respiratory: Negative for cough and shortness of breath. Cardiovascular: Negative for chest pain and leg swelling. Gastrointestinal: Negative for abdominal distention, abdominal pain, constipation, diarrhea, nausea and vomiting. Genitourinary: Negative for difficulty urinating and dysuria. Musculoskeletal: Positive for gait problem.  Negative for arthralgias. Skin: Negative for color change and pallor. Neurological: Positive for weakness. Negative for dizziness and headaches. Psychiatric/Behavioral: Negative for behavioral problems, confusion and sleep disturbance. The patient is not nervous/anxious. Objective:     Vitals: Blood pressure (!) 101/47, pulse 62, temperature 98.7 °F (37.1 °C), temperature source Oral, resp. rate 18, weight 53.3 kg (117 lb 8.1 oz), SpO2 99 %, not currently breastfeeding. ,Body mass index is 22.95 kg/m². Intake/Output Summary (Last 24 hours) at 8/22/2023 1733  Last data filed at 8/21/2023 1901  Gross per 24 hour   Intake 180 ml   Output --   Net 180 ml       Current Medications: Reviewed    Physical Exam:   Physical Exam  Vitals and nursing note reviewed. Constitutional:       General: She is not in acute distress. Appearance: She is not ill-appearing. HENT:      Head: Normocephalic. Mouth/Throat:      Mouth: Mucous membranes are dry. Eyes:      General: No scleral icterus. Conjunctiva/sclera: Conjunctivae normal.   Cardiovascular:      Rate and Rhythm: Normal rate and regular rhythm. Pulmonary:      Effort: Pulmonary effort is normal. No respiratory distress. Abdominal:      General: Bowel sounds are normal. There is no distension. Palpations: Abdomen is soft. Tenderness: There is no abdominal tenderness. Musculoskeletal:         General: No swelling or tenderness. Skin:     General: Skin is warm and dry. Neurological:      General: No focal deficit present. Mental Status: She is alert and oriented to person, place, and time. Mental status is at baseline. Invasive Devices     Peripheral Intravenous Line  Duration           Peripheral IV 08/21/23 Distal;Dorsal (posterior); Right Forearm 1 day                Lab, Imaging and other studies: I have personally reviewed pertinent reports.       Please note:  Voice-recognition software may have been used in the preparation of this document. Occasional wrong word or "sound-alike" substitutions may have occurred due to the inherent limitations of voice recognition software. Interpretation should be guided by context.

## 2023-08-22 NOTE — ASSESSMENT & PLAN NOTE
· Multifactorial due to acute kidney injury, dehydration, UTI on top of depression and anxiety. · On admission she was reportedly nonverbal, lethargic and not eating. · Last weekend she reportedly was more interactive and alert  · However since yesterday and today she  has sparse speech but is able to cooperate, take her pills, and eat her meals   · Continue antibiotic treatment, supportive  care and await the reevaluation  · Geriatric and psychiatric consult reviewed. · Recent behavioral health note reviewed.   · Continue celexa, B12 injections  · OP memory/cognitive evaluation when stable

## 2023-08-22 NOTE — OCCUPATIONAL THERAPY NOTE
Occupational Therapy Progress Note     Patient Name: Alethea Loving  PDHNB'L Date: 8/22/2023  Problem List  Principal Problem:    MARIANELA (acute kidney injury) ([address])  Active Problems:    Chronic coronary artery disease    Hypothyroidism    Hyperlipidemia    Hypotension    Acute metabolic encephalopathy    MDD (major depressive disorder), recurrent episode, severe ([address])    Severe protein-calorie malnutrition ([address])    Failure to thrive in adult    Acute cystitis without hematuria    Bradycardia          08/22/23 1616   OT Last Visit   OT Visit Date 08/22/23   Note Type   Note Type Treatment   Pain Assessment   Pain Assessment Tool 0-10   Pain Score No Pain   Restrictions/Precautions   Weight Bearing Precautions Per Order No   Other Precautions Cognitive; Chair Alarm; Bed Alarm; Fall Risk   ADL   Grooming Assistance 5  Supervision/Setup   Grooming Deficit Setup;Supervision/safety; Increased time to complete;Wash/dry hands;Brushing hair   Grooming Comments Standing at sink   LB Dressing Assistance 4  Minimal Assistance   LB Dressing Deficit Setup;Verbal cueing; Increased time to complete;Supervision/safety; Thread RLE into underwear; Thread LLE into underwear;Pull up over hips   Toileting Assistance  5  Supervision/Setup   Toileting Deficit Setup;Supervison/safety; Increased time to complete;Perineal hygiene   Functional Standing Tolerance   Time ~5 mins   Activity Dynamic standing balance activity   Comments No LOBs noted   Bed Mobility   Supine to Sit 5  Supervision   Additional items HOB elevated; Bedrails; Increased time required;Verbal cues   Sit to Supine 5  Supervision   Additional items Increased time required;Verbal cues   Additional Comments Pt lying supine with bed alarm activated at end of session. Call bell and phone within reach. All needs met and pt reports no further questions for OT at this time. Transfers   Sit to Stand 5  Supervision   Additional items Bedrails; Increased time required;Verbal cues   Stand to Sit 5  Supervision   Additional items Increased time required;Verbal cues   Toilet transfer 5  Supervision   Additional items Increased time required;Verbal cues;Standard toilet   Functional Mobility   Functional Mobility 5  Supervision   Additional Comments Assist x1   Additional items Rolling walker   Cognition   Overall Cognitive Status Impaired   Arousal/Participation Alert; Cooperative   Attention Attends with cues to redirect   Following Commands Follows one step commands with increased time or repetition   Activity Tolerance   Activity Tolerance Patient tolerated treatment well   Medical Staff Made Aware ALISHA Jamison   Assessment   Assessment Pt seen for OT treatment session focusing on functional activity tolerance, bed mobility, ADLs, functional transfers/mobility and Safe transfer techniques. Pt alert and cooperative throughout session. Pt lying supine at start of session, completing bed mobility (supine>sit) w/ Supervision w/ use of bed rails and increased time to complete. Transfers (sit<>stand) completed w/ Supervision w/ verbal cues for safe technique and hand placement. Supervision required for functional mobility w/ RW. Pt w/ increased dynamic standing tolerance, tolerating approx. 5 mins of standing activity w/ no signs of fatigue. Toileting tasks completed w/ Supervision w/ Fair- dynamic standing balance demonstrated during clothing management up/down. LB dressing completed w/ Min A with assist to thread LEs into underwear. No LOBs noted when standing to pull underwear over hips. Pt lying supine with bed alarm activated at end of session. Call bell and phone within reach. All needs met and pt reports no further questions for OT at this time. Recommend Home OT and 24/7 care upon D/C when medically cleared. OT to follow pt on caseload. Plan   Treatment Interventions ADL retraining;Functional transfer training; Endurance training;Patient/family training;Equipment evaluation/education; Compensatory technique education; Activityengagement;Continued evaluation   Goal Expiration Date 09/01/23   OT Treatment Day 1   OT Frequency 2-3x/wk   Recommendation   OT Discharge Recommendation Home with home health rehabilitation  (and 24/7 care)   Additional Comments  The patient's raw score on the AM-PAC Daily Activity Inpatient Short Form is 19. A raw score of greater than or equal to 19 suggests the patient may benefit from discharge to home. Please refer to the recommendation of the Occupational Therapist for safe discharge planning.    AM-PAC Daily Activity Inpatient   Lower Body Dressing 3   Bathing 3   Toileting 3   Upper Body Dressing 3   Grooming 3   Eating 4   Daily Activity Raw Score 19   Daily Activity Standardized Score (Calc for Raw Score >=11) 40.22   AM-PAC Applied Cognition Inpatient   Following a Speech/Presentation 3   Understanding Ordinary Conversation 3   Taking Medications 1   Remembering Where Things Are Placed or Put Away 1   Remembering List of 4-5 Errands 1   Taking Care of Complicated Tasks 1   Applied Cognition Raw Score 10   Applied Cognition Standardized Score 24.98         Kay Ledezma OTR/L

## 2023-08-22 NOTE — PLAN OF CARE
Problem: OCCUPATIONAL THERAPY ADULT  Goal: Performs self-care activities at highest level of function for planned discharge setting. See evaluation for individualized goals. Description: Treatment Interventions: ADL retraining, UE strengthening/ROM, Functional transfer training, Endurance training, Cognitive reorientation, Patient/family training, Equipment evaluation/education, Neuromuscular reeducation, Compensatory technique education, Energy conservation, Activityengagement          See flowsheet documentation for full assessment, interventions and recommendations. Outcome: Progressing  Note: Limitation: Decreased ADL status, Decreased UE strength, Decreased Safe judgement during ADL, Decreased cognition, Decreased endurance, Decreased self-care trans  Prognosis: Fair  Assessment: Pt seen for OT treatment session focusing on functional activity tolerance, bed mobility, ADLs, functional transfers/mobility and Safe transfer techniques. Pt alert and cooperative throughout session. Pt lying supine at start of session, completing bed mobility (supine>sit) w/ Supervision w/ use of bed rails and increased time to complete. Transfers (sit<>stand) completed w/ Supervision w/ verbal cues for safe technique and hand placement. Supervision required for functional mobility w/ RW. Pt w/ increased dynamic standing tolerance, tolerating approx. 5 mins of standing activity w/ no signs of fatigue. Toileting tasks completed w/ Supervision w/ Fair- dynamic standing balance demonstrated during clothing management up/down. LB dressing completed w/ Min A with assist to thread LEs into underwear. No LOBs noted when standing to pull underwear over hips. Pt lying supine with bed alarm activated at end of session. Call bell and phone within reach. All needs met and pt reports no further questions for OT at this time. Recommend Home OT and 24/7 care upon D/C when medically cleared. OT to follow pt on caseload.      OT Discharge Recommendation: Home with home health rehabilitation (and 24/7 care)

## 2023-08-22 NOTE — ASSESSMENT & PLAN NOTE
· Recent behavioral unit discharge summary reviewed. ·  she was discharged on Celexa and Seroquel which are being given here.     ·  close  follow-up with psychiatry and discharge

## 2023-08-22 NOTE — PLAN OF CARE
Problem: Potential for Falls  Goal: Patient will remain free of falls  Description: INTERVENTIONS:  - Educate patient/family on patient safety including physical limitations  - Instruct patient to call for assistance with activity   - Consult OT/PT to assist with strengthening/mobility   - Keep Call bell within reach  - Keep bed low and locked with side rails adjusted as appropriate  - Keep care items and personal belongings within reach  - Initiate and maintain comfort rounds  - Make Fall Risk Sign visible to staff  - Offer Toileting every 2 Hours, in advance of need  - Initiate/Maintain bed alarm  - Apply yellow socks and bracelet for high fall risk patients  - Consider moving patient to room near nurses station  Outcome: Progressing     Problem: MOBILITY - ADULT  Goal: Maintain or return to baseline ADL function  Description: INTERVENTIONS:  -  Assess patient's ability to carry out ADLs; assess patient's baseline for ADL function and identify physical deficits which impact ability to perform ADLs (bathing, care of mouth/teeth, toileting, grooming, dressing, etc.)  - Assess/evaluate cause of self-care deficits   - Assess range of motion  - Assess patient's mobility; develop plan if impaired  - Assess patient's need for assistive devices and provide as appropriate  - Encourage maximum independence but intervene and supervise when necessary  - Involve family in performance of ADLs  - Assess for home care needs following discharge   - Consider OT consult to assist with ADL evaluation and planning for discharge  - Provide patient education as appropriate  Outcome: Progressing  Goal: Maintains/Returns to pre admission functional level  Description: INTERVENTIONS:  - Perform BMAT or MOVE assessment daily.   - Set and communicate daily mobility goal to care team and patient/family/caregiver. - Collaborate with rehabilitation services on mobility goals if consulted  - Perform Range of Motion 3 times a day.   - Reposition patient every 2 hours.   - Dangle patient 3 times a day  - Stand patient 3 times a day  - Ambulate patient 3 times a day  - Out of bed to chair 3 times a day   - Out of bed for meals 3 times a day  - Out of bed for toileting  - Record patient progress and toleration of activity level   Outcome: Progressing     Problem: PAIN - ADULT  Goal: Verbalizes/displays adequate comfort level or baseline comfort level  Description: Interventions:  - Encourage patient to monitor pain and request assistance  - Assess pain using appropriate pain scale  - Administer analgesics based on type and severity of pain and evaluate response  - Implement non-pharmacological measures as appropriate and evaluate response  - Consider cultural and social influences on pain and pain management  - Notify physician/advanced practitioner if interventions unsuccessful or patient reports new pain  Outcome: Progressing     Problem: INFECTION - ADULT  Goal: Absence or prevention of progression during hospitalization  Description: INTERVENTIONS:  - Assess and monitor for signs and symptoms of infection  - Monitor lab/diagnostic results  - Monitor all insertion sites, i.e. indwelling lines, tubes, and drains  - Monitor endotracheal if appropriate and nasal secretions for changes in amount and color  - [place] appropriate cooling/warming therapies per order  - Administer medications as ordered  - Instruct and encourage patient and family to use good hand hygiene technique  - Identify and instruct in appropriate isolation precautions for identified infection/condition  Outcome: Progressing  Goal: Absence of fever/infection during neutropenic period  Description: INTERVENTIONS:  - Monitor WBC    Outcome: Progressing     Problem: SAFETY ADULT  Goal: Patient will remain free of falls  Description: INTERVENTIONS:  - Educate patient/family on patient safety including physical limitations  - Instruct patient to call for assistance with activity   - Consult OT/PT to assist with strengthening/mobility   - Keep Call bell within reach  - Keep bed low and locked with side rails adjusted as appropriate  - Keep care items and personal belongings within reach  - Initiate and maintain comfort rounds  - Make Fall Risk Sign visible to staff  - Offer Toileting every 2 Hours, in advance of need  - Initiate/Maintain bed alarm  - Apply yellow socks and bracelet for high fall risk patients  - Consider moving patient to room near nurses station  Outcome: Progressing  Goal: Maintain or return to baseline ADL function  Description: INTERVENTIONS:  -  Assess patient's ability to carry out ADLs; assess patient's baseline for ADL function and identify physical deficits which impact ability to perform ADLs (bathing, care of mouth/teeth, toileting, grooming, dressing, etc.)  - Assess/evaluate cause of self-care deficits   - Assess range of motion  - Assess patient's mobility; develop plan if impaired  - Assess patient's need for assistive devices and provide as appropriate  - Encourage maximum independence but intervene and supervise when necessary  - Involve family in performance of ADLs  - Assess for home care needs following discharge   - Consider OT consult to assist with ADL evaluation and planning for discharge  - Provide patient education as appropriate  Outcome: Progressing  Goal: Maintains/Returns to pre admission functional level  Description: INTERVENTIONS:  - Perform BMAT or MOVE assessment daily.   - Set and communicate daily mobility goal to care team and patient/family/caregiver. - Collaborate with rehabilitation services on mobility goals if consulted  - Perform Range of Motion 3 times a day. - Reposition patient every 2 hours.   - Dangle patient 3 times a day  - Stand patient 3 times a day  - Ambulate patient 3 times a day  - Out of bed to chair 3 times a day   - Out of bed for meals 3 times a day  - Out of bed for toileting  - Record patient progress and toleration of activity level   Outcome: Progressing     Problem: DISCHARGE PLANNING  Goal: Discharge to home or other facility with appropriate resources  Description: INTERVENTIONS:  - Identify barriers to discharge w/patient and caregiver  - Arrange for needed discharge resources and transportation as appropriate  - Identify discharge learning needs (meds, wound care, etc.)  - Arrange for interpretive services to assist at discharge as needed  - Refer to Case Management Department for coordinating discharge planning if the patient needs post-hospital services based on physician/advanced practitioner order or complex needs related to functional status, cognitive ability, or social support system  Outcome: Progressing     Problem: Knowledge Deficit  Goal: Patient/family/caregiver demonstrates understanding of disease process, treatment plan, medications, and discharge instructions  Description: Complete learning assessment and assess knowledge base. Interventions:  - Provide teaching at level of understanding  - Provide teaching via preferred learning methods  Outcome: Progressing     Problem: Nutrition/Hydration-ADULT  Goal: Nutrient/Hydration intake appropriate for improving, restoring or maintaining nutritional needs  Description: Monitor and assess patient's nutrition/hydration status for malnutrition. Collaborate with interdisciplinary team and initiate plan and interventions as ordered. Monitor patient's weight and dietary intake as ordered or per policy. Utilize nutrition screening tool and intervene as necessary. Determine patient's food preferences and provide high-protein, high-caloric foods as appropriate.      INTERVENTIONS:  - Monitor oral intake, urinary output, labs, and treatment plans  - Assess nutrition and hydration status and recommend course of action  - Evaluate amount of meals eaten  - Assist patient with eating if necessary   - Allow adequate time for meals  - Recommend/ encourage appropriate diets, oral nutritional supplements, and vitamin/mineral supplements  - Order, calculate, and assess calorie counts as needed  - Recommend, monitor, and adjust tube feedings and TPN/PPN based on assessed needs  - Assess need for intravenous fluids  - Provide specific nutrition/hydration education as appropriate  - Include patient/family/caregiver in decisions related to nutrition  Outcome: Progressing     Problem: Prexisting or High Potential for Compromised Skin Integrity  Goal: Skin integrity is maintained or improved  Description: INTERVENTIONS:  - Identify patients at risk for skin breakdown  - Assess and monitor skin integrity  - Assess and monitor nutrition and hydration status  - Monitor labs   - Assess for incontinence   - Turn and reposition patient  - Assist with mobility/ambulation  - Relieve pressure over bony prominences  - Avoid friction and shearing  - Provide appropriate hygiene as needed including keeping skin clean and dry  - Evaluate need for skin moisturizer/barrier cream  - Collaborate with interdisciplinary team   - Patient/family teaching  - Consider wound care consult   Outcome: Progressing

## 2023-08-23 VITALS
HEART RATE: 65 BPM | WEIGHT: 119.27 LBS | DIASTOLIC BLOOD PRESSURE: 56 MMHG | OXYGEN SATURATION: 98 % | RESPIRATION RATE: 18 BRPM | TEMPERATURE: 98.3 F | SYSTOLIC BLOOD PRESSURE: 106 MMHG | BODY MASS INDEX: 23.29 KG/M2

## 2023-08-23 PROBLEM — N30.00 ACUTE CYSTITIS WITHOUT HEMATURIA: Status: RESOLVED | Noted: 2023-08-18 | Resolved: 2023-08-23

## 2023-08-23 PROBLEM — N17.9 AKI (ACUTE KIDNEY INJURY) (HCC): Status: RESOLVED | Noted: 2023-08-17 | Resolved: 2023-08-23

## 2023-08-23 LAB
BACTERIA BLD CULT: NORMAL
BACTERIA BLD CULT: NORMAL

## 2023-08-23 PROCEDURE — 99232 SBSQ HOSP IP/OBS MODERATE 35: CPT

## 2023-08-23 PROCEDURE — 99239 HOSP IP/OBS DSCHRG MGMT >30: CPT | Performed by: INTERNAL MEDICINE

## 2023-08-23 RX ADMIN — CITALOPRAM HYDROBROMIDE 10 MG: 20 TABLET ORAL at 09:01

## 2023-08-23 RX ADMIN — EZETIMIBE 10 MG: 10 TABLET ORAL at 09:01

## 2023-08-23 RX ADMIN — PANTOPRAZOLE SODIUM 40 MG: 40 TABLET, DELAYED RELEASE ORAL at 06:08

## 2023-08-23 RX ADMIN — CEFTRIAXONE 1000 MG: 1 INJECTION, SOLUTION INTRAVENOUS at 09:02

## 2023-08-23 RX ADMIN — DOCUSATE SODIUM 100 MG: 100 CAPSULE, LIQUID FILLED ORAL at 09:01

## 2023-08-23 RX ADMIN — ASPIRIN 81 MG 81 MG: 81 TABLET ORAL at 09:01

## 2023-08-23 RX ADMIN — HEPARIN SODIUM 5000 UNITS: 5000 INJECTION INTRAVENOUS; SUBCUTANEOUS at 06:08

## 2023-08-23 RX ADMIN — LEVOTHYROXINE SODIUM 175 MCG: 125 TABLET ORAL at 06:08

## 2023-08-23 RX ADMIN — CYANOCOBALAMIN 1000 MCG: 1000 INJECTION, SOLUTION INTRAMUSCULAR at 10:08

## 2023-08-23 NOTE — PROGRESS NOTES
Progress Note - Geriatric Medicine   [facility] 70 y.o. female MRN: [id number]  Unit/Bed#: E5 -01 Encounter: [id number]      Assessment/Plan:    Cognitive Screening   · Patient has no documented history of memory issues or cognitive impairment   · She was seen by her PCP on 7/21/2023 for anxiety and depression   · She was noted to have lost weight and was living with her son   · She was noted to have suicidal ideation but no plan   · She was started on Lexapro and recommended follow-up in 4 weeks   · She was noted to be oriented x 3   · She was admitted to the hospital from 7/28-7/31  · She was noted to have a UTI on that admission and was treated for this   · MRI revealed old cerebellar infarcts   · Patient was noted by multiple providers including psychiatry and geriatrics to be oriented x 3 during her hospitalization   · Some staff members noted that she had only been nodding her head when asked questions but she had been responsive in some form   · She was agreeable to inpatient psych and signed a 201  · Patient was admitted to behavioral health from 7/31 to 8/11 and her discharge summary revealed the following  · She presented somewhat confused and was noted to be a poor historian and was unable to elaborate on any information   · She was alert and oriented x 2 and showed latency in her responses   · She did not know why she was at behavioral health   · She was noted to be guarded, withdrawn, and delayed in her thought process and speech   · She was noted to be verbally answering questions to nonverbal at times throughout her stay   · It was recommended that she no longer drive and she did have her license taken away   · Discharge note indicates she was discharged on Prozac and melatonin, however, her discharge medication list did not indicate she was taking these medications   · It appears that she was started on Seroquel 25 mg daily at bedtime and continued on Celexa 10 mg daily   · She was noted to be doing well on discharge with a stable mood   · It was noted that her sleep and appetite were improved and she was tolerating her medication   · They did assist the patient/family in scheduling follow-ups with PCP, psych, and neurology   · She was noted to be nonverbal on Friday when I completed her consult   · Dr. Chelsi Pretty evaluated over the weekend and the patient was noted to be oriented x 4 and speaking in full sentences  · She was noted to be nonverbal on my exams Friday 8/18 and Monday 8/21 but did verbally respond to my orientation questions yesterday and today   · She is alert and oriented x 3 on my exam today   · She verbally answered about 50% of my questions today   · She states that she is having a "hot flash" and would like her fan turned on   · She states that she is going to be discharged today   · Most recent TSH on 8/18/2023 noted to be 0.706  · Most recent vitamin B 12 level on 8/2/2023 noted to be 303   · Patient was started on monthly B 12 injections while at behavioral health   · MRI of the brain on 7/30/2023 revealed moderate microangiopathic changes and chronic lacunar infarcts in the basal ganglia and thalamus with chronic microhemorrhages   · CT of the head on 8/17/2023 revealed the same  · There is no evidence of cognitive testing performed in the past   · Suspect that the patients periods of waxing and waning are related to depression   · While it may be possible that she has some underlying cognitive impairment, a diagnosis of dementia would not be given with the patient is not at her baseline   · Defer to psych for recommendations related to depression   · She would likely benefit from outpatient evaluations with either neurology or geriatrics once she is back to baseline so that further cognitive evaluations can be completed   · Maintain delirium precautions as discussed below  · Redirect and reorient as needed   · Keep physically, mentally, and socially active     Delirium   • Current mentation: alert and oriented x 3  • Patient is at high risk secondary to age, possible underlying cognitive impairment, fall, MARIANELA secondary to poor oral intake, acute pain, frequent environmental changes (hospital to behavioral health to home to hospital since 7/28), and hospitalization   • Maintain delirium precautions   · Provide redirection, reorientation, and distraction techniques  · Maintain fall and safety precautions   · Assist with ADLs/IADLs  · Avoid deliriogenic medications such as tramadol, benzodiazepines, anticholinergics, benadryl  · Treat pain using geriatric pain protocol   · Encourage oral hydration and nutrition   · Monitor for constipation and urinary retention   · Implement sleep hygiene and limit night time interuptions   · Maintain sleep-wake cycle   · Encourage early and frequent mobilization   · Encourage participation in group activities  • Most recent EKG on 8/18/2023 revealed a QTc interval of 455  · If all other interventions are unsuccessful for acute agitation and behaviors, can consider Zyprexa 2.5 mg IM Q 8 hours prn   • Would avoid benzodiazepines such as Ativan as these can worsen delirium     Deconditioning   • Patient is at increased risk for deconditioning secondary to MARIANELA, poor oral intake, depression, weakness, gait dysfunction, and hospitalization    • Continue to optimize diet, hydration, and mobility for healing   ·  on 8/21  · Keep hydrated   · PO intake   · Family notes patient has had poor oral intake   · She has lost 20 lb in the last several months per her daughter   · Behavioral health discharge note indicates she had improved appetite under their care   · Nutrition services had been following at behavioral health and they are consulted here as well   · Speech therapy consulted   · Patient was on calorie count over the weekend and she did have fair oral intake   · Nursing notes she did eat some breakfast this morning  · Continue aspiration precautions · Anemia   · Patient with known documented history  · Baseline hemoglobin appears to be 13-14   · Hemoglobin on 8/21 noted to be stable at 10.5  · Continue to monitor CBC   · Transfuse for hemoglobin < 7   • Monitor for signs and symptoms of infection, dehydration, DVT, and skin breakdown    Frailty   Clinical Frail Scale: 5- Mildly Frail  · More evident slowing, needs help high order IADLs (transport, bills, medications)  · Progressively impairs shopping and walking outside alone, meal prep and housework  • Most recent albumin on 8/17/2023 noted to be 4.1  • Consider nutrition consult  • Encourage protein supplementation     Ambulatory Dysfunction/Falls  • Patient was noted to have one fall on the day of admission when she became weak and fell backwards hitting her head   · Family noted that since discharge from [facility] she has been more unsteady on her feet which they attribute to poor oral intake   · She typically does not ambulate with any assistive devices at baseline   · PT/OT consulted to assist with strengthening/mobility and assist with discharge planning to appropriate level of care  • Assess patient frequently for physical needs, encourage use of assistant devices as needed and directed by PT/OT  • Identify cognitive and physical deficits and behaviors that affect risk of falls  • Consider moving patient closer to nursing station to monitor more closely for impulsive behavior which may increase risk of falls  • [place] fall precautions   • Educate patient/family on patient safety including physical limitations and importance of using call bell for assistance   • Modify environment to reduce risk of injury including disconnecting from pole when not in use, ensuring adequate lighting in room and restroom, ensuring that path to restroom is clear and free of trip hazards  • Out of bed as tolerated     Dentition/Appetite   • Patients daughter reports a 20 lb weight loss in the last month   • She was recently at behavioral health and was noted to have improved appetite which was noted on her discharge summary   • Albumin on 8/17 was noted to be 4.1  • Patient does have a history of GERD and takes a PPI at baseline   • Speech therapy and nutrition services consulted   • Patient was on a calorie count over the weekend and she appeared to be eating   • Nursing notes the patient did have some breakfast this morning including a banana and some of her muffin   • Ensure meal consistency is appropriate for all abilities   • Consider nutrition consult   • Continue aspiration precautions     Elimination   • Patient is continent of bowel and bladder at baseline  • She does not appear to have a history of urinary retention or constipation   • Last documented bowel movement was this morning   • Current bowel regimen includes   · Colace 100 mg BID   · MiraLAX 17 g daily   • Monitor for constipation and urinary retention     Insomnia   • Patient appears to have a history of sleep disturbances per shahab review   • She had not been taking any medication for sleep on her last admission   • She was recently started on Seroquel during her behavioral health admission   • Behavioral health noted that she had improved sleep on this medication in the discharge summary   • Would continue Seroquel 25 mg daily at bedtime   • First line is behavioral therapy   • Avoid sedative hypnotics including benzodiazepines and benadryl  • Encourage staying awake during the day   • Encourage daytime activities and morning exercise   • Decrease or eliminate daytime naps   • Avoid caffeine especially during late afternoon and evening hours  • Establish a nighttime routine  • Implement sleep hygiene and limit nighttime interruptions  • Can consider melatonin 3 mg daily at bedtime for sleep if needed     Anxiety/Depression  · Patient has a history of anxiety and depression   · She was noted to be anxious back in 2019 and presented to the ER with PCP follow-up  · She admitted that she lost her job and was having financial difficulties which were the cause of her anxiety   · Recently she was diagnosed with depression and underwent treatment for this at behavioral health   · She was recently discharged from behavioral health on Seroquel 25 mg daily at bedtime and Citalopram 10 mg daily   · Patients family notes that she has been having poor oral intake and they note her baseline to be nonverbal which is not completely consistent with documentation from other providers   · She was noted to be selectively verbal during her most recent inpatient psych stay per chart review   · She was noted to be speaking full sentences and was oriented x 4 over the weekend   · She is alert and oriented x 3 on my exam today and is verbally answering approximately 50% of my questions today  · I suspect her poor oral intake and selectively verbal communication is secondary to her depression   · Psych consulted and would defer to their recommendations for this   · Patient will likely need at the very least outpatient follow-up with psych for management  · Continue supportive care     Acute Kidney Injury   · Patient presented with poor oral intake at home   · She was noted to have MARIANELA on admission with a creatinine of 1.42  · She was administered IVF and her creatinine has since improved   · Nutrition services consulted and following  · Management per primary team     Bradycardia  · Noted on EKG  Suspected to be secondary to recent weight loss  · Beta-blocker remains on hold   · She is being monitored on telemetry   · Management per primary team     Hypotension   · Patient has a documented history of hypertension but was noted to be hypotensive in the ER with a BP of 87/51  · ACE inhibitor and beta-blocker currently on hold   · IVF discontinued yesterday   · Management per primary team     Failure to Thrive   · Patient resides with son and recently had a fall prior to admission · Family suspects this fall was secondary to weakness from poor oral intake   · Daughter notes a 20 lb weight loss over the past month   · Suspect this is secondary to depression   · Psych consulted   · Patient was on a calorie count over the weekend and she appeared to be eating   · Encourage oral intake   · PT/OT consulted and following   · Recommending home vs STR       Subjective: The patient is being seen and evaluated today at the bedside for geriatric follow-up. She is noted to be sitting up in her recliner in no acute distress. She is alert and oriented x 3 on my exam today. She is initially only nodding to questions and then a bit later was verbally responding. She states that she is having a "hot flash" and is wanting her fan turned on. She is currently denying pain. She admits that she will be discharging today. Care was coordinated with patients nurse [facility]. She states the patient did eat some of her breakfast this morning and has been doing well. No acute issues or events noted overnight. Review of Systems   Constitutional: Positive for appetite change. Negative for activity change, chills, fatigue and fever. HENT: Negative for hearing loss. Eyes: Negative for visual disturbance. Respiratory: Negative for cough and shortness of breath. Cardiovascular: Negative for chest pain and leg swelling. Gastrointestinal: Negative for abdominal distention, abdominal pain, constipation, diarrhea, nausea and vomiting. Genitourinary: Negative for difficulty urinating and dysuria. Musculoskeletal: Positive for gait problem. Negative for arthralgias. Skin: Negative for color change and pallor. Neurological: Positive for weakness. Negative for dizziness and headaches. Psychiatric/Behavioral: Negative for behavioral problems, confusion and sleep disturbance. The patient is not nervous/anxious.           Objective:     Vitals: Blood pressure 106/56, pulse 65, temperature 98.3 °F (36.8 °C), resp. rate 18, weight 54.1 kg (119 lb 4.3 oz), SpO2 98 %, not currently breastfeeding. ,Body mass index is 23.29 kg/m². No intake or output data in the 24 hours ending 08/23/23 1150    Current Medications: Reviewed    Physical Exam:   Physical Exam  Vitals and nursing note reviewed. Constitutional:       General: She is not in acute distress. Appearance: She is not ill-appearing. HENT:      Head: Normocephalic. Mouth/Throat:      Mouth: Mucous membranes are dry. Eyes:      General: No scleral icterus. Conjunctiva/sclera: Conjunctivae normal.   Cardiovascular:      Rate and Rhythm: Normal rate and regular rhythm. Pulmonary:      Effort: Pulmonary effort is normal. No respiratory distress. Abdominal:      General: Bowel sounds are normal. There is no distension. Palpations: Abdomen is soft. Tenderness: There is no abdominal tenderness. Musculoskeletal:         General: No swelling or tenderness. Skin:     General: Skin is warm and dry. Neurological:      General: No focal deficit present. Mental Status: She is alert and oriented to person, place, and time. Mental status is at baseline. Invasive Devices     Peripheral Intravenous Line  Duration           Peripheral IV 08/21/23 Distal;Dorsal (posterior); Right Forearm 2 days                Lab, Imaging and other studies: I have personally reviewed pertinent reports. Please note:  Voice-recognition software may have been used in the preparation of this document. Occasional wrong word or "sound-alike" substitutions may have occurred due to the inherent limitations of voice recognition software. Interpretation should be guided by context.

## 2023-08-23 NOTE — ASSESSMENT & PLAN NOTE
· Continue aspirin and statin  · hold beta-blocker due to bradycardia  · History of CABG and follows with Dr. Margo Lombard

## 2023-08-23 NOTE — DISCHARGE SUMMARY
[facility]  Discharge- Davy Devi 1960, 70 y.o. female MRN: [id number]  Unit/Bed#: E5 -01 Encounter: [id number]  Primary Care Provider: Star Perales MD   Date and time admitted to hospital: 8/17/2023  6:52 PM    * MARIANELA (acute kidney injury) (HCC)-resolved as of 8/23/2023  Assessment & Plan  · Acute kidney injury secondary to renal azotemia from poor oral intake and failure to thrive  · Improved and resolved with IV fluid hydration   · Avoid hypotension  · Avoid nephrotoxic agent    Acute cystitis without hematuria-resolved as of 8/23/2023  Assessment & Plan  · Limited historian but improved with antibiotic treatment  · Antibiotic course completed    Acute metabolic encephalopathy  Assessment & Plan  · Multifactorial due to acute kidney injury, dehydration, UTI on top of depression and anxiety. · On admission she was reportedly nonverbal, lethargic and not eating. · Clinically improved and more interactive  · Stable for DC home with family support    Bradycardia  Assessment & Plan  · Sinus bradycardia secondary to beta-blocker. · Continue to hold beta-blocker due to borderline low heart rate and blood pressure. · Stable    Hyperlipidemia  Assessment & Plan  · Continue statin    Chronic coronary artery disease  Assessment & Plan  · Continue aspirin and statin  · hold beta-blocker due to bradycardia  · History of CABG and follows with Dr. Valente Ho    Hypothyroidism  [id number]  · Continue levothyroxine     MDD (major depressive disorder), recurrent episode, severe ([address])  Assessment & Plan  · Recent behavioral unit discharge summary reviewed. ·  she was discharged on Celexa and Seroquel which are being given here.     ·  close  follow-up with psychiatry and discharge      Medical Problems     Resolved Problems  Date Reviewed: 8/23/2023          Resolved    Hypotension 8/23/2023     Resolved by  Shani Julian MD    * (Principal) MARIANELA (acute kidney injury) ([address]) 8/23/2023     Resolved by  Nathaniel Wellington MD    Acute cystitis without hematuria 8/23/2023     Resolved by  Nathaniel Wellington MD        Discharging Physician / Practitioner: Nathaniel Wellington MD  PCP: Jackie Bagley MD  Admission Date:   Admission Orders (From admission, onward)     Ordered        08/18/23 0940  Inpatient Admission  Once            08/17/23 2238  Place in Observation  Once                      Discharge Date: 08/23/23    Consultations During Hospital Stay:  · Geriatric    Procedures Performed:   · none    Significant Findings / Test Results:   · none    Incidental Findings:  Cholelithiasis      Test Results Pending at Discharge (will require follow up): · None     Outpatient Tests Requested:  · none    Complications:  none    Reason for Admission: "She has not been eating and fell"    Hospital Course:   Alethea Loving is a 70 y.o. female patient who originally presented to the hospital on 8/17/2023 due to dehydration, poor oral intake and  diminished responsiveness. She was treated with IV fluid hydration. She was also treated for presumed urinary tract infection. She  had significant improvement and was able to become more interactive. She completed her antibiotic treatment here. She will be discharged to home under the care of her family with visiting nurses. Please see above list of diagnoses and related plan for additional information. Condition at Discharge: good    Discharge Day Visit / Exam:   Subjective: Seen and examined earlier during rounds. She had no events overnight. She was more interactive. She  replied yes but she wanted to go home.   Vitals: Blood Pressure: 106/56 (08/23/23 0857)  Pulse: 65 (08/23/23 0857)  Temperature: 98.3 °F (36.8 °C) (08/23/23 0857)  Temp Source: Oral (08/22/23 1516)  Respirations: 18 (08/21/23 2238)  Weight - Scale: 54.1 kg (119 lb 4.3 oz) (08/23/23 0559)  SpO2: 98 % (08/23/23 0857)  Exam: Physical Exam  Vitals reviewed. Constitutional:       Appearance: She is not ill-appearing or diaphoretic. HENT:      Head: Normocephalic and atraumatic. Eyes:      General: No scleral icterus. Cardiovascular:      Rate and Rhythm: Regular rhythm. Pulmonary:      Breath sounds: No wheezing or rhonchi. Abdominal:      Tenderness: There is no abdominal tenderness. There is no guarding. Musculoskeletal:         General: No deformity or signs of injury. Psychiatric:         Behavior: Behavior normal.         Discussion with Family: Attempted to update  (daughter) via phone. Left voicemail. Discharge instructions/Information to patient and family:   See after visit summary for information provided to patient and family. Provisions for Follow-Up Care:  See after visit summary for information related to follow-up care and any pertinent home health orders. Disposition:   Home with [facility] Services (Reminder: Complete face to face encounter)    Planned Readmission: no     Discharge Statement:  I spent >30 minutes discharging the patient. This time was spent on the day of discharge. I had direct contact with the patient on the day of discharge. Greater than 50% of the total time was spent examining patient, answering all patient questions, arranging and discussing plan of care with patient as well as directly providing post-discharge instructions. Additional time then spent on discharge activities. Discharge Medications:  See after visit summary for reconciled discharge medications provided to patient and/or family.       **Please Note: This note may have been constructed using a voice recognition system**

## 2023-08-23 NOTE — NURSING NOTE
Discharge instructions reviewed with daughter at the bedside. She is aware of all medication changes and has no questions or concerns. Pt is pleased she gets to go home. She has no questions or concerns either. Daughter is providing ride home.

## 2023-08-23 NOTE — PLAN OF CARE
Problem: Potential for Falls  Goal: Patient will remain free of falls  Description: INTERVENTIONS:  - Educate patient/family on patient safety including physical limitations  - Instruct patient to call for assistance with activity   - Consult OT/PT to assist with strengthening/mobility   - Keep Call bell within reach  - Keep bed low and locked with side rails adjusted as appropriate  - Keep care items and personal belongings within reach  - Initiate and maintain comfort rounds  - Make Fall Risk Sign visible to staff  - Offer Toileting every 2 Hours, in advance of need  - Initiate/Maintain bed alarm  - Apply yellow socks and bracelet for high fall risk patients  - Consider moving patient to room near nurses station  Outcome: Progressing     Problem: MOBILITY - ADULT  Goal: Maintain or return to baseline ADL function  Description: INTERVENTIONS:  -  Assess patient's ability to carry out ADLs; assess patient's baseline for ADL function and identify physical deficits which impact ability to perform ADLs (bathing, care of mouth/teeth, toileting, grooming, dressing, etc.)  - Assess/evaluate cause of self-care deficits   - Assess range of motion  - Assess patient's mobility; develop plan if impaired  - Assess patient's need for assistive devices and provide as appropriate  - Encourage maximum independence but intervene and supervise when necessary  - Involve family in performance of ADLs  - Assess for home care needs following discharge   - Consider OT consult to assist with ADL evaluation and planning for discharge  - Provide patient education as appropriate  Outcome: Progressing  Goal: Maintains/Returns to pre admission functional level  Description: INTERVENTIONS:  - Perform BMAT or MOVE assessment daily.   - Set and communicate daily mobility goal to care team and patient/family/caregiver. - Collaborate with rehabilitation services on mobility goals if consulted  - Perform Range of Motion 3 times a day.   - Reposition patient every 2 hours.   - Dangle patient 3 times a day  - Stand patient 3 times a day  - Ambulate patient 3 times a day  - Out of bed to chair 3 times a day   - Out of bed for meals 3 times a day  - Out of bed for toileting  - Record patient progress and toleration of activity level   Outcome: Progressing     Problem: PAIN - ADULT  Goal: Verbalizes/displays adequate comfort level or baseline comfort level  Description: Interventions:  - Encourage patient to monitor pain and request assistance  - Assess pain using appropriate pain scale  - Administer analgesics based on type and severity of pain and evaluate response  - Implement non-pharmacological measures as appropriate and evaluate response  - Consider cultural and social influences on pain and pain management  - Notify physician/advanced practitioner if interventions unsuccessful or patient reports new pain  Outcome: Progressing     Problem: INFECTION - ADULT  Goal: Absence or prevention of progression during hospitalization  Description: INTERVENTIONS:  - Assess and monitor for signs and symptoms of infection  - Monitor lab/diagnostic results  - Monitor all insertion sites, i.e. indwelling lines, tubes, and drains  - Monitor endotracheal if appropriate and nasal secretions for changes in amount and color  - [place] appropriate cooling/warming therapies per order  - Administer medications as ordered  - Instruct and encourage patient and family to use good hand hygiene technique  - Identify and instruct in appropriate isolation precautions for identified infection/condition  Outcome: Progressing  Goal: Absence of fever/infection during neutropenic period  Description: INTERVENTIONS:  - Monitor WBC    Outcome: Progressing     Problem: SAFETY ADULT  Goal: Patient will remain free of falls  Description: INTERVENTIONS:  - Educate patient/family on patient safety including physical limitations  - Instruct patient to call for assistance with activity   - Consult OT/PT to assist with strengthening/mobility   - Keep Call bell within reach  - Keep bed low and locked with side rails adjusted as appropriate  - Keep care items and personal belongings within reach  - Initiate and maintain comfort rounds  - Make Fall Risk Sign visible to staff  - Offer Toileting every 2 Hours, in advance of need  - Initiate/Maintain bed alarm  - Apply yellow socks and bracelet for high fall risk patients  - Consider moving patient to room near nurses station  Outcome: Progressing  Goal: Maintain or return to baseline ADL function  Description: INTERVENTIONS:  -  Assess patient's ability to carry out ADLs; assess patient's baseline for ADL function and identify physical deficits which impact ability to perform ADLs (bathing, care of mouth/teeth, toileting, grooming, dressing, etc.)  - Assess/evaluate cause of self-care deficits   - Assess range of motion  - Assess patient's mobility; develop plan if impaired  - Assess patient's need for assistive devices and provide as appropriate  - Encourage maximum independence but intervene and supervise when necessary  - Involve family in performance of ADLs  - Assess for home care needs following discharge   - Consider OT consult to assist with ADL evaluation and planning for discharge  - Provide patient education as appropriate  Outcome: Progressing  Goal: Maintains/Returns to pre admission functional level  Description: INTERVENTIONS:  - Perform BMAT or MOVE assessment daily.   - Set and communicate daily mobility goal to care team and patient/family/caregiver. - Collaborate with rehabilitation services on mobility goals if consulted  - Perform Range of Motion 3 times a day. - Reposition patient every 2 hours.   - Dangle patient 3 times a day  - Stand patient 3 times a day  - Ambulate patient 3 times a day  - Out of bed to chair 3 times a day   - Out of bed for meals 3 times a day  - Out of bed for toileting  - Record patient progress and toleration of activity level   Outcome: Progressing     Problem: DISCHARGE PLANNING  Goal: Discharge to home or other facility with appropriate resources  Description: INTERVENTIONS:  - Identify barriers to discharge w/patient and caregiver  - Arrange for needed discharge resources and transportation as appropriate  - Identify discharge learning needs (meds, wound care, etc.)  - Arrange for interpretive services to assist at discharge as needed  - Refer to Case Management Department for coordinating discharge planning if the patient needs post-hospital services based on physician/advanced practitioner order or complex needs related to functional status, cognitive ability, or social support system  Outcome: Progressing     Problem: Knowledge Deficit  Goal: Patient/family/caregiver demonstrates understanding of disease process, treatment plan, medications, and discharge instructions  Description: Complete learning assessment and assess knowledge base. Interventions:  - Provide teaching at level of understanding  - Provide teaching via preferred learning methods  Outcome: Progressing     Problem: Nutrition/Hydration-ADULT  Goal: Nutrient/Hydration intake appropriate for improving, restoring or maintaining nutritional needs  Description: Monitor and assess patient's nutrition/hydration status for malnutrition. Collaborate with interdisciplinary team and initiate plan and interventions as ordered. Monitor patient's weight and dietary intake as ordered or per policy. Utilize nutrition screening tool and intervene as necessary. Determine patient's food preferences and provide high-protein, high-caloric foods as appropriate.      INTERVENTIONS:  - Monitor oral intake, urinary output, labs, and treatment plans  - Assess nutrition and hydration status and recommend course of action  - Evaluate amount of meals eaten  - Assist patient with eating if necessary   - Allow adequate time for meals  - Recommend/ encourage appropriate diets, oral nutritional supplements, and vitamin/mineral supplements  - Order, calculate, and assess calorie counts as needed  - Recommend, monitor, and adjust tube feedings and TPN/PPN based on assessed needs  - Assess need for intravenous fluids  - Provide specific nutrition/hydration education as appropriate  - Include patient/family/caregiver in decisions related to nutrition  Outcome: Progressing     Problem: Prexisting or High Potential for Compromised Skin Integrity  Goal: Skin integrity is maintained or improved  Description: INTERVENTIONS:  - Identify patients at risk for skin breakdown  - Assess and monitor skin integrity  - Assess and monitor nutrition and hydration status  - Monitor labs   - Assess for incontinence   - Turn and reposition patient  - Assist with mobility/ambulation  - Relieve pressure over bony prominences  - Avoid friction and shearing  - Provide appropriate hygiene as needed including keeping skin clean and dry  - Evaluate need for skin moisturizer/barrier cream  - Collaborate with interdisciplinary team   - Patient/family teaching  - Consider wound care consult   Outcome: Progressing

## 2023-08-23 NOTE — ASSESSMENT & PLAN NOTE
· Multifactorial due to acute kidney injury, dehydration, UTI on top of depression and anxiety. · On admission she was reportedly nonverbal, lethargic and not eating.   · Clinically improved and more interactive  · Stable for DC home with family support

## 2023-08-23 NOTE — RESTORATIVE TECHNICIAN NOTE
Restorative Technician Note      Patient Name: [facility]     Restorative Tech Visit Date: 08/23/23  Note Type: Mobility  Patient Position Upon Consult: Supine  Mobility / Activity Provided: assisted pt with ambulation to the  and to [facility]  Activity Performed: Ambulated  Assistive Device: Roller walker  Patient Position at End of Consult: Bedside chair;  All needs within reach; Bed/Chair alarm activated

## 2023-08-23 NOTE — PLAN OF CARE
Problem: Potential for Falls  Goal: Patient will remain free of falls  Description: INTERVENTIONS:  - Educate patient/family on patient safety including physical limitations  - Instruct patient to call for assistance with activity   - Consult OT/PT to assist with strengthening/mobility   - Keep Call bell within reach  - Keep bed low and locked with side rails adjusted as appropriate  - Keep care items and personal belongings within reach  - Initiate and maintain comfort rounds  - Make Fall Risk Sign visible to staff  - Offer Toileting every 2 Hours, in advance of need  - Initiate/Maintain bed alarm  - Apply yellow socks and bracelet for high fall risk patients  - Consider moving patient to room near nurses station  Outcome: Progressing     Problem: MOBILITY - ADULT  Goal: Maintain or return to baseline ADL function  Description: INTERVENTIONS:  -  Assess patient's ability to carry out ADLs; assess patient's baseline for ADL function and identify physical deficits which impact ability to perform ADLs (bathing, care of mouth/teeth, toileting, grooming, dressing, etc.)  - Assess/evaluate cause of self-care deficits   - Assess range of motion  - Assess patient's mobility; develop plan if impaired  - Assess patient's need for assistive devices and provide as appropriate  - Encourage maximum independence but intervene and supervise when necessary  - Involve family in performance of ADLs  - Assess for home care needs following discharge   - Consider OT consult to assist with ADL evaluation and planning for discharge  - Provide patient education as appropriate  Outcome: Progressing  Goal: Maintains/Returns to pre admission functional level  Description: INTERVENTIONS:  - Perform BMAT or MOVE assessment daily.   - Set and communicate daily mobility goal to care team and patient/family/caregiver. - Collaborate with rehabilitation services on mobility goals if consulted  - Perform Range of Motion 4 times a day.   - Reposition patient every 2 hours.   - Dangle patient 3 times a day  - Stand patient 3 times a day  - Ambulate patient 3 times a day  - Out of bed to chair 3 times a day   - Out of bed for meals 3 times a day  - Out of bed for toileting  - Record patient progress and toleration of activity level   Outcome: Progressing     Problem: PAIN - ADULT  Goal: Verbalizes/displays adequate comfort level or baseline comfort level  Description: Interventions:  - Encourage patient to monitor pain and request assistance  - Assess pain using appropriate pain scale  - Administer analgesics based on type and severity of pain and evaluate response  - Implement non-pharmacological measures as appropriate and evaluate response  - Consider cultural and social influences on pain and pain management  - Notify physician/advanced practitioner if interventions unsuccessful or patient reports new pain  Outcome: Progressing     Problem: INFECTION - ADULT  Goal: Absence or prevention of progression during hospitalization  Description: INTERVENTIONS:  - Assess and monitor for signs and symptoms of infection  - Monitor lab/diagnostic results  - Monitor all insertion sites, i.e. indwelling lines, tubes, and drains  - Monitor endotracheal if appropriate and nasal secretions for changes in amount and color  - [place] appropriate cooling/warming therapies per order  - Administer medications as ordered  - Instruct and encourage patient and family to use good hand hygiene technique  - Identify and instruct in appropriate isolation precautions for identified infection/condition  Outcome: Progressing  Goal: Absence of fever/infection during neutropenic period  Description: INTERVENTIONS:  - Monitor WBC    Outcome: Progressing     Problem: SAFETY ADULT  Goal: Patient will remain free of falls  Description: INTERVENTIONS:  - Educate patient/family on patient safety including physical limitations  - Instruct patient to call for assistance with activity   - Consult OT/PT to assist with strengthening/mobility   - Keep Call bell within reach  - Keep bed low and locked with side rails adjusted as appropriate  - Keep care items and personal belongings within reach  - Initiate and maintain comfort rounds  - Make Fall Risk Sign visible to staff  - Offer Toileting every 2 Hours, in advance of need  - Initiate/Maintain bed alarm  - Apply yellow socks and bracelet for high fall risk patients  - Consider moving patient to room near nurses station  Outcome: Progressing  Goal: Maintain or return to baseline ADL function  Description: INTERVENTIONS:  -  Assess patient's ability to carry out ADLs; assess patient's baseline for ADL function and identify physical deficits which impact ability to perform ADLs (bathing, care of mouth/teeth, toileting, grooming, dressing, etc.)  - Assess/evaluate cause of self-care deficits   - Assess range of motion  - Assess patient's mobility; develop plan if impaired  - Assess patient's need for assistive devices and provide as appropriate  - Encourage maximum independence but intervene and supervise when necessary  - Involve family in performance of ADLs  - Assess for home care needs following discharge   - Consider OT consult to assist with ADL evaluation and planning for discharge  - Provide patient education as appropriate  Outcome: Progressing  Goal: Maintains/Returns to pre admission functional level  Description: INTERVENTIONS:  - Perform BMAT or MOVE assessment daily.   - Set and communicate daily mobility goal to care team and patient/family/caregiver. - Collaborate with rehabilitation services on mobility goals if consulted  - Perform Range of Motion 4 times a day. - Reposition patient every 2 hours.   - Dangle patient 3 times a day  - Stand patient 3 times a day  - Ambulate patient 3 times a day  - Out of bed to chair 3 times a day   - Out of bed for meals 3 times a day  - Out of bed for toileting  - Record patient progress and toleration of activity level   Outcome: Progressing     Problem: DISCHARGE PLANNING  Goal: Discharge to home or other facility with appropriate resources  Description: INTERVENTIONS:  - Identify barriers to discharge w/patient and caregiver  - Arrange for needed discharge resources and transportation as appropriate  - Identify discharge learning needs (meds, wound care, etc.)  - Arrange for interpretive services to assist at discharge as needed  - Refer to Case Management Department for coordinating discharge planning if the patient needs post-hospital services based on physician/advanced practitioner order or complex needs related to functional status, cognitive ability, or social support system  Outcome: Progressing     Problem: Knowledge Deficit  Goal: Patient/family/caregiver demonstrates understanding of disease process, treatment plan, medications, and discharge instructions  Description: Complete learning assessment and assess knowledge base. Interventions:  - Provide teaching at level of understanding  - Provide teaching via preferred learning methods  Outcome: Progressing     Problem: Nutrition/Hydration-ADULT  Goal: Nutrient/Hydration intake appropriate for improving, restoring or maintaining nutritional needs  Description: Monitor and assess patient's nutrition/hydration status for malnutrition. Collaborate with interdisciplinary team and initiate plan and interventions as ordered. Monitor patient's weight and dietary intake as ordered or per policy. Utilize nutrition screening tool and intervene as necessary. Determine patient's food preferences and provide high-protein, high-caloric foods as appropriate.      INTERVENTIONS:  - Monitor oral intake, urinary output, labs, and treatment plans  - Assess nutrition and hydration status and recommend course of action  - Evaluate amount of meals eaten  - Assist patient with eating if necessary   - Allow adequate time for meals  - Recommend/ encourage appropriate diets, oral nutritional supplements, and vitamin/mineral supplements  - Order, calculate, and assess calorie counts as needed  - Recommend, monitor, and adjust tube feedings and TPN/PPN based on assessed needs  - Assess need for intravenous fluids  - Provide specific nutrition/hydration education as appropriate  - Include patient/family/caregiver in decisions related to nutrition  Outcome: Progressing     Problem: Prexisting or High Potential for Compromised Skin Integrity  Goal: Skin integrity is maintained or improved  Description: INTERVENTIONS:  - Identify patients at risk for skin breakdown  - Assess and monitor skin integrity  - Assess and monitor nutrition and hydration status  - Monitor labs   - Assess for incontinence   - Turn and reposition patient  - Assist with mobility/ambulation  - Relieve pressure over bony prominences  - Avoid friction and shearing  - Provide appropriate hygiene as needed including keeping skin clean and dry  - Evaluate need for skin moisturizer/barrier cream  - Collaborate with interdisciplinary team   - Patient/family teaching  - Consider wound care consult   Outcome: Progressing

## 2023-08-24 ENCOUNTER — TRANSITIONAL CARE MANAGEMENT (OUTPATIENT)
Dept: FAMILY MEDICINE CLINIC | Facility: CLINIC | Age: 63
End: 2023-08-24

## 2023-08-24 ENCOUNTER — HOME CARE VISIT (OUTPATIENT)
Dept: HOME HEALTH SERVICES | Facility: HOME HEALTHCARE | Age: 63
End: 2023-08-24

## 2023-08-25 ENCOUNTER — HOME CARE VISIT (OUTPATIENT)
Dept: HOME HEALTH SERVICES | Facility: HOME HEALTHCARE | Age: 63
End: 2023-08-25

## 2023-08-25 NOTE — CASE COMMUNICATION
This is for informational purposes only. PT to attempt to schedule a home [facility] visit tomorrow. Two messages left for patient and one for patient's daughter with no answer and no return call from the patient. New SOC date will be 8/26/23.  Thank you

## 2023-08-25 NOTE — CASE COMMUNICATION
I left 2 messages for the patient with no answer or return phone call. I left a message for the patient's daughter Anirudh Jarvis regarding home therapy. I explained that the therapist will be calling to schedule a visit for tomorrow. I also left the main office number for Anirudh Jarvis to call with any questions or concerns.

## 2023-08-26 ENCOUNTER — HOME CARE VISIT (OUTPATIENT)
Dept: HOME HEALTH SERVICES | Facility: HOME HEALTHCARE | Age: 63
End: 2023-08-26
Payer: MEDICARE

## 2023-08-26 PROCEDURE — 10330081 VN NO-PAY CLAIM PROCEDURE

## 2023-08-26 PROCEDURE — G0151 HHCP-SERV OF PT,EA 15 MIN: HCPCS

## 2023-08-26 PROCEDURE — 400013 VN SOC

## 2023-08-27 VITALS — SYSTOLIC BLOOD PRESSURE: 102 MMHG | DIASTOLIC BLOOD PRESSURE: 58 MMHG | HEART RATE: 64 BPM | OXYGEN SATURATION: 98 %

## 2023-08-27 DIAGNOSIS — I51.7 LVH (LEFT VENTRICULAR HYPERTROPHY): ICD-10-CM

## 2023-08-28 ENCOUNTER — HOME CARE VISIT (OUTPATIENT)
Dept: HOME HEALTH SERVICES | Facility: HOME HEALTHCARE | Age: 63
End: 2023-08-28
Payer: MEDICARE

## 2023-08-28 VITALS — HEART RATE: 73 BPM | OXYGEN SATURATION: 98 %

## 2023-08-28 PROCEDURE — G0151 HHCP-SERV OF PT,EA 15 MIN: HCPCS

## 2023-08-28 RX ORDER — METOPROLOL SUCCINATE 25 MG/1
TABLET, EXTENDED RELEASE ORAL
Qty: 90 TABLET | Refills: 1 | Status: SHIPPED | OUTPATIENT
Start: 2023-08-28 | End: 2023-08-30 | Stop reason: HOSPADM

## 2023-08-30 ENCOUNTER — HOME CARE VISIT (OUTPATIENT)
Dept: HOME HEALTH SERVICES | Facility: HOME HEALTHCARE | Age: 63
End: 2023-08-30
Payer: MEDICARE

## 2023-08-30 ENCOUNTER — OFFICE VISIT (OUTPATIENT)
Dept: FAMILY MEDICINE CLINIC | Facility: CLINIC | Age: 63
End: 2023-08-30
Payer: MEDICARE

## 2023-08-30 VITALS
SYSTOLIC BLOOD PRESSURE: 120 MMHG | DIASTOLIC BLOOD PRESSURE: 60 MMHG | HEIGHT: 60 IN | HEART RATE: 58 BPM | OXYGEN SATURATION: 98 % | TEMPERATURE: 98.5 F | BODY MASS INDEX: 23.56 KG/M2 | WEIGHT: 120 LBS

## 2023-08-30 DIAGNOSIS — R00.1 BRADYCARDIA: ICD-10-CM

## 2023-08-30 DIAGNOSIS — Z86.73 HISTORY OF CVA (CEREBROVASCULAR ACCIDENT): ICD-10-CM

## 2023-08-30 DIAGNOSIS — K80.21 CALCULUS OF GALLBLADDER WITH BILIARY OBSTRUCTION BUT WITHOUT CHOLECYSTITIS: ICD-10-CM

## 2023-08-30 DIAGNOSIS — G93.41 ACUTE METABOLIC ENCEPHALOPATHY: Primary | ICD-10-CM

## 2023-08-30 DIAGNOSIS — R82.90 UNSPECIFIED ABNORMAL FINDINGS IN URINE: ICD-10-CM

## 2023-08-30 DIAGNOSIS — Z87.440 HISTORY OF UTI: ICD-10-CM

## 2023-08-30 DIAGNOSIS — F01.53 VASCULAR DEMENTIA WITH MOOD DISTURBANCE, UNSPECIFIED DEMENTIA SEVERITY (HCC): ICD-10-CM

## 2023-08-30 DIAGNOSIS — F33.2 SEVERE EPISODE OF RECURRENT MAJOR DEPRESSIVE DISORDER, WITHOUT PSYCHOTIC FEATURES (HCC): ICD-10-CM

## 2023-08-30 LAB
BACTERIA UR QL AUTO: ABNORMAL /HPF
BILIRUB UR QL STRIP: NEGATIVE
CLARITY UR: CLEAR
COLOR UR: YELLOW
GLUCOSE UR STRIP-MCNC: NEGATIVE MG/DL
HGB UR QL STRIP.AUTO: NEGATIVE
KETONES UR STRIP-MCNC: NEGATIVE MG/DL
LEUKOCYTE ESTERASE UR QL STRIP: ABNORMAL
NITRITE UR QL STRIP: NEGATIVE
NON-SQ EPI CELLS URNS QL MICRO: ABNORMAL /HPF
PH UR STRIP.AUTO: 6.5 [PH]
PROT UR STRIP-MCNC: ABNORMAL MG/DL
RBC #/AREA URNS AUTO: ABNORMAL /HPF
SP GR UR STRIP.AUTO: 1.02 (ref 1–1.03)
UROBILINOGEN UR STRIP-ACNC: <2 MG/DL
WBC #/AREA URNS AUTO: ABNORMAL /HPF

## 2023-08-30 PROCEDURE — 81001 URINALYSIS AUTO W/SCOPE: CPT | Performed by: FAMILY MEDICINE

## 2023-08-30 PROCEDURE — 87086 URINE CULTURE/COLONY COUNT: CPT | Performed by: FAMILY MEDICINE

## 2023-08-30 PROCEDURE — G0155 HHCP-SVS OF CSW,EA 15 MIN: HCPCS

## 2023-08-30 PROCEDURE — 99496 TRANSJ CARE MGMT HIGH F2F 7D: CPT | Performed by: FAMILY MEDICINE

## 2023-08-30 NOTE — PROGRESS NOTES
Assessment & Plan     1. Acute metabolic encephalopathy  Assessment & Plan:  Status post recent hospitalization from August 17 to August 23 multifactorial including acute kidney injury infection patient also had MRI of the brain which showed she had old infarct      2. History of CVA (cerebrovascular accident)  Assessment & Plan:  Noted on MRI of the brain July 30, 2023  Old left cerebellar infarct. . Chronic microangiopathy and old lacunar infarcts. Patient already on statin and she is on aspirin she is off of beta-blocker secondary to bradycardia she will follow-up with the neurology        3. History of UTI  Assessment & Plan:  History of UTI found during recent hospitalization patient was a treated with antibiotic plan to send the urine for UA and CS to confirm resolution    Orders:  -     Urine culture  -     UA (URINE) with reflex to Scope  -     Urine Microscopic    4. Vascular dementia with mood disturbance, unspecified dementia severity [facility])  Assessment & Plan:  New diagnosis patient had MRI of the brain in July ToriaOld left cerebellar infarct. . Chronic microangiopathy and old lacunar infarcts. Patient lives with the family who is the main caregiver patient started to during hospitalization on B12 injection also during previous hospitalization end of July she evaluated by psychiatric she was restarted on citalopram and Seroquel recommended patient to be evaluated by Senior care team      5. Bradycardia  Assessment & Plan:  Patient history of coronary artery disease used to be on beta-blocker had sinus bradycardia beta-blocker has been discontinued continue monitor patient asymptomatic      6. Unspecified abnormal findings in urine  -     Urine culture    7. Severe episode of recurrent major depressive disorder, without psychotic features [facility])  Assessment & Plan:  Patient was admitted to the psych unit the end of July evaluated by psychiatric care she was discharged on citalopram and Seroquel      8. Calculus of gallbladder with biliary obstruction but without cholecystitis  Assessment & Plan:  Incidental finding on CAT scan of the abdomen August 17 asymptomatic we will continue to monitor           Subjective     Transitional Care Management Review:   Gloria Paris is a 70 y.o. female here for TCM follow up. During the TCM phone call patient stated:  TCM Call     Date and time call was made  8/24/2023  [facility] care reviewed  Records reviewed    Patient was hospitialized at  [address]    Date of Admission  08/17/23    Date of discharge  08/23/23    Diagnosis  Acute kidney injury    Disposition  Home      TCM Call     Scheduled for follow up? Not clinically warranted    I have advised the patient to call PCP with any new or worsening symptoms  Tien Gray MA        Patient here with her sister status post hospitalization she was admitted in August 17 secondary to dehydration poor oral intake and decrease in her response patient diagnosed to have acute kidney injury and she was started on IV fluids work-up to rule out stroke she had MRI of the brain that showed old no bleeding no new stroke infarcts she found to have a UTI and encephalopathy she was not treated with antibiotic during hospitalization evaluated by geriatrician stable to discharge on August 23 with recommendation to follow-up with the neurology and psychiatric as outpatient hospital records and medication has reviewed with the patient sister    Review of Systems   Constitutional: Negative for chills and fever. HENT: Negative for ear pain and sore throat. Eyes: Negative for pain and visual disturbance. Respiratory: Negative for cough and shortness of breath. Cardiovascular: Negative for chest pain and palpitations. Gastrointestinal: Negative for abdominal pain, constipation, diarrhea, nausea and vomiting. Genitourinary: Negative for dysuria and hematuria.    Musculoskeletal: Negative for arthralgias and back pain.   Skin: Negative for color change and rash. Neurological: Negative for seizures, syncope and headaches. All other systems reviewed and are negative. Objective     /60 (BP Location: Left arm, Patient Position: Sitting)   Pulse 58   Temp 98.5 °F (36.9 °C) (Tympanic)   Ht 5' (1.524 m)   Wt 54.4 kg (120 lb)   LMP  (LMP Unknown)   SpO2 98%   Breastfeeding No   BMI 23.44 kg/m²      Physical Exam  Constitutional:       General: She is not in acute distress. Appearance: She is well-developed and normal weight. She is not toxic-appearing or diaphoretic. HENT:      Head: Normocephalic and atraumatic. Right Ear: Tympanic membrane, ear canal and external ear normal. There is no impacted cerumen. Left Ear: Tympanic membrane, ear canal and external ear normal. There is no impacted cerumen. Nose: Nose normal. No congestion or rhinorrhea. Mouth/Throat:      Mouth: Mucous membranes are moist.      Pharynx: Oropharynx is clear. No oropharyngeal exudate or posterior oropharyngeal erythema. Eyes:      General: No scleral icterus. Right eye: No discharge. Left eye: No discharge. Conjunctiva/sclera: Conjunctivae normal.      Pupils: Pupils are equal, round, and reactive to light. Neck:      Thyroid: No thyromegaly. Cardiovascular:      Rate and Rhythm: Normal rate and regular rhythm. Pulses: Normal pulses. Heart sounds: Normal heart sounds. No murmur heard. No friction rub. Pulmonary:      Effort: Pulmonary effort is normal. No respiratory distress. Breath sounds: Normal breath sounds. No wheezing or rales. Chest:      Chest wall: No tenderness. Abdominal:      General: There is no distension. Palpations: Abdomen is soft. There is no mass. Tenderness: There is no abdominal tenderness. Hernia: No hernia is present. There is no hernia in the left inguinal area. Genitourinary:     Labia:         Right: No rash. Left: No rash. Vagina: No foreign body. No vaginal discharge, tenderness or bleeding. Cervix: No cervical motion tenderness. Adnexa:         Right: No mass or tenderness. Left: No mass or tenderness. Musculoskeletal:         General: Normal range of motion. Cervical back: Normal range of motion. No rigidity. Lymphadenopathy:      Cervical: No cervical adenopathy. Skin:     General: Skin is warm. Findings: No erythema or rash. Neurological:      Mental Status: She is alert and oriented to person, place, and time. Motor: No abnormal muscle tone.        Medications have been reviewed by provider in current encounter    Benitez Ambrosio MD

## 2023-08-30 NOTE — CASE COMMUNICATION
During initial evaluation completed by PT on 8/26, pt and dtr declined need for OT services. However as of today during MSW evaluation, dtr requesting an OT evaluation. Please place order for home OT eval and treat. Thank you.

## 2023-08-31 ENCOUNTER — TELEPHONE (OUTPATIENT)
Dept: FAMILY MEDICINE CLINIC | Facility: CLINIC | Age: 63
End: 2023-08-31

## 2023-08-31 ENCOUNTER — HOME CARE VISIT (OUTPATIENT)
Dept: HOME HEALTH SERVICES | Facility: HOME HEALTHCARE | Age: 63
End: 2023-08-31
Payer: MEDICARE

## 2023-08-31 ENCOUNTER — TELEPHONE (OUTPATIENT)
Age: 63
End: 2023-08-31

## 2023-08-31 VITALS — OXYGEN SATURATION: 98 % | HEART RATE: 65 BPM

## 2023-08-31 VITALS — HEART RATE: 68 BPM | DIASTOLIC BLOOD PRESSURE: 58 MMHG | OXYGEN SATURATION: 96 % | SYSTOLIC BLOOD PRESSURE: 118 MMHG

## 2023-08-31 LAB — BACTERIA UR CULT: NORMAL

## 2023-08-31 PROCEDURE — G0152 HHCP-SERV OF OT,EA 15 MIN: HCPCS

## 2023-08-31 PROCEDURE — G0151 HHCP-SERV OF PT,EA 15 MIN: HCPCS

## 2023-08-31 NOTE — TELEPHONE ENCOUNTER
----- Message from Duane Cosby MD sent at 8/31/2023  3:48 PM EDT -----  Negative urine culture ,improve in RBC and WBC in urine

## 2023-08-31 NOTE — CASE COMMUNICATION
OT evaluation completed on 8/31/23. No further OT visits indicated at this time. Pt is near baseline with ADLs. OT visit frequency 1x1.

## 2023-09-04 PROBLEM — Z87.440 HISTORY OF UTI: Status: ACTIVE | Noted: 2023-09-04

## 2023-09-04 PROBLEM — K80.21 CALCULUS OF GALLBLADDER WITH BILIARY OBSTRUCTION BUT WITHOUT CHOLECYSTITIS: Status: ACTIVE | Noted: 2023-09-04

## 2023-09-04 PROBLEM — K80.21 CALCULUS OF GALLBLADDER WITH BILIARY OBSTRUCTION BUT WITHOUT CHOLECYSTITIS: Status: ACTIVE | Noted: 2023-08-30

## 2023-09-04 PROCEDURE — G0180 MD CERTIFICATION HHA PATIENT: HCPCS | Performed by: INTERNAL MEDICINE

## 2023-09-04 NOTE — ASSESSMENT & PLAN NOTE
Noted on MRI of the brain July 30, 2023  Old left cerebellar infarct. . Chronic microangiopathy and old lacunar infarcts.   Patient already on statin and she is on aspirin she is off of beta-blocker secondary to bradycardia she will follow-up with the neurology

## 2023-09-04 NOTE — ASSESSMENT & PLAN NOTE
Status post recent hospitalization from August 17 to August 23 multifactorial including acute kidney injury infection patient also had MRI of the brain which showed she had old infarct

## 2023-09-04 NOTE — ASSESSMENT & PLAN NOTE
Patient was admitted to the psych unit the end of July evaluated by psychiatric care she was discharged on citalopram and Seroquel

## 2023-09-04 NOTE — ASSESSMENT & PLAN NOTE
Patient history of coronary artery disease used to be on beta-blocker had sinus bradycardia beta-blocker has been discontinued continue monitor patient asymptomatic

## 2023-09-04 NOTE — ASSESSMENT & PLAN NOTE
New diagnosis patient had MRI of the brain in July ToriaOld left cerebellar infarct. . Chronic microangiopathy and old lacunar infarcts.   Patient lives with the family who is the main caregiver patient started to during hospitalization on B12 injection also during previous hospitalization end of July she evaluated by psychiatric she was restarted on citalopram and Seroquel recommended patient to be evaluated by Senior care team

## 2023-09-04 NOTE — ASSESSMENT & PLAN NOTE
History of UTI found during recent hospitalization patient was a treated with antibiotic plan to send the urine for UA and CS to confirm resolution

## 2023-09-05 ENCOUNTER — HOME CARE VISIT (OUTPATIENT)
Dept: HOME HEALTH SERVICES | Facility: HOME HEALTHCARE | Age: 63
End: 2023-09-05
Payer: MEDICARE

## 2023-09-05 VITALS — HEART RATE: 74 BPM | OXYGEN SATURATION: 98 %

## 2023-09-05 PROCEDURE — G0151 HHCP-SERV OF PT,EA 15 MIN: HCPCS

## 2023-09-07 ENCOUNTER — HOME CARE VISIT (OUTPATIENT)
Dept: HOME HEALTH SERVICES | Facility: HOME HEALTHCARE | Age: 63
End: 2023-09-07
Payer: MEDICARE

## 2023-09-07 VITALS — HEART RATE: 65 BPM | OXYGEN SATURATION: 98 %

## 2023-09-07 PROCEDURE — G0151 HHCP-SERV OF PT,EA 15 MIN: HCPCS

## 2023-09-13 DIAGNOSIS — E78.2 MIXED HYPERLIPIDEMIA: ICD-10-CM

## 2023-09-13 DIAGNOSIS — E55.9 VITAMIN D DEFICIENCY: ICD-10-CM

## 2023-09-13 DIAGNOSIS — Z86.73 H/O: CVA (CEREBROVASCULAR ACCIDENT): ICD-10-CM

## 2023-09-13 RX ORDER — ASPIRIN 81 MG/1
81 TABLET, CHEWABLE ORAL DAILY
Qty: 30 TABLET | Refills: 2 | Status: SHIPPED | OUTPATIENT
Start: 2023-09-13

## 2023-09-13 RX ORDER — ATORVASTATIN CALCIUM 40 MG/1
40 TABLET, FILM COATED ORAL
Qty: 30 TABLET | Refills: 2 | Status: SHIPPED | OUTPATIENT
Start: 2023-09-13

## 2023-09-13 RX ORDER — MELATONIN
1000 DAILY
Qty: 30 TABLET | Refills: 2 | Status: SHIPPED | OUTPATIENT
Start: 2023-09-13

## 2023-09-19 ENCOUNTER — TELEPHONE (OUTPATIENT)
Age: 63
End: 2023-09-19

## 2023-09-19 NOTE — TELEPHONE ENCOUNTER
Lianne Schuster [address], [address] [place] [address], [facility]    [phone]    Telephone Intake: Geriatric Assessment     - Chart Review  1. Has this patient been seen by our department in the last 3 years? No  2. Please route to provider for chart review prior to scheduling and let the caller know that this phone intake will be reviewed IF -  • Pt was recently hospitalized  • Pt is prescribed medications for behavior management or has a history of psychiatric hospitalization  • Pt plans to attend alone    Referral source: Coty Simms MD    Caller who is scheduling/relationship to pt: Daughter, Fadia Baez phone number: [phone]    Reason for referral: Patient concerns , Family member concerns and Provider concerns regarding memory concerns. If there are behavioral concerns, is the pt prescribed medications to manage these? no   If so, how many? none   Has the patient ever had an inpatient psychiatric hospitalization? Yes , July 2023 due to need for medication evaluation. Concluded that issue appear to be dementia related. Patient seen in ER for UTI with behavior disturbances prior to admission for psych evauation Discharged 8/23/23 and has been stable. What is the goal of the visit? initial assessment and diagnosis     Has the patient been seen by a Neurologist or Geriatrician? No   If yes, is this appointment for a second opinion? No  Has the patient ever been diagnosed with dementia? No  Has the patient had an MRI NeuroQuant within the last 1 year? No (If so, please route to provider to determine if assessment + conference are needed or if only assessment should be scheduled)      Preferred language? English  Highest education level? High School Graduate  Does the patient wear glasses? Yes   Does the patient use hearing aids? No     Is there a living will/healthcare POA in place/If so, who?  Yes , Proxy, Anabelle Villela, daughter    Does the pt/caregiver have access for a virtual visit (computer/smart phone with audio/video)? Yes     Caller was informed:   • Please make sure the pt is accompanied by someone who knows them well / caregiver / family member to participate in this appointment. Who will accompany the pt (name and relationship)? Live Jeff, daughter  Phone number of person accompanying pt: [phone]  • Please make sure the pt attends all appointments, including the assessment, care conference, follow-up, whether in-person or virtual.  • For virtual visits, pt must be physically present in [facility]. Office packet mailed out to: [id number] [facility] [id number]  C/o Live Jeff    Added to wait list for sooner appointments/notified that calls can be short notice (same day/day prior)?  Yes

## 2023-09-20 ENCOUNTER — OFFICE VISIT (OUTPATIENT)
Dept: FAMILY MEDICINE CLINIC | Facility: CLINIC | Age: 63
End: 2023-09-20
Payer: MEDICARE

## 2023-09-20 VITALS
OXYGEN SATURATION: 98 % | WEIGHT: 118 LBS | SYSTOLIC BLOOD PRESSURE: 100 MMHG | DIASTOLIC BLOOD PRESSURE: 60 MMHG | HEIGHT: 60 IN | TEMPERATURE: 96.2 F | HEART RATE: 66 BPM | BODY MASS INDEX: 23.16 KG/M2

## 2023-09-20 DIAGNOSIS — D52.8 OTHER FOLATE DEFICIENCY ANEMIAS: ICD-10-CM

## 2023-09-20 DIAGNOSIS — F01.53 VASCULAR DEMENTIA WITH MOOD DISTURBANCE, UNSPECIFIED DEMENTIA SEVERITY (HCC): ICD-10-CM

## 2023-09-20 DIAGNOSIS — Z00.00 WELCOME TO MEDICARE PREVENTIVE VISIT: Primary | ICD-10-CM

## 2023-09-20 DIAGNOSIS — Z23 NEED FOR INFLUENZA VACCINATION: ICD-10-CM

## 2023-09-20 DIAGNOSIS — E55.9 VITAMIN D DEFICIENCY: ICD-10-CM

## 2023-09-20 DIAGNOSIS — E03.9 ACQUIRED HYPOTHYROIDISM: ICD-10-CM

## 2023-09-20 DIAGNOSIS — E78.2 MIXED HYPERLIPIDEMIA: ICD-10-CM

## 2023-09-20 DIAGNOSIS — E43 SEVERE PROTEIN-CALORIE MALNUTRITION (HCC): ICD-10-CM

## 2023-09-20 DIAGNOSIS — R73.01 IMPAIRED FASTING GLUCOSE: ICD-10-CM

## 2023-09-20 PROCEDURE — 99214 OFFICE O/P EST MOD 30 MIN: CPT | Performed by: FAMILY MEDICINE

## 2023-09-20 PROCEDURE — G0402 INITIAL PREVENTIVE EXAM: HCPCS | Performed by: FAMILY MEDICINE

## 2023-09-20 PROCEDURE — 96372 THER/PROPH/DIAG INJ SC/IM: CPT | Performed by: FAMILY MEDICINE

## 2023-09-20 NOTE — PROGRESS NOTES
Assessment and Plan:     Problem List Items Addressed This Visit        Endocrine    Hypothyroidism    Relevant Orders    TSH, 3rd generation with Free T4 reflex    Impaired fasting glucose    Relevant Orders    CBC and differential    Basic metabolic panel       Nervous and Auditory    Vascular dementia ([address])     Patient to continue B12 injection monthly for 6 months she is due for 1 today         Relevant Orders    Ferritin    Iron    Folate       Other    Anemia    Relevant Orders    Ferritin    Iron    Folate    Hyperlipidemia     Chronic LDL on the blood work in May 2023 uncontrolled and patient with history of CVA patient currently on atorvastatin 40 mg and Zetia has been added 10 mg once a day recommend to continue current management         Relevant Orders    Lipid panel    Vitamin D deficiency    Severe protein-calorie malnutrition ([address])     Malnutrition Findings: Prominent cheek bone secondary to poor oral intake muscle wasting in the temporal area patient recently noticed improvement in her oral intake but still weight not picking up encourage patient to increase calorie intake                             BMI Findings: Body mass index is 23.05 kg/m². Welcome to Medicare preventive visit - Primary     Advice and education were given regarding nutrition, aerobic exercises, weight-bearing exercises, cardiovascular risk reduction, fall risk reduction, and age-appropriate supplements. The patient was counseled regarding instructions for management, risk factor reductions, prognosis, risks and benefits of treatment options, patient and family education, and importance of compliance with treatment.          Other Visit Diagnoses     Need for influenza vaccination        Relevant Orders    FLUZONE HIGH-DOSE: influenza vaccine, high-dose, preservative-free 0.7 mL (Completed)           Preventive health issues were discussed with patient, and age appropriate screening tests were ordered as noted in patient's After Visit Summary. Personalized health advice and appropriate referrals for health education or preventive services given if needed, as noted in patient's After Visit Summary. History of Present Illness:     Patient presents for a Medicare Wellness Visit    Patient here for follow-up wellome to Medicare follow-up with a chronic condition and due for B12 shot she is with her been family member she lives well at home with the family she started able to take care of herself except that she does not do shopping or billing recent blood work reviewed      Patient Care Team:  Marthena Denver, MD as PCP - General (Family Medicine)  Marthena Denver, MD as PCP - [facility] ([facility])  Marthena Denver, MD as PCP - [facility] ([facility])     Review of Systems:     Review of Systems   Constitutional: Negative for chills and fever. HENT: Negative for ear pain and sore throat. Eyes: Negative for pain and visual disturbance. Respiratory: Negative for cough and shortness of breath. Cardiovascular: Negative for chest pain and palpitations. Gastrointestinal: Negative for abdominal pain, constipation, diarrhea, nausea and vomiting. Genitourinary: Negative for dysuria and hematuria. Musculoskeletal: Negative for arthralgias and back pain. Skin: Negative for color change and rash. Neurological: Negative for seizures and syncope. All other systems reviewed and are negative.        Problem List:     Patient Active Problem List   Diagnosis   • Anemia   • Chronic coronary artery disease   • Hypothyroidism   • Impaired fasting glucose   • Hyperlipidemia   • Vitamin D deficiency   • Class 1 obesity due to excess calories with serious comorbidity and body mass index (BMI) of 30.0 to 30.9 in adult   • Postsurgical aortocoronary bypass status   • LVH (left ventricular hypertrophy)   • Immunization refused   • Acute metabolic encephalopathy   • White matter abnormality on MRI of brain   • MDD (major depressive disorder), recurrent episode, severe ([address])   • History of CVA (cerebrovascular accident)   • Severe protein-calorie malnutrition ([address])   • Neurocognitive disorder   • Failure to thrive in adult   • Vascular dementia ([address])   • Bradycardia   • History of UTI   • Calculus of gallbladder with biliary obstruction but without cholecystitis   • Welcome to Medicare preventive visit      Past Medical and Surgical History:     Past Medical History:   Diagnosis Date   • Acute MI ([address]) 2012   • MARIANELA (acute kidney injury) ([address]) 2023   • Anemia    • Anxiety 2023   • CAD (coronary artery disease)    • Encounter for well adult exam with abnormal findings 2019   • Hyperlipidemia    • Hypertension    • Myocardial infarction ([address]) 2002   • Thyroid disease    • Ventricular fibrillation ([address]) 2012     Past Surgical History:   Procedure Laterality Date   • COLONOSCOPY     • CORONARY ARTERY BYPASS GRAFT        Family History:     Family History   Problem Relation Age of Onset   • Hypertension Mother    • Stroke Mother         3 strokes   • Heart attack Father    • No Known Problems Sister    • No Known Problems Daughter    • No Known Problems Maternal Grandmother    • No Known Problems Maternal Grandfather    • No Known Problems Paternal Grandmother    • No Known Problems Paternal Grandfather    • No Known Problems Maternal Aunt    • No Known Problems Maternal Aunt    • No Known Problems Maternal Aunt    • Breast cancer Paternal Aunt 46   • No Known Problems Paternal Aunt       Social History:     Social History     Socioeconomic History   • Marital status:      Spouse name: None   • Number of children: None   • Years of education: None   • Highest education level: None   Occupational History   • Occupation:  worker ;  to infant ages     Employer: OTHER     Comment: parttime    Tobacco Use   • Smoking status: Former     Packs/day: 0.25     Years: 22.00     Total pack years: 5.50     Types: Cigarettes     Start date: 1971     Quit date: 1993     Years since quittin.7     Passive exposure: Never   • Smokeless tobacco: Never   • Tobacco comments:     no passive smoke exposure   Vaping Use   • Vaping Use: Never used   Substance and Sexual Activity   • Alcohol use: Not Currently   • Drug use: No   • Sexual activity: Not Currently   Other Topics Concern   • None   Social History Narrative   • None     Social Determinants of Health     Financial Resource Strain: Low Risk  (2023)    Overall Financial Resource Strain (CARDIA)    • Difficulty of Paying Living Expenses: Not hard at all   Food Insecurity: No Food Insecurity (3/18/2022)    Hunger Vital Sign    • Worried About Running Out of Food in the Last Year: Never true    • Ran Out of Food in the Last Year: Never true   Transportation Needs: No Transportation Needs (2023)    PRAPARE - Transportation    • Lack of Transportation (Medical): No    • Lack of Transportation (Non-Medical): No   Physical Activity: Insufficiently Active (3/20/2023)    Exercise Vital Sign    • Days of Exercise per Week: 3 days    • Minutes of Exercise per Session: 30 min   Stress: No Stress Concern Present (3/18/2022)    [facility]    • Feeling of Stress : Not at all   Social Connections: Moderately Integrated (3/18/2022)    Social Connection and Isolation Panel [NHANES]    • Frequency of Communication with Friends and Family: More than three times a week    • Frequency of Social Gatherings with Friends and Family: More than three times a week    • Attends Confucianist Services: More than 4 times per year    • Active Member of Clubs or Organizations:  Yes    • Attends Club or Organization Meetings: More than 4 times per year    • Marital Status:    Intimate Partner Violence: Not At Risk (3/18/2022)    Humiliation, Afraid, Rape, and Kick questionnaire    • Fear of Current or Ex-Partner: No    • Emotionally Abused: No    • Physically Abused: No    • Sexually Abused: No   Housing Stability: Unknown (3/18/2022)    Housing Stability Vital Sign    • Unable to Pay for Housing in the Last Year: No    • Number of Places Lived in the Last Year: Not on file    • Unstable Housing in the Last Year: No      Medications and Allergies:     Current Outpatient Medications   Medication Sig Dispense Refill   • Ascorbic Acid (vitamin C) 1000 MG tablet Take 1,000 mg by mouth daily     • aspirin 81 mg chewable tablet Chew 1 tablet (81 mg total) daily 30 tablet 2   • atorvastatin (LIPITOR) 40 mg tablet Take 1 tablet (40 mg total) by mouth daily with dinner 30 tablet 2   • Blood Pressure KIT Use in the morning 1 kit 0   • cholecalciferol (VITAMIN D3) 1,000 units tablet Take 1 tablet (1,000 Units total) by mouth daily 30 tablet 2   • citalopram (CeleXA) 10 mg tablet Take 1 tablet (10 mg total) by mouth daily 30 tablet 1   • cyanocobalamin 1,000 mcg/mL Inject 1 mL (1,000 mcg total) into a muscle every 30 (thirty) days Do not start before September 2, 2023. 1 mL 0   • docusate sodium (COLACE) 100 mg capsule Take 1 capsule (100 mg total) by mouth 2 (two) times a day 60 capsule 0   • ezetimibe (ZETIA) 10 mg tablet Take 1 tablet (10 mg total) by mouth daily 30 tablet 0   • levothyroxine (Euthyrox) 175 mcg tablet Take 1 tablet (175 mcg total) by mouth daily in the early morning 30 tablet 0   • polyethylene glycol (MIRALAX) 17 g packet Take 17 g by mouth daily Do not start before August 12, 2023. 30 each 0   • QUEtiapine (SEROquel) 25 mg tablet Take 1 tablet (25 mg total) by mouth daily at bedtime 30 tablet 1     No current facility-administered medications for this visit.      Allergies   Allergen Reactions   • Cat Hair Extract Sneezing      Immunizations:     Immunization History   Administered Date(s) Administered   • COVID-19 J&J ([employer]) vaccine 0.5 mL 03/31/2021   • Influenza, high dose seasonal 0.7 mL 09/20/2023   • Pneumococcal Polysaccharide PPV23 02/12/2019   • Tdap 02/12/2019      Health Maintenance:         Topic Date Due   • HIV Screening  Never done   • Cervical Cancer Screening  Never done   • Breast Cancer Screening: Mammogram  04/14/2024   • Colorectal Cancer Screening  03/30/2025   • Hepatitis C Screening  Completed         Topic Date Due   • Pneumococcal Vaccine: 65+ Years (2 - PCV) 02/12/2020   • COVID-19 Vaccine (2 - Booster for Renetta series) 05/26/2021      Medicare Screening Tests and Risk Assessments:     Citlali Lira is here for her Welcome to Medicare visit. Health Risk Assessment:   Patient rates overall health as very good. Patient feels that their physical health rating is same. Patient is very satisfied with their life. Eyesight was rated as same. Hearing was rated as same. Patient feels that their emotional and mental health rating is same. Patients states they are never, rarely angry. Patient states they are sometimes unusually tired/fatigued. Pain experienced in the last 7 days has been none. Patient states that she has experienced weight loss or gain in last 6 months. Depression Screening:   PHQ-9 Score: 7      Fall Risk Screening: In the past year, patient has experienced: no history of falling in past year      Urinary Incontinence Screening:   Patient has not leaked urine accidently in the last six months. Home Safety:  Patient does not have trouble with stairs inside or outside of their home. Patient has working smoke alarms and has working carbon monoxide detector. Home safety hazards include: none. Nutrition:   Current diet is Regular. Medications:   Patient is currently taking over-the-counter supplements. OTC medications include: see medication list. Patient is able to manage medications.      Activities of Daily Living (ADLs)/Instrumental Activities of Daily Living (IADLs):   Walk and transfer into and out of bed and chair?: Yes  Dress and groom yourself?: Yes    Bathe or shower yourself?: Yes    Feed yourself? Yes  Do your laundry/housekeeping?: Yes  Manage your money, pay your bills and track your expenses?: Yes  Make your own meals?: Yes    Do your own shopping?: Yes    Previous Hospitalizations:   Any hospitalizations or ED visits within the last 12 months?: Yes    How many hospitalizations have you had in the last year?: 1-2    Advance Care Planning:   Living will: No    Durable POA for healthcare: Yes    Advanced directive: No    Advanced directive counseling given: No    Five wishes given: Yes    Patient declined ACP directive: No    End of Life Decisions reviewed with patient: Yes    Provider agrees with end of life decisions: Yes      Cognitive Screening:   Provider or family/friend/caregiver concerned regarding cognition?: No    PREVENTIVE SCREENINGS      Cardiovascular Screening:    General: Screening Not Indicated and History Lipid Disorder      Diabetes Screening:     General: Screening Current      Colorectal Cancer Screening:     General: Screening Current      Breast Cancer Screening:     General: Screening Current      Cervical Cancer Screening:    General: Screening Not Indicated      Osteoporosis Screening:    General: Screening Current      Abdominal Aortic Aneurysm (AAA) Screening:        General: Screening Not Indicated      Lung Cancer Screening:     General: Screening Not Indicated      Hepatitis C Screening:    General: Screening Current    Screening, Brief Intervention, and Referral to Treatment (SBIRT)    Screening  Typical number of drinks in a day: 0  Typical number of drinks in a week: 0  Interpretation: Low risk drinking behavior.     AUDIT-C Screenin) How often did you have a drink containing alcohol in the past year? never  2) How many drinks did you have on a typical day when you were drinking in the past year? 0  3) How often did you have 6 or more drinks on one occasion in the past year? never    AUDIT-C Score: 0  Interpretation: Score 0-2 (female): Negative screen for alcohol misuse    Single Item Drug Screening:  How often have you used an illegal drug (including marijuana) or a prescription medication for non-medical reasons in the past year? never    Single Item Drug Screen Score: 0  Interpretation: Negative screen for possible drug use disorder    Brief Intervention  Alcohol & drug use screenings were reviewed. No concerns regarding substance use disorder identified. Hearing Screening    500Hz 1000Hz 2000Hz 4000Hz   Right ear 20 20 20 20   Left ear 20 20 20 20     Vision Screening    Right eye Left eye Both eyes   Without correction      With correction 20/30 20/25 20/25        Physical Exam:     /60 (BP Location: Left arm, Patient Position: Sitting)   Pulse 66   Temp (!) 96.2 °F (35.7 °C) (Tympanic)   Ht 5' (1.524 m)   Wt 53.5 kg (118 lb)   LMP  (LMP Unknown)   SpO2 98%   Breastfeeding No   BMI 23.05 kg/m²     Physical Exam  Vitals and nursing note reviewed. Exam conducted with a chaperone present. Constitutional:       General: She is not in acute distress. Appearance: She is well-developed. HENT:      Head: Normocephalic and atraumatic. Right Ear: Tympanic membrane and external ear normal. There is no impacted cerumen. Left Ear: Tympanic membrane and external ear normal. There is no impacted cerumen. Nose: No rhinorrhea. Mouth/Throat:      Pharynx: No posterior oropharyngeal erythema. Eyes:      General:         Right eye: No discharge. Left eye: No discharge. Conjunctiva/sclera: Conjunctivae normal.   Cardiovascular:      Rate and Rhythm: Normal rate and regular rhythm. Heart sounds: No murmur heard. Pulmonary:      Effort: Pulmonary effort is normal. No respiratory distress. Breath sounds: Normal breath sounds. Abdominal:      Palpations: Abdomen is soft. Tenderness: There is no abdominal tenderness.    Musculoskeletal:         General: No swelling. Cervical back: Neck supple. Skin:     General: Skin is warm and dry. Capillary Refill: Capillary refill takes less than 2 seconds. Findings: No erythema or rash. Neurological:      Mental Status: She is alert and oriented to person, place, and time.    Psychiatric:         Mood and Affect: Mood normal.          Stiven Collado MD

## 2023-09-20 NOTE — PATIENT INSTRUCTIONS
Medicare Preventive Visit Patient Instructions  Thank you for completing your Welcome to Medicare Visit or Medicare Annual Wellness Visit today. Your next wellness visit will be due in one year (9/20/2024). The screening/preventive services that you may require over the next 5-10 years are detailed below. Some tests may not apply to you based off risk factors and/or age. Screening tests ordered at today's visit but not completed yet may show as past due. Also, please note that scanned in results may not display below. Preventive Screenings:  Service Recommendations Previous Testing/Comments   Colorectal Cancer Screening  * Colonoscopy    * Fecal Occult Blood Test (FOBT)/Fecal Immunochemical Test (FIT)  * Fecal DNA/Cologuard Test  * Flexible Sigmoidoscopy Age: 43-73 years old   Colonoscopy: every 10 years (may be performed more frequently if at higher risk)  OR  FOBT/FIT: every 1 year  OR  Cologuard: every 3 years  OR  Sigmoidoscopy: every 5 years  Screening may be recommended earlier than age 39 if at higher risk for colorectal cancer. Also, an individualized decision between you and your healthcare provider will decide whether screening between the ages of 77-80 would be appropriate. Colonoscopy: 03/30/2022  FOBT/FIT: 08/19/2023  Cologuard: 03/30/2022  Sigmoidoscopy: Not on file    Screening Current     Breast Cancer Screening Age: 36 years old  Frequency: every 1-2 years  Not required if history of left and right mastectomy Mammogram: 04/14/2023    Screening Current   Cervical Cancer Screening Between the ages of 21-29, pap smear recommended once every 3 years. Between the ages of 32-69, can perform pap smear with HPV co-testing every 5 years.    Recommendations may differ for women with a history of total hysterectomy, cervical cancer, or abnormal pap smears in past. Pap Smear: Not on file    Screening Not Indicated   Hepatitis C Screening Once for adults born between 1945 and 1965  More frequently in patients at high risk for Hepatitis C Hep C Antibody: 02/26/2019    Screening Current   Diabetes Screening 1-2 times per year if you're at risk for diabetes or have pre-diabetes Fasting glucose: 93 mg/dL (5/22/2023)  A1C: 6.0 % (8/3/2023)  Screening Current   Cholesterol Screening Once every 5 years if you don't have a lipid disorder. May order more often based on risk factors. Lipid panel: 08/03/2023    Screening Not Indicated  History Lipid Disorder     Other Preventive Screenings Covered by Medicare:  1. Abdominal Aortic Aneurysm (AAA) Screening: covered once if your at risk. You're considered to be at risk if you have a family history of AAA. 2. Lung Cancer Screening: covers low dose CT scan once per year if you meet all of the following conditions: (1) Age 48-67; (2) No signs or symptoms of lung cancer; (3) Current smoker or have quit smoking within the last 15 years; (4) You have a tobacco smoking history of at least 20 pack years (packs per day multiplied by number of years you smoked); (5) You get a written order from a healthcare provider. 3. Glaucoma Screening: covered annually if you're considered high risk: (1) You have diabetes OR (2) Family history of glaucoma OR (3)  aged 48 and older OR (3)  American aged 72 and older  3. Osteoporosis Screening: covered every 2 years if you meet one of the following conditions: (1) You're estrogen deficient and at risk for osteoporosis based off medical history and other findings; (2) Have a vertebral abnormality; (3) On glucocorticoid therapy for more than 3 months; (4) Have primary hyperparathyroidism; (5) On osteoporosis medications and need to assess response to drug therapy. · Last bone density test (DXA Scan): 04/18/2022.  5. HIV Screening: covered annually if you're between the age of 15-65. Also covered annually if you are younger than 13 and older than 72 with risk factors for HIV infection.  For pregnant patients, it is covered up to 3 times per pregnancy. Immunizations:  Immunization Recommendations   Influenza Vaccine Annual influenza vaccination during flu season is recommended for all persons aged >= 6 months who do not have contraindications   Pneumococcal Vaccine   * Pneumococcal conjugate vaccine = PCV13 (Prevnar 13), PCV15 (Vaxneuvance), PCV20 (Prevnar 20)  * Pneumococcal polysaccharide vaccine = PPSV23 (Pneumovax) Adults 20-63 years old: 1-3 doses may be recommended based on certain risk factors  Adults 72 years old: 1-2 doses may be recommended based off what pneumonia vaccine you previously received   Hepatitis B Vaccine 3 dose series if at intermediate or high risk (ex: diabetes, end stage renal disease, liver disease)   Tetanus (Td) Vaccine - COST NOT COVERED BY MEDICARE PART B Following completion of primary series, a booster dose should be given every 10 years to maintain immunity against tetanus. Td may also be given as tetanus wound prophylaxis. Tdap Vaccine - COST NOT COVERED BY MEDICARE PART B Recommended at least once for all adults. For pregnant patients, recommended with each pregnancy. Shingles Vaccine (Shingrix) - COST NOT COVERED BY MEDICARE PART B  2 shot series recommended in those aged 48 and above     Health Maintenance Due:      Topic Date Due   • HIV Screening  Never done   • Cervical Cancer Screening  Never done   • Breast Cancer Screening: Mammogram  04/14/2024   • Colorectal Cancer Screening  03/30/2025   • Hepatitis C Screening  Completed     Immunizations Due:      Topic Date Due   • Pneumococcal Vaccine: 65+ Years (2 - PCV) 02/12/2020   • COVID-19 Vaccine (2 - Booster for Renetta series) 05/26/2021   • Influenza Vaccine (1) 09/01/2023     Advance Directives   What are advance directives? Advance directives are legal documents that state your wishes and plans for medical care. These plans are made ahead of time in case you lose your ability to make decisions for yourself.  Advance directives can apply to any medical decision, such as the treatments you want, and if you want to donate organs. What are the types of advance directives? There are many types of advance directives, and each state has rules about how to use them. You may choose a combination of any of the following:  · Living will: This is a written record of the treatment you want. You can also choose which treatments you do not want, which to limit, and which to stop at a certain time. This includes surgery, medicine, IV fluid, and tube feedings. · Durable power of  for healthcare [facility]): This is a written record that states who you want to make healthcare choices for you when you are unable to make them for yourself. This person, called a proxy, is usually a family member or a friend. You may choose more than 1 proxy. · Do not resuscitate (DNR) order:  A DNR order is used in case your heart stops beating or you stop breathing. It is a request not to have certain forms of treatment, such as CPR. A DNR order may be included in other types of advance directives. · Medical directive: This covers the care that you want if you are in a coma, near death, or unable to make decisions for yourself. You can list the treatments you want for each condition. Treatment may include pain medicine, surgery, blood transfusions, dialysis, IV or tube feedings, and a ventilator (breathing machine). · Values history: This document has questions about your views, beliefs, and how you feel and think about life. This information can help others choose the care that you would choose. Why are advance directives important? An advance directive helps you control your care. Although spoken wishes may be used, it is better to have your wishes written down. Spoken wishes can be misunderstood, or not followed. Treatments may be given even if you do not want them.  An advance directive may make it easier for your family to make difficult choices about your care.       © Copyright [employer] 2018 Information is for End User's use only and may not be sold, redistributed or otherwise used for commercial purposes.  All illustrations and images included in CareNotes® are the copyrighted property of [email or web address] or [address] [place] [address]

## 2023-09-21 PROBLEM — Z00.01 ENCOUNTER FOR WELL ADULT EXAM WITH ABNORMAL FINDINGS: Status: RESOLVED | Noted: 2019-02-12 | Resolved: 2023-09-21

## 2023-09-21 PROBLEM — Z00.00 WELCOME TO MEDICARE PREVENTIVE VISIT: Status: ACTIVE | Noted: 2023-09-21

## 2023-09-21 NOTE — ASSESSMENT & PLAN NOTE
Advice and education were given regarding nutrition, aerobic exercises, weight-bearing exercises, cardiovascular risk reduction, fall risk reduction, and age-appropriate supplements. The patient was counseled regarding instructions for management, risk factor reductions, prognosis, risks and benefits of treatment options, patient and family education, and importance of compliance with treatment. 0

## 2023-09-21 NOTE — ASSESSMENT & PLAN NOTE
Malnutrition Findings: Prominent cheek bone secondary to poor oral intake muscle wasting in the temporal area patient recently noticed improvement in her oral intake but still weight not picking up encourage patient to increase calorie intake                             BMI Findings: Body mass index is 23.05 kg/m².

## 2023-09-21 NOTE — ASSESSMENT & PLAN NOTE
Chronic LDL on the blood work in May 2023 uncontrolled and patient with history of CVA patient currently on atorvastatin 40 mg and Zetia has been added 10 mg once a day recommend to continue current management

## 2023-10-05 DIAGNOSIS — E78.2 MIXED HYPERLIPIDEMIA: ICD-10-CM

## 2023-10-05 RX ORDER — ATORVASTATIN CALCIUM 40 MG/1
40 TABLET, FILM COATED ORAL
Qty: 90 TABLET | Refills: 1 | Status: SHIPPED | OUTPATIENT
Start: 2023-10-05

## 2023-10-11 DIAGNOSIS — E78.2 MIXED HYPERLIPIDEMIA: ICD-10-CM

## 2023-10-11 DIAGNOSIS — K59.00 CONSTIPATION: ICD-10-CM

## 2023-10-11 DIAGNOSIS — F39 MOOD DISORDER (HCC): ICD-10-CM

## 2023-10-13 ENCOUNTER — OFFICE VISIT (OUTPATIENT)
Dept: PSYCHIATRY | Facility: CLINIC | Age: 63
End: 2023-10-13
Payer: MEDICARE

## 2023-10-13 DIAGNOSIS — F32.4 MDD (MAJOR DEPRESSIVE DISORDER), SINGLE EPISODE, IN PARTIAL REMISSION (HCC): Primary | ICD-10-CM

## 2023-10-13 DIAGNOSIS — R41.9 NEUROCOGNITIVE DISORDER: ICD-10-CM

## 2023-10-13 DIAGNOSIS — F41.9 ANXIETY: ICD-10-CM

## 2023-10-13 PROCEDURE — 90792 PSYCH DIAG EVAL W/MED SRVCS: CPT | Performed by: NURSE PRACTITIONER

## 2023-10-13 RX ORDER — QUETIAPINE FUMARATE 25 MG/1
25 TABLET, FILM COATED ORAL
Qty: 90 TABLET | Refills: 1 | Status: SHIPPED | OUTPATIENT
Start: 2023-10-13

## 2023-10-13 RX ORDER — CITALOPRAM HYDROBROMIDE 10 MG/1
10 TABLET ORAL DAILY
Qty: 90 TABLET | Refills: 1 | Status: SHIPPED | OUTPATIENT
Start: 2023-10-13

## 2023-10-13 RX ORDER — DOCUSATE SODIUM 100 MG/1
100 CAPSULE, LIQUID FILLED ORAL 2 TIMES DAILY
Qty: 60 CAPSULE | Refills: 2 | Status: SHIPPED | OUTPATIENT
Start: 2023-10-13

## 2023-10-13 RX ORDER — EZETIMIBE 10 MG/1
10 TABLET ORAL DAILY
Qty: 30 TABLET | Refills: 2 | Status: SHIPPED | OUTPATIENT
Start: 2023-10-13

## 2023-10-13 RX ORDER — QUETIAPINE FUMARATE 25 MG/1
25 TABLET, FILM COATED ORAL
Qty: 30 TABLET | Refills: 2 | Status: SHIPPED | OUTPATIENT
Start: 2023-10-13 | End: 2023-10-13 | Stop reason: SDUPTHER

## 2023-10-13 NOTE — PSYCH
[facility]    Name and Date of Birth:  Helen Arora 70 y.o. 1960 MRN: [id number]    Date of Visit: October 13, 2023    Reason for visit: Full psychiatric intake assessment for medication management        Chief Complaint   Patient presents with    Establish Care    Medication Management    Anxiety    Depression    Memory Loss    Memory Issues       HPI:     Helen Arora is a 70 y.o.  female, Single, domiciled with son, retired, w/ PMH of CAD, HLD, CV, anemia, Vit D deficiency and PPH of MDD with psychosis, anxiety, 1 x prior psychiatric admissions ( [facility] 2023, depression with psychosis), no prior SA, no h/o self-injurious behavior, who presented to the mental health clinic for the initia/l intake and psychiatric evaluation on October 13, 2023. Epi Medina was recently discharged /from [address] [place] [address] Way 8/11/23, currently on Celexa 10 mg daily, Seroquel 25 HS. Tolerating medication well with no medication side effects observed or reported. Not actively involved in individual psychotherapy. Patient presented to today's appointment calm, cooperative, pleasant, dressed appropriate for the season, with good hygiene. She is a limited historian. As such, daughter, Fabian Egan, accompanied Epi Medina to the appointment and provided collateral information. Daughter reports that Matthews mood has remained fairly stable throughout her life up until spring 2023. It was at during this time that her mother was applying for Medicare and attempting to handle this process independently. The family started to notice significant changes in the patient's behavior, including feelings of being overwhelmed, restlessness, worry, and anxiety. Epi Medina also misinterpreting situations, developing a preoccupation that children were moving out of the area.   Family began to notice significant weight loss (20 pounds in 2 months), frequent pacing at all times of the day, difficulty sleeping, tearful episodes, paranoia, confusion, and disorganized behavior. PCP initiated treatment with Celexa for anxiety and depression. However, after one week, Socorro's symptoms worsened, leading to inpatient hospitalization. 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 Following the first hospitalization, the patient was discharged home, only to be readmitted 1 week later due to a fall. It was during the second hospitalization on a medical unit (UTI retreated with antibiotics) that [facility] psychological presentation improved. She began to verbally engage once again, her appetite and energy levels improved, and her mood symptoms alleviated. She was discharged home on Celexa 10 mg daily and Seroquel 25 mg at bedtime. Of note, daughter reports that patient has had significant weight loss during times of psychosocial stress in the past (selling her home and moving in with daughter, COVID-19 pandemic) which she believes are associated with depression, however she is unable to identify any other specific symptoms. Daughter reports that during these times, her mother continue to work and engage in her normal daily activities. On evaluation today, Citlali Lira reports seeking treatment to establish ongoing care. Currently reports feeling “good” for the past couple months. She currently resides with her son and spends the morning hours alone at home, while her daughters are with her in the afternoon and son returns in the evening. Since December, she has not been driving as her 's license was revoked by the state. The patient has not displayed any aggressive or agitated behaviors. She feels safe in her son's home. She endorses improvement in appetite, and is now eating regular meals with her daughter, in addition to receiving Meals on Wheels.   She is experiencing restful sleep, going to bed at 8:30 PM and waking at 9 AM, feeling refreshed in the morning. She denies feeling depressed and continues to enjoy spending time with her family, as well as engaging in community activities. Her energy levels have improved, and she denies experiencing anhedonia, hopelessness, worthlessness, or guilt. There have been no instances of crying spells, self-harm ideation, or thoughts of suicide. She does not report feeling anxious and no longer engages in pacing behaviors. She denies irritability, muscle tension, or excessive worry. No panic attacks. She denies perceptual disturbances and does not feel paranoid. During time of encounter, patient does not appear to be responding to internal stimuli and does not voice any delusional material.  PHQ-9 score: 2. JOANNA-7 score: 0. Denies history of trauma with no intrusive, avoidance, negative alterations, or hyperarousal symptoms of PTSD noted. Denied any history of disordered eating. No symptoms of OCD including obsessive, ritualistic acts, intrusive thoughts or images noted. No current or history of manic symptoms. She does not exhibit objective evidence of lenny/hypomania. Not currently irritable, grandiose, labile, or pathologically euphoric. Denies recent ETOH or illicit substance abuse.       Review Of Systems:    Constitutional negative   ENT negative   Cardiovascular negative   Respiratory negative   Gastrointestinal negative   Genitourinary negative   Musculoskeletal negative   Integumentary negative   Neurological confusion, poor memory, and as noted in HPI   Endocrine negative   Other Symptoms none, all other systems are negative         PHQ-2/9 Depression Screening    Little interest or pleasure in doing things: 1 - several days  Feeling down, depressed, or hopeless: 0 - not at all  Trouble falling or staying asleep, or sleeping too much: 0 - not at all  Feeling tired or having little energy: 0 - not at all  Poor appetite or overeatin - several days  Feeling bad about yourself - or that you are a failure or have let yourself or your family down: 0 - not at all  Trouble concentrating on things, such as reading the newspaper or watching television: 0 - not at all  Moving or speaking so slowly that other people could have noticed. Or the opposite - being so fidgety or restless that you have been moving around a lot more than usual: 0 - not at all  Thoughts that you would be better off dead, or of hurting yourself in some way: 0 - not at all  PHQ-9 Score: 2   PHQ-9 Interpretation: No or Minimal depression            JOANNA-7 Flowsheet Screening      Flowsheet Row Most Recent Value   Over the last 2 weeks, how often have you been bothered by any of the following problems? Feeling nervous, anxious, or on edge 0   Not being able to stop or control worrying 0   Worrying too much about different things 0   Trouble relaxing 0   Being so restless that it is hard to sit still 0   Becoming easily annoyed or irritable 0   Feeling afraid as if something awful might happen 0   JOANNA-7 Total Score 0              Past Psychiatric History:      Past Inpatient Psychiatric Treatment:   One past inpatient psychiatric admission at [address] [place] [address] in 2023  Past Outpatient Psychiatric Treatment:    Most recently in outpatient psychiatric treatment with a family physician  Past Suicide Attempts: no  Past Violent Behavior: no  Past Psychiatric Medication Trials: Celexa and Seroquel    Substance Abuse History:     Tobacco/alcohol/caffeine: Denies alcohol use, Denies tobacco use, Caffeine intake: 1 cups of caffeinated coffee per day(s)  Illicit drugs: Denies history of illicit drug use    No past legal actions or arrests secondary to substance intoxication. The patient denies prior DWIs/DUIs. No history of outpatient/inpatient rehabilitation programs.  Mariposa Vincent does not exhibit objective evidence of substance withdrawal during today's examination nor does Dalila Grady appear under the influence of any psychoactive substance. Social History:      Developmental: Denies a history of milestone/developmental delay. Denies a history of in-utero exposure to toxins/illicit substances. There is no documented history of IEP or need for special education. Education: high school diploma/GED, some technical college for computers (1 year)  Marital history:   Children: 2- son, Ania Mackey age 39; daughter, Clarisse Be age 43  Living arrangement, social support: son, lives with son since July  Occupational History: retired  Access to firearms: Denies direct access to weapons/firearms. Sandra Pinzon has no history of arrests or violence with a deadly weapon.      Traumatic History:      Abuse: none  Other Traumatic Events:  heart attack at age 34      Family Psychiatric History:     Family History   Problem Relation Age of Onset    Hypertension Mother     Stroke Mother         3 strokes    Heart attack Father     No Known Problems Sister     No Known Problems Daughter     No Known Problems Maternal Grandmother     No Known Problems Maternal Grandfather     No Known Problems Paternal Grandmother     No Known Problems Paternal Grandfather     No Known Problems Maternal Aunt     No Known Problems Maternal Aunt     No Known Problems Maternal Aunt     Breast cancer Paternal Aunt 46    No Known Problems Paternal Aunt          Past Medical History:    Past Medical History:   Diagnosis Date    Acute MI ([address]) 08/12/2012    MARIANELA (acute kidney injury) ([address]) 8/17/2023    Anemia     Anxiety 07/21/2023    CAD (coronary artery disease)     Encounter for well adult exam with abnormal findings 2/12/2019    Hyperlipidemia     Hypertension     Myocardial infarction ([address]) 03/26/2002    Thyroid disease     Ventricular fibrillation ([address]) 08/12/2012        Past Surgical History:   Procedure Laterality Date    COLONOSCOPY  2013    CORONARY ARTERY BYPASS GRAFT  2003     Allergies   Allergen Reactions    Cat Hair Extract Sneezing       History Review: The following portions of the patient's history were reviewed and updated as appropriate: allergies, current medications, past medical history, past social history, and past surgical history. allergies, current medications, past family history, past medical history, past social history, and past surgical history    OBJECTIVE:    Vital signs in last 24 hours: There were no vitals filed for this visit.     Mental Status Evaluation:  Appearance and attitude: appeared as stated age, cooperative and attentive, casually dressed, with good hygiene  Eye contact: good  Motor Function: within normal limits, intact gait, No PMA/PMR  Gait/station: normal gait/station and normal balance  Speech: talking in soft tone with normal latency and decreased amount  Language: No overt abnormality  Mood/affect: euthymic / Affect was euthymic, reactive, in full range, normal intensity and mood congruent  Thought Processes: decreased rate of thoughts, slowing of thoughts  Thought content: denies suicidal ideation or homicidal ideation; no delusions or first rank symptoms  Associations: intact associations  Perceptual disturbances: denies Auditory/Visual/Tactile Hallucinations  Orientation: oriented to time, person, place and to the situational context  Cognitive Function: intact  Memory:  impaired short term and long term memory  Intellect: unable to assess  Fund of knowledge: aware of current events, aware of past history, and vocabulary average  Impulse control: good  Insight/judgment: limited/fair    Pain: denied    Lab Results: I have personally reviewed all pertinent laboratory/tests results  Most Recent Labs:   Lab Results   Component Value Date    WBC 3.59 (L) 08/21/2023    RBC 3.34 (L) 08/21/2023    HGB 10.5 (L) 08/21/2023    HCT 31.7 (L) 08/21/2023     08/21/2023    RDW 11.8 08/21/2023    NEUTROABS 2.05 08/21/2023     12/21/2015    SODIUM 141 08/21/2023    K 3.7 08/21/2023     (H) 08/21/2023    CO2 26 08/21/2023    BUN 9 08/21/2023    CREATININE 0.41 (L) 08/21/2023    GLUCOSE 119 12/21/2015    CALCIUM 8.4 08/21/2023    AST 26 08/19/2023    ALT 48 08/19/2023    ALKPHOS 57 08/19/2023    PROT 7.7 12/21/2015    TP 5.7 (L) 08/19/2023    BILITOT 0.58 12/21/2015    TBILI 0.48 08/19/2023    CHOL 154 12/21/2015    CHOLESTEROL 121 08/03/2023    TRIG 84 08/03/2023    HDL 41 (L) 08/03/2023    LDLCALC 63 08/03/2023    NONHDLC 127 02/05/2019    AMMONIA 15 (L) 07/28/2023    SGF0BBZZNGXF 0.706 08/18/2023    FREET4 1.06 07/28/2023     EKG   Lab Results   Component Value Date    VENTRATE 56 08/18/2023    ATRIALRATE 56 08/18/2023    PRINT 120 08/18/2023    QRSDINT 88 08/18/2023    QTINT 472 08/18/2023    PAXIS -2 08/18/2023    QRSAXIS 25 08/18/2023    TWAVEAXIS 15 08/18/2023     Imaging Studies: No results found.     Suicide/Homicide Risk Assessment:    Risk of Harm to Self:  The following ratings are based on assessment at the time of the interview  Demographic risk factors include: ,  status, age: over 48 or older  Historical Risk Factors include: history of depression, history of anxiety  Recent Specific Risk Factors include: diagnosis of depression  Protective Factors: no current suicidal ideation, ability to adapt to change, able to manage anger well, access to mental health treatment, compliant with medications, compliant with mental health treatment, connection to community, connection to own children, contact with caregivers, cultural beliefs discouraging suicide, effective coping skills, effective decision-making skills, effective problem solving skills, having a desire to be alive, having a desire to live, having a sense of purpose or meaning in life, impulse control, medical compliance, no substance use problems, opportunities to contribute to community, personal beliefs, personal beliefs about the meaning and value of life, resiliency, responsibilities and duties to others, restricted access to lethal means, stable living environment, strong relationships, supportive family, supportive friends  Based on today's assessment, Mariposa Vincent presents the following risk of harm to self: minimal    Risk of Harm to Others: The following ratings are based on assessment at the time of the interview  Demographic Risk Factors include: none. Historical Risk Factors include: none. Recent Specific Risk Factors include: none. Protective Factors: no current homicidal ideation  Based on today's assessment, Mariposa Vincent presents the following risk of harm to others: none    The following interventions are recommended: no intervention changes needed. Although patient's acute lethality risk is LOW, long-term/chronic lethality risk is mildly elevated given cognitive decline and history of depression/anxiety. However, at the current moment, Mariposa Vincent is future-oriented, forward-thinking, and demonstrates ability to act in a self-preserving manner as evidenced by volitionally presenting to the clinic today, seeking treatment. At this time, inpatient hospitalization is not currently warranted. To mitigate future risk, patient should adhere to treatment recommendations, avoid alcohol/illicit substance use, utilize community-based resources and familiar support, and prioritize mental health treatment. Based on today's assessment and clinical criteria, [facility] contracts for safety and is not an imminent risk of harm to self or others. Outpatient level of care is deemed appropriate at this present time. Mariposa Vincent understands that if they are no longer able to contract for safety, they need to call/contact the outpatient office including this writer, call/contact crisis and/or attend to the nearest Emergency Department for immediate evaluation. Assessment/Plan:     In summary, THE YVONNE ROONEY is a 70 y.o.   female, Single, domiciled with son, retired, w/ PMH of CAD, [address] [place] [address], anemia, Vit D deficiency and PPH of MDD with psychosis, anxiety, 1 x prior psychiatric admissions ([facility] 2023, depression with psychosis), no prior SA, no h/o self-injurious behavior, who presented to the mental health clinic for the initia/l intake and psychiatric evaluation on October 13, 2023. Citlali Lira was recently discharged /from [address] [place] [address] Way 8/11/23, currently on Celexa 10 mg daily, Seroquel 25 HS. Tolerating medication well with no medication side effects observed or reported. Not actively involved in individual psychotherapy. Daughter reports that Matthews mood has remained fairly stable throughout her life up until spring 2023. It was at during this time that her mother was applying for Medicare and attempting to handle this process independently. The family started to notice significant changes in the patient's behavior, including feelings of being overwhelmed, restlessness, worry, and anxiety. Citlali Lira also misinterpreting situations, developing a preoccupation that children were moving out of the area. Family began to notice significant weight loss (20 pounds in 2 months), frequent pacing at all times of the day, difficulty sleeping, tearful episodes, paranoia, confusion, and disorganized behavior. PCP initiated treatment with Celexa for anxiety and depression. However, after one week, Matthews symptoms worsened, leading to inpatient hospitalization. 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 Following the first hospitalization, the patient was discharged home, only to be readmitted 1 week later due to a fall. It was during the second hospitalization on a medical unit (UTI retreated with antibiotics) that [facility] psychological presentation improved. She began to verbally engage once again, her appetite and energy levels improved, and her mood symptoms alleviated.   She was discharged home on Celexa 10 mg daily and Seroquel 25 mg at bedtime. Of note, daughter reports that patient has had significant weight loss during times of psychosocial stress in the past which she believes are associated with depression, however she is unable to identify any other specific symptoms. Daughter reports that during these times, her mother continue to work and engage in her normal daily activities. PHQ-9 score: 2; JOANNA-7 score: 0. Her current presentation meets criteria for MDD, single episode, in partial remission, anxiety. Currently she is not at risk for suicide, homicide, self-injury, aggressive behaviors, self-neglect, or neglect of dependents or children. Given this presentation, the patient will benefit from further outpatient follow up for management of her symptoms. Psychopharmacologically, Douglas Dubois and daughter both believe that patient has achieved mood stability on current medication regimen. She has consults for geriatric medicine lined up in the next few weeks. She is tolerating medications well and living in a safe environment. No medication changes at this time. Plan:  Continue Celexa 10 mg daily for depression and anxiety  Continue Seroquel 25 mg at bedtime for antidepressant augmentation" off label" use with anxiety and sleep  Psychotherapy-declines at this time  Follow up with primary care provider for ongoing medical care  Follow up with this provider in 3 months         Diagnoses and all orders for this visit:    MDD (major depressive disorder), single episode, in partial remission (HCC)  -     citalopram (CeleXA) 10 mg tablet; Take 1 tablet (10 mg total) by mouth daily  -     QUEtiapine (SEROquel) 25 mg tablet; Take 1 tablet (25 mg total) by mouth daily at bedtime    Anxiety  -     citalopram (CeleXA) 10 mg tablet; Take 1 tablet (10 mg total) by mouth daily  -     QUEtiapine (SEROquel) 25 mg tablet;  Take 1 tablet (25 mg total) by mouth daily at bedtime    Neurocognitive disorder          - Psychoeducation provided regarding the importance of exercise and health dietary choices and their impact on mood, energy, and motivation.  - Counseled to avoid ETOH, illicit substances, and nicotine secondary to the detrimental effects of these substances on mental and physical health. - Psychoeducation regarding medication benefits and risks, side effects, indications and alternatives provided to the patient and the importance of compliance with psychiatric medication reiterated. [employer] verbalized understanding and agreed with the plan  - Educated on the [address] and Environmental Approach to mental health. - The patient was educated about 24 hour and weekend coverage for urgent situations accessed by calling [facility] main practice number  - Patient was educated to call [place] ([phone] [[phone]]) for behavioral crisis at any time, 911 for any safety concerns, or go to nearest ER if her symptoms become overwhelming or unmanageable. Medications Risks/Benefits:      Risks, Benefits And Possible Side Effects Of Medications:    Risks, benefits, and possible side effects of medications explained to Damion Ashley including risk of parkinsonian symptoms, Tardive Dyskinesia and metabolic syndrome related to treatment with antipsychotic medications, risk of cardiovascular events in elderly related to treatment with antipsychotic medications, and risk of suicidality and serotonin syndrome related to treatment with antidepressants. She verbalizes understanding and agreement for treatment.     PARQ completed including dizziness, sedation, GI distress, orthostatic hypotension and cardiovascular risks, metabolic syndrome, NMS, TD, EPS, Seizures, and others  PARQ completed including serotonin syndrome, SIADH, worsening depression, suicidality, induction of lenny, GI upset, headaches, activation, sexual side effects, sedation, potential drug interactions, and others. Controlled Medication Discussion:     Not applicable      Treatment Plan not complete within time limits due to: Intensive intake, entire session was needed to gather all relevant information. Visit Time    Visit Start Time: 9:00 AM  Visit Stop Time: 10:15 AM  Total Visit Duration:  75 minutes    Clay Cockayne[address] 10/13/23    This note was completed in part utilizing [address] [place] [address]. Grammatical, translation, syntax errors, random word insertions, spelling mistakes, and incomplete sentences may be an occasional consequence of this system secondary to software limitations with voice recognition, ambient noise, and hardware issues. If you have any questions or concerns about the content, text, or information contained within the body of this dictation, please contact the provider for clarification.

## 2023-10-20 ENCOUNTER — CLINICAL SUPPORT (OUTPATIENT)
Dept: FAMILY MEDICINE CLINIC | Facility: CLINIC | Age: 63
End: 2023-10-20
Payer: MEDICARE

## 2023-10-20 DIAGNOSIS — D64.9 ANEMIA, UNSPECIFIED TYPE: Primary | ICD-10-CM

## 2023-10-20 PROCEDURE — 96372 THER/PROPH/DIAG INJ SC/IM: CPT

## 2023-10-20 RX ORDER — CYANOCOBALAMIN 1000 UG/ML
1000 INJECTION, SOLUTION INTRAMUSCULAR; SUBCUTANEOUS
Status: SHIPPED | OUTPATIENT
Start: 2023-10-20

## 2023-10-20 RX ADMIN — CYANOCOBALAMIN 1000 MCG: 1000 INJECTION, SOLUTION INTRAMUSCULAR; SUBCUTANEOUS at 14:07

## 2023-11-02 ENCOUNTER — OFFICE VISIT (OUTPATIENT)
Age: 63
End: 2023-11-02
Payer: MEDICARE

## 2023-11-02 VITALS
HEART RATE: 78 BPM | TEMPERATURE: 97.5 F | OXYGEN SATURATION: 97 % | DIASTOLIC BLOOD PRESSURE: 74 MMHG | SYSTOLIC BLOOD PRESSURE: 118 MMHG | HEIGHT: 60 IN | BODY MASS INDEX: 24.85 KG/M2 | WEIGHT: 126.6 LBS

## 2023-11-02 DIAGNOSIS — D51.9 ANEMIA DUE TO VITAMIN B12 DEFICIENCY, UNSPECIFIED B12 DEFICIENCY TYPE: ICD-10-CM

## 2023-11-02 DIAGNOSIS — E78.2 MIXED HYPERLIPIDEMIA: ICD-10-CM

## 2023-11-02 DIAGNOSIS — E43 SEVERE PROTEIN-CALORIE MALNUTRITION (HCC): ICD-10-CM

## 2023-11-02 DIAGNOSIS — Z87.440 HISTORY OF UTI: ICD-10-CM

## 2023-11-02 DIAGNOSIS — G93.41 ACUTE METABOLIC ENCEPHALOPATHY: ICD-10-CM

## 2023-11-02 DIAGNOSIS — F33.2 SEVERE EPISODE OF RECURRENT MAJOR DEPRESSIVE DISORDER, WITHOUT PSYCHOTIC FEATURES (HCC): ICD-10-CM

## 2023-11-02 DIAGNOSIS — R73.01 IMPAIRED FASTING GLUCOSE: ICD-10-CM

## 2023-11-02 DIAGNOSIS — I25.10 CHRONIC CORONARY ARTERY DISEASE: ICD-10-CM

## 2023-11-02 DIAGNOSIS — Z86.73 HISTORY OF CVA (CEREBROVASCULAR ACCIDENT): ICD-10-CM

## 2023-11-02 DIAGNOSIS — E03.9 ACQUIRED HYPOTHYROIDISM: ICD-10-CM

## 2023-11-02 DIAGNOSIS — F01.53 VASCULAR DEMENTIA WITH MOOD DISTURBANCE, UNSPECIFIED DEMENTIA SEVERITY (HCC): Primary | ICD-10-CM

## 2023-11-02 PROCEDURE — 99483 ASSMT & CARE PLN PT COG IMP: CPT | Performed by: FAMILY MEDICINE

## 2023-11-02 NOTE — PROGRESS NOTES
[facility]  Consultation  [address]  [phone]      ASSESSMENT AND PLAN:  1. Vascular dementia with mood disturbance, unspecified dementia severity ([address] )      At this point in time her presentation is consistent with Dementia, vascular, mild to moderate. In retrospect she has had some memory deficit for the past 2-3 years but significant decline during her hospitalization. Today MOCA testing was a 19/30, with deficits in visuospatial, delayed recall, language, abstraction. She is independent of ADLS but dependent on all IADLS. Her family has been very supportive. Marilyn Orthodox her daughter is with her during the day and her son is with her during the night. They have assisted her with finances and medications. She gets meals on wheels and has supervision for cooking. Has assistance with cleaning and laundry. She no longer drives. Advised to continue to engage in exercises to stimulate the brain such as games. To increase physical activity. To maintain a healthy diet such as the mediterranean diet. Advised continued need to have good control of her cardiovascular risk factors. She should remain on ASA 81 mg daily as part of treatment for Vascular Dementia. 2. Acute metabolic encephalopathy    She was in the hospital with delirium/acute encephalopathy seocndary to UTI, dehydration, kvng, bradycardia, and depression. Since then she has made progress and has had significant improvement since her discharge 8/17/2023 which is about 8 weeks ago. Although it does appear she does have Vascular Dementia her overall cognition may still improve. Suspect that she may continue to recover from the encephalopathy. 3. Severe protein-calorie malnutrition ([address] [facility])    Malgorzata Agustin has had improvement. She is eating well. BMI is normal.   Malgorzata Agustin does have meals on wheels and family otherwise cooks/provides food.    4. Severe episode of recurrent major depressive disorder, without psychotic features [facility])    She has had a history of anxiety and depression. She had significant depressive features in the hospital that included paranoia, sadness, melancholy, poor speech. She has been maintained on Celexa 10 mg daily. She continue with Seroquel which appears to have helped as an adjunct to depression treatment. This has improved her sleep. She has no se/ar from the Seroquel at this time and continues to be appropriate. Over time I do think a dose reduction may be instituted but will need to monitor depressive sytmpoms/behaviors. Today a GDS was administered and scored 1/15.   5. Impaired fasting glucose    Continue with good dietary control. 6. Acquired hypothyroidism    Followed by PCP. Tsh has been normal.   Continue with the same. 7. Mixed hyperlipidemia    Need to maintain treatment. Continue with good dietary control. Stay on statin and zetia. 8. History of CVA (cerebrovascular accident)    No current deficits. On ASA and statin  9. Anemia due to vitamin B12 deficiency, unspecified B12 deficiency type    With mild anemia likely due to b12 that is low. Continue with B12 supplementation as directed by PCP. 10. Chronic coronary artery disease  Currently asymptomatic. Continue fu with PCP and Cardiology. 11. History of UTI        Decision-making capacity: not fully assessed    Staging: mild    Medications Review: reviewed. Medications appropriate at this time. Consider dose reduction of Seroquel in the future. HPI:    We had the pleasure of evaluating Jay Ritter who is a 70 y.o. female in Geriatric consultation today. Ms. Eri Chew is in the office with her Renato Denver. She lives with Edgar Lora    HISTORY AS PER daughter and patient. :      Tran Johnson presents at [facility] for Geriatric Assessment. This was prompted by recommendation by Geriatric consultation during her last hospitalization and again by her PCP.      Over the past 2-3 years, in retrospect, Lexi Norris notes she did have some memory lapses once in a while. However she had a significant change when she was hospitalized. Elva Santos was hospitalized for enceophalopathy due to kvng, dehydration, uti, and bradycardia. She did require admission for severe depression as well. During her hospitalization she had significant decline in her memory. Very confused, poor memore, severe depression, no energy, weakness. She has had significant improvement since that time. Currently she is eating well. Continues to have good interaction with her family. Sleeping well. Depression has significantly improved. Elva Santos has no new complaints. Currently Elva Santos receives assistance in managing her medications and finances from her family. She has meals on wheels. She has assistance with cooking, cleaning, and laundry. She no longer drives. COGNITION:  Memory Issues noticed since 2 year(s)    Memory affected: short term memory loss    Symptoms started: sudden  Over time the memory has:  improved  Memory issue(s) were noted by: patient and family   Difficulty finding the right word while speaking: No  Requires repeat information or asking the same question repeatedly: Yes  Fluctuation in alertness: No  Changes in mood or personality:Yes  Current or previous treatment for depression or anxiety: Yes    Family member with dementia and what type? yes  History of head trauma: No  History of stroke: Yes  History of alcohol or substance abuse: No      Mri finding of lacunar infarcts. No hospitalizaton or outpatient visits related to CVA or TIA.      FUNCTIONAL STATUS:  BADLs  Does patient require assistance with:  Bathing: No  Dressing: No  Transferring: No  Continence: No  Toileting: No  Feeding: No    IADLs  Dose patient require assistance with:  Telephone: Yes  Shopping: Yes  Food Preparation: Yes  Housekeeping: Yes  Laundry: Yes  Transportation: Yes  Medications: Yes  Finances: Yes    NEUROPSYCH SYMPTOMS:  Does patient get angry or hostile? Resist care from others? No  Does patient see or hear things that no one else can see or hear? No  Does patient act impatient and cranky? Does mood frequently change for no apparent reason? No  Does patient act suspicious without good reason (example: believes that others are stealing from him or her, or planning to harm him or her in some way)? No  Does patient less interested in his or her usual activities or in the activities and plans of others? No  Does patient have trouble sleeping at night? No  Dose patient have abnormal movements while asleep?  No    SAFETY:  Hearing and vision issue: No  Any gait or balance disorder: No  Uses: none  Any falls in the last year: Yes  Any history of wandering: No  Are there firearms or guns in the home: No  Does patient drive: No  Any driving accidents or citations in the last year: No  Any concerns about patient's ability to drive: Yes    ACP REVIEW:  Does patient have POA: Yes  Does patient have a Living will: Yes  Any legal assistance needed for healthcare planning?: No    Allergies   Allergen Reactions    Cat Hair Extract Sneezing       Medications:    Current Outpatient Medications on File Prior to Visit   Medication Sig Dispense Refill    aspirin 81 mg chewable tablet Chew 1 tablet (81 mg total) daily 30 tablet 2    atorvastatin (LIPITOR) 40 mg tablet TAKE 1 TABLET BY MOUTH DAILY WITH DINNER 90 tablet 1    Blood Pressure KIT Use in the morning 1 kit 0    cholecalciferol (VITAMIN D3) 1,000 units tablet Take 1 tablet (1,000 Units total) by mouth daily 30 tablet 2    citalopram (CeleXA) 10 mg tablet Take 1 tablet (10 mg total) by mouth daily 90 tablet 1    cyanocobalamin 1,000 mcg/mL Inject 1 mL (1,000 mcg total) into a muscle every 30 (thirty) days Do not start before September 2, 2023. 1 mL 0    docusate sodium (COLACE) 100 mg capsule Take 1 capsule (100 mg total) by mouth 2 (two) times a day 60 capsule 2    ezetimibe (ZETIA) 10 mg tablet Take 1 tablet (10 mg total) by mouth daily 30 tablet 2    levothyroxine (Euthyrox) 175 mcg tablet Take 1 tablet (175 mcg total) by mouth daily in the early morning 30 tablet 0    QUEtiapine (SEROquel) 25 mg tablet Take 1 tablet (25 mg total) by mouth daily at bedtime 90 tablet 1    Ascorbic Acid (vitamin C) 1000 MG tablet Take 1,000 mg by mouth daily (Patient not taking: Reported on 11/2/2023)      polyethylene glycol (MIRALAX) 17 g packet Take 17 g by mouth daily Do not start before August 12, 2023.  (Patient not taking: Reported on 11/2/2023) 30 each 0     Current Facility-Administered Medications on File Prior to Visit   Medication Dose Route Frequency Provider Last Rate Last Admin    cyanocobalamin injection 1,000 mcg  1,000 mcg Intramuscular Q30 Days Amanda Wayne MD   1,000 mcg at 10/20/23 1407       History:  Past Medical History:   Diagnosis Date    Acute MI ([address]) 08/12/2012    MARIANELA (acute kidney injury) ([address]) 8/17/2023    Anemia     Anxiety 07/21/2023    CAD (coronary artery disease)     Encounter for well adult exam with abnormal findings 2/12/2019    Hyperlipidemia     Hypertension     Myocardial infarction ([address]) 03/26/2002    Thyroid disease     Ventricular fibrillation ([address]) 08/12/2012     Past Surgical History:   Procedure Laterality Date    COLONOSCOPY  2013    CORONARY ARTERY BYPASS GRAFT  2003     Family History   Problem Relation Age of Onset    Hypertension Mother     Stroke Mother         3 strokes    Heart attack Father     No Known Problems Sister     No Known Problems Daughter     No Known Problems Maternal Grandmother     No Known Problems Maternal Grandfather     No Known Problems Paternal Grandmother     No Known Problems Paternal Grandfather     No Known Problems Maternal Aunt     No Known Problems Maternal Aunt     No Known Problems Maternal Aunt     Breast cancer Paternal Aunt 46    No Known Problems Paternal Aunt      Social History     Socioeconomic History    Marital status:  Spouse name: Not on file    Number of children: Not on file    Years of education: Not on file    Highest education level: Not on file   Occupational History    Occupation:  worker ;  to infant ages     Employer: OTHER     Comment: parttime    Tobacco Use    Smoking status: Former     Packs/day: 0.25     Years: 22.00     Total pack years: 5.50     Types: Cigarettes     Start date: 1971     Quit date: 1993     Years since quittin.8     Passive exposure: Never    Smokeless tobacco: Never    Tobacco comments:     no passive smoke exposure   Vaping Use    Vaping Use: Never used   Substance and Sexual Activity    Alcohol use: Not Currently    Drug use: No    Sexual activity: Not Currently   Other Topics Concern    Not on file   Social History Narrative    Not on file     Social Determinants of Health     Financial Resource Strain: Low Risk  (2023)    Overall Financial Resource Strain (CARDIA)     Difficulty of Paying Living Expenses: Not hard at all   Food Insecurity: No Food Insecurity (3/18/2022)    Hunger Vital Sign     Worried About Running Out of Food in the Last Year: Never true     [address] in the Last Year: Never true   Transportation Needs: No Transportation Needs (2023)    PRAPARE - Transportation     Lack of Transportation (Medical): No     Lack of Transportation (Non-Medical): No   Physical Activity: Insufficiently Active (3/20/2023)    Exercise Vital Sign     Days of Exercise per Week: 3 days     Minutes of Exercise per Session: 30 min   Stress: No Stress Concern Present (3/18/2022)    [address] [facility]     Feeling of Stress : Not at all   Social Connections:  Moderately Integrated (3/18/2022)    Social Connection and Isolation Panel [NHANES]     Frequency of Communication with Friends and Family: More than three times a week     Frequency of Social Gatherings with Friends and Family: More than three times a week     Attends Jainism Services: More than 4 times per year     Active Member of Clubs or Organizations: Yes     Attends Club or Organization Meetings: More than 4 times per year     Marital Status:    Intimate Partner Violence: Not At Risk (3/18/2022)    Humiliation, Afraid, Rape, and Kick questionnaire     Fear of Current or Ex-Partner: No     Emotionally Abused: No     Physically Abused: No     Sexually Abused: No   Housing Stability: Unknown (3/18/2022)    Housing Stability Vital Sign     Unable to Pay for Housing in the Last Year: No     Number of [address] in the Last Year: Not on file     Unstable Housing in the Last Year: No     Past Surgical History:   Procedure Laterality Date    COLONOSCOPY  2013    CORONARY ARTERY BYPASS GRAFT  2003       OBJECTIVE:  Vitals:    11/02/23 1356   BP: 118/74   Pulse: 78   Temp: 97.5 °F (36.4 °C)   SpO2: 97%   Weight: 57.4 kg (126 lb 9.6 oz)   Height: 5' (1.524 m)     Body mass index is 24.72 kg/m². Physical Exam  Vitals and nursing note reviewed. Constitutional:       Appearance: Normal appearance. She is well-developed and normal weight. HENT:      Head: Normocephalic and atraumatic. Right Ear: Tympanic membrane, ear canal and external ear normal.      Left Ear: Tympanic membrane and external ear normal.      Nose: Nose normal.      Mouth/Throat:      Mouth: Mucous membranes are moist.   Eyes:      Extraocular Movements: Extraocular movements intact. Conjunctiva/sclera: Conjunctivae normal.      Pupils: Pupils are equal, round, and reactive to light. Neck:      Thyroid: No thyromegaly. Trachea: No tracheal deviation. Cardiovascular:      Rate and Rhythm: Normal rate and regular rhythm. Heart sounds: Normal heart sounds. No murmur heard. Pulmonary:      Effort: Pulmonary effort is normal. No respiratory distress. Breath sounds: Normal breath sounds. No wheezing.    Abdominal:      General: Bowel sounds are normal. Palpations: Abdomen is soft. Musculoskeletal:         General: Normal range of motion. Cervical back: Normal range of motion and neck supple. Skin:     General: Skin is warm and dry. Capillary Refill: Capillary refill takes less than 2 seconds. Neurological:      General: No focal deficit present. Mental Status: She is alert and oriented to person, place, and time. GCS: GCS eye subscore is 4. GCS verbal subscore is 5. GCS motor subscore is 6. Cranial Nerves: Cranial nerves 2-12 are intact. Sensory: Sensation is intact. Motor: Motor function is intact. Coordination: Coordination is intact. Gait: Gait is intact. Comments: Get up and go: normal, < 12 seconds.     Psychiatric:         Mood and Affect: Mood normal.         Behavior: Behavior normal.         MoCA: 19/30  Geriatric Depression Screen      Flowsheet Row Most Recent Value   Geriatric Depression Scale (Short Version) Total 1            Labs & Imaging:  Lab Results   Component Value Date    WBC 3.59 (L) 08/21/2023    HGB 10.5 (L) 08/21/2023    HCT 31.7 (L) 08/21/2023    MCV 95 08/21/2023     08/21/2023     Lab Results   Component Value Date     12/21/2015    SODIUM 141 08/21/2023    K 3.7 08/21/2023     (H) 08/21/2023    CO2 26 08/21/2023    ANIONGAP 8 12/21/2015    AGAP 4 08/21/2023    BUN 9 08/21/2023    CREATININE 0.41 (L) 08/21/2023    GLUC 96 08/21/2023    GLUF 93 05/22/2023    CALCIUM 8.4 08/21/2023    AST 26 08/19/2023    ALT 48 08/19/2023    ALKPHOS 57 08/19/2023    PROT 7.7 12/21/2015    TP 5.7 (L) 08/19/2023    BILITOT 0.58 12/21/2015    TBILI 0.48 08/19/2023    EGFR 108 08/21/2023     Lab Results   Component Value Date    HGBA1C 6.0 (H) 08/03/2023     Lab Results   Component Value Date    CHOLESTEROL 121 08/03/2023    CHOLESTEROL 233 (H) 05/22/2023    CHOLESTEROL 245 (H) 01/11/2023     Lab Results   Component Value Date    HDL 41 (L) 08/03/2023    HDL 43 (L) 05/22/2023    HDL 52 01/11/2023     Lab Results   Component Value Date    TRIG 84 08/03/2023    TRIG 99 05/22/2023    TRIG 172 (H) 01/11/2023     Lab Results   Component Value Date    [address] 02/05/2019     Lab Results   Component Value Date    LDLCALC 63 08/03/2023    LDLCALC 170 (H) 05/22/2023     Lab Results   Component Value Date    LAQOLLBL31 303 08/02/2023     Lab Results   Component Value Date    UBH4UAVWLLOF 0.706 08/18/2023     No results found for: "SYPHILISAB"  No results found for: "RPR"      Lab Results   Component Value Date    OITV91IQNLGQ 24.9 (L) 08/02/2023      Results for orders placed during the hospital encounter of 08/17/23    CT head wo contrast    Narrative  CT BRAIN - WITHOUT CONTRAST    INDICATION:   Head trauma, moderate-severe  head injury. COMPARISON:  7/28/23. TECHNIQUE:  CT examination of the brain was performed. Multiplanar 2D reformatted images were created from the source data. Radiation dose length product (DLP) for this visit:  893 mGy-cm . This examination, like all CT scans performed in the [facility], was performed utilizing techniques to minimize radiation dose exposure, including the use of iterative  reconstruction and automated exposure control. IMAGE QUALITY:  Diagnostic. FINDINGS:    PARENCHYMA: Decreased attenuation is noted in periventricular and subcortical white matter demonstrating an appearance that is statistically most likely to represent moderate microangiopathic change. Chronic lacunar infarction(s) are noted in basal  ganglia. No CT signs of acute infarction. No intracranial mass, mass effect or midline shift. No acute parenchymal hemorrhage. Old left cerebellar infarct. VENTRICLES AND EXTRA-AXIAL SPACES:  Normal for the patient's age. VISUALIZED ORBITS: Normal visualized orbits. PARANASAL SINUSES: Normal visualized paranasal sinuses.     CALVARIUM AND EXTRACRANIAL SOFT TISSUES:  Normal.    Impression  No acute intracranial abnormality. Chronic microangiopathic changes. Old left cerebellar infarct. Workstation performed: [id number]    No results found for this or any previous visit. No results found for this or any previous visit. Results for orders placed during the hospital encounter of 07/28/23    MRI brain wo contrast    Narrative  MRI BRAIN WITHOUT CONTRAST    INDICATION: AMS. COMPARISON:   CT dated 7/28/2023. TECHNIQUE:  Multiplanar, multisequence imaging of the brain was performed. IMAGE QUALITY:  Diagnostic. FINDINGS:    BRAIN PARENCHYMA: No restricted diffusion to indicate an acute infarct. There is encephalomalacia in the left cerebellum due to remote infarct. There are T2/FLAIR hyperintensities in the periventricular and subcortical white matter as well as the tracie  compatible with moderate chronic microangiopathic change. Chronic lacunar infarcts in the basal ganglia and thalamus with associated chronic microhemorrhages. VENTRICLES:  Normal for the patient's age. SELLA AND PITUITARY GLAND:  Normal.    ORBITS:  Normal.    PARANASAL SINUSES:  Normal.    VASCULATURE:  Evaluation of the major intracranial vasculature demonstrates appropriate flow voids. CALVARIUM AND SKULL BASE:  Normal.    EXTRACRANIAL SOFT TISSUES:  Normal.    Impression  No acute intracranial pathology. Old left cerebellar infarct. . Chronic microangiopathy and old lacunar infarcts. Workstation performed: [id number]    No results found for this or any previous visit. No results found for this or any previous visit. I have spent 60 minutes with Patient and family today including taking history from family, patient, review of records, charting, and counseling regarding diagnosis and management of conditions.

## 2023-11-02 NOTE — PROGRESS NOTES
[facility]  [address] [facility], [address] [facility], [facility] [address]  [phone]    Social Work [address] [place] [address] presents today for a geriatric assessment, accompanied by daughter. LSW met with daughter to offer support/resources. Family with patient 24/7, and have seen significant improvements since her last hospital stay. Daughter had applied for waiver services, and is waiting the outcome. LSW provided [place] IEB number to check status and list of documents need to process waiver application. Daughter also has meals on wheels in place for patient. LSW provided some ways patient can stay active such as senior centers, adult day centers, and NIH activity packet information. Patient also took a cognitive exercise booklet home. LSW to remain available for support. [place] Cognitive Assessment (MoCA) Version 8.1  Education: High School    Points Earned POSSIBLE Points   Visuospatial/Executive   Alternating [place] Making 1 1   Visuoconstructional skills 0 1   Visuoconstructional skills (clock) 3 3   Naming   Naming Animals 3 3   Attention   Digit Span 2 2   Vigilance (letters) 1 1   Serial 7 subtraction 2 3   Language   Sentence Repetition 0 2   Verbal fluency 0 1   Abstraction   Abstraction (word pairings) 1 2   Delayed recall   Delayed recall 0 5   Memory index score: 7/15   Orientation   Orientation 5 6   TOTAL SCORE: 19/30  (Normal ?26/30)   Additional notes:        Geriatric Depression Scale (GDS) completed today, 1/15.

## 2023-11-03 PROBLEM — Z87.440 HISTORY OF UTI: Status: RESOLVED | Noted: 2023-09-04 | Resolved: 2023-11-03

## 2023-11-07 DIAGNOSIS — E78.2 MIXED HYPERLIPIDEMIA: ICD-10-CM

## 2023-11-07 RX ORDER — EZETIMIBE 10 MG/1
10 TABLET ORAL DAILY
Qty: 90 TABLET | Refills: 1 | Status: SHIPPED | OUTPATIENT
Start: 2023-11-07

## 2023-11-16 DIAGNOSIS — E03.9 ACQUIRED HYPOTHYROIDISM: ICD-10-CM

## 2023-11-16 RX ORDER — LEVOTHYROXINE SODIUM 175 UG/1
175 TABLET ORAL
Qty: 90 TABLET | Refills: 0 | Status: SHIPPED | OUTPATIENT
Start: 2023-11-16

## 2023-11-20 ENCOUNTER — CLINICAL SUPPORT (OUTPATIENT)
Dept: FAMILY MEDICINE CLINIC | Facility: CLINIC | Age: 63
End: 2023-11-20
Payer: MEDICARE

## 2023-11-20 DIAGNOSIS — F01.53 VASCULAR DEMENTIA WITH MOOD DISTURBANCE, UNSPECIFIED DEMENTIA SEVERITY (HCC): Primary | ICD-10-CM

## 2023-11-20 PROBLEM — Z00.00 WELCOME TO MEDICARE PREVENTIVE VISIT: Status: RESOLVED | Noted: 2023-09-21 | Resolved: 2023-11-20

## 2023-11-20 PROCEDURE — 96372 THER/PROPH/DIAG INJ SC/IM: CPT

## 2023-11-20 RX ORDER — CYANOCOBALAMIN 1000 UG/ML
1000 INJECTION, SOLUTION INTRAMUSCULAR; SUBCUTANEOUS
Status: SHIPPED | OUTPATIENT
Start: 2023-11-20

## 2023-11-20 RX ADMIN — CYANOCOBALAMIN 1000 MCG: 1000 INJECTION, SOLUTION INTRAMUSCULAR; SUBCUTANEOUS at 14:11

## 2023-12-08 NOTE — PROGRESS NOTES
[facility]  [address] [facility], [address] [place] [address], [facility]  [phone]    Care Conference    NAME: Billy Siegel  AGE: 70 y.o. SEX: female  YOB: 1960  DATE OF ASSESSMENT: 11/02/23  DATE OF CONFERENCE: 12/14/23    Family Present: ***  Staff Present: Romayne May, MD    Patient / Family Goals of Care: Review cognitive decline and associated symptoms, discuss treatment options and care planning. Medical Concerns (Current/Historical): acute metabolic encephalopathy, severe protein-calorie malnutrition, severe episode of recurrent major depressive disorder, without psychotic features, Impaired fasting glucose, acquired hypothyroidism, mixed hyperlipidemia, history of CVA (cerebrovascular accident), Anemia due to vitamin B12 deficiency, unspecified B12 deficiency type, chronic coronary artery disease, history of UTI    Geriatric Syndromes/Age Related Syndromes: vascular dementia, mild to moderate    Neuropsychological  Vascular dementia, mild to moderate  At this point in time her presentation is consistent with Dementia, vascular, mild to moderate. In retrospect she has had some memory deficit for the past 2-3 years but significant decline during her hospitalization. Her family has been very supportive. Joyce Montalvo her daughter is with her during the day and her son is with her during the night. They have assisted her with finances and medications. She gets meals on wheels and has supervision for cooking. Has assistance with cleaning and laundry. She no longer drives. Advised to continue to engage in exercises to stimulate the brain such as games. To increase physical activity. To maintain a healthy diet such as the mediterranean diet. Advised continued need to have good control of her cardiovascular risk factors. She should remain on ASA 81 mg daily as part of treatment for Vascular Dementia.    [place] Cognitive Assessment: 19/30- deficits in visuospatial, delayed recall, language and abstraction  Geriatric Depression Screen: 1/15    Decision-making capacity: not fully assessed  Staging: mild    Remain active physically, mentally and socially  Pharmaceutical and non-pharmaceutical interventions discussed  Engage in cognitively challenging exercises such as crosswords and puzzles  Maintain chronic conditions under control  Repeat cognitive assessment in *** months    Diagnostic Studies  Review of bloodwork  Review of previous imaging: MRI (07/30/23): IMPRESSION: No acute intracranial pathology. Old left cerebellar infarct. Chronic microangiopathy and old lacunar infarcts. Physical Finding Impacting Function   Fall Risk: ***   Activities of Daily Living: Independent   Instrumental Activities of Daily Living: Dependent    Encourage appropriate footwear at all times  Review fall risk prevention tips and adjust within the home environment as needed    Medications Reviewed   Medications seem appropriate for present conditions. Consider dose reduction of Seroquel in the future. Check with PCP before using over the counter medications  Avoid over the counter medications that can affect cognition (e.g., Benadryl, Tylenol PM)   Avoid NSAIDs due to risk of GI bleed and renal impairment    Other Findings   Overall health  BMI: 24.72 kg/m2  Maintain well-balanced diet  Continue following with primary care physician regularly  Acute metabolic encephalopathy  She was in the hospital with delirium/acute encephalopathy seocndary to UTI, dehydration, kvng, bradycardia, and depression. Since then she has made progress and has had significant improvement since her discharge 8/17/2023 which is about 8 weeks ago. Although it does appear she does have Vascular Dementia her overall cognition may still improve. Suspect that she may continue to recover from the encephalopathy.    Severe protein-calorie malnutrition  Lino Cartagena has had improvement  She is eating well, BMI is normal  Lino Cartagena does have meals on wheels and family otherwise cooks/provides food   Severe episode of recurrent major depressive disorder, without psychotic features  She has had a history of anxiety and depression. She had significant depressive features in the hospital that included paranoia, sadness, melancholy, poor speech. She has been maintained on Celexa 10 mg daily. She continue with Seroquel which appears to have helped as an adjunct to depression treatment. This has improved her sleep. She has no se/ar from the Seroquel at this time and continues to be appropriate. Over time I do think a dose reduction may be instituted but will need to monitor depressive sytmpoms/behaviors. Today a GDS was administered and scored 1/15. Impaired fasting glucose  Continue with good dietary control  Acquired hypothyroidism  Followed by PCP. TSH has been normal. Continue with the same  Mixed hyperlipidemia  Need to maintain treatment  Continue with good dietary control  Stay on statin and zetia  History of CVA (cerebrovascular accident)  No current deficits  On ASA and statin  Anemia due to vitamin B12 deficiency, unspecified B12 deficiency type  With mild anemia likely due to B12 that is low  Continue with B12 supplementation as directed by PCP  Chronic coronary artery disease  Currently asymptomatic  Continue follow up with PCP and Cardiology  History of UTI  ***    Recommended Health Maintenance   Immunizations, if not contraindicated:    Influenza vaccine yearly  Pneumo vaccine every 5 years (65 years and over)  Shingles vaccine  COVID-19 vaccine    Social / Safety Concerns  Consider an Adult Day Program for positive socialization, physical exercise, cognitive stimulation and family respite  Consider a home care aide to assist with daily care needs  Stay in touch with family and friends  Plan self-care activities for your mental well-being each week  Recommend review of fall risk prevention tips  Recommend use of fall precautions including fall alert device  Consider assistance for medication administration such as blister packaging or use of an automated pill dispenser  Recommend contacting your local [facility] on Aging for possible eligible programs such as OPTIONS, Caregiver Support Program, or [address] [place] [address] updated advance directives and provide a copy to your primary care provider  Consider caregiver support groups and educational resources through the Alzheimer's Association; access Alzheimer's Association 24/7 Helpline at [address] [facility] [facility] does offer a monthly caregiver support group. If interested, please speak with a . Utilize reorientation and redirection as needed (dependent on situation)  Educational information provided  Recommended literature: 36 Hour Day    Patient and family verbalized understanding of above care plan. For care coordination purposes, this care plan will be shared with your primary care provider. With any questions, please contact our office at [phone].

## 2023-12-14 ENCOUNTER — TELEMEDICINE (OUTPATIENT)
Age: 63
End: 2023-12-14

## 2023-12-14 DIAGNOSIS — Z86.73 HISTORY OF CVA (CEREBROVASCULAR ACCIDENT): ICD-10-CM

## 2023-12-14 DIAGNOSIS — F01.53 VASCULAR DEMENTIA WITH MOOD DISTURBANCE, UNSPECIFIED DEMENTIA SEVERITY (HCC): Primary | ICD-10-CM

## 2023-12-14 DIAGNOSIS — E78.2 MIXED HYPERLIPIDEMIA: ICD-10-CM

## 2023-12-14 DIAGNOSIS — F33.2 SEVERE EPISODE OF RECURRENT MAJOR DEPRESSIVE DISORDER, WITHOUT PSYCHOTIC FEATURES (HCC): ICD-10-CM

## 2023-12-14 NOTE — PROGRESS NOTES
[facility]  [address] [facility], [address] [facility], [facility]  [phone]    Care Conference: Resources Provided    LSW participated in today's conference and provided the following resources, along with a copy of care plan:  [address] [facility] [id number]    General Information  - 10 Ways to Love Your Brain  - Memory loss ladder  - Packet with Alzheimer's Association information including: definition of dementia, communication tips for different stages, common behaviors and response strategies  - Vascular dementia packet    Caregiver Support  - Flyer for [employer] Caregiver Support Group (meeting 2x per month by [employer])  - Alzheimer's Association Rx Pad (guide to website and 24/7 helpline, [phone])    Safety  - [facility] fall prevention / home safety [address] [place] [address] in [facility] (contains information about higher levels of care, homecare, etc.)  - [place] [facility] Way of the [address] [facility] [address],  [address]: What is Dementia & Where to Begin brochure & Dementia Resource Guide      Other  - [facility]: Caregiver Website QR code to scan for resources/education

## 2023-12-15 NOTE — PROGRESS NOTES
Pham Ortiz [address], [address] [place] [address], [facility]  [phone]       REQUIRED DOCUMENTATION:     1. This service was provided via Telemedicine. 2. Provider located at [place]. 3. TeleMed provider: Tonio Marie MD.  4. Identify all parties in room with patient during tele consult: 5. Patient was then informed that this was a Telemedicine visit and that the exam was being conducted confidentially over secure lines. My office door was closed. No one else was in the room. Patient acknowledged consent and understanding of privacy and security of the Telemedicine visit, and gave us permission to have the assistant stay in the room in order to assist with the history and to conduct the exam.  I informed the patient that I have reviewed their record in Epic and presented the opportunity for them to ask any questions regarding the visit today. The patient agreed to participate. Care Conference     NAME: Em Estrada  AGE: 70 y.o. SEX: female  YOB: 1960  DATE OF ASSESSMENT: 11/02/23  DATE OF CONFERENCE: 12/14/23     Family Present: daughter  Staff Present: Tonio Marie MD     Patient / Family Goals of Care: Review cognitive decline and associated symptoms, discuss treatment options and care planning. Medical Concerns (Current/Historical): acute metabolic encephalopathy, severe protein-calorie malnutrition, severe episode of recurrent major depressive disorder, without psychotic features, Impaired fasting glucose, acquired hypothyroidism, mixed hyperlipidemia, history of CVA (cerebrovascular accident), Anemia due to vitamin B12 deficiency, unspecified B12 deficiency type, chronic coronary artery disease, history of UTI     Geriatric Syndromes/Age Related Syndromes: vascular dementia, mild to moderate     Neuropsychological  Vascular dementia, mild to moderate  Discussed Vascular Dementia. Discussed MOCA with score of 19/30.   Discussed treatment and medications. No medications advised at this time. Her family has been very supportive. Devin Jean her daughter is with her during the day and her son is with her during the night. They have assisted her with finances and medications. She gets meals on wheels and has supervision for cooking. Has assistance with cleaning and laundry. She no longer drives. Advised to continue to engage in exercises to stimulate the brain such as games. To increase physical activity. To maintain a healthy diet such as the mediterranean diet. Advised continued need to have good control of her cardiovascular risk factors. She should remain on ASA 81 mg daily as part of treatment for Vascular Dementia. [place] Cognitive Assessment: 19/30- deficits in visuospatial, delayed recall, language and abstraction  Geriatric Depression Screen: 1/15     Edwin Rizo has been on Seroquel for acute encephalopathy with underlying dementia. She has recovered from the aggravating illnesses and has been doing well. Advised to try her off of the seroquel. To lower to 12.5 mg nightly for three days then discontinue. To continue to monitor. Her daughter or patient will reach out if they note any change in her behavior or mood. We can easily put her back on Seroquel if needed. Decision-making capacity: not fully assessed  Staging: mild     Remain active physically, mentally and socially  Pharmaceutical and non-pharmaceutical interventions discussed  Engage in cognitively challenging exercises such as crosswords and puzzles  Maintain chronic conditions under control  Repeat cognitive assessment in 6 months     Diagnostic Studies  Review of bloodwork  Review of previous imaging: MRI (07/30/23): IMPRESSION: No acute intracranial pathology. Old left cerebellar infarct. Chronic microangiopathy and old lacunar infarcts.       Physical Finding Impacting Function              Fall Risk: minimal              Activities of Daily Living: Independent              Instrumental Activities of Daily Living: Dependent     Encourage appropriate footwear at all times  Review fall risk prevention tips and adjust within the home environment as needed     Medications Reviewed   Check with PCP before using over the counter medications  Avoid over the counter medications that can affect cognition (e.g., Benadryl, Tylenol PM)   Avoid NSAIDs due to risk of GI bleed and renal impairment     Other Findings              Overall health  BMI: 24.72 kg/m2  Maintain well-balanced diet  Continue following with primary care physician regularly  Acute metabolic encephalopathy  She was in the hospital with delirium/acute encephalopathy seocndary to UTI, dehydration, kvng, bradycardia, and depression. Since then she has made progress and has had significant improvement since her discharge 8/17/2023 which is about 8 weeks ago. Severe protein-calorie malnutrition  Nishant Whyte has had improvement  She is eating well, BMI is normal  Nishant Whyte does have meals on wheels and family otherwise cooks/provides food   This is resolved. Continue to monitor. Severe episode of recurrent major depressive disorder, without psychotic features  She has had a history of anxiety and depression. She had significant depressive features in the hospital that included paranoia, sadness, melancholy, poor speech. She has been maintained on Celexa 10 mg daily. She continue with Seroquel which appears to have helped as an adjunct to depression treatment. Will discontinue Seroquel and monitor sytmpoms. Her GDS was 1/15. Impaired fasting glucose  Continue with good dietary control  Acquired hypothyroidism  Followed by PCP.  TSH has been normal. Continue with the same  Mixed hyperlipidemia  Need to maintain treatment  Continue with good dietary control  Stay on statin and zetia  History of CVA (cerebrovascular accident)  No current deficits  On ASA and statin  Anemia due to vitamin B12 deficiency, unspecified B12 deficiency type  With mild anemia likely due to B12 that is low  Continue with B12 supplementation as directed by PCP  Chronic coronary artery disease  Currently asymptomatic  Continue follow up with PCP and Cardiology  History of UTI  Doing well  Monitor. Followup with PCP     Recommended Health Maintenance              Immunizations, if not contraindicated: Influenza vaccine yearly  Pneumo vaccine every 5 years (65 years and over)  Shingles vaccine  COVID-19 vaccine     Social / Safety Concerns  Consider an Adult Day Program for positive socialization, physical exercise, cognitive stimulation and family respite  Consider a home care aide to assist with daily care needs  Stay in touch with family and friends  Plan self-care activities for your mental well-being each week  Recommend review of fall risk prevention tips  Recommend use of fall precautions including fall alert device  Consider assistance for medication administration such as blister packaging or use of an automated pill dispenser  Recommend contacting your local [address] [place] [address] on Aging for possible eligible programs such as OPTIONS, Caregiver Support Program, or [address] updated advance directives and provide a copy to your primary care provider  Consider caregiver support groups and educational resources through the Alzheimer's Association; access Alzheimer's Association 24/7 Helpline at [address] [facility] does offer a monthly caregiver support group. If interested, please speak with a . Utilize reorientation and redirection as needed (dependent on situation)  Educational information provided  Recommended literature: 36 Hour Day     Patient and family verbalized understanding of above care plan. For care coordination purposes, this care plan will be shared with your primary care provider. With any questions, please contact our office at [phone]. History of Present Illness     HPI  Brittany Lorenzo is a 70 y.o. female who is in the office for [address] [place] [address]. History obtained from patient and family members. Denies any new complaints, falls, hospitalization. Past Medical, Social, Surgical, Medications and Allergy history reviewed.      Review of Systems     Review of Systems    History     Past Medical History:   Diagnosis Date   • Acute MI ([address]) 2012   • MARIANELA (acute kidney injury) ([address] ) 2023   • Anemia    • Anxiety 2023   • CAD (coronary artery disease)    • Encounter for well adult exam with abnormal findings 2019   • Hyperlipidemia    • Hypertension    • Myocardial infarction ([address] ) 2002   • Thyroid disease    • Ventricular fibrillation ([address] [facility]) 2012     Past Surgical History:   Procedure Laterality Date   • COLONOSCOPY     • CORONARY ARTERY BYPASS GRAFT       Social History     Socioeconomic History   • Marital status:      Spouse name: Not on file   • Number of children: Not on file   • Years of education: Not on file   • Highest education level: Not on file   Occupational History   • Occupation:  worker ;  to infant ages     Employer: OTHER     Comment: parttime    Tobacco Use   • Smoking status: Former     Current packs/day: 0.00     Average packs/day: 0.3 packs/day for 22.0 years (5.5 ttl pk-yrs)     Types: Cigarettes     Start date: 1971     Quit date: 1993     Years since quittin.9     Passive exposure: Never   • Smokeless tobacco: Never   • Tobacco comments:     no passive smoke exposure   Vaping Use   • Vaping status: Never Used   Substance and Sexual Activity   • Alcohol use: Not Currently   • Drug use: No   • Sexual activity: Not Currently   Other Topics Concern   • Not on file   Social History Narrative   • Not on file     Social Determinants of Health     Financial Resource Strain: Low Risk  (2023)    Overall Financial Resource Strain (CARDIA)    • Difficulty of Paying Living Expenses: Not hard at all   Food Insecurity: No Food Insecurity (3/18/2022)    Hunger Vital Sign    • Worried About Running Out of Food in the Last Year: Never true    • Ran Out of Food in the Last Year: Never true   Transportation Needs: No Transportation Needs (9/20/2023)    PRAPARE - Transportation    • Lack of Transportation (Medical): No    • Lack of Transportation (Non-Medical): No   Physical Activity: Insufficiently Active (3/20/2023)    Exercise Vital Sign    • Days of Exercise per Week: 3 days    • Minutes of Exercise per Session: 30 min   Stress: No Stress Concern Present (3/18/2022)    [facility]    • Feeling of Stress : Not at all   Social Connections: Moderately Integrated (3/18/2022)    Social Connection and Isolation Panel [NHANES]    • Frequency of Communication with Friends and Family: More than three times a week    • Frequency of Social Gatherings with Friends and Family: More than three times a week    • Attends Alevism Services: More than 4 times per year    • Active Member of Clubs or Organizations:  Yes    • Attends Club or Organization Meetings: More than 4 times per year    • Marital Status:    Intimate Partner Violence: Not At Risk (3/18/2022)    Humiliation, Afraid, Rape, and Kick questionnaire    • Fear of Current or Ex-Partner: No    • Emotionally Abused: No    • Physically Abused: No    • Sexually Abused: No   Housing Stability: Unknown (3/18/2022)    Housing Stability Vital Sign    • Unable to Pay for Housing in the Last Year: No    • Number of Places Lived in the Last Year: Not on file    • Unstable Housing in the Last Year: No     Current Outpatient Medications   Medication Sig Dispense Refill   • aspirin 81 mg chewable tablet Chew 1 tablet (81 mg total) daily 30 tablet 2   • atorvastatin (LIPITOR) 40 mg tablet TAKE 1 TABLET BY MOUTH DAILY WITH DINNER 90 tablet 1   • Blood Pressure KIT Use in the morning 1 kit 0   • cholecalciferol (VITAMIN D3) 1,000 units tablet Take 1 tablet (1,000 Units total) by mouth daily 30 tablet 2   • citalopram (CeleXA) 10 mg tablet Take 1 tablet (10 mg total) by mouth daily 90 tablet 1   • docusate sodium (COLACE) 100 mg capsule Take 1 capsule (100 mg total) by mouth 2 (two) times a day 60 capsule 2   • ezetimibe (ZETIA) 10 mg tablet TAKE 1 TABLET BY MOUTH EVERY DAY 90 tablet 1   • levothyroxine (Euthyrox) 175 mcg tablet Take 1 tablet (175 mcg total) by mouth daily in the early morning 90 tablet 0   • QUEtiapine (SEROquel) 25 mg tablet Take 1 tablet (25 mg total) by mouth daily at bedtime 90 tablet 1   • Ascorbic Acid (vitamin C) 1000 MG tablet Take 1,000 mg by mouth daily (Patient not taking: Reported on 11/2/2023)     • cyanocobalamin 1,000 mcg/mL Inject 1 mL (1,000 mcg total) into a muscle every 30 (thirty) days Do not start before September 2, 2023. 1 mL 0   • polyethylene glycol (MIRALAX) 17 g packet Take 17 g by mouth daily Do not start before August 12, 2023.  (Patient not taking: Reported on 11/2/2023) 30 each 0     Current Facility-Administered Medications   Medication Dose Route Frequency Provider Last Rate Last Admin   • cyanocobalamin injection 1,000 mcg  1,000 mcg Intramuscular Q30 Days Najma Butcher MD   1,000 mcg at 10/20/23 1407   • cyanocobalamin injection 1,000 mcg  1,000 mcg Intramuscular Q30 Days Najma Butcher MD   1,000 mcg at 11/20/23 1411     Family History   Problem Relation Age of Onset   • Hypertension Mother    • Stroke Mother         3 strokes   • Heart attack Father    • No Known Problems Sister    • No Known Problems Daughter    • No Known Problems Maternal Grandmother    • No Known Problems Maternal Grandfather    • No Known Problems Paternal Grandmother    • No Known Problems Paternal Grandfather    • No Known Problems Maternal Aunt    • No Known Problems Maternal Aunt    • No Known Problems Maternal Aunt    • Breast cancer Paternal Aunt 52   • No Known Problems Paternal Aunt        Allergies: Allergies   Allergen Reactions   • Cat Hair Extract Sneezing         Objective      Vitals: There were no vitals filed for this visit.     Physical Exam

## 2024-01-02 DIAGNOSIS — K59.00 CONSTIPATION: ICD-10-CM

## 2024-01-02 RX ORDER — DOCUSATE SODIUM 100 MG/1
100 CAPSULE, LIQUID FILLED ORAL 2 TIMES DAILY
Qty: 60 CAPSULE | Refills: 2 | Status: SHIPPED | OUTPATIENT
Start: 2024-01-02

## 2024-01-03 ENCOUNTER — OFFICE VISIT (OUTPATIENT)
Dept: FAMILY MEDICINE CLINIC | Facility: CLINIC | Age: 64
End: 2024-01-03
Payer: MEDICARE

## 2024-01-03 VITALS
HEART RATE: 73 BPM | SYSTOLIC BLOOD PRESSURE: 118 MMHG | TEMPERATURE: 99.2 F | HEIGHT: 60 IN | OXYGEN SATURATION: 96 % | WEIGHT: 126.4 LBS | BODY MASS INDEX: 24.81 KG/M2 | DIASTOLIC BLOOD PRESSURE: 70 MMHG

## 2024-01-03 DIAGNOSIS — Z86.73 HISTORY OF CVA (CEREBROVASCULAR ACCIDENT): ICD-10-CM

## 2024-01-03 DIAGNOSIS — E78.2 MIXED HYPERLIPIDEMIA: ICD-10-CM

## 2024-01-03 DIAGNOSIS — F33.2 SEVERE EPISODE OF RECURRENT MAJOR DEPRESSIVE DISORDER, WITHOUT PSYCHOTIC FEATURES (HCC): ICD-10-CM

## 2024-01-03 DIAGNOSIS — F39 MOOD DISORDER (HCC): ICD-10-CM

## 2024-01-03 DIAGNOSIS — E43 SEVERE PROTEIN-CALORIE MALNUTRITION (HCC): ICD-10-CM

## 2024-01-03 DIAGNOSIS — F01.53 VASCULAR DEMENTIA WITH MOOD DISTURBANCE, UNSPECIFIED DEMENTIA SEVERITY (HCC): Primary | ICD-10-CM

## 2024-01-03 DIAGNOSIS — E03.9 ACQUIRED HYPOTHYROIDISM: ICD-10-CM

## 2024-01-03 DIAGNOSIS — Z23 ENCOUNTER FOR IMMUNIZATION: ICD-10-CM

## 2024-01-03 DIAGNOSIS — D52.8 OTHER FOLATE DEFICIENCY ANEMIAS: ICD-10-CM

## 2024-01-03 DIAGNOSIS — Z78.0 POST-MENOPAUSAL: ICD-10-CM

## 2024-01-03 PROCEDURE — G0009 ADMIN PNEUMOCOCCAL VACCINE: HCPCS

## 2024-01-03 PROCEDURE — 90677 PCV20 VACCINE IM: CPT

## 2024-01-03 PROCEDURE — 96372 THER/PROPH/DIAG INJ SC/IM: CPT

## 2024-01-03 PROCEDURE — 99214 OFFICE O/P EST MOD 30 MIN: CPT | Performed by: FAMILY MEDICINE

## 2024-01-03 RX ORDER — CYANOCOBALAMIN 1000 UG/ML
1000 INJECTION, SOLUTION INTRAMUSCULAR; SUBCUTANEOUS
Status: SHIPPED | OUTPATIENT
Start: 2024-01-03

## 2024-01-03 RX ADMIN — CYANOCOBALAMIN 1000 MCG: 1000 INJECTION, SOLUTION INTRAMUSCULAR; SUBCUTANEOUS at 15:02

## 2024-01-04 PROBLEM — F01.53: Status: ACTIVE | Noted: 2023-08-17

## 2024-01-04 PROBLEM — F39 MOOD DISORDER (HCC): Status: ACTIVE | Noted: 2024-01-04

## 2024-01-04 PROBLEM — Z28.21 IMMUNIZATION REFUSED: Status: RESOLVED | Noted: 2023-03-20 | Resolved: 2024-01-04

## 2024-01-04 PROBLEM — Z23 ENCOUNTER FOR IMMUNIZATION: Status: ACTIVE | Noted: 2024-01-04

## 2024-01-04 PROBLEM — Z78.0 POST-MENOPAUSAL: Status: ACTIVE | Noted: 2024-01-04

## 2024-01-04 NOTE — PROGRESS NOTES
Name: Socorro Briceño      : 1960      MRN: [id number]  Encounter Provider: Aan Ashley MD  Encounter Date: 1/3/2024   Encounter department: [facility]    Assessment & Plan     1. Vascular dementia with mood disturbance, unspecified dementia severity (HCC)  Assessment & Plan:  Stable patient follow-up with his new care team currently on aspirin and statin continue current management    Orders:  -     cyanocobalamin injection 1,000 mcg  -     CBC and differential; Future    2. Severe protein-calorie malnutrition (HCC)  Assessment & Plan:    Body mass index is 24.69 kg/m².  Improvement in the BMI compared with before increase allergy patient to continue current diet      Orders:  -     Vitamin B12; Future    3. Severe episode of recurrent major depressive disorder, without psychotic features (HCC)  Assessment & Plan:  Chronic stable recently evaluated by Senior well care team who recommended to stop Seroquel and currently not on any medication continue to monitor    Orders:  -     cyanocobalamin injection 1,000 mcg  -     Comprehensive metabolic panel; Future    4. Encounter for immunization  Assessment & Plan:  Discussed immunization with the patient and she cannot take care of the vaccine 20 today patient will take a Shingrix at the pharmacy    Orders:  -     Pneumococcal Conjugate Vaccine 20-valent (Pcv20)    5. Mood disorder (HCC)  -     cyanocobalamin injection 1,000 mcg    6. Mixed hyperlipidemia  -     Lipid panel; Future    7. Acquired hypothyroidism  -     TSH, 3rd generation with Free T4 reflex; Future    8. Other folate deficiency anemias  Assessment & Plan:  Patient on B 12 injections monthly she has 1 today tolerated well without side effect    Orders:  -     CBC and differential; Future  -     Ferritin; Future  -     Iron; Future  -     Folate; Future    9. Post-menopausal  Assessment & Plan:  Above age 50 with postmenopause history of vitamin D deficiency  she is at multiple risk for osteoporosis recommend DEXA scan and she agree    Orders:  -     DXA bone density spine hip and pelvis; Future; Expected date: 04/13/2024    10. History of CVA (cerebrovascular accident)  Assessment & Plan:  History of CVA patient already on aspirin and statin continue current management due for blood work to check lipid panel             Subjective      Patient here with her daughter Follow-up with a chronic condition patient compliant with the medication tolerated well without side effect no new concerns she forgot to do her blood work patient since seen last time evaluated by the senior care team and note reviewed with the patient and her daughter      Review of Systems   Constitutional:  Negative for chills and fever.   HENT:  Negative for ear pain and sore throat.    Eyes:  Negative for pain and visual disturbance.   Respiratory:  Negative for cough and shortness of breath.    Cardiovascular:  Negative for chest pain and palpitations.   Gastrointestinal:  Negative for abdominal pain and vomiting.   Genitourinary:  Negative for dysuria and hematuria.   Musculoskeletal:  Negative for gait problem.   Skin:  Negative for color change and rash.   Neurological:  Negative for seizures and syncope.   All other systems reviewed and are negative.      Current Outpatient Medications on File Prior to Visit   Medication Sig   • aspirin 81 mg chewable tablet Chew 1 tablet (81 mg total) daily   • atorvastatin (LIPITOR) 40 mg tablet TAKE 1 TABLET BY MOUTH DAILY WITH DINNER   • Blood Pressure KIT Use in the morning   • cholecalciferol (VITAMIN D3) 1,000 units tablet Take 1 tablet (1,000 Units total) by mouth daily   • citalopram (CeleXA) 10 mg tablet Take 1 tablet (10 mg total) by mouth daily   • docusate sodium (COLACE) 100 mg capsule Take 1 capsule (100 mg total) by mouth 2 (two) times a day   • ezetimibe (ZETIA) 10 mg tablet TAKE 1 TABLET BY MOUTH EVERY DAY   • levothyroxine (Euthyrox) 175 mcg  tablet Take 1 tablet (175 mcg total) by mouth daily in the early morning       Objective     /70 (BP Location: Left arm, Patient Position: Sitting)   Pulse 73   Temp 99.2 °F (37.3 °C) (Tympanic)   Ht 5' (1.524 m)   Wt 57.3 kg (126 lb 6.4 oz)   LMP  (LMP Unknown)   SpO2 96%   BMI 24.69 kg/m²     Physical Exam  Vitals and nursing note reviewed.   Constitutional:       General: She is not in acute distress.     Appearance: She is not diaphoretic.   HENT:      Head: Normocephalic and atraumatic.      Right Ear: Tympanic membrane normal. There is no impacted cerumen.      Left Ear: Tympanic membrane normal. There is no impacted cerumen.      Nose: Nose normal. No congestion or rhinorrhea.      Mouth/Throat:      Pharynx: No posterior oropharyngeal erythema.   Eyes:      General: No scleral icterus.        Right eye: No discharge.         Left eye: No discharge.   Cardiovascular:      Rate and Rhythm: Normal rate and regular rhythm.      Heart sounds: Murmur heard.   Pulmonary:      Effort: Pulmonary effort is normal. No respiratory distress.   Abdominal:      General: There is no distension.      Tenderness: There is no abdominal tenderness.   Musculoskeletal:         General: Normal range of motion.      Cervical back: Normal range of motion.      Right lower leg: No edema.      Left lower leg: No edema.   Skin:     Findings: No rash.   Neurological:      Mental Status: She is alert and oriented to person, place, and time.       Ana Ashley MD

## 2024-01-04 NOTE — ASSESSMENT & PLAN NOTE
Discussed immunization with the patient and she cannot take care of the vaccine 20 today patient will take a Shingrix at the pharmacy

## 2024-01-04 NOTE — ASSESSMENT & PLAN NOTE
Body mass index is 24.69 kg/m².  Improvement in the BMI compared with before increase allergy patient to continue current diet

## 2024-01-04 NOTE — ASSESSMENT & PLAN NOTE
Above age 48 with postmenopause history of vitamin D deficiency she is at multiple risk for osteoporosis recommend DEXA scan and she agree

## 2024-01-04 NOTE — ASSESSMENT & PLAN NOTE
Chronic stable recently evaluated by Senior well care team who recommended to stop Seroquel and currently not on any medication continue to monitor

## 2024-01-04 NOTE — ASSESSMENT & PLAN NOTE
Stable patient follow-up with his new care team currently on aspirin and statin continue current management

## 2024-01-04 NOTE — ASSESSMENT & PLAN NOTE
History of CVA patient already on aspirin and statin continue current management due for blood work to check lipid panel

## 2024-01-10 ENCOUNTER — TELEPHONE (OUTPATIENT)
Age: 64
End: 2024-01-10

## 2024-01-10 NOTE — TELEPHONE ENCOUNTER
Pt daughter called in requesting a Letter to be written for patients Medicaid just stating her diagnosis and that she is still under our care. Daughter would like a call back once the letter is ready for . Best number to call daughter is [phone]. Please advise. Thank you!

## 2024-01-12 NOTE — PSYCH
Virtual Visit Disclaimer:       TeleMed provider: KAILA Wolfe.     Verification of patient location:     Patient is located at Home in the state of [place].  Patient is currently located in a state in which I am licensed     After connecting through [employer], the patient was identified by name and date of birth.  Socorro Briceño was informed that this is a telemedicine visit that is being conducted through [employer], and the patient was informed that this is a secure, HIPAA-compliant platform. My office door was closed. No one else was in the room.. Socorro Navarro acknowledged consent and understanding of privacy and security of the video platform. Socorro understands that the online visit is based solely on information provided by the patient, and that, in the absence of a face-to-face physical evaluation by the provider, the diagnosis Socorro  receives is both limited and provisional in terms of accuracy and completeness. Socorroyvon Briceño understands that they can discontinue the visit at any time. I informed Socorro that I have reviewed their record in EP[facility] and presented the opportunity for them to ask any questions regarding the visit today. Socorroyvon Briceño voiced understanding and consented to these terms. Socorro is aware this is a billable service.    Virtual visit start and stop times:    Start Time: 1405     Stop Time: 1418    I spent 13 minutes with patient today in which greater than 50% of the time was spent in counseling/coordination of care.      SOLO Wolfe 01/15/24    MEDICATION MANAGEMENT NOTE        [facility]      Name and Date of Birth:  Socorro Briceño 63 y.o. 1960 MRN: [id number]    Date of Visit: January 15, 2024    Reason for Visit:   Chief Complaint   Patient presents with    Medication Management    Follow-up         SUBJECTIVE:    Socorro Briceño is a 63 y.o. female with past psychiatric history significant for Major  Depressive Disorder, anxiety, and neurocognitive disorder  who was virtually seen and evaluated today at the [facility] outpatient clinic for follow-up and medication management. Completes psychiatric assessment without difficulty accompanied by daughter, Nai, who provided collateral information.     Socorro endorses compliance with psychotropic medication regimen that consists of Celexa and Seroquel.  At previous outpatient psychiatric appointment with this writer, no medication changes were made.   She denies any current adverse medication side effects.      Overall, Socorro reports that she is doing well from a mood standpoint.  She was recently evaluated by geriatric medicine.  During that appointment, Seroquel was discontinued due to anticholinergic side effects.  She was advised to continue trying new things, keeping mind active, and continuing with social activities.  She is no longer driving and is accompanied by children as her caretakers.  They have attempted to ease the burden of making difficult decisions to help reduce anxiety.  She is not depressed or particularly anxious.  No panic attacks.  She is sleeping well.  Goes to bed around 10 PM and wakes at 6 AM, averaging 8 hours/night.  Appetite and energy are baseline and appropriate.  She is not had any falls or engaging in any dangerous activities such as leaving the stove on.  Daughter also feels that patient is doing well from a mood standpoint and not in need of any medication changes at this time.    Current Rating Scores:     None completed today.    Past Psychiatric History: (unchanged information from previous note copied and italicized) - Information that is bolded has been updated.     Past Inpatient Psychiatric Treatment:   One past inpatient psychiatric admission at [facility] in 2023  Past Outpatient Psychiatric Treatment:    Most recently in outpatient psychiatric treatment with  a family physician  Past Suicide Attempts: no  Past Violent Behavior: no  Past Psychiatric Medication Trials: Celexa and Seroquel    Substance Abuse History: (unchanged information from previous note copied and italicized) - Information that is bolded has been updated.     Tobacco/alcohol/caffeine: Denies alcohol use, Denies tobacco use, Caffeine intake: 1 cups of caffeinated coffee per day(s)  Illicit drugs: Denies history of illicit drug use     No past legal actions or arrests secondary to substance intoxication. The patient denies prior DWIs/DUIs. No history of outpatient/inpatient rehabilitation programs. Socorro does not exhibit objective evidence of substance withdrawal during today's examination nor does Socorro appear under the influence of any psychoactive substance.      Social History: (unchanged information from previous note copied and italicized) - Information that is bolded has been updated.     Developmental: Denies a history of milestone/developmental delay. Denies a history of in-utero exposure to toxins/illicit substances. There is no documented history of IEP or need for special education.  Education: high school diploma/GED, some technical college for computers (1 year)  Marital history:   Children: 2- son, Juan Alberto age 45; daughter, Nai age 42  Living arrangement, social support: son, lives with son since July  Occupational History: retired  Access to firearms: Denies direct access to weapons/firearms. Socorro Briceño has no history of arrests or violence with a deadly weapon.     Traumatic History: (unchanged information from previous note copied and italicized) - Information that is bolded has been updated.     Abuse: none  Other Traumatic Events:  heart attack at age 38        Past Medical History:    Past Medical History:   Diagnosis Date    Acute MI ([facility]) 08/12/2012    MARIANELA (acute kidney injury) ([facility]) 8/17/2023    Anemia     Anxiety 07/21/2023    CAD (coronary artery disease)      Encounter for well adult exam with abnormal findings 2019    Hyperlipidemia     Hypertension     Mood disorder (HCC) 2024    Myocardial infarction (HCC) 2002    Thyroid disease     Ventricular fibrillation (HCC) 2012        Past Surgical History:   Procedure Laterality Date    COLONOSCOPY  2013    CORONARY ARTERY BYPASS GRAFT       Allergies   Allergen Reactions    Cat Hair Extract Sneezing       Substance Abuse History:    Social History     Substance and Sexual Activity   Alcohol Use Not Currently     Social History     Substance and Sexual Activity   Drug Use No       Social History:    Social History     Socioeconomic History    Marital status:      Spouse name: Not on file    Number of children: Not on file    Years of education: Not on file    Highest education level: Not on file   Occupational History    Occupation:  worker ;  to infant ages     Employer: OTHER     Comment: parttime    Tobacco Use    Smoking status: Former     Current packs/day: 0.00     Average packs/day: 0.3 packs/day for 22.0 years (5.5 ttl pk-yrs)     Types: Cigarettes     Start date: 1971     Quit date: 1993     Years since quittin.0     Passive exposure: Never    Smokeless tobacco: Never    Tobacco comments:     no passive smoke exposure   Vaping Use    Vaping status: Never Used   Substance and Sexual Activity    Alcohol use: Not Currently    Drug use: No    Sexual activity: Not Currently   Other Topics Concern    Not on file   Social History Narrative    Not on file     Social Determinants of Health     Financial Resource Strain: Low Risk  (2023)    Overall Financial Resource Strain (CARDIA)     Difficulty of Paying Living Expenses: Not hard at all   Food Insecurity: No Food Insecurity (3/18/2022)    Hunger Vital Sign     Worried About Running Out of Food in the Last Year: Never true     Ran Out of Food in the Last Year: Never true   Transportation Needs: No  Transportation Needs (9/20/2023)    PRAPARE - Transportation     Lack of Transportation (Medical): No     Lack of Transportation (Non-Medical): No   Physical Activity: Insufficiently Active (3/20/2023)    Exercise Vital Sign     Days of Exercise per Week: 3 days     Minutes of Exercise per Session: 30 min   Stress: No Stress Concern Present (3/18/2022)    [employer] - Occupational Stress Questionnaire     Feeling of Stress : Not at all   Social Connections: Moderately Integrated (3/18/2022)    Social Connection and Isolation Panel [NHANES]     Frequency of Communication with Friends and Family: More than three times a week     Frequency of Social Gatherings with Friends and Family: More than three times a week     Attends Hinduism Services: More than 4 times per year     Active Member of Clubs or Organizations: Yes     Attends Club or Organization Meetings: More than 4 times per year     Marital Status:    Intimate Partner Violence: Not At Risk (3/18/2022)    Humiliation, Afraid, Rape, and Kick questionnaire     Fear of Current or Ex-Partner: No     Emotionally Abused: No     Physically Abused: No     Sexually Abused: No   Housing Stability: Unknown (3/18/2022)    Housing Stability Vital Sign     Unable to Pay for Housing in the Last Year: No     Number of Places Lived in the Last Year: Not on file     Unstable Housing in the Last Year: No       Family Psychiatric History:     Family History   Problem Relation Age of Onset    Hypertension Mother     Stroke Mother         3 strokes    Heart attack Father     No Known Problems Sister     No Known Problems Daughter     No Known Problems Maternal Grandmother     No Known Problems Maternal Grandfather     No Known Problems Paternal Grandmother     No Known Problems Paternal Grandfather     No Known Problems Maternal Aunt     No Known Problems Maternal Aunt     No Known Problems Maternal Aunt     Breast cancer Paternal Aunt 52    No  Known Problems Paternal Aunt        History Review: The following portions of the patient's history were reviewed and updated as appropriate: allergies, current medications, past medical history, and past social history.         OBJECTIVE:     Vital signs in last 24 hours:    There were no vitals filed for this visit.    Mental Status Evaluation:    Appearance age appropriate, casually dressed   Behavior cooperative, calm   Speech normal rate, normal volume, normal pitch   Mood euthymic   Affect normal range and intensity, appropriate   Thought Processes coherent, decreased rate of thoughts   Associations intact associations   Thought Content no overt delusions   Perceptual Disturbances: no auditory hallucinations, no visual hallucinations   Abnormal Thoughts  Risk Potential Suicidal ideation - None  Homicidal ideation - None  Potential for aggression - No   Orientation oriented to: person, place, time/date, and situation   Memory recent and remote memory grossly intact   Consciousness alert and awake   Attention Span Concentration Span attention span and concentration appear shorter than expected for age   Intellect appears to be of average intelligence   Insight fair   Judgement fair   Muscle Strength and  Gait unable to assess today due to virtual visit   Motor activity unable to assess today due to virtual visit   Language no difficulty naming common objects, no difficulty repeating a phrase   Fund of Knowledge adequate knowledge of current events  adequate fund of knowledge regarding past history  adequate fund of knowledge regarding vocabulary    Pain none   Pain Scale 0       Laboratory Results: I have personally reviewed all pertinent laboratory/tests results    Most Recent Labs:   Lab Results   Component Value Date    WBC 3.59 (L) 08/21/2023    RBC 3.34 (L) 08/21/2023    HGB 10.5 (L) 08/21/2023    HCT 31.7 (L) 08/21/2023     08/21/2023    RDW 11.8 08/21/2023    NEUTROABS 2.05 08/21/2023      12/21/2015    SODIUM 141 08/21/2023    K 3.7 08/21/2023     (H) 08/21/2023    CO2 26 08/21/2023    BUN 9 08/21/2023    CREATININE 0.41 (L) 08/21/2023    GLUCOSE 119 12/21/2015    CALCIUM 8.4 08/21/2023    AST 26 08/19/2023    ALT 48 08/19/2023    ALKPHOS 57 08/19/2023    PROT 7.7 12/21/2015    TP 5.7 (L) 08/19/2023    BILITOT 0.58 12/21/2015    TBILI 0.48 08/19/2023    CHOL 154 12/21/2015    CHOLESTEROL 121 08/03/2023    TRIG 84 08/03/2023    HDL 41 (L) 08/03/2023    LDLCALC 63 08/03/2023    NONHDLC 127 02/05/2019    AMMONIA 15 (L) 07/28/2023    UWC7MAADXEQU 0.706 08/18/2023    FREET4 1.06 07/28/2023       Lethality Statement:    Based on today's assessment and clinical criteria, Socorro Briceño contracts for safety and is not an imminent risk of harm to self or others. Outpatient level of care is deemed appropriate at this current time. Socorro understands that if they can no longer contract for safety, they need to call the office or report to their nearest Emergency Room for immediate evaluation.    Assessment/Plan:     In summary, Socorro Briceño is a 63 y.o.  female, Single, domiciled with son, retired, w/ PMH of CAD, HLD, CV, anemia, Vit D deficiency and PPH of MDD with psychosis, anxiety, 1 x prior psychiatric admissions ([facility] 2023, depression with psychosis), no prior SA, no h/o self-injurious behavior, who presented to the mental health clinic for the initia/l intake and psychiatric evaluation on October 13, 2023.  Socorro was recently discharged /from [facility] 8/11/23, currently on Celexa 10 mg daily, Seroquel 25 HS.  Tolerating medication well with no medication side effects observed or reported.  Not actively involved in individual psychotherapy. Daughter reports that Socorro's mood has remained fairly stable throughout her life up until spring 2023.  It was at during this time that her mother was applying for Medicare and attempting to handle this process independently.  The family  started to notice significant changes in the patient's behavior, including feelings of being overwhelmed, restlessness, worry, and anxiety.  Socorro also misinterpreting situations, developing a preoccupation that children were moving out of the area.  Family began to notice significant weight loss (20 pounds in 2 months), frequent pacing at all times of the day, difficulty sleeping, tearful episodes, paranoia, confusion, and disorganized behavior.  PCP initiated treatment with Celexa for anxiety and depression.  However, after one week, Socorro's symptoms worsened, leading to inpatient hospitalization.  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  Following the first hospitalization, the patient was discharged home, only to be readmitted 1 week later due to a fall. It was during the second hospitalization on a medical unit (UTI retreated with antibiotics) that Socorro's psychological presentation improved.  She began to verbally engage once again, her appetite and energy levels improved, and her mood symptoms alleviated.  She was discharged home on Celexa 10 mg daily and Seroquel 25 mg at bedtime.  Of note, daughter reports that patient has had significant weight loss during times of psychosocial stress in the past which she believes are associated with depression, however she is unable to identify any other specific symptoms.  Daughter reports that during these times, her mother continue to work and engage in her normal daily activities. PHQ-9 score: 2; JOANNA-7 score: 0.  Her current presentation meets criteria for MDD, single episode, in partial remission, anxiety.     Psychopharmacologically, patient endorses mood stability.  Recent evaluation by geriatric medicine resulted in discontinuation of Seroquel.  She is doing well on Celexa 10 mg daily.  Advised patient and daughter that if anxiety/depression exacerbate, dose optimization of this  medication will be likely.  No medication changes at this time.    Risks/benefits/alternativies to treatment discussed, including a myriad of potential adverse medication side effects, to which Socorro voiced understanding and consented fully to treatment.  Also, patient is amenable to calling/contacting the outpatient office including this writer if any acute adverse effects of their medication regimen arise in addition to any comments or concerns pertaining to their psychiatric management.         Plan:  Continue Celexa 10 mg daily for depression and anxiety  Psychotherapy-declines at this time  Follow up with primary care provider for ongoing medical care  Follow up with this provider in 3 months     Diagnoses and all orders for this visit:    Recurrent major depressive disorder, in partial remission (HCC)    Anxiety    Neurocognitive disorder           - Psychoeducation provided regarding the importance of exercise and health dietary choices and their impact on mood, energy, and motivation.  - Counseled to avoid ETOH, illicit substances, and nicotine secondary to the detrimental effects of these substances on mental and physical health.  - Encouraged to engage in non-verbal forms of therapy such as art therapy, music therapy, and mindfulness.   Aware of 24 hour and weekend coverage for urgent situations accessed by calling [facility] main practice number    Medications Risks/Benefits      Risks, Benefits And Possible Side Effects Of Medications:    Risks, benefits, and possible side effects of medications explained to Socorro including risk of suicidality and serotonin syndrome related to treatment with antidepressants. She verbalizes understanding and agreement for treatment.    Controlled Medication Discussion:     Not applicable    Psychotherapy Provided:     Individual psychotherapy provided: Medication education provided to Socorro  Goals discussed during session  Reassurance  and supportive therapy provided  Crisis/safety plan discussed with Socorro Briceño     Treatment Plan:    Completed and signed during the session: Not applicable - Treatment Plan not due at this session      Visit Time    Visit Start Time: 2:05 PM  Visit Stop Time: 2:18 PM  Total Visit Duration:  13 minutes    SOLO Wolfe 01/15/24    This note was completed in part utilizing [employer]. Grammatical, translation, syntax errors, random word insertions, spelling mistakes, and incomplete sentences may be an occasional consequence of this system secondary to software limitations with voice recognition, ambient noise, and hardware issues. If you have any questions or concerns about the content, text, or information contained within the body of this dictation, please contact the provider for clarification.

## 2024-01-15 ENCOUNTER — TELEMEDICINE (OUTPATIENT)
Dept: PSYCHIATRY | Facility: CLINIC | Age: 64
End: 2024-01-15
Payer: MEDICARE

## 2024-01-15 ENCOUNTER — TELEPHONE (OUTPATIENT)
Dept: PSYCHIATRY | Facility: CLINIC | Age: 64
End: 2024-01-15

## 2024-01-15 DIAGNOSIS — F41.9 ANXIETY: ICD-10-CM

## 2024-01-15 DIAGNOSIS — F33.41 RECURRENT MAJOR DEPRESSIVE DISORDER, IN PARTIAL REMISSION (HCC): Primary | ICD-10-CM

## 2024-01-15 DIAGNOSIS — R41.9 NEUROCOGNITIVE DISORDER: ICD-10-CM

## 2024-01-15 PROCEDURE — 99213 OFFICE O/P EST LOW 20 MIN: CPT | Performed by: NURSE PRACTITIONER

## 2024-01-15 NOTE — TELEPHONE ENCOUNTER
Left message for patient as she has appt 1/15 2PM and her mychart is pending. Advised appt would need to be cancelled and rescheduled if mychart not activated by time of the appt due to needing to sign required documents post appt visit. Please advise of into upon return call. Writer will attempt to reach again.  
Spoke with daughter and activated patients [employer]. Patient was unable to access due to not receiving emails for confirmation. Help desk number for [employer] provided for daughter for assistance post appt.  
no

## 2024-01-18 ENCOUNTER — TELEPHONE (OUTPATIENT)
Dept: PSYCHIATRY | Facility: CLINIC | Age: 64
End: 2024-01-18

## 2024-01-18 NOTE — TELEPHONE ENCOUNTER
Left voicemail informing patient of the Psych Encounter form needing to be signed as a requirement from the insurance company for billing purposes. Patient can access form via [employer] and sign electronically.     Please make patient aware this form must be signed for each visit as a requirement to continue future visits with provider.

## 2024-01-25 ENCOUNTER — TELEPHONE (OUTPATIENT)
Dept: PSYCHIATRY | Facility: CLINIC | Age: 64
End: 2024-01-25

## 2024-01-25 NOTE — TELEPHONE ENCOUNTER
Left voicemail informing patient of the Psych Encounter form needing to be signed as a requirement from the insurance company for billing purposes. Patient can access form via "[employer] and sign electronically.     Please make patient aware this form must be signed for each visit as a requirement to continue future visits with provider.

## 2024-02-01 ENCOUNTER — TELEPHONE (OUTPATIENT)
Dept: PSYCHIATRY | Facility: CLINIC | Age: 64
End: 2024-02-01

## 2024-02-01 NOTE — TELEPHONE ENCOUNTER
Left voicemail informing patient of the Psych Encounter form needing to be signed as a requirement from the insurance company for billing purposes. Patient can access form via [employer] and sign electronically.     Please make patient aware this form must be signed for each visit as a requirement to continue future visits with provider.

## 2024-02-05 ENCOUNTER — CLINICAL SUPPORT (OUTPATIENT)
Dept: FAMILY MEDICINE CLINIC | Facility: CLINIC | Age: 64
End: 2024-02-05
Payer: MEDICARE

## 2024-02-05 DIAGNOSIS — D51.9 ANEMIA DUE TO VITAMIN B12 DEFICIENCY, UNSPECIFIED B12 DEFICIENCY TYPE: ICD-10-CM

## 2024-02-05 DIAGNOSIS — R41.9 NEUROCOGNITIVE DISORDER: Primary | ICD-10-CM

## 2024-02-05 DIAGNOSIS — E03.9 ACQUIRED HYPOTHYROIDISM: ICD-10-CM

## 2024-02-05 PROCEDURE — 96372 THER/PROPH/DIAG INJ SC/IM: CPT

## 2024-02-06 RX ORDER — LEVOTHYROXINE SODIUM 175 UG/1
175 TABLET ORAL
Qty: 90 TABLET | Refills: 1 | Status: SHIPPED | OUTPATIENT
Start: 2024-02-06

## 2024-02-22 NOTE — ASSESSMENT & PLAN NOTE
Advice and education were given regarding nutrition, aerobic exercises, weight-bearing exercises, cardiovascular risk reduction, fall risk reduction, and age-appropriate supplements  The patient was counseled regarding instructions for management, risk factor reductions, prognosis, risks and benefits of treatment options, patient and family education, and importance of compliance with treatment 
BMI today 30 13 discussed patient portion control low-carb low-fat diet and increase physical activity
Chronic asymptomatic fair control continue current medication low-salt diet reviewed with patient important lose weight
Chronic asymptomatic patient already on statin and she is on beta-blocke she follow-up with cardiology    Likely
Chronic asymptomatic uncontrolled patient has not been taking her medication regularly recommend to continue atorvastatin 40 mg once a day
Chronic uncontrolled patient on vitamin D 50,000 IU once a week for 12-week
No Vaccines Administered.

## 2024-02-26 ENCOUNTER — TELEPHONE (OUTPATIENT)
Dept: PSYCHIATRY | Facility: CLINIC | Age: 64
End: 2024-02-26

## 2024-02-26 NOTE — TELEPHONE ENCOUNTER
Patient is calling regarding cancelling an appointment.    Date/Time: 4/8/24 at 2:30 pm    Reason: Daughter needed to reschedule due to her schedule.     Patient was rescheduled: YES [x] NO []  If yes, when was Patient reschedule for: 4/24/24 at 3:00 pm    Patient requesting call back to reschedule: YES [] NO [x]

## 2024-03-04 ENCOUNTER — CLINICAL SUPPORT (OUTPATIENT)
Dept: FAMILY MEDICINE CLINIC | Facility: CLINIC | Age: 64
End: 2024-03-04
Payer: MEDICARE

## 2024-03-04 DIAGNOSIS — F01.53 VASCULAR DEMENTIA WITH MOOD DISTURBANCE, UNSPECIFIED DEMENTIA SEVERITY (HCC): Primary | ICD-10-CM

## 2024-03-04 PROCEDURE — 96372 THER/PROPH/DIAG INJ SC/IM: CPT

## 2024-03-29 DIAGNOSIS — E78.2 MIXED HYPERLIPIDEMIA: ICD-10-CM

## 2024-03-29 RX ORDER — ATORVASTATIN CALCIUM 40 MG/1
40 TABLET, FILM COATED ORAL
Qty: 90 TABLET | Refills: 1 | Status: SHIPPED | OUTPATIENT
Start: 2024-03-29

## 2024-04-09 ENCOUNTER — TELEPHONE (OUTPATIENT)
Dept: FAMILY MEDICINE CLINIC | Facility: CLINIC | Age: 64
End: 2024-04-09

## 2024-04-09 ENCOUNTER — CLINICAL SUPPORT (OUTPATIENT)
Dept: FAMILY MEDICINE CLINIC | Facility: CLINIC | Age: 64
End: 2024-04-09
Payer: MEDICARE

## 2024-04-09 DIAGNOSIS — E53.8 B12 DEFICIENCY: Primary | ICD-10-CM

## 2024-04-09 PROCEDURE — 96372 THER/PROPH/DIAG INJ SC/IM: CPT

## 2024-04-09 RX ORDER — CYANOCOBALAMIN 1000 UG/ML
1000 INJECTION, SOLUTION INTRAMUSCULAR; SUBCUTANEOUS
Status: SHIPPED | OUTPATIENT
Start: 2024-04-09

## 2024-04-09 RX ADMIN — CYANOCOBALAMIN 1000 MCG: 1000 INJECTION, SOLUTION INTRAMUSCULAR; SUBCUTANEOUS at 16:05

## 2024-04-09 NOTE — TELEPHONE ENCOUNTER
If pt calls back please cancel appt at 3:00 with us today for a B12 injection as she is not a patient of ours. Please have her schedule with family doctor.

## 2024-04-15 ENCOUNTER — HOSPITAL ENCOUNTER (OUTPATIENT)
Dept: MAMMOGRAPHY | Facility: MEDICAL CENTER | Age: 64
Discharge: HOME | End: 2024-04-15
Payer: MEDICARE

## 2024-04-15 ENCOUNTER — HOSPITAL ENCOUNTER (OUTPATIENT)
Dept: BONE DENSITY | Facility: MEDICAL CENTER | Age: 64
Discharge: HOME | End: 2024-04-15
Payer: MEDICARE

## 2024-04-15 ENCOUNTER — APPOINTMENT (OUTPATIENT)
Dept: LAB | Facility: MEDICAL CENTER | Age: 64
End: 2024-04-15
Payer: MEDICARE

## 2024-04-15 VITALS — BODY MASS INDEX: 24.8 KG/M2 | HEIGHT: 60 IN | WEIGHT: 126.32 LBS

## 2024-04-15 DIAGNOSIS — E43 SEVERE PROTEIN-CALORIE MALNUTRITION (HCC): ICD-10-CM

## 2024-04-15 DIAGNOSIS — E03.9 ACQUIRED HYPOTHYROIDISM: ICD-10-CM

## 2024-04-15 DIAGNOSIS — D52.8 OTHER FOLATE DEFICIENCY ANEMIAS: ICD-10-CM

## 2024-04-15 DIAGNOSIS — F33.2 SEVERE EPISODE OF RECURRENT MAJOR DEPRESSIVE DISORDER, WITHOUT PSYCHOTIC FEATURES (HCC): ICD-10-CM

## 2024-04-15 DIAGNOSIS — R73.01 IMPAIRED FASTING GLUCOSE: ICD-10-CM

## 2024-04-15 DIAGNOSIS — Z78.0 POST-MENOPAUSAL: ICD-10-CM

## 2024-04-15 DIAGNOSIS — F01.53 VASCULAR DEMENTIA WITH MOOD DISTURBANCE, UNSPECIFIED DEMENTIA SEVERITY (HCC): ICD-10-CM

## 2024-04-15 DIAGNOSIS — Z12.31 ENCOUNTER FOR SCREENING MAMMOGRAM FOR BREAST CANCER: ICD-10-CM

## 2024-04-15 LAB
ALBUMIN SERPL BCP-MCNC: 4.3 G/DL (ref 3.5–5)
ALP SERPL-CCNC: 70 U/L (ref 34–104)
ALT SERPL W P-5'-P-CCNC: 22 U/L (ref 7–52)
ANION GAP SERPL CALCULATED.3IONS-SCNC: 9 MMOL/L (ref 4–13)
AST SERPL W P-5'-P-CCNC: 19 U/L (ref 13–39)
BASOPHILS # BLD AUTO: 0.02 THOUSANDS/ÂΜL (ref 0–0.1)
BASOPHILS NFR BLD AUTO: 1 % (ref 0–1)
BILIRUB SERPL-MCNC: 0.73 MG/DL (ref 0.2–1)
BUN SERPL-MCNC: 16 MG/DL (ref 5–25)
CALCIUM SERPL-MCNC: 9.7 MG/DL (ref 8.4–10.2)
CHLORIDE SERPL-SCNC: 106 MMOL/L (ref 96–108)
CO2 SERPL-SCNC: 29 MMOL/L (ref 21–32)
CREAT SERPL-MCNC: 0.62 MG/DL (ref 0.6–1.3)
EOSINOPHIL # BLD AUTO: 0.1 THOUSAND/ÂΜL (ref 0–0.61)
EOSINOPHIL NFR BLD AUTO: 3 % (ref 0–6)
ERYTHROCYTE [DISTWIDTH] IN BLOOD BY AUTOMATED COUNT: 11.7 % (ref 11.6–15.1)
FERRITIN SERPL-MCNC: 147 NG/ML (ref 11–307)
FOLATE SERPL-MCNC: >22.3 NG/ML
GFR SERPL CREATININE-BSD FRML MDRD: 94 ML/MIN/1.73SQ M
GLUCOSE SERPL-MCNC: 138 MG/DL (ref 65–140)
HCT VFR BLD AUTO: 40.3 % (ref 34.8–46.1)
HGB BLD-MCNC: 13.6 G/DL (ref 11.5–15.4)
IMM GRANULOCYTES # BLD AUTO: 0 THOUSAND/UL (ref 0–0.2)
IMM GRANULOCYTES NFR BLD AUTO: 0 % (ref 0–2)
IRON SERPL-MCNC: 103 UG/DL (ref 50–212)
LYMPHOCYTES # BLD AUTO: 1.08 THOUSANDS/ÂΜL (ref 0.6–4.47)
LYMPHOCYTES NFR BLD AUTO: 27 % (ref 14–44)
MCH RBC QN AUTO: 30.6 PG (ref 26.8–34.3)
MCHC RBC AUTO-ENTMCNC: 33.7 G/DL (ref 31.4–37.4)
MCV RBC AUTO: 91 FL (ref 82–98)
MONOCYTES # BLD AUTO: 0.36 THOUSAND/ÂΜL (ref 0.17–1.22)
MONOCYTES NFR BLD AUTO: 9 % (ref 4–12)
NEUTROPHILS # BLD AUTO: 2.41 THOUSANDS/ÂΜL (ref 1.85–7.62)
NEUTS SEG NFR BLD AUTO: 60 % (ref 43–75)
NRBC BLD AUTO-RTO: 0 /100 WBCS
PLATELET # BLD AUTO: 261 THOUSANDS/UL (ref 149–390)
PMV BLD AUTO: 10.8 FL (ref 8.9–12.7)
POTASSIUM SERPL-SCNC: 4.2 MMOL/L (ref 3.5–5.3)
PROT SERPL-MCNC: 6.8 G/DL (ref 6.4–8.4)
RBC # BLD AUTO: 4.44 MILLION/UL (ref 3.81–5.12)
SODIUM SERPL-SCNC: 144 MMOL/L (ref 135–147)
TSH SERPL DL<=0.05 MIU/L-ACNC: <0.01 UIU/ML (ref 0.45–4.5)
VIT B12 SERPL-MCNC: 670 PG/ML (ref 180–914)
WBC # BLD AUTO: 3.97 THOUSAND/UL (ref 4.31–10.16)

## 2024-04-15 PROCEDURE — 82746 ASSAY OF FOLIC ACID SERUM: CPT

## 2024-04-15 PROCEDURE — 82728 ASSAY OF FERRITIN: CPT

## 2024-04-15 PROCEDURE — 80053 COMPREHEN METABOLIC PANEL: CPT

## 2024-04-15 PROCEDURE — 77080 DXA BONE DENSITY AXIAL: CPT

## 2024-04-15 PROCEDURE — 77067 SCR MAMMO BI INCL CAD: CPT

## 2024-04-15 PROCEDURE — 84439 ASSAY OF FREE THYROXINE: CPT

## 2024-04-15 PROCEDURE — 83540 ASSAY OF IRON: CPT

## 2024-04-15 PROCEDURE — 85025 COMPLETE CBC W/AUTO DIFF WBC: CPT

## 2024-04-15 PROCEDURE — 77063 BREAST TOMOSYNTHESIS BI: CPT

## 2024-04-15 PROCEDURE — 36415 COLL VENOUS BLD VENIPUNCTURE: CPT

## 2024-04-15 PROCEDURE — 84443 ASSAY THYROID STIM HORMONE: CPT

## 2024-04-15 PROCEDURE — 82607 VITAMIN B-12: CPT

## 2024-04-16 LAB — T4 FREE SERPL-MCNC: 1.61 NG/DL (ref 0.61–1.12)

## 2024-04-17 ENCOUNTER — TELEPHONE (OUTPATIENT)
Dept: FAMILY MEDICINE CLINIC | Facility: CLINIC | Age: 64
End: 2024-04-17

## 2024-04-17 DIAGNOSIS — Z12.31 ENCOUNTER FOR SCREENING MAMMOGRAM FOR MALIGNANT NEOPLASM OF BREAST: Primary | ICD-10-CM

## 2024-04-17 NOTE — TELEPHONE ENCOUNTER
----- Message from Ana Ashley MD sent at 4/16/2024  6:09 PM EDT -----  Negative mammogram repeat in one year

## 2024-04-17 NOTE — TELEPHONE ENCOUNTER
Left message to advise patient of results    Patient is scheduled for 4/21/25 at 2 pm at [facility] in [place]

## 2024-04-18 NOTE — PSYCH
Virtual Visit Disclaimer:       TeleMed provider: KAILA Wolfe.     Verification of patient location:     Patient is located at Home in the state of [place].  Patient is currently located in a state in which I am licensed     After connecting through [employer], the patient was identified by name and date of birth.  Socorro Briceño was informed that this is a telemedicine visit that is being conducted through Path, and the patient was informed that this is a secure, HIPAA-compliant platform. My office door was closed. No one else was in the room.. Socorro Navarro acknowledged consent and understanding of privacy and security of the video platform. Socorro understands that the online visit is based solely on information provided by the patient, and that, in the absence of a face-to-face physical evaluation by the provider, the diagnosis Socorro  receives is both limited and provisional in terms of accuracy and completeness. Socorroyvon Briceño understands that they can discontinue the visit at any time. I informed Socorro that I have reviewed their record in EPIC and presented the opportunity for them to ask any questions regarding the visit today. Socorroyvon Briceño voiced understanding and consented to these terms. Socorro is aware this is a billable service.    Virtual visit start and stop times:    Start Time: 1457     Stop Time: 1512    I spent 14 minutes with patient today in which greater than 50% of the time was spent in counseling/coordination of care.      SOLO Wolfe 04/24/24    MEDICATION MANAGEMENT NOTE        [facility]      Name and Date of Birth:  Socorro Briceño 63 y.o. 1960 MRN: [id number]    Date of Visit: April 24, 2024    Reason for Visit:   Chief Complaint   Patient presents with    Medication Management    Follow-up         SUBJECTIVE:    Socorro Briceño is a 63 y.o. female with past psychiatric history significant for Major  Depressive Disorder, anxiety, and Neurocognitive disorder  who was virtually seen and evaluated today at the [facility] outpatient clinic for follow-up and medication management. Completes psychiatric assessment without difficulty and accompanied by daughter Nai who provides collateral information.     Socorro endorses compliance with psychotropic medication regimen that consists of Celexa.  At previous outpatient psychiatric appointment with this writer, no medication changes.   She denies any current adverse medication side effects.      Overall, Socorro reports that she is doing very well.  She does not feel depressed or anxious.  She is enjoying the warmer weather and taking walks outside.  She continues to enjoy spending time with family and finds that events with them motivate her to accomplish tasks.  Daughter agrees that patient is doing well.  At times she is forgetful with taking medication, but she remains safe in the home.  She is not leaving the stove on or the door open/unlocked.  No falls.  Bathing regularly.  Sleeping well.  Appetite is good.  Feels that medications are appropriately dosed.  No other questions/concerns.    Current Rating Scores:     None completed today.    Past Psychiatric History: (unchanged information from previous note copied and italicized) - Information that is bolded has been updated.      Past Inpatient Psychiatric Treatment:   One past inpatient psychiatric admission at [facility] in 2023  Past Outpatient Psychiatric Treatment:    Most recently in outpatient psychiatric treatment with a family physician  Past Suicide Attempts: no  Past Violent Behavior: no  Past Psychiatric Medication Trials: Celexa and Seroquel     Substance Abuse History: (unchanged information from previous note copied and italicized) - Information that is bolded has been updated.      Tobacco/alcohol/caffeine: Denies alcohol use, Denies  tobacco use, Caffeine intake: 1 cups of caffeinated coffee per day(s)  Illicit drugs: Denies history of illicit drug use     No past legal actions or arrests secondary to substance intoxication. The patient denies prior DWIs/DUIs. No history of outpatient/inpatient rehabilitation programs. Socorro does not exhibit objective evidence of substance withdrawal during today's examination nor does Socorro appear under the influence of any psychoactive substance.       Social History: (unchanged information from previous note copied and italicized) - Information that is bolded has been updated.      Developmental: Denies a history of milestone/developmental delay. Denies a history of in-utero exposure to toxins/illicit substances. There is no documented history of IEP or need for special education.  Education: high school diploma/GED, some technical college for computers (1 year)  Marital history:   Children: 2- sonJuan Alberto age 45; daughter, Nai age 42  Living arrangement, social support: son, lives with son since July  Occupational History: retired  Access to firearms: Denies direct access to weapons/firearms. Socorro Briceño has no history of arrests or violence with a deadly weapon.      Traumatic History: (unchanged information from previous note copied and italicized) - Information that is bolded has been updated.      Abuse: none  Other Traumatic Events:  heart attack at age 38        Past Medical History:    Past Medical History:   Diagnosis Date    Acute MI ([facility]) 08/12/2012    MARIANELA (acute kidney injury) ([facility]) 8/17/2023    Anemia     Anxiety 07/21/2023    CAD (coronary artery disease)     Encounter for well adult exam with abnormal findings 2/12/2019    Hyperlipidemia     Hypertension     Mood disorder ([facility]) 1/4/2024    Myocardial infarction (HCC) 03/26/2002    Thyroid disease     Ventricular fibrillation ([facility]) 08/12/2012        Past Surgical History:   Procedure Laterality Date    COLONOSCOPY  2013     CORONARY ARTERY BYPASS GRAFT  2003     Allergies   Allergen Reactions    Cat Hair Extract Sneezing       Substance Abuse History:    Social History     Substance and Sexual Activity   Alcohol Use Not Currently     Social History     Substance and Sexual Activity   Drug Use No       Social History:    Social History     Socioeconomic History    Marital status:      Spouse name: Not on file    Number of children: Not on file    Years of education: Not on file    Highest education level: Not on file   Occupational History    Occupation:  worker ;  to infant ages     Employer: OTHER     Comment: parttime    Tobacco Use    Smoking status: Former     Current packs/day: 0.00     Average packs/day: 0.3 packs/day for 22.0 years (5.5 ttl pk-yrs)     Types: Cigarettes     Start date: 1971     Quit date: 1993     Years since quittin.3     Passive exposure: Never    Smokeless tobacco: Never    Tobacco comments:     no passive smoke exposure   Vaping Use    Vaping status: Never Used   Substance and Sexual Activity    Alcohol use: Not Currently    Drug use: No    Sexual activity: Not Currently   Other Topics Concern    Not on file   Social History Narrative    Not on file     Social Determinants of Health     Financial Resource Strain: Low Risk  (2023)    Overall Financial Resource Strain (CARDIA)     Difficulty of Paying Living Expenses: Not hard at all   Food Insecurity: No Food Insecurity (3/18/2022)    Hunger Vital Sign     Worried About Running Out of Food in the Last Year: Never true     Ran Out of Food in the Last Year: Never true   Transportation Needs: No Transportation Needs (2023)    PRAPARE - Transportation     Lack of Transportation (Medical): No     Lack of Transportation (Non-Medical): No   Physical Activity: Insufficiently Active (3/20/2023)    Exercise Vital Sign     Days of Exercise per Week: 3 days     Minutes of Exercise per Session: 30 min   Stress: No Stress  Concern Present (3/18/2022)    [employer] - Occupational Stress Questionnaire     Feeling of Stress : Not at all   Social Connections: Moderately Integrated (3/18/2022)    Social Connection and Isolation Panel [NHANES]     Frequency of Communication with Friends and Family: More than three times a week     Frequency of Social Gatherings with Friends and Family: More than three times a week     Attends Hinduism Services: More than 4 times per year     Active Member of Clubs or Organizations: Yes     Attends Club or Organization Meetings: More than 4 times per year     Marital Status:    Intimate Partner Violence: Not At Risk (3/18/2022)    Humiliation, Afraid, Rape, and Kick questionnaire     Fear of Current or Ex-Partner: No     Emotionally Abused: No     Physically Abused: No     Sexually Abused: No   Housing Stability: Unknown (3/18/2022)    Housing Stability Vital Sign     Unable to Pay for Housing in the Last Year: No     Number of Places Lived in the Last Year: Not on file     Unstable Housing in the Last Year: No       Family Psychiatric History:     Family History   Problem Relation Age of Onset    Hypertension Mother     Stroke Mother         3 strokes    Heart attack Father     No Known Problems Sister     No Known Problems Daughter     No Known Problems Maternal Grandmother     No Known Problems Maternal Grandfather     No Known Problems Paternal Grandmother     No Known Problems Paternal Grandfather     No Known Problems Maternal Aunt     No Known Problems Maternal Aunt     No Known Problems Maternal Aunt     Breast cancer Paternal Aunt 52    No Known Problems Paternal Aunt        History Review: The following portions of the patient's history were reviewed and updated as appropriate: allergies, current medications, and past social history.         OBJECTIVE:     Vital signs in last 24 hours:    There were no vitals filed for this visit.    Mental Status  Evaluation:    Appearance age appropriate, casually dressed   Behavior cooperative, calm   Speech normal rate, normal volume, normal pitch   Mood euthymic   Affect normal range and intensity, appropriate   Thought Processes organized, goal directed   Associations intact associations   Thought Content no overt delusions   Perceptual Disturbances: no auditory hallucinations, no visual hallucinations   Abnormal Thoughts  Risk Potential Suicidal ideation - None  Homicidal ideation - None  Potential for aggression - No   Orientation oriented to: person, place, time/date, and situation   Memory recent and remote memory grossly intact   Consciousness alert and awake   Attention Span Concentration Span attention span and concentration appear shorter than expected for age   Intellect appears to be of average intelligence   Insight intact   Judgement intact   Muscle Strength and  Gait unable to assess today due to virtual visit   Motor activity no abnormal movements   Language no difficulty naming common objects, no difficulty repeating a phrase   Fund of Knowledge adequate knowledge of current events  adequate fund of knowledge regarding past history  adequate fund of knowledge regarding vocabulary    Pain none   Pain Scale 0       Laboratory Results: I have personally reviewed all pertinent laboratory/tests results    Lethality Statement:    Based on today's assessment and clinical criteria, Socorro Briceño contracts for safety and is not an imminent risk of harm to self or others. Outpatient level of care is deemed appropriate at this current time. Socorro understands that if they can no longer contract for safety, they need to call the office or report to their nearest Emergency Room for immediate evaluation.    Assessment/Plan:     In summary, Socorro Briceño is a 63 y.o.  female, Single, domiciled with son, retired, w/ PMH of CAD, HLD, CV, anemia, Vit D deficiency and PPH of MDD with psychosis, anxiety, 1 x  prior psychiatric admissions ([facility] 2023, depression with psychosis), no prior SA, no h/o self-injurious behavior, who presented to the mental health clinic for the initia/l intake and psychiatric evaluation on October 13, 2023.  Socorro was recently discharged /from [facility] 8/11/23, currently on Celexa 10 mg daily, Seroquel 25 HS.  Tolerating medication well with no medication side effects observed or reported.  Not actively involved in individual psychotherapy. Daughter reports that Socorro's mood has remained fairly stable throughout her life up until spring 2023.  It was at during this time that her mother was applying for Medicare and attempting to handle this process independently.  The family started to notice significant changes in the patient's behavior, including feelings of being overwhelmed, restlessness, worry, and anxiety.  Socorro also misinterpreting situations, developing a preoccupation that children were moving out of the area.  Family began to notice significant weight loss (20 pounds in 2 months), frequent pacing at all times of the day, difficulty sleeping, tearful episodes, paranoia, confusion, and disorganized behavior.  PCP initiated treatment with Celexa for anxiety and depression.  However, after one week, Socorro's symptoms worsened, leading to inpatient hospitalization.  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  Following the first hospitalization, the patient was discharged home, only to be readmitted 1 week later due to a fall. It was during the second hospitalization on a medical unit (UTI retreated with antibiotics) that Socorro's psychological presentation improved.  She began to verbally engage once again, her appetite and energy levels improved, and her mood symptoms alleviated.  She was discharged home on Celexa 10 mg daily and Seroquel 25 mg at bedtime.  Of note, daughter reports that patient has had  significant weight loss during times of psychosocial stress in the past which she believes are associated with depression, however she is unable to identify any other specific symptoms.  Daughter reports that during these times, her mother continue to work and engage in her normal daily activities. PHQ-9 score: 2; JOANNA-7 score: 0.  Her current presentation meets criteria for MDD, single episode, in partial remission, anxiety.      Psychopharmacologically, Socorro endorses mood stability.  Enjoying the warmer weather.  No medication changes.     Risks/benefits/alternativies to treatment discussed, including a myriad of potential adverse medication side effects, to which Socorro voiced understanding and consented fully to treatment.  Also, patient is amenable to calling/contacting the outpatient office including this writer if any acute adverse effects of their medication regimen arise in addition to any comments or concerns pertaining to their psychiatric management.         Plan:  Continue Celexa 10 mg daily for depression and anxiety  Psychotherapy-declines at this time  Follow up with primary care provider for ongoing medical care  Follow up with this provider in 6 months     Diagnoses and all orders for this visit:    MDD (major depressive disorder), recurrent, in full remission (HCC)    Neurocognitive disorder           - Psychoeducation provided regarding the importance of exercise and health dietary choices and their impact on mood, energy, and motivation.  - Counseled to avoid ETOH, illicit substances, and nicotine secondary to the detrimental effects of these substances on mental and physical health.  - Encouraged to engage in non-verbal forms of therapy such as art therapy, music therapy, and mindfulness.   Aware of 24 hour and weekend coverage for urgent situations accessed by calling [facility] main practice number    Medications Risks/Benefits      Risks, Benefits And Possible  Side Effects Of Medications:    Risks, benefits, and possible side effects of medications explained to Socorro including risk of suicidality and serotonin syndrome related to treatment with antidepressants. She verbalizes understanding and agreement for treatment.    Controlled Medication Discussion:     Not applicable    Psychotherapy Provided:     Individual psychotherapy provided: No  Goals discussed during session  Reassurance and supportive therapy provided  Crisis/safety plan discussed with Socorro Briceño     Treatment Plan:    Completed and signed during the session: Yes - with Socorro    Visit Time    Visit Start Time: 2:57 PM  Visit Stop Time: 3:12 PM  Total Visit Duration:  14 minutes    SOLO Wolfe 04/24/24    This note was completed in part utilizing [employer]. Grammatical, translation, syntax errors, random word insertions, spelling mistakes, and incomplete sentences may be an occasional consequence of this system secondary to software limitations with voice recognition, ambient noise, and hardware issues. If you have any questions or concerns about the content, text, or information contained within the body of this dictation, please contact the provider for clarification.

## 2024-04-22 ENCOUNTER — TELEPHONE (OUTPATIENT)
Dept: PSYCHIATRY | Facility: CLINIC | Age: 64
End: 2024-04-22

## 2024-04-22 DIAGNOSIS — F32.4 MDD (MAJOR DEPRESSIVE DISORDER), SINGLE EPISODE, IN PARTIAL REMISSION (HCC): ICD-10-CM

## 2024-04-22 DIAGNOSIS — F41.9 ANXIETY: ICD-10-CM

## 2024-04-23 ENCOUNTER — RA CDI HCC (OUTPATIENT)
Dept: OTHER | Facility: HOSPITAL | Age: 64
End: 2024-04-23

## 2024-04-24 ENCOUNTER — TELEMEDICINE (OUTPATIENT)
Dept: PSYCHIATRY | Facility: CLINIC | Age: 64
End: 2024-04-24
Payer: MEDICARE

## 2024-04-24 DIAGNOSIS — R41.9 NEUROCOGNITIVE DISORDER: ICD-10-CM

## 2024-04-24 DIAGNOSIS — F33.42 MDD (MAJOR DEPRESSIVE DISORDER), RECURRENT, IN FULL REMISSION (HCC): Primary | ICD-10-CM

## 2024-04-24 DIAGNOSIS — E78.2 MIXED HYPERLIPIDEMIA: ICD-10-CM

## 2024-04-24 PROCEDURE — 99213 OFFICE O/P EST LOW 20 MIN: CPT | Performed by: NURSE PRACTITIONER

## 2024-04-24 RX ORDER — CITALOPRAM HYDROBROMIDE 10 MG/1
10 TABLET ORAL DAILY
Qty: 90 TABLET | Refills: 1 | Status: SHIPPED | OUTPATIENT
Start: 2024-04-24

## 2024-04-24 NOTE — TELEPHONE ENCOUNTER
Please contact patient to schedule follow-up appointment prior to refill of medications.  Thank you.

## 2024-04-24 NOTE — BH TREATMENT PLAN
"TREATMENT PLAN (Medication Management Only)        [facility]    Name and Date of Birth:  Socorro Briceño 63 y.o. 1960  Date of Treatment Plan: April 24, 2024  Diagnosis/Diagnoses:    1. MDD (major depressive disorder), recurrent, in full remission (HCC)    2. Neurocognitive disorder      Strengths/Personal Resources for Self-Care: supportive family, supportive friends, taking medications as prescribed, ability to adapt to life changes, ability to communicate needs, ability to communicate well, ability to listen, ability to reason, ability to understand psychiatric illness, average or above intelligence, family ties, financial means, financial security, general fund of knowledge, good understanding of illness, motivation for treatment, ability to negotiate basic needs, being resoureceful, special hobby/interest, willingness to work on problems.  Area/Areas of need (in own words):  \"maintain mood stability\"  1. Long Term Goal: maintain stability of mood.  Target Date:6 months - 10/24/2024  Person/Persons responsible for completion of goal: Socorro  2.  Short Term Objective (s) - How will we reach this goal?:   A. Provider new recommended medication/dosage changes and/or continue medication(s): continue current medications as prescribed.  B. Spend more time with friends and family.  C. Call supportive people when needed .  Target Date:6 months - 10/24/2024  Person/Persons Responsible for Completion of Goal: Socorro  Progress Towards Goals: significant progress  Treatment Modality: medication management every 6 months  Review due 180 days from date of this plan: 6 months - 10/24/2024  Expected length of service: maintenance  My Physician/PA/NP and I have developed this plan together and I agree to work on the goals and objectives. I understand the treatment goals that were developed for my treatment.      "

## 2024-04-24 NOTE — BH CRISIS PLAN
Client Name: Socorro Briceño       Client YOB: 1960    [facility] Safety Plan      Creation Date: 4/24/24 Update Date: 4/24/24   Created By: SOLO Wolfe Last Updated By: SOLO Wolfe      Step 1: Warning Signs:   Warning Signs   increased irritability   difficulty recognizing people            Step 2: Internal Coping Strategies:   Internal Coping Strategies   cleaning            Step 3: People and social settings that provide distraction:   Name Contact Information   - Mignon Trimble [phone]    Places   waking around the block           Step 4: People whom I can ask for help during a crisis:      Name Contact Information    eligio Trimble [phone]      Step 5: Professionals or agencies I can contact during a crisis:      Clinican/Agency Name Phone Emergency Contact    none identified- call 911        Local Emergency Department Emergency Department Phone Emergency Department Address    [facility]          Crisis Phone Numbers:   Suicide Prevention Lifeline: Call or Text  983 Crisis Text Line: Text HOME to 752-230   Please note: Some [facility] do not have a separate number for Child/Adolescent specific crisis. If your county is not listed under Child/Adolescent, please call the adult number for your county      Adult Crisis Numbers: Child/Adolescent Crisis Numbers   [facility]: [phone] [facility]: [phone]   [facility]: [phone] [facility]: [phone]   [facility]: [phone] [place]: [phone]   [facility]: [phone] [place]: [phone]   [facility]: [phone]   [facility]: [phone]   [facility]: [phone]   [place] [facility]: [phone] (daytime) [phone] (after hours, weekends, holidays)      Step 6: Making the environment safer (plan for lethal means safety):   Patient did not identify any lethal methods: Yes      Optional: What is most important to me and worth living for?   Children and grandchildren, family     [facility] Safety Plan. Cristin Waldrop and King Jaimes. Used with permission of the authors.

## 2024-04-25 RX ORDER — EZETIMIBE 10 MG/1
10 TABLET ORAL DAILY
Qty: 90 TABLET | Refills: 1 | Status: SHIPPED | OUTPATIENT
Start: 2024-04-25

## 2024-04-25 NOTE — SOCIAL WORK
08/10/23 8468   Team Meeting   Meeting Type Daily Rounds   Team Members Present   Team Members Present Physician;Nurse;;Occupational Therapist;Other (Discipline and Name)   Physician Team Member Dr. Anna Casas DO; OSLO Herman   Nursing Team Member Robbie Landry RN; Nikos Bautista, RN   Care Management Team Member Annette chung; JAYLENE Henson   OT Team Member Andre Badillo   Other (Discipline and Name) pharmacey   Patient/Family Present   Patient Present No   Patient's Family Present No   Flat, medication compliant, confused, anxious. Family requesting PT be discharged to their care, follow up meeting with family after rounds. Possible d/c tomorrow.
CM met with PT and completed psycho soc, PT was flat and delayed throughout conversation. PT denies si/hi/ah/vh reported some anxiety and depression. PT strengths include family; limitations include physical and mental health; coping skills include time with family; denies hx of mental health; no prior psych stays; no psych meds; no hx of si/sa; denies access to firearms; denies hx of violence to self and to others; denies hx of abuse and trauma; denies family hx of mental health/substance abuse/suicide/homicide; mother had dementia; denies substance abuse past or present; denies addictions past or present; denies legal issues past or present; denies smoking; ; heterosexual; daughter Tish Morgan and son Leonardo Can both adults; reported that she lives with daughter; no pets; daughter has pet dog Stefany; has 1 sister; graduated high school; worked as an  and also at ; no [address]; wears contacts/reading glasses; Gnosticist preference [employer]; denies cultural needs; drives self; employed and ssi amount unknown- manges own finances; no poa or guardian; signed abhishek for psych and neuro referral; signed abhishek for son and daughter; unsure of pharmacy.
CM met with PT for PT check in. PT presented as flat, guarded. PT indicated that she is not feeling well but not able to provide specifics. (Nursing aware). CM inquired if PT needed anything at this time. PT declined. CM reviewed speaking with PT children and it being positive engagement. Reviewed that we are scheduled for a in person family meeting next Thursday at 11am. PT in agreement to this. Much reassurance provided.
CM met with PT for PT check in. PT remain flat and guarded. Reviewed plan for discharge tomorrow that PT daughter Branden Nielsen would be picking her up. Reviewed discharge plan along with follow up care and supports, PT nodded head in agreement. CM reviewed with PT that revocation of license has been submitted and the reasoning for this. PT does not seem to understand entirely. CM reviewed with PT that moving forward she is not permitted to drive per provider due to safety and reassured her that her family will be providing her transportation to the places she needs to go. PT ndded head yes in agreement. Much reassurance provided.  CM reviewed with PT the IMM, PT in agreement to discharge tomorrow and signed, declined right to appeal.
CM met with PT for PT check in. PT was flat, minimal in verbal response. CM inquired if PT needed anything at this time, PT declined, reassurance provided.
CM met with PT for PT check in. PT was flat, non verbal. CM inquired if PT needed anything at this time, PT nodded head no. CM encouraged PT to reach out with any needs, PT in agreement. Reassurance provided.
CM placed call to PT daughter Jody Tadeo for family check in. CM updated Jody Tadeo on PT status and plan of care, PT daughter in agreement. Jody Tadeo reflected PT has no psychiatric hx, PT was living at home with PT daughter had been there for several years, and then recently moved to sons home in June. At baseline PT is active and engaged with family and [employer]. PT has been more quiet and timid, more confused more so over the past 2-3 weeks- in the past she was on point with things and could make decisions without difficulty. PT was driving and going to the incorrect place-daughter had been following her to redirect. 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 Has part time job at  currently 2-5 days, goes to [employer] throughout the week, helpes with grandchildren. Maternal grandmother she believes had hx of dementia. PT enjoys home improvement shows, bible/Yazidism, general hospital show, time with children. PT daughter will update PT son and will notify him of family meeting scheduled for Tuesday at 2pm via phone. CM provided contact information and encouraged reach out with any needs, PT daughter in agreement. Call ended mutually.  [phone]
CM received message from  psych via [facility], PT is scheduled o 10/13 @ 9a at [facility] location located on [address] [place] [address] with [facility]. CM confirmed. CM placed call to PT PCP  Primary Care , spoke with Heena Bedolla, notified of PT scheduled discharge. PT is scheduled for follow up at 8:45am on 8/30/23 in person. Call ended mutually. CM placed call to  neurology [phone] to complete referral.  Spoke with Bronwyn Montes. Completed triage referral. Bronwyn Montes indicated that she will send to neurologist whom will then review triage and follow p with her. If response today Bronwyn Montes will call CM back direct to schedule. If response received after today, response will be made direct to PT daughter Raman Suazo to arrange scheduling. CM in agreement. Call ended mutually.
CM received voicemail from PT daughter, CM returned call and left message that the provider and CM will be reaching out to her direct today at 11am to follow up on her conversation with provider yesterday and to discuss discharge planning. Requested return call with any questions.
Provider and CM took part in call with PT daughter Tamara Ellis [phone]. Tamara Ellis indicated that they do not wish to have in person family meeting next week and that after discussion with her family they wish for PT to return home at this time. PT daughter and family members will provide 24 hour care and supervision, Tamara Ellis reflected that she understood that PT can not be left unsupervised and that she and family will ensure there is 24 hour supervision in place. Tamara Ellis has been in contact with the [facility] aging office and has started the process of applying for in home services. Tamara Ellis understands that the application process for in home services may take some time and reflected that she and family are comfortable in providing care until in place as family feels it be best for PT to return home at this time. Tamara Ellis  indicated that the family will ensure all medications, firearms, are secure in the home and that PT does not have access. Tamara Ellis indicated that family will manage PT medications moving forward. PT daughter agreeable to referral for psych and neuro follow up, along with confirming pcp appt. PT daughter will  PT tomorrow at 10am. Provider and CM reviewed that as of today documentation will be submitted for revocation of PT license and reasoning why. Tamara Ellis reflected that she understood and in agreement. Tamara Ellis indicated that she and family members will secure vehicle access to ensure PT does not have access to drive for her as well as others saftey. Inquired if PT daughter had additional questions. Tamara Ellis indicated not at this time. Reassurance provided. Call ended mutually.
Provider completed the license reporting form to have PT license revoked due to neurological concerns. CM faxed to [phone]. Confirmation received.
Provider, SASHA took part in phone family mtg with PT daughter Nick Connelly and PT son Catherine Jerome. Updated on PT status, plan of care, imaging results, new baseline, need for 24 hours supervisiion, medication management, and not able to drive. Reviewed return home with 24 hour supervision vs long term care. Planned in person family meeting on Thursday 17th at 1100am with PT to review PT continued response to [facility] and discharge planning and disposition pending on PT progress. All in agreement. Call ended mutually.
Ambulatory

## 2024-04-29 ENCOUNTER — OFFICE VISIT (OUTPATIENT)
Dept: FAMILY MEDICINE CLINIC | Facility: CLINIC | Age: 64
End: 2024-04-29
Payer: MEDICARE

## 2024-04-29 VITALS
HEART RATE: 61 BPM | SYSTOLIC BLOOD PRESSURE: 112 MMHG | TEMPERATURE: 99.3 F | WEIGHT: 123.2 LBS | OXYGEN SATURATION: 97 % | HEIGHT: 60 IN | BODY MASS INDEX: 24.19 KG/M2 | DIASTOLIC BLOOD PRESSURE: 72 MMHG

## 2024-04-29 DIAGNOSIS — I51.7 LVH (LEFT VENTRICULAR HYPERTROPHY): ICD-10-CM

## 2024-04-29 DIAGNOSIS — I25.10 CHRONIC CORONARY ARTERY DISEASE: Primary | ICD-10-CM

## 2024-04-29 DIAGNOSIS — F01.53 VASCULAR DEMENTIA WITH MOOD DISTURBANCE, UNSPECIFIED DEMENTIA SEVERITY (HCC): ICD-10-CM

## 2024-04-29 DIAGNOSIS — E78.2 MIXED HYPERLIPIDEMIA: ICD-10-CM

## 2024-04-29 DIAGNOSIS — E03.9 ACQUIRED HYPOTHYROIDISM: ICD-10-CM

## 2024-04-29 PROCEDURE — G2211 COMPLEX E/M VISIT ADD ON: HCPCS | Performed by: FAMILY MEDICINE

## 2024-04-29 PROCEDURE — 99214 OFFICE O/P EST MOD 30 MIN: CPT | Performed by: FAMILY MEDICINE

## 2024-04-29 RX ORDER — LANOLIN ALCOHOL/MO/W.PET/CERES
1000 CREAM (GRAM) TOPICAL DAILY
Qty: 90 TABLET | Refills: 1 | Status: SHIPPED | OUTPATIENT
Start: 2024-04-29

## 2024-04-29 RX ORDER — LEVOTHYROXINE SODIUM 0.15 MG/1
150 TABLET ORAL
Qty: 30 TABLET | Refills: 1 | Status: SHIPPED | OUTPATIENT
Start: 2024-04-29

## 2024-04-29 NOTE — PROGRESS NOTES
Name: Socorro Briceño      : 1960      MRN: [id number]  Encounter Provider: Ana Ashley MD  Encounter Date: 2024   Encounter department: [facility]    Assessment & Plan     1. Chronic coronary artery disease  Assessment & Plan:  Chronic asymptomatic patient already on aspirin and statin patient of beta-blocker secondary to bradycardia patient overdue for appointment with cardiology recommend to call for appointment    Orders:  -     Ambulatory Referral to Cardiology; Future    2. LVH (left ventricular hypertrophy)  Assessment & Plan:  Chronic asymptomatic patient not on beta-blocker secondary to bradycardia due for appointment with cardiology blood pressure today well-controlled    Orders:  -     Ambulatory Referral to Cardiology; Future    3. Mixed hyperlipidemia  Assessment & Plan:  Chronic asymptomatic fair control continue current management atorvastatin 40 mg once a day and Zetia 10 mg once a day      4. Acquired hypothyroidism  -     levothyroxine (Euthyrox) 150 mcg tablet; Take 1 tablet (150 mcg total) by mouth daily in the early morning  -     TSH, 3rd generation with Free T4 reflex; Future    5. Vascular dementia with mood disturbance, unspecified dementia severity (HCC)  Assessment & Plan:  Chronic patient already evaluated by neurology she is already on aspirin and statin patient vitamin B12 within normal range recommended to stop injection and start taking oral supplement vitamin B12 1000 mcg once a day    Orders:  -     vitamin B-12 (VITAMIN B-12) 1,000 mcg tablet; Take 1 tablet (1,000 mcg total) by mouth daily           Subjective      Patient here with her daughter follow-up with a chronic condition compliant with the medication tolerated well without side effect no new concern recent blood work discussed with the patient      Review of Systems   Constitutional:  Negative for chills and fever.   HENT:  Negative for ear pain and sore throat.    Eyes:   Negative for pain and visual disturbance.   Respiratory:  Negative for cough and shortness of breath.    Cardiovascular:  Negative for chest pain and palpitations.   Gastrointestinal:  Negative for abdominal pain and vomiting.   Genitourinary:  Negative for dysuria and hematuria.   Musculoskeletal:  Negative for arthralgias and back pain.   Skin:  Negative for color change and rash.   Neurological:  Negative for seizures and syncope.   All other systems reviewed and are negative.      Current Outpatient Medications on File Prior to Visit   Medication Sig    aspirin 81 mg chewable tablet Chew 1 tablet (81 mg total) daily    atorvastatin (LIPITOR) 40 mg tablet TAKE 1 TABLET BY MOUTH EVERY DAY WITH DINNER    Blood Pressure KIT Use in the morning    cholecalciferol (VITAMIN D3) 1,000 units tablet Take 1 tablet (1,000 Units total) by mouth daily    citalopram (CeleXA) 10 mg tablet TAKE 1 TABLET BY MOUTH EVERY DAY    docusate sodium (COLACE) 100 mg capsule Take 1 capsule (100 mg total) by mouth 2 (two) times a day    ezetimibe (ZETIA) 10 mg tablet TAKE 1 TABLET BY MOUTH EVERY DAY       Objective     /72 (BP Location: Left arm, Patient Position: Sitting, Cuff Size: Standard)   Pulse 61   Temp 99.3 °F (37.4 °C) (Tympanic)   Ht 5' (1.524 m)   Wt 55.9 kg (123 lb 3.2 oz)   LMP  (LMP Unknown)   SpO2 97%   BMI 24.06 kg/m²     Physical Exam  Vitals and nursing note reviewed.   Constitutional:       General: She is not in acute distress.     Appearance: She is not diaphoretic.   HENT:      Head: Normocephalic and atraumatic.      Right Ear: Tympanic membrane normal. There is no impacted cerumen.      Left Ear: Tympanic membrane normal. There is no impacted cerumen.      Nose: Nose normal. No congestion or rhinorrhea.      Mouth/Throat:      Pharynx: No posterior oropharyngeal erythema.   Eyes:      General: No scleral icterus.        Right eye: No discharge.         Left eye: No discharge.   Cardiovascular:      Rate  and Rhythm: Normal rate and regular rhythm.      Heart sounds: Murmur heard.   Pulmonary:      Effort: Pulmonary effort is normal. No respiratory distress.   Abdominal:      General: There is no distension.      Tenderness: There is no abdominal tenderness.   Musculoskeletal:         General: Normal range of motion.      Cervical back: Normal range of motion.      Right lower leg: No edema.      Left lower leg: No edema.   Skin:     Findings: No rash.   Neurological:      Mental Status: She is alert and oriented to person, place, and time.       Ana Ashley MD

## 2024-05-02 NOTE — ASSESSMENT & PLAN NOTE
Chronic asymptomatic patient not on beta-blocker secondary to bradycardia due for appointment with cardiology blood pressure today well-controlled

## 2024-05-02 NOTE — ASSESSMENT & PLAN NOTE
Chronic asymptomatic patient already on aspirin and statin patient of beta-blocker secondary to bradycardia patient overdue for appointment with cardiology recommend to call for appointment

## 2024-05-02 NOTE — ASSESSMENT & PLAN NOTE
Chronic asymptomatic fair control continue current management atorvastatin 40 mg once a day and Zetia 10 mg once a day

## 2024-05-02 NOTE — ASSESSMENT & PLAN NOTE
Chronic patient already evaluated by neurology she is already on aspirin and statin patient vitamin B12 within normal range recommended to stop injection and start taking oral supplement vitamin B12 1000 mcg once a day

## 2024-06-13 ENCOUNTER — LAB (OUTPATIENT)
Dept: LAB | Facility: HOSPITAL | Age: 64
End: 2024-06-13
Payer: COMMERCIAL

## 2024-06-13 ENCOUNTER — OFFICE VISIT (OUTPATIENT)
Age: 64
End: 2024-06-13
Payer: MEDICARE

## 2024-06-13 VITALS
HEIGHT: 60 IN | HEART RATE: 72 BPM | DIASTOLIC BLOOD PRESSURE: 64 MMHG | WEIGHT: 122 LBS | TEMPERATURE: 96.8 F | BODY MASS INDEX: 23.95 KG/M2 | SYSTOLIC BLOOD PRESSURE: 116 MMHG | OXYGEN SATURATION: 97 %

## 2024-06-13 DIAGNOSIS — I25.10 CHRONIC CORONARY ARTERY DISEASE: ICD-10-CM

## 2024-06-13 DIAGNOSIS — E03.9 ACQUIRED HYPOTHYROIDISM: ICD-10-CM

## 2024-06-13 DIAGNOSIS — F01.53 VASCULAR DEMENTIA WITH MOOD DISTURBANCE, UNSPECIFIED DEMENTIA SEVERITY (HCC): ICD-10-CM

## 2024-06-13 DIAGNOSIS — F33.42 MDD (MAJOR DEPRESSIVE DISORDER), RECURRENT, IN FULL REMISSION (HCC): Primary | ICD-10-CM

## 2024-06-13 DIAGNOSIS — Z86.73 HISTORY OF CVA (CEREBROVASCULAR ACCIDENT): ICD-10-CM

## 2024-06-13 DIAGNOSIS — E78.2 MIXED HYPERLIPIDEMIA: ICD-10-CM

## 2024-06-13 PROBLEM — R62.7 FAILURE TO THRIVE IN ADULT: Status: RESOLVED | Noted: 2023-08-17 | Resolved: 2024-06-13

## 2024-06-13 LAB
T4 FREE SERPL-MCNC: 1.44 NG/DL (ref 0.61–1.12)
TSH SERPL DL<=0.05 MIU/L-ACNC: 0.01 UIU/ML (ref 0.45–4.5)

## 2024-06-13 PROCEDURE — 84443 ASSAY THYROID STIM HORMONE: CPT

## 2024-06-13 PROCEDURE — G2211 COMPLEX E/M VISIT ADD ON: HCPCS | Performed by: FAMILY MEDICINE

## 2024-06-13 PROCEDURE — 99215 OFFICE O/P EST HI 40 MIN: CPT | Performed by: FAMILY MEDICINE

## 2024-06-13 PROCEDURE — 84439 ASSAY OF FREE THYROXINE: CPT

## 2024-06-13 PROCEDURE — 36415 COLL VENOUS BLD VENIPUNCTURE: CPT

## 2024-06-14 ENCOUNTER — TELEPHONE (OUTPATIENT)
Dept: FAMILY MEDICINE CLINIC | Facility: CLINIC | Age: 64
End: 2024-06-14

## 2024-06-14 PROBLEM — E43 SEVERE PROTEIN-CALORIE MALNUTRITION (HCC): Status: RESOLVED | Noted: 2023-08-03 | Resolved: 2024-06-14

## 2024-06-14 PROBLEM — R00.1 BRADYCARDIA: Status: RESOLVED | Noted: 2023-08-18 | Resolved: 2024-06-14

## 2024-06-14 NOTE — ASSESSMENT & PLAN NOTE
Known Vasc Dementia.   She has overall been doing well.     Continue with ASA 81 mg daily.     Family continues to be supportive.     MOCA score today is 17/30 compared to previous of 19/30.     She is to continue to do activities that stimulate the brain. Advised to increase physical activity. Cotninue with a cardiac diet.     Need to maintain control of her cardiovascular disease.

## 2024-06-14 NOTE — TELEPHONE ENCOUNTER
----- Message from Haider Mansfield MD sent at 6/14/2024  9:27 AM EDT -----  Free T4 elevated and TSH decreased.  Patient may benefit from reduction in thyroid medication, appears to be following with Dr. Ashley within 2 weeks.

## 2024-06-14 NOTE — PROGRESS NOTES
Ambulatory Visit  Name: Socorro Briceño      : 1960      MRN: [id number]  Encounter Provider: Noel Davis MD  Encounter Date: 2024   Encounter department: [facility]  Follow-up  [address] [facility] [id number] [phone]   Assessment & Plan   1. MDD (major depressive disorder), recurrent, in full remission (HCC)  Assessment & Plan:  Doing well. On citalopram.   Mgt per PCP/psychiatry.   2. Vascular dementia with mood disturbance, unspecified dementia severity (HCC)  Assessment & Plan:  Known Vasc Dementia.   She has overall been doing well.     Continue with ASA 81 mg daily.     Family continues to be supportive.     MOCA score today is 17/30 compared to previous of .     She is to continue to do activities that stimulate the brain. Advised to increase physical activity. Cotninue with a cardiac diet.     Need to maintain control of her cardiovascular disease.   3. Chronic coronary artery disease  Assessment & Plan:  No current symptoms.   Management per PCP and Cardio.   4. Mixed hyperlipidemia  5. History of CVA (cerebrovascular accident)  Assessment & Plan:  Continue with statin and ASA.   Cotninue with a good diet and increase physical activity.       History of Present Illness     Socorro Briceño is a 63 y.o. female who presents for fu at [facility]. Known vascular dementia.     Family continues to be supportive of iadls. She is independent with ADLS.     No new issues.     She continues to stay as active as she can.   She is on statin and ASA.     No issues withmedications.     Her daughter has not notices any significant changes or significant decline.         Review of Systems   Constitutional: Negative.  Negative for activity change, appetite change, chills, diaphoresis, fatigue and fever.   HENT:  Negative for congestion, facial swelling and sore throat.    Respiratory: Negative.  Negative for apnea, cough, chest tightness  and shortness of breath.    Cardiovascular: Negative.  Negative for chest pain and palpitations.   Gastrointestinal: Negative.  Negative for abdominal distention, abdominal pain, blood in stool, constipation, diarrhea and nausea.   Genitourinary: Negative.  Negative for difficulty urinating, dysuria, flank pain and frequency.     Current Outpatient Medications on File Prior to Visit   Medication Sig Dispense Refill   • aspirin 81 mg chewable tablet Chew 1 tablet (81 mg total) daily 30 tablet 2   • atorvastatin (LIPITOR) 40 mg tablet TAKE 1 TABLET BY MOUTH EVERY DAY WITH DINNER 90 tablet 1   • Blood Pressure KIT Use in the morning 1 kit 0   • cholecalciferol (VITAMIN D3) 1,000 units tablet Take 1 tablet (1,000 Units total) by mouth daily 30 tablet 2   • citalopram (CeleXA) 10 mg tablet TAKE 1 TABLET BY MOUTH EVERY DAY 90 tablet 1   • docusate sodium (COLACE) 100 mg capsule Take 1 capsule (100 mg total) by mouth 2 (two) times a day 60 capsule 2   • ezetimibe (ZETIA) 10 mg tablet TAKE 1 TABLET BY MOUTH EVERY DAY 90 tablet 1   • levothyroxine (Euthyrox) 150 mcg tablet Take 1 tablet (150 mcg total) by mouth daily in the early morning 30 tablet 1   • vitamin B-12 (VITAMIN B-12) 1,000 mcg tablet Take 1 tablet (1,000 mcg total) by mouth daily 90 tablet 1     No current facility-administered medications on file prior to visit.      Social History     Tobacco Use   • Smoking status: Former     Current packs/day: 0.00     Average packs/day: 0.3 packs/day for 22.0 years (5.5 ttl pk-yrs)     Types: Cigarettes     Start date: 1971     Quit date: 1993     Years since quittin.4     Passive exposure: Never   • Smokeless tobacco: Never   • Tobacco comments:     no passive smoke exposure   Vaping Use   • Vaping status: Never Used   Substance and Sexual Activity   • Alcohol use: Not Currently   • Drug use: No   • Sexual activity: Not Currently     Objective     /64 (BP Location: Left arm, Patient Position: Sitting,  Cuff Size: Standard)   Pulse 72   Temp (!) 96.8 °F (36 °C) (Temporal)   Ht 5' (1.524 m)   Wt 55.3 kg (122 lb)   LMP  (LMP Unknown)   SpO2 97%   BMI 23.83 kg/m²     Physical Exam  Vitals and nursing note reviewed.   Constitutional:       General: She is not in acute distress.     Appearance: Normal appearance. She is well-developed.   HENT:      Head: Normocephalic and atraumatic.      Right Ear: External ear normal.      Left Ear: External ear normal.      Nose: Nose normal.      Mouth/Throat:      Mouth: Mucous membranes are moist.   Eyes:      Conjunctiva/sclera: Conjunctivae normal.      Pupils: Pupils are equal, round, and reactive to light.   Cardiovascular:      Rate and Rhythm: Normal rate and regular rhythm.      Heart sounds: Normal heart sounds.   Pulmonary:      Effort: Pulmonary effort is normal.      Breath sounds: Normal breath sounds.   Abdominal:      General: Bowel sounds are normal.      Palpations: Abdomen is soft.   Musculoskeletal:      Cervical back: Normal range of motion and neck supple.   Skin:     General: Skin is warm and dry.   Neurological:      General: No focal deficit present.      Mental Status: She is alert. Mental status is at baseline.      Cranial Nerves: No cranial nerve deficit.      Sensory: No sensory deficit.      Motor: No weakness.      Coordination: Coordination normal.      Gait: Gait normal.      Deep Tendon Reflexes: Reflexes normal.   Psychiatric:         Mood and Affect: Mood normal.         Behavior: Behavior normal.       Administrative Statements   I have spent a total time of 45 minutes on 06/14/24 In caring for this patient including Diagnostic results, Risks and benefits of tx options, Instructions for management, Patient and family education, Importance of tx compliance, Risk factor reductions, Impressions, Counseling / Coordination of care, Documenting in the medical record, and Reviewing / ordering tests, medicine, procedures  .

## 2024-06-19 DIAGNOSIS — E03.9 ACQUIRED HYPOTHYROIDISM: ICD-10-CM

## 2024-06-19 RX ORDER — LEVOTHYROXINE SODIUM 0.15 MG/1
150 TABLET ORAL
Qty: 90 TABLET | Refills: 1 | Status: SHIPPED | OUTPATIENT
Start: 2024-06-19 | End: 2024-06-24

## 2024-06-24 ENCOUNTER — OFFICE VISIT (OUTPATIENT)
Dept: FAMILY MEDICINE CLINIC | Facility: CLINIC | Age: 64
End: 2024-06-24
Payer: MEDICARE

## 2024-06-24 VITALS
HEART RATE: 69 BPM | SYSTOLIC BLOOD PRESSURE: 110 MMHG | BODY MASS INDEX: 24.35 KG/M2 | OXYGEN SATURATION: 97 % | DIASTOLIC BLOOD PRESSURE: 66 MMHG | TEMPERATURE: 99.3 F | WEIGHT: 124 LBS | HEIGHT: 60 IN

## 2024-06-24 DIAGNOSIS — F01.53 VASCULAR DEMENTIA WITH MOOD DISTURBANCE, UNSPECIFIED DEMENTIA SEVERITY (HCC): ICD-10-CM

## 2024-06-24 DIAGNOSIS — E55.9 VITAMIN D DEFICIENCY: ICD-10-CM

## 2024-06-24 DIAGNOSIS — E03.9 ACQUIRED HYPOTHYROIDISM: Primary | ICD-10-CM

## 2024-06-24 DIAGNOSIS — R73.01 IMPAIRED FASTING GLUCOSE: ICD-10-CM

## 2024-06-24 DIAGNOSIS — Z13.220 SCREENING, LIPID: ICD-10-CM

## 2024-06-24 PROCEDURE — G2211 COMPLEX E/M VISIT ADD ON: HCPCS | Performed by: FAMILY MEDICINE

## 2024-06-24 PROCEDURE — 99214 OFFICE O/P EST MOD 30 MIN: CPT | Performed by: FAMILY MEDICINE

## 2024-06-24 RX ORDER — LEVOTHYROXINE SODIUM 0.12 MG/1
125 TABLET ORAL
Qty: 100 TABLET | Refills: 3 | Status: SHIPPED | OUTPATIENT
Start: 2024-06-24

## 2024-06-24 NOTE — ASSESSMENT & PLAN NOTE
Chronic asymptomatic fair control encourage patient to continue with a low-carb diet   HLD (hyperlipidemia)

## 2024-06-24 NOTE — PROGRESS NOTES
Ambulatory Visit  Name: Socorro Briceño      : 1960      MRN: [id number]  Encounter Provider: Ana Ashley MD  Encounter Date: 2024   Encounter department: [facility]    Assessment & Plan   1. Acquired hypothyroidism  Assessment & Plan:  Chronic symptomatic uncontrolled increase levothyroxine from 150 mcg to 125 mcg proper use and possible side effects reviewed  Orders:  -     levothyroxine 125 mcg tablet; Take 1 tablet (125 mcg total) by mouth daily in the early morning  -     Basic metabolic panel; Future  -     TSH, 3rd generation with Free T4 reflex; Future  2. Vascular dementia with mood disturbance, unspecified dementia severity (HCC)  Assessment & Plan:  Chronic stable patient will continue current management she already evaluated by Senior care team  Orders:  -     Basic metabolic panel; Future  3. Vitamin D deficiency  Assessment & Plan:  Chronic asymptomatic continue vitamin D supplement due for vitamin D level before next appointment  4. Screening, lipid  -     Lipid Panel with Direct LDL reflex; Future  5. Impaired fasting glucose  Assessment & Plan:  Chronic asymptomatic fair control encourage patient to continue with a low-carb diet       History of Present Illness     Patient is here f/up with chronic condition ,complaint with med tolerated well no new concern recent blood work reviewed         Review of Systems   Constitutional:  Negative for chills and fever.   HENT:  Negative for ear pain and sore throat.    Eyes:  Negative for pain and visual disturbance.   Respiratory:  Negative for cough and shortness of breath.    Cardiovascular:  Negative for chest pain and palpitations.   Gastrointestinal:  Negative for abdominal pain, constipation, diarrhea and vomiting.   Genitourinary:  Negative for dysuria and hematuria.   Skin:  Negative for color change and rash.   Neurological:  Negative for seizures and syncope.   All other systems reviewed and are  negative.      Objective     /66 (BP Location: Left arm, Patient Position: Sitting)   Pulse 69   Temp 99.3 °F (37.4 °C) (Tympanic)   Ht 5' (1.524 m)   Wt 56.2 kg (124 lb)   LMP  (LMP Unknown)   SpO2 97%   Breastfeeding No   BMI 24.22 kg/m²     Physical Exam  Vitals and nursing note reviewed.   Constitutional:       General: She is not in acute distress.     Appearance: She is well-developed. She is not diaphoretic.   HENT:      Head: Normocephalic.      Right Ear: Tympanic membrane and external ear normal.      Left Ear: Tympanic membrane and external ear normal.      Nose: No rhinorrhea.      Mouth/Throat:      Pharynx: No posterior oropharyngeal erythema.   Eyes:      General:         Right eye: No discharge.         Left eye: No discharge.      Conjunctiva/sclera: Conjunctivae normal.   Neck:      Vascular: No JVD.   Cardiovascular:      Rate and Rhythm: Normal rate and regular rhythm.      Heart sounds: Murmur heard.      No gallop.   Pulmonary:      Effort: Pulmonary effort is normal. No respiratory distress.      Breath sounds: Normal breath sounds. No stridor. No wheezing or rales.   Chest:      Chest wall: No tenderness.   Abdominal:      General: There is no distension.      Palpations: Abdomen is soft. There is no mass.      Tenderness: There is no abdominal tenderness. There is no rebound.   Musculoskeletal:         General: No tenderness.      Cervical back: Normal range of motion and neck supple.   Lymphadenopathy:      Cervical: No cervical adenopathy.   Skin:     General: Skin is warm.      Findings: No erythema or rash.   Neurological:      Mental Status: She is alert and oriented to person, place, and time.       Administrative Statements

## 2024-06-24 NOTE — ASSESSMENT & PLAN NOTE
Chronic symptomatic uncontrolled increase levothyroxine from 150 mcg to 125 mcg proper use and possible side effects reviewed

## 2024-06-24 NOTE — ASSESSMENT & PLAN NOTE
Chronic asymptomatic continue vitamin D supplement due for vitamin D level before next appointment

## 2024-07-01 ENCOUNTER — TELEPHONE (OUTPATIENT)
Age: 64
End: 2024-07-01

## 2024-07-01 NOTE — TELEPHONE ENCOUNTER
Dghtr called and needs the PCP top write a letter to excuse her from jury duty.  Pls call the dghtr once completed.  It is okay to [facility].  [phone]

## 2024-07-02 DIAGNOSIS — K59.00 CONSTIPATION: ICD-10-CM

## 2024-07-02 RX ORDER — DOCUSATE SODIUM 100 MG/1
100 CAPSULE, LIQUID FILLED ORAL 2 TIMES DAILY
Qty: 180 CAPSULE | Refills: 1 | Status: SHIPPED | OUTPATIENT
Start: 2024-07-02

## 2024-07-09 ENCOUNTER — TELEPHONE (OUTPATIENT)
Dept: FAMILY MEDICINE CLINIC | Facility: CLINIC | Age: 64
End: 2024-07-09

## 2024-07-15 ENCOUNTER — CONSULT (OUTPATIENT)
Dept: CARDIOLOGY CLINIC | Facility: CLINIC | Age: 64
End: 2024-07-15
Payer: MEDICARE

## 2024-07-15 VITALS
HEIGHT: 60 IN | SYSTOLIC BLOOD PRESSURE: 110 MMHG | WEIGHT: 125 LBS | DIASTOLIC BLOOD PRESSURE: 60 MMHG | BODY MASS INDEX: 24.54 KG/M2 | HEART RATE: 72 BPM

## 2024-07-15 DIAGNOSIS — E78.5 DYSLIPIDEMIA: ICD-10-CM

## 2024-07-15 DIAGNOSIS — I51.7 LVH (LEFT VENTRICULAR HYPERTROPHY): ICD-10-CM

## 2024-07-15 DIAGNOSIS — I25.10 CORONARY ARTERY DISEASE INVOLVING NATIVE CORONARY ARTERY OF NATIVE HEART WITHOUT ANGINA PECTORIS: Primary | ICD-10-CM

## 2024-07-15 DIAGNOSIS — Z86.73 HISTORY OF CVA (CEREBROVASCULAR ACCIDENT): ICD-10-CM

## 2024-07-15 DIAGNOSIS — I10 ESSENTIAL HYPERTENSION: ICD-10-CM

## 2024-07-15 DIAGNOSIS — R94.31 ABNORMAL ELECTROCARDIOGRAM (ECG) (EKG): ICD-10-CM

## 2024-07-15 DIAGNOSIS — I61.9 CVA (CEREBROVASCULAR ACCIDENT DUE TO INTRACEREBRAL HEMORRHAGE) (HCC): ICD-10-CM

## 2024-07-15 DIAGNOSIS — I51.7 LEFT VENTRICULAR HYPERTROPHY: ICD-10-CM

## 2024-07-15 DIAGNOSIS — I25.10 CHRONIC CORONARY ARTERY DISEASE: ICD-10-CM

## 2024-07-15 DIAGNOSIS — R53.82 CHRONIC FATIGUE: ICD-10-CM

## 2024-07-15 PROCEDURE — 99214 OFFICE O/P EST MOD 30 MIN: CPT | Performed by: INTERNAL MEDICINE

## 2024-07-15 PROCEDURE — 93000 ELECTROCARDIOGRAM COMPLETE: CPT | Performed by: INTERNAL MEDICINE

## 2024-07-15 NOTE — PROGRESS NOTES
Cardiology Office Note  MD Javi Mitchell MD, [facility]  Grady Knutson DO, [facility]  MD Shobha Talamantes DO, [facility]  Giorgi Shore DO, [facility] [place] [address]    Socorro Briceño 64 y.o. female MRN: [id number]  Unit/Bed#:  Encounter: [id number]      History of Present Illness:  It was a pleasure to see Socorro Briceño in the office today for initial CV evaluation. She has a past medical history of CAD status post CABG, dyslipidemia, LVH and hypothyroid.  She establish care with me in July 2024.  Previously she followed with Dr. Flower.  The patient experienced acute onset chest discomfort in 2001 with symptoms requiring her to go to the emergency department.  She was found to have acute myocardial infarction at the time.  She was sent for cardiac catheterization demonstrating multivessel coronary disease.  Subsequently, she was sent for bypass surgery with unknown vessels being bypassed.  Following the bypass, the patient had been doing well overall.  She was seen in July 2023 due to confusion.  MRI of the brain was performed showing old left cerebellar infarct with old lacunar infarcts.  Nothing acute at the time.  She was seen by primary care and due to her changes in 2023, was referred back to cardiology office for evaluation.  She admitted to significant decrease in her physical activity and effort tolerance.  Denies chest pain, pressure, tightness or squeezing.  Denies lightheadedness, dizziness or palpitations.  Denies lower extremity swelling, orthopnea or paroxysmal nocturnal dyspnea.    Review of Systems:  Review of Systems   Constitutional: Positive for malaise/fatigue. Negative for decreased appetite, fever, weight gain and weight loss.   HENT:  Negative for congestion and sore throat.    Eyes:  Negative for visual disturbance.   Cardiovascular:  Negative for chest  pain, dyspnea on exertion, leg swelling, near-syncope and palpitations.   Respiratory:  Negative for cough and shortness of breath.    Hematologic/Lymphatic: Negative for bleeding problem.   Skin:  Negative for rash.   Musculoskeletal:  Negative for myalgias and neck pain.   Gastrointestinal:  Negative for abdominal pain and nausea.   Neurological:  Negative for light-headedness and weakness.   Psychiatric/Behavioral:  Negative for depression.        Past Medical History:   Diagnosis Date    Acute MI ([facility]) 2012    MARIANELA (acute kidney injury) ([facility]) 2023    Anemia     Anxiety 2023    Bradycardia 2023    CAD (coronary artery disease)     Encounter for well adult exam with abnormal findings 2019    Failure to thrive in adult 2023    Hyperlipidemia     Hypertension     Mood disorder ([facility]) 2024    Myocardial infarction ([facility]) 2002    Severe protein-calorie malnutrition ([facility]) 2023    Thyroid disease     Ventricular fibrillation ([facility]) 2012       Past Surgical History:   Procedure Laterality Date    COLONOSCOPY      CORONARY ARTERY BYPASS GRAFT         Social History     Socioeconomic History    Marital status:      Spouse name: Not on file    Number of children: Not on file    Years of education: Not on file    Highest education level: Not on file   Occupational History    Occupation:  worker ;  to infant ages     Employer: OTHER     Comment: parttime    Tobacco Use    Smoking status: Former     Current packs/day: 0.00     Average packs/day: 0.3 packs/day for 22.0 years (5.5 ttl pk-yrs)     Types: Cigarettes     Start date: 1971     Quit date: 1993     Years since quittin.5     Passive exposure: Never    Smokeless tobacco: Never    Tobacco comments:     no passive smoke exposure   Vaping Use    Vaping status: Never Used   Substance and Sexual Activity    Alcohol use: Not Currently    Drug use: No    Sexual activity: Not  Currently   Other Topics Concern    Not on file   Social History Narrative    Not on file     Social Determinants of Health     Financial Resource Strain: Low Risk  (9/20/2023)    Overall Financial Resource Strain (CARDIA)     Difficulty of Paying Living Expenses: Not hard at all   Food Insecurity: No Food Insecurity (3/18/2022)    Hunger Vital Sign     Worried About Running Out of Food in the Last Year: Never true     Ran Out of Food in the Last Year: Never true   Transportation Needs: No Transportation Needs (9/20/2023)    PRAPARE - Transportation     Lack of Transportation (Medical): No     Lack of Transportation (Non-Medical): No   Physical Activity: Insufficiently Active (3/20/2023)    Exercise Vital Sign     Days of Exercise per Week: 3 days     Minutes of Exercise per Session: 30 min   Stress: No Stress Concern Present (3/18/2022)    [employer] [place] [employer] - Occupational Stress Questionnaire     Feeling of Stress : Not at all   Social Connections: Moderately Integrated (3/18/2022)    Social Connection and Isolation Panel [NHANES]     Frequency of Communication with Friends and Family: More than three times a week     Frequency of Social Gatherings with Friends and Family: More than three times a week     Attends Holiness Services: More than 4 times per year     Active Member of Clubs or Organizations: Yes     Attends Club or Organization Meetings: More than 4 times per year     Marital Status:    Intimate Partner Violence: Not At Risk (3/18/2022)    Humiliation, Afraid, Rape, and Kick questionnaire     Fear of Current or Ex-Partner: No     Emotionally Abused: No     Physically Abused: No     Sexually Abused: No   Housing Stability: Unknown (3/18/2022)    Housing Stability Vital Sign     Unable to Pay for Housing in the Last Year: No     Number of Places Lived in the Last Year: Not on file     Unstable Housing in the Last Year: No       Family History   Problem Relation Age of Onset     Hypertension Mother     Stroke Mother         3 strokes    Heart attack Father     No Known Problems Sister     No Known Problems Daughter     No Known Problems Maternal Grandmother     No Known Problems Maternal Grandfather     No Known Problems Paternal Grandmother     No Known Problems Paternal Grandfather     No Known Problems Maternal Aunt     No Known Problems Maternal Aunt     No Known Problems Maternal Aunt     Breast cancer Paternal Aunt 52    No Known Problems Paternal Aunt        Allergies   Allergen Reactions    Cat Hair Extract Sneezing         Current Outpatient Medications:     aspirin 81 mg chewable tablet, Chew 1 tablet (81 mg total) daily, Disp: 30 tablet, Rfl: 2    atorvastatin (LIPITOR) 40 mg tablet, TAKE 1 TABLET BY MOUTH EVERY DAY WITH DINNER, Disp: 90 tablet, Rfl: 1    Blood Pressure KIT, Use in the morning, Disp: 1 kit, Rfl: 0    cholecalciferol (VITAMIN D3) 1,000 units tablet, Take 1 tablet (1,000 Units total) by mouth daily, Disp: 30 tablet, Rfl: 2    citalopram (CeleXA) 10 mg tablet, TAKE 1 TABLET BY MOUTH EVERY DAY, Disp: 90 tablet, Rfl: 1    docusate sodium (COLACE) 100 mg capsule, TAKE 1 CAPSULE BY MOUTH TWICE A DAY, Disp: 180 capsule, Rfl: 1    ezetimibe (ZETIA) 10 mg tablet, TAKE 1 TABLET BY MOUTH EVERY DAY, Disp: 90 tablet, Rfl: 1    levothyroxine 125 mcg tablet, Take 1 tablet (125 mcg total) by mouth daily in the early morning, Disp: 100 tablet, Rfl: 3    vitamin B-12 (VITAMIN B-12) 1,000 mcg tablet, Take 1 tablet (1,000 mcg total) by mouth daily, Disp: 90 tablet, Rfl: 1    Vitals:    07/15/24 1335   BP: 110/60   Pulse: 72   Weight: 56.7 kg (125 lb)   Height: 5' (1.524 m)     Body mass index is 24.41 kg/m².    PHYSICAL EXAMINATION:  Gen: Awake, Alert, NAD   Head/eyes: AT/NC, pupils equal and round, Anicteric  ENT: mmm  Neck: Supple, No elevated JVP, trachea midline  Resp: CTA bilaterally no w/r/r  CV: RRR +S1, S2, No m/r/g  Abd: Soft, NT/ND + BS  Ext: no LE edema  bilaterally  Neuro: Follows commands, moves all extermities  Psych: Appropriate affect, normal mood, pleasant attitude, non-combative  Skin: warm; no rash, erythema or venous stasis changes on exposed skin    --------------------------------------------------------------------------------  TREADMILL STRESS  No results found for this or any previous visit.     --------------------------------------------------------------------------------  NUCLEAR STRESS TEST: No results found for this or any previous visit.    No results found for this or any previous visit.      --------------------------------------------------------------------------------  CATH:  No results found for this or any previous visit.    --------------------------------------------------------------------------------  ECHO:   Results for orders placed during the hospital encounter of 18    Echo complete with contrast if indicated    [address] [address] [phone]    Transthoracic Echocardiogram  2D, M-mode, Doppler, and Color Doppler    Study date:  04-Sep-2018    Patient: RACHANA MARTE  MR number: [id number]  Account number: [id number]  : 1960  Age: 58 years  Gender: Female  Status: Outpatient  Location: [facility]  Height: 60 in  Weight: 150 lb  BP: 120/ 68 mmHg    Indications: Known coronary artery disease.    Diagnoses: I25.83 - Coronary atherosclerosis due to lipid rich plaque    Sonographer:  FRANCESCA Cartwright  Primary Physician:  RASTA URBINA  Referring Physician:  RASTA URBINA  Group:  [facility]  Interpreting Physician:  Shobha Herrera DO    SUMMARY    LEFT VENTRICLE:  Systolic function was normal. Ejection fraction was estimated to be 55 %.  There were no regional wall motion abnormalities.  Wall thickness was mildly increased.  Doppler parameters were consistent with abnormal left ventricular relaxation  (grade 1 diastolic dysfunction).    MITRAL VALVE:  There was mild annular calcification.    HISTORY: PRIOR HISTORY: hypothyroid. Risk factors: hypertension and medication-treated hypercholesterolemia. PRIOR PROCEDURES: Stent. Coronary bypass.    PROCEDURE: The study was performed in the [facility] [place]. This was a routine study. The transthoracic approach was used. The study included complete 2D imaging, M-mode, complete spectral Doppler, and color Doppler. The  heart rate was 53 bpm, at the start of the study. Image quality was adequate.    LEFT VENTRICLE: Size was normal. Systolic function was normal. Ejection fraction was estimated to be 55 %. There were no regional wall motion abnormalities. Wall thickness was mildly increased. DOPPLER: Doppler parameters were consistent  with abnormal left ventricular relaxation (grade 1 diastolic dysfunction).    RIGHT VENTRICLE: The size was normal. Systolic function was normal. Wall thickness was normal.    LEFT ATRIUM: Size was normal.    RIGHT ATRIUM: Size was normal.    MITRAL VALVE: There was mild annular calcification. DOPPLER: There was no evidence for stenosis. There was no regurgitation.    AORTIC VALVE: The valve was trileaflet. Leaflets exhibited normal thickness and normal cuspal separation. DOPPLER: Transaortic velocity was within the normal range. There was no evidence for stenosis. There was no regurgitation.    TRICUSPID VALVE: The valve structure was normal. There was normal leaflet separation. DOPPLER: The transtricuspid velocity was within the normal range. There was no evidence for stenosis. There was no regurgitation.    PULMONIC VALVE: Leaflets exhibited normal thickness, no calcification, and normal cuspal separation. DOPPLER: The transpulmonic velocity was within the normal range. There was no regurgitation.    PERICARDIUM: There was no pericardial effusion. The pericardium was normal in appearance.    AORTA: The root exhibited normal  size.    SYSTEMIC VEINS: IVC: The inferior vena cava was normal in size and course. Respirophasic changes were normal.    PULMONARY ARTERY: DOPPLER: The tricuspid jet envelope definition was inadequate for estimation of RV systolic pressure.    SYSTEM MEASUREMENT TABLES    2D  %FS: 29.8 %  Ao Diam: 3.4 cm  EDV(Teich): 73.7 ml  EF(Teich): 57.4 %  ESV(Teich): 31.4 ml  IVSd: 1.2 cm  LA Area: 12.9 cm2  LA Diam: 3.5 cm  LVEDV MOD A4C: 55 ml  LVEF MOD A4C: 63.6 %  LVESV MOD A4C: 20 ml  LVIDd: 4.1 cm  LVIDs: 2.9 cm  LVLd A4C: 7.2 cm  LVLs A4C: 6.4 cm  LVPWd: 1.2 cm  RA Area: 12.9 cm2  RVIDd: 2.5 cm  SV MOD A4C: 34.9 ml  SV(Teich): 42.3 ml    MM  TAPSE: 1.7 cm    PW  E': 0.1 m/s  E/E': 10.7  MV A Rogerio: 0.7 m/s  MV Dec [place]: 2.9 m/s2  MV DecT: 234.2 ms  MV E Rogerio: 0.7 m/s  MV E/A Ratio: 0.9  MV PHT: 67.9 ms  MVA By PHT: 3.2 cm2    [facility]    Prepared and electronically signed by    Shobha Herrera DO  Signed 04-Sep-2018 17:02:35    No results found for this or any previous visit.    --------------------------------------------------------------------------------  HOLTER  No results found for this or any previous visit.    No results found for this or any previous visit.    --------------------------------------------------------------------------------  CAROTIDS  No results found for this or any previous visit.     --------------------------------------------------------------------------------  ECGs:  Results for orders placed or performed in visit on 07/15/24   POCT ECG    Impression    Sinus rhythm 72 bpm with nonspecific ST-T wave abnormalities        Lab Results   Component Value Date    WBC 3.97 (L) 04/15/2024    HGB 13.6 04/15/2024    HCT 40.3 04/15/2024    MCV 91 04/15/2024     04/15/2024      Lab Results   Component Value Date    SODIUM 144 04/15/2024    K 4.2 04/15/2024     04/15/2024    CO2 29 04/15/2024    BUN 16 04/15/2024    CREATININE 0.62  "04/15/2024    GLUC 138 04/15/2024    CALCIUM 9.7 04/15/2024      Lab Results   Component Value Date    HGBA1C 6.0 (H) 08/03/2023      Lab Results   Component Value Date    CHOL 154 12/21/2015    CHOL 190 03/16/2015    CHOL 179 06/04/2014     Lab Results   Component Value Date    HDL 41 (L) 08/03/2023    HDL 43 (L) 05/22/2023    HDL 52 01/11/2023     Lab Results   Component Value Date    LDLCALC 63 08/03/2023    LDLCALC 170 (H) 05/22/2023    LDLCALC 159 (H) 01/11/2023     Lab Results   Component Value Date    TRIG 84 08/03/2023    TRIG 99 05/22/2023    TRIG 172 (H) 01/11/2023     No results found for: \"CHOLHDL\"   Lab Results   Component Value Date    INR 1.10 08/17/2023    PROTIME 14.2 08/17/2023        1. Coronary artery disease involving native coronary artery of native heart without angina pectoris  -     POCT ECG  2. Essential hypertension  -     POCT ECG  -     Echo complete w/ contrast if indicated; Future; Expected date: 07/15/2024  3. Dyslipidemia  4. Left ventricular hypertrophy  -     POCT ECG  5. Chronic coronary artery disease  -     Ambulatory Referral to Cardiology  6. LVH (left ventricular hypertrophy)  -     Ambulatory Referral to Cardiology  -     POCT ECG  7. Abnormal electrocardiogram (ECG) (EKG)  -     Echo complete w/ contrast if indicated; Future; Expected date: 07/15/2024  8. History of CVA (cerebrovascular accident)  -     VAS carotid complete study; Future; Expected date: 07/15/2024  -     AMB extended holter monitor; Future; Expected date: 07/15/2024  9. CVA (cerebrovascular accident due to intracerebral hemorrhage) (HCC)  -     VAS carotid complete study; Future; Expected date: 07/15/2024      IMPRESSION:    CAD  s/p CABG, 2001  Fatigue  Hypertension  LVEF 52%, moderate RAJANI, grade 1 diastolic dysfunction, mild MAC, mild MR/TR, May 2022  Dyslipidemia  LVH  Hypothyroid  History of CVA with old left cerebellar infarct by MRI, July 2023    PLAN:  It was a pleasure to see Karlie in the office today " "for initial CV evaluation.  She is here today due to her history of CABG in 2001.  She also has been experiencing progressive fatigue.  The patient has no chest pain concerning for angina, but decreased effort tolerance is somewhat concerning.  She has no signs or symptoms of heart failure and clinically examines to be euvolemic.  ECG shows nonspecific ST-T wave abnormalities.  These findings are new.  She is tolerating her current medications without any reported adverse effects.  Blood pressure and heart rate are currently stable.  Based on her clinical presentation, I have the following recommendations:    1.  Given the patient's decreased effort tolerance and nonspecific changes on ECG with known history of CABG in 2001, would check pharmacologic nuclear stress test to assess for any evidence of underlying myocardial ischemia.  Would perform this is a pharmacologic test due to joint pain and difficulty with ambulation post CVA.  2.  Check 2D echocardiogram to assess cardiac structure and function with the ECG changes.  3.  Would obtain 2-week event recorder with prior history of CVA to assess for any evidence of atrial arrhythmias.  4.  Will check carotid Doppler for completeness with prior CVA.  5.  Recommend heart healthy diet low in sodium and carbohydrate.  6.  Repeat lipid panel is currently pending.  Continue high intensity statin.  Goal LDL is less than 70 mg/dL.  Repeat lipid panel in 3 to 6 months.  Continue Zetia.  7.  Lifelong aspirin.  8.  We will follow-up with her after testing to review the results.    As always, please do not hesitate to call with any questions.    Portions of the record may have been created with voice recognition software. Occasional wrong word or \"sound a like\" substitutions may have occurred due to the inherent limitations of voice recognition software. Read the chart carefully and recognize, using context, where substitutions have occurred.      Signed: Grady Knutson DO, " FACC, FASE, FACP

## 2024-07-25 ENCOUNTER — HOSPITAL ENCOUNTER (OUTPATIENT)
Dept: NUCLEAR MEDICINE | Facility: HOSPITAL | Age: 64
End: 2024-07-25
Attending: INTERNAL MEDICINE
Payer: MEDICARE

## 2024-07-25 ENCOUNTER — HOSPITAL ENCOUNTER (OUTPATIENT)
Dept: NON INVASIVE DIAGNOSTICS | Facility: HOSPITAL | Age: 64
End: 2024-07-25
Attending: INTERNAL MEDICINE
Payer: MEDICARE

## 2024-07-25 ENCOUNTER — HOSPITAL ENCOUNTER (OUTPATIENT)
Dept: NON INVASIVE DIAGNOSTICS | Facility: HOSPITAL | Age: 64
Discharge: HOME | End: 2024-07-25
Attending: INTERNAL MEDICINE
Payer: MEDICARE

## 2024-07-25 ENCOUNTER — HOSPITAL ENCOUNTER (OUTPATIENT)
Dept: NUCLEAR MEDICINE | Facility: HOSPITAL | Age: 64
Discharge: HOME | End: 2024-07-25
Attending: INTERNAL MEDICINE

## 2024-07-25 VITALS
HEART RATE: 60 BPM | BODY MASS INDEX: 24.54 KG/M2 | SYSTOLIC BLOOD PRESSURE: 140 MMHG | HEIGHT: 60 IN | WEIGHT: 125 LBS | DIASTOLIC BLOOD PRESSURE: 81 MMHG

## 2024-07-25 DIAGNOSIS — R94.31 ABNORMAL ELECTROCARDIOGRAM (ECG) (EKG): ICD-10-CM

## 2024-07-25 DIAGNOSIS — I10 ESSENTIAL HYPERTENSION: ICD-10-CM

## 2024-07-25 DIAGNOSIS — I61.9 CVA (CEREBROVASCULAR ACCIDENT DUE TO INTRACEREBRAL HEMORRHAGE) (HCC): ICD-10-CM

## 2024-07-25 DIAGNOSIS — R53.82 CHRONIC FATIGUE: ICD-10-CM

## 2024-07-25 DIAGNOSIS — Z86.73 HISTORY OF CVA (CEREBROVASCULAR ACCIDENT): ICD-10-CM

## 2024-07-25 LAB
AORTIC ROOT: 3.1 CM
AORTIC VALVE MEAN VELOCITY: 7.7 M/S
APICAL FOUR CHAMBER EJECTION FRACTION: 63 %
ASCENDING AORTA: 3.4 CM
AV AREA BY CONTINUOUS VTI: 2.5 CM2
AV AREA PEAK VELOCITY: 2.5 CM2
AV LVOT MEAN GRADIENT: 2 MMHG
AV LVOT PEAK GRADIENT: 4 MMHG
AV MEAN GRADIENT: 3 MMHG
AV PEAK GRADIENT: 6 MMHG
AV VALVE AREA: 2.47 CM2
AV VELOCITY RATIO: 0.88
DOP CALC AO PEAK VEL: 1.2 M/S
DOP CALC AO VTI: 29.13 CM
DOP CALC LVOT AREA: 2.83 CM2
DOP CALC LVOT CARDIAC INDEX: 2.7 L/MIN/M2
DOP CALC LVOT CARDIAC OUTPUT: 4.13 L/MIN
DOP CALC LVOT DIAMETER: 1.9 CM
DOP CALC LVOT PEAK VEL VTI: 25.4 CM
DOP CALC LVOT PEAK VEL: 1.05 M/S
DOP CALC LVOT STROKE INDEX: 45.8 ML/M2
DOP CALC LVOT STROKE VOLUME: 70
E WAVE DECELERATION TIME: 220 MS
E/A RATIO: 0.66
FRACTIONAL SHORTENING: 28 (ref 28–44)
INTERVENTRICULAR SEPTUM IN DIASTOLE (PARASTERNAL SHORT AXIS VIEW): 1.1 CM
INTERVENTRICULAR SEPTUM: 1.1 CM (ref 0.6–1.1)
LAAS-AP2: 14.7 CM2
LAAS-AP4: 13.6 CM2
LEFT ATRIUM SIZE: 3.6 CM
LEFT ATRIUM VOLUME (MOD BIPLANE): 36 ML
LEFT ATRIUM VOLUME INDEX (MOD BIPLANE): 23.5 ML/M2
LEFT INTERNAL DIMENSION IN SYSTOLE: 2.9 CM (ref 2.1–4)
LEFT VENTRICULAR INTERNAL DIMENSION IN DIASTOLE: 4 CM (ref 3.5–6)
LEFT VENTRICULAR POSTERIOR WALL IN END DIASTOLE: 0.8 CM
LEFT VENTRICULAR STROKE VOLUME: 37 ML
LVSV (TEICH): 37 ML
MV E'TISSUE VEL-LAT: 9 CM/S
MV E'TISSUE VEL-SEP: 5 CM/S
MV PEAK A VEL: 0.91 M/S
MV PEAK E VEL: 60 CM/S
RIGHT ATRIAL 2D VOLUME: 34 ML
RIGHT ATRIUM AREA SYSTOLE A4C: 13.9 CM2
RIGHT VENTRICLE ID DIMENSION: 3.2 CM
SL CV LEFT ATRIUM LENGTH A2C: 4.5 CM
SL CV LV EF: 60
SL CV PED ECHO LEFT VENTRICLE DIASTOLIC VOLUME (MOD BIPLANE) 2D: 70 ML
SL CV PED ECHO LEFT VENTRICLE SYSTOLIC VOLUME (MOD BIPLANE) 2D: 33 ML
TRICUSPID ANNULAR PLANE SYSTOLIC EXCURSION: 1.2 CM

## 2024-07-25 PROCEDURE — 93880 EXTRACRANIAL BILAT STUDY: CPT | Performed by: SURGERY

## 2024-07-25 PROCEDURE — 93306 TTE W/DOPPLER COMPLETE: CPT | Performed by: INTERNAL MEDICINE

## 2024-07-25 PROCEDURE — 93880 EXTRACRANIAL BILAT STUDY: CPT

## 2024-07-25 PROCEDURE — 93306 TTE W/DOPPLER COMPLETE: CPT

## 2024-07-31 ENCOUNTER — TELEPHONE (OUTPATIENT)
Dept: CARDIOLOGY CLINIC | Facility: CLINIC | Age: 64
End: 2024-07-31

## 2024-07-31 NOTE — TELEPHONE ENCOUNTER
----- Message from Grady Knutson DO sent at 7/31/2024  1:01 PM EDT -----  Patient had mild bilateral carotid artery stenosis.  There is no evidence of severe carotid disease.  Please reach out to the patient and let her know this good news.  Will discuss further in the office.  Thank you.

## 2024-08-08 ENCOUNTER — HOSPITAL ENCOUNTER (OUTPATIENT)
Dept: NUCLEAR MEDICINE | Facility: HOSPITAL | Age: 64
Discharge: HOME | End: 2024-08-08
Payer: MEDICARE

## 2024-08-08 ENCOUNTER — HOSPITAL ENCOUNTER (OUTPATIENT)
Dept: NON INVASIVE DIAGNOSTICS | Facility: HOSPITAL | Age: 64
Discharge: HOME | End: 2024-08-08
Payer: MEDICARE

## 2024-08-08 VITALS — HEIGHT: 60 IN | BODY MASS INDEX: 24.54 KG/M2 | WEIGHT: 125 LBS

## 2024-08-08 DIAGNOSIS — R53.82 CHRONIC FATIGUE: ICD-10-CM

## 2024-08-08 DIAGNOSIS — R94.31 ABNORMAL ELECTROCARDIOGRAM (ECG) (EKG): ICD-10-CM

## 2024-08-08 LAB
CHEST PAIN STATEMENT: NORMAL
MAX DIASTOLIC BP: 87 MMHG
MAX HR PERCENT: 75 %
MAX HR: 116 BPM
MAX PREDICTED HEART RATE: 154 BPM
NUC STRESS EJECTION FRACTION: 63 %
PROTOCOL NAME: NORMAL
RATE PRESSURE PRODUCT: NORMAL
REASON FOR TERMINATION: NORMAL
SL CV REST NUCLEAR ISOTOPE DOSE: 10.5 MCI
SL CV STRESS NUCLEAR ISOTOPE DOSE: 32.8 MCI
SL CV STRESS RECOVERY BP: NORMAL MMHG
SL CV STRESS RECOVERY HR: 88 BPM
SL CV STRESS RECOVERY O2 SAT: 99 %
STRESS ANGINA INDEX: 0
STRESS BASELINE BP: NORMAL MMHG
STRESS BASELINE HR: 63 BPM
STRESS O2 SAT REST: 98 %
STRESS PEAK HR: 116 BPM
STRESS POST ESTIMATED WORKLOAD: 1 METS
STRESS POST EXERCISE DUR MIN: 3 MIN
STRESS POST EXERCISE DUR SEC: 1 SEC
STRESS POST O2 SAT PEAK: 97 %
STRESS POST PEAK BP: 171 MMHG
STRESS POST PEAK HR: 116 BPM
STRESS POST PEAK SYSTOLIC BP: 171 MMHG
STRESS/REST PERFUSION RATIO: 1.19
TARGET HR FORMULA: NORMAL
TEST INDICATION: NORMAL

## 2024-08-08 PROCEDURE — 78452 HT MUSCLE IMAGE SPECT MULT: CPT

## 2024-08-08 PROCEDURE — 93016 CV STRESS TEST SUPVJ ONLY: CPT

## 2024-08-08 PROCEDURE — 93018 CV STRESS TEST I&R ONLY: CPT

## 2024-08-08 PROCEDURE — A9502 TC99M TETROFOSMIN: HCPCS

## 2024-08-08 PROCEDURE — 93017 CV STRESS TEST TRACING ONLY: CPT

## 2024-08-08 RX ORDER — REGADENOSON 0.08 MG/ML
0.4 INJECTION, SOLUTION INTRAVENOUS ONCE
Status: COMPLETED | OUTPATIENT
Start: 2024-08-08 | End: 2024-08-08

## 2024-08-08 RX ADMIN — REGADENOSON 0.4 MG: 0.08 INJECTION, SOLUTION INTRAVENOUS at 09:25

## 2024-08-12 ENCOUNTER — CLINICAL SUPPORT (OUTPATIENT)
Dept: CARDIOLOGY CLINIC | Facility: CLINIC | Age: 64
End: 2024-08-12
Payer: MEDICARE

## 2024-08-12 DIAGNOSIS — Z86.73 HISTORY OF CVA (CEREBROVASCULAR ACCIDENT): ICD-10-CM

## 2024-08-12 PROCEDURE — 93248 EXT ECG>7D<15D REV&INTERPJ: CPT | Performed by: INTERNAL MEDICINE

## 2024-08-14 ENCOUNTER — TELEPHONE (OUTPATIENT)
Age: 64
End: 2024-08-14

## 2024-09-10 ENCOUNTER — OFFICE VISIT (OUTPATIENT)
Dept: CARDIOLOGY CLINIC | Facility: CLINIC | Age: 64
End: 2024-09-10
Payer: MEDICARE

## 2024-09-10 VITALS
HEART RATE: 72 BPM | SYSTOLIC BLOOD PRESSURE: 112 MMHG | HEIGHT: 60 IN | BODY MASS INDEX: 25.52 KG/M2 | DIASTOLIC BLOOD PRESSURE: 70 MMHG | WEIGHT: 130 LBS

## 2024-09-10 DIAGNOSIS — I25.10 CORONARY ARTERY DISEASE INVOLVING NATIVE CORONARY ARTERY OF NATIVE HEART WITHOUT ANGINA PECTORIS: Primary | ICD-10-CM

## 2024-09-10 DIAGNOSIS — E78.5 DYSLIPIDEMIA: ICD-10-CM

## 2024-09-10 DIAGNOSIS — I10 ESSENTIAL HYPERTENSION: ICD-10-CM

## 2024-09-10 DIAGNOSIS — R53.82 CHRONIC FATIGUE: ICD-10-CM

## 2024-09-10 PROCEDURE — 99214 OFFICE O/P EST MOD 30 MIN: CPT | Performed by: INTERNAL MEDICINE

## 2024-09-10 NOTE — PROGRESS NOTES
Cardiology Office Note  MD Javi Mitchell MD, [facility]  Grady Knutson DO, [facility]  MD Shobha Talamantes DO, [facility]  Giorgi Shore DO, [facility]  [place] [address]    Socorro Briceño 64 y.o. female MRN: [id number]  Unit/Bed#:  Encounter: [id number]      History of Present Illness:  It was a pleasure to see Socorro Briceño in the office today for follow-up CV evaluation. She has a past medical history of CAD status post CABG, dyslipidemia, LVH and hypothyroid.  She establish care with me in July 2024.  Previously she followed with Dr. Flower.  The patient experienced acute onset chest discomfort in 2001 with symptoms requiring her to go to the emergency department.  She was found to have acute myocardial infarction at the time.  She was sent for cardiac catheterization demonstrating multivessel coronary disease.  Subsequently, she was sent for bypass surgery with unknown vessels being bypassed.  Following the bypass, the patient had been doing well overall.  She was seen in July 2023 due to confusion.  MRI of the brain was performed showing old left cerebellar infarct with old lacunar infarcts.  Nothing acute at the time.  She was seen by primary care and due to her changes in 2023, was referred back to cardiology office for evaluation.  She admitted to significant decrease in her physical activity and effort tolerance. Due to the patient's history of prior CVA in July 2023, fatigue as well as her history of CAD, she was sent for echocardiogram, stress test, event recorder and carotid Doppler in July and August 2024.  She is here today to review the results.    Denies chest pain, pressure, tightness or squeezing.  Denies lightheadedness, dizziness or palpitations.  Denies lower extremity swelling, orthopnea or paroxysmal nocturnal dyspnea.    Review of Systems:  Review of Systems    Constitutional: Positive for malaise/fatigue. Negative for decreased appetite, fever, weight gain and weight loss.   HENT:  Negative for congestion and sore throat.    Eyes:  Negative for visual disturbance.   Cardiovascular:  Negative for chest pain, dyspnea on exertion, leg swelling, near-syncope and palpitations.   Respiratory:  Negative for cough and shortness of breath.    Hematologic/Lymphatic: Negative for bleeding problem.   Skin:  Negative for rash.   Musculoskeletal:  Negative for myalgias and neck pain.   Gastrointestinal:  Negative for abdominal pain and nausea.   Neurological:  Negative for light-headedness and weakness.   Psychiatric/Behavioral:  Negative for depression.        Past Medical History:   Diagnosis Date    Acute MI ([facility]) 2012    MARIANELA (acute kidney injury) ([facility]) 2023    Anemia     Anxiety 2023    Bradycardia 2023    CAD (coronary artery disease)     Encounter for well adult exam with abnormal findings 2019    Failure to thrive in adult 2023    Hyperlipidemia     Hypertension     Mood disorder ([facility]) 2024    Myocardial infarction ([facility]) 2002    Severe protein-calorie malnutrition ([facility]) 2023    Thyroid disease     Ventricular fibrillation ([facility]) 2012       Past Surgical History:   Procedure Laterality Date    COLONOSCOPY  2013    CORONARY ARTERY BYPASS GRAFT  2003       Social History     Socioeconomic History    Marital status:      Spouse name: None    Number of children: None    Years of education: None    Highest education level: None   Occupational History    Occupation:  worker ;  to infant ages     Employer: OTHER     Comment: parttime    Tobacco Use    Smoking status: Former     Current packs/day: 0.00     Average packs/day: 0.3 packs/day for 22.0 years (5.5 ttl pk-yrs)     Types: Cigarettes     Start date: 1971     Quit date: 1993     Years since quittin.7     Passive exposure: Never     Smokeless tobacco: Never    Tobacco comments:     no passive smoke exposure   Vaping Use    Vaping status: Never Used   Substance and Sexual Activity    Alcohol use: Not Currently    Drug use: No    Sexual activity: Not Currently   Other Topics Concern    None   Social History Narrative    None     Social Determinants of Health     Financial Resource Strain: Low Risk  (9/20/2023)    Overall Financial Resource Strain (CARDIA)     Difficulty of Paying Living Expenses: Not hard at all   Food Insecurity: No Food Insecurity (3/18/2022)    Hunger Vital Sign     Worried About Running Out of Food in the Last Year: Never true     Ran Out of Food in the Last Year: Never true   Transportation Needs: No Transportation Needs (9/20/2023)    PRAPARE - Transportation     Lack of Transportation (Medical): No     Lack of Transportation (Non-Medical): No   Physical Activity: Insufficiently Active (3/20/2023)    Exercise Vital Sign     Days of Exercise per Week: 3 days     Minutes of Exercise per Session: 30 min   Stress: No Stress Concern Present (3/18/2022)    [employer] - Occupational Stress Questionnaire     Feeling of Stress : Not at all   Social Connections: Moderately Integrated (3/18/2022)    Social Connection and Isolation Panel [NHANES]     Frequency of Communication with Friends and Family: More than three times a week     Frequency of Social Gatherings with Friends and Family: More than three times a week     Attends [employer] Services: More than 4 times per year     Active Member of Clubs or Organizations: Yes     Attends Club or Organization Meetings: More than 4 times per year     Marital Status:    Intimate Partner Violence: Not At Risk (3/18/2022)    Humiliation, Afraid, Rape, and Kick questionnaire     Fear of Current or Ex-Partner: No     Emotionally Abused: No     Physically Abused: No     Sexually Abused: No   Housing Stability: Unknown (3/18/2022)    Housing Stability Vital  Sign     Unable to Pay for Housing in the Last Year: No     Number of Places Lived in the Last Year: Not on file     Unstable Housing in the Last Year: No       Family History   Problem Relation Age of Onset    Hypertension Mother     Stroke Mother         3 strokes    Heart attack Father     No Known Problems Sister     No Known Problems Daughter     No Known Problems Maternal Grandmother     No Known Problems Maternal Grandfather     No Known Problems Paternal Grandmother     No Known Problems Paternal Grandfather     No Known Problems Maternal Aunt     No Known Problems Maternal Aunt     No Known Problems Maternal Aunt     Breast cancer Paternal Aunt 52    No Known Problems Paternal Aunt        Allergies   Allergen Reactions    Cat Hair Extract Sneezing         Current Outpatient Medications:     aspirin 81 mg chewable tablet, Chew 1 tablet (81 mg total) daily, Disp: 30 tablet, Rfl: 2    atorvastatin (LIPITOR) 40 mg tablet, TAKE 1 TABLET BY MOUTH EVERY DAY WITH DINNER, Disp: 90 tablet, Rfl: 1    Blood Pressure KIT, Use in the morning, Disp: 1 kit, Rfl: 0    cholecalciferol (VITAMIN D3) 1,000 units tablet, Take 1 tablet (1,000 Units total) by mouth daily, Disp: 30 tablet, Rfl: 2    citalopram (CeleXA) 10 mg tablet, TAKE 1 TABLET BY MOUTH EVERY DAY, Disp: 90 tablet, Rfl: 1    docusate sodium (COLACE) 100 mg capsule, TAKE 1 CAPSULE BY MOUTH TWICE A DAY, Disp: 180 capsule, Rfl: 1    ezetimibe (ZETIA) 10 mg tablet, TAKE 1 TABLET BY MOUTH EVERY DAY, Disp: 90 tablet, Rfl: 1    levothyroxine 125 mcg tablet, Take 1 tablet (125 mcg total) by mouth daily in the early morning, Disp: 100 tablet, Rfl: 3    vitamin B-12 (VITAMIN B-12) 1,000 mcg tablet, Take 1 tablet (1,000 mcg total) by mouth daily, Disp: 90 tablet, Rfl: 1    Vitals:    09/10/24 1327   BP: 112/70   Pulse: 72   Weight: 59 kg (130 lb)   Height: 5' (1.524 m)       Body mass index is 25.39 kg/m².    PHYSICAL EXAMINATION:  Gen: Awake, Alert, NAD   Head/eyes: AT/NC,  pupils equal and round, Anicteric  ENT: mmm  Neck: Supple, No elevated JVP, trachea midline  Resp: CTA bilaterally no w/r/r  CV: RRR +S1, S2, No m/r/g  Abd: Soft, NT/ND + BS  Ext: no LE edema bilaterally  Neuro: Follows commands, moves all extermities  Psych: Appropriate affect, happy mood, pleasant attitude, non-combative  Skin: warm; no rash, erythema or venous stasis changes on exposed skin    --------------------------------------------------------------------------------  TREADMILL STRESS  No results found for this or any previous visit.     --------------------------------------------------------------------------------  NUCLEAR STRESS TEST: No results found for this or any previous visit.    No results found for this or any previous visit.      --------------------------------------------------------------------------------  CATH:  No results found for this or any previous visit.    --------------------------------------------------------------------------------  ECHO:   Results for orders placed during the hospital encounter of 18    Echo complete with contrast if indicated    [facility]  [address] [place] [address] [place] [address]  [phone]    Transthoracic Echocardiogram  2D, M-mode, Doppler, and Color Doppler    Study date:  04-Sep-2018    Patient: RACHANA MARTE  MR number: [id number]  Account number: [id number]  : 1960  Age: 58 years  Gender: Female  Status: Outpatient  Location: [facility]  Height: 60 in  Weight: 150 lb  BP: 120/ 68 mmHg    Indications: Known coronary artery disease.    Diagnoses: I25.83 - Coronary atherosclerosis due to lipid rich plaque    Sonographer:  FRANCESCA Cartwright  Primary Physician:  RASTA URBINA  Referring Physician:  RASTA URBINA  Group:  [facility]  Interpreting Physician:  DO SHERRI Persaud    LEFT VENTRICLE:  Systolic function was normal. Ejection fraction was  estimated to be 55 %.  There were no regional wall motion abnormalities.  Wall thickness was mildly increased.  Doppler parameters were consistent with abnormal left ventricular relaxation (grade 1 diastolic dysfunction).    MITRAL VALVE:  There was mild annular calcification.    HISTORY: PRIOR HISTORY: hypothyroid. Risk factors: hypertension and medication-treated hypercholesterolemia. PRIOR PROCEDURES: Stent. Coronary bypass.    PROCEDURE: The study was performed in the [facility]. This was a routine study. The transthoracic approach was used. The study included complete 2D imaging, M-mode, complete spectral Doppler, and color Doppler. The  heart rate was 53 bpm, at the start of the study. Image quality was adequate.    LEFT VENTRICLE: Size was normal. Systolic function was normal. Ejection fraction was estimated to be 55 %. There were no regional wall motion abnormalities. Wall thickness was mildly increased. DOPPLER: Doppler parameters were consistent  with abnormal left ventricular relaxation (grade 1 diastolic dysfunction).    RIGHT VENTRICLE: The size was normal. Systolic function was normal. Wall thickness was normal.    LEFT ATRIUM: Size was normal.    RIGHT ATRIUM: Size was normal.    MITRAL VALVE: There was mild annular calcification. DOPPLER: There was no evidence for stenosis. There was no regurgitation.    AORTIC VALVE: The valve was trileaflet. Leaflets exhibited normal thickness and normal cuspal separation. DOPPLER: Transaortic velocity was within the normal range. There was no evidence for stenosis. There was no regurgitation.    TRICUSPID VALVE: The valve structure was normal. There was normal leaflet separation. DOPPLER: The transtricuspid velocity was within the normal range. There was no evidence for stenosis. There was no regurgitation.    PULMONIC VALVE: Leaflets exhibited normal thickness, no calcification, and normal cuspal separation. DOPPLER: The transpulmonic velocity  was within the normal range. There was no regurgitation.    PERICARDIUM: There was no pericardial effusion. The pericardium was normal in appearance.    AORTA: The root exhibited normal size.    SYSTEMIC VEINS: IVC: The inferior vena cava was normal in size and course. Respirophasic changes were normal.    PULMONARY ARTERY: DOPPLER: The tricuspid jet envelope definition was inadequate for estimation of RV systolic pressure.    SYSTEM MEASUREMENT TABLES    2D  %FS: 29.8 %  Ao Diam: 3.4 cm  EDV(Teich): 73.7 ml  EF(Teich): 57.4 %  ESV(Teich): 31.4 ml  IVSd: 1.2 cm  LA Area: 12.9 cm2  LA Diam: 3.5 cm  LVEDV MOD A4C: 55 ml  LVEF MOD A4C: 63.6 %  LVESV MOD A4C: 20 ml  LVIDd: 4.1 cm  LVIDs: 2.9 cm  LVLd A4C: 7.2 cm  LVLs A4C: 6.4 cm  LVPWd: 1.2 cm  RA Area: 12.9 cm2  RVIDd: 2.5 cm  SV MOD A4C: 34.9 ml  SV(Teich): 42.3 ml    MM  TAPSE: 1.7 cm    PW  E': 0.1 m/s  E/E': 10.7  MV A Rogerio: 0.7 m/s  MV Dec Amelia: 2.9 m/s2  MV DecT: 234.2 ms  MV E Rogerio: 0.7 m/s  MV E/A Ratio: 0.9  MV PHT: 67.9 ms  MVA By PHT: 3.2 cm2    [facility]    Prepared and electronically signed by    Shobha Herrera DO  Signed 04-Sep-2018 17:02:35    LVEF 52%, moderate RAJANI, grade 1 diastolic dysfunction, mild MAC, mild MR/TR, May 2022    --------------------------------------------------------------------------------  HOLTER  No results found for this or any previous visit.    No results found for this or any previous visit.    --------------------------------------------------------------------------------  CAROTIDS  No results found for this or any previous visit.     --------------------------------------------------------------------------------  ECGs:  No results found for this visit on 09/10/24.       Lab Results   Component Value Date    WBC 3.97 (L) 04/15/2024    HGB 13.6 04/15/2024    HCT 40.3 04/15/2024    MCV 91 04/15/2024     04/15/2024      Lab Results   Component Value Date    SODIUM 144  "04/15/2024    K 4.2 04/15/2024     04/15/2024    CO2 29 04/15/2024    BUN 16 04/15/2024    CREATININE 0.62 04/15/2024    GLUC 138 04/15/2024    CALCIUM 9.7 04/15/2024      Lab Results   Component Value Date    HGBA1C 6.0 (H) 08/03/2023      Lab Results   Component Value Date    CHOL 154 12/21/2015    CHOL 190 03/16/2015    CHOL 179 06/04/2014     Lab Results   Component Value Date    HDL 41 (L) 08/03/2023    HDL 43 (L) 05/22/2023    HDL 52 01/11/2023     Lab Results   Component Value Date    LDLCALC 63 08/03/2023    LDLCALC 170 (H) 05/22/2023    LDLCALC 159 (H) 01/11/2023     Lab Results   Component Value Date    TRIG 84 08/03/2023    TRIG 99 05/22/2023    TRIG 172 (H) 01/11/2023     No results found for: \"CHOLHDL\"   Lab Results   Component Value Date    INR 1.10 08/17/2023    PROTIME 14.2 08/17/2023        1. Coronary artery disease involving native coronary artery of native heart without angina pectoris  -     Lipid Panel with Direct LDL reflex; Future; Expected date: 03/10/2025  2. Chronic fatigue  3. Essential hypertension  4. Dyslipidemia  -     Lipid Panel with Direct LDL reflex; Future; Expected date: 03/10/2025        IMPRESSION:    CAD  s/p CABG, 2001  Abnormal pharmacologic nuclear stress test with small basal inferolateral infarct without myocardial ischemia, gated EF 63%, August 2024  Fatigue  Hypertension  LVEF 60%, mild RAJANI, grade 1 diastolic dysfunction, AV sclerosis, trace AR, MAC, mild MR, trace TR, July 2024  Dyslipidemia  LVH  Hypothyroid  History of CVA with old left cerebellar infarct by MRI, July 2023  Event recorder w/ SR avg HR 59 bpm, rare PVCs, rare atrial couplets/triplets, rare PVCs, rare ventricular couplets/triplets, nonsustained PSVT x 8 with longest run 17.4 seconds, fastest run 169 bpm, triggered events correlated with SR, August 2024  Carotid stenosis < 50% bilaterally, July 2024    PLAN:  It was a pleasure to see Karlie in the office today for follow-up CV evaluation.  She is " here today to review the results of her stress test, echocardiogram, event recorder and carotid Doppler.  The stress test was found to show possible basal inferolateral infarct.  Upon independent review, this may be related to GI artifact.  The echocardiogram demonstrated normal left ventricular systolic function with only mild valvular regurgitation.  The event recorder showed sinus rhythm with rare atrial and ventricular ectopy.  She had a nonsustained run of PSVT with the longest being at 17.4 seconds at just over 100 bpm.  She did not feel any of her runs.  Carotid Doppler showed mild disease bilaterally.  I have shared with her the stable news.  She has no chest pain concerning for angina and no signs or symptoms of heart failure.  Clinically she examines to be euvolemic.  Blood pressure and heart rate are currently stable.  She is tolerating her current medications without any reported adverse effects.  The patient can perform greater than 4 METS on a daily basis without significant exertional symptoms.  Based on her clinical presentation, I have the following recommendations:    1.  Recommend monitoring of her heart rhythm.  We have discussed both implantable loop recorder as well as self monitoring with Apple Watch and [employer] mobile device.  Risks and benefits discussed and she would like to monitor herself currently with the Apple Watch.  Should she have any runs of significant arrhythmia, recommend calling the office immediately.  2.  Recommend a heart healthy diet low in sodium and carbohydrate.  3.  Would encourage 30 minutes a day, 5 days a week of moderate intensity activity to build cardiovascular endurance.  4.  Lifelong aspirin  5.  Continue high intensity statin and Zetia.  Goal LDL is less than 70 mg/dL.  Patient is currently to goal.  Repeat lipid panel has been ordered prior to follow-up.  6.  She is otherwise up-to-date with CV testing  7.  We will follow-up with her in 6 months to review her  "lipid panel.    As always, please do not hesitate to call with any questions.    Portions of the record may have been created with voice recognition software. Occasional wrong word or \"sound a like\" substitutions may have occurred due to the inherent limitations of voice recognition software. Read the chart carefully and recognize, using context, where substitutions have occurred.      Signed: Grady Knutson DO, FACC, FASE, FASNC, FACP  "

## 2024-09-23 ENCOUNTER — OFFICE VISIT (OUTPATIENT)
Dept: FAMILY MEDICINE CLINIC | Facility: CLINIC | Age: 64
End: 2024-09-23
Payer: MEDICARE

## 2024-09-23 VITALS
WEIGHT: 130 LBS | SYSTOLIC BLOOD PRESSURE: 130 MMHG | TEMPERATURE: 98.7 F | DIASTOLIC BLOOD PRESSURE: 70 MMHG | HEART RATE: 79 BPM | OXYGEN SATURATION: 97 % | BODY MASS INDEX: 25.52 KG/M2 | HEIGHT: 60 IN

## 2024-09-23 DIAGNOSIS — I25.10 CHRONIC CORONARY ARTERY DISEASE: ICD-10-CM

## 2024-09-23 DIAGNOSIS — Z23 NEED FOR INFLUENZA VACCINATION: ICD-10-CM

## 2024-09-23 DIAGNOSIS — Z00.00 MEDICARE ANNUAL WELLNESS VISIT, SUBSEQUENT: Primary | ICD-10-CM

## 2024-09-23 DIAGNOSIS — E03.9 ACQUIRED HYPOTHYROIDISM: ICD-10-CM

## 2024-09-23 DIAGNOSIS — Z23 ENCOUNTER FOR IMMUNIZATION: ICD-10-CM

## 2024-09-23 DIAGNOSIS — E55.9 VITAMIN D DEFICIENCY: ICD-10-CM

## 2024-09-23 DIAGNOSIS — H11.32 SUBCONJUNCTIVAL HEMORRHAGE OF LEFT EYE: ICD-10-CM

## 2024-09-23 DIAGNOSIS — E66.3 OVERWEIGHT (BMI 25.0-29.9): ICD-10-CM

## 2024-09-23 DIAGNOSIS — E78.2 MIXED HYPERLIPIDEMIA: ICD-10-CM

## 2024-09-23 PROCEDURE — 99214 OFFICE O/P EST MOD 30 MIN: CPT | Performed by: FAMILY MEDICINE

## 2024-09-23 PROCEDURE — G0438 PPPS, INITIAL VISIT: HCPCS | Performed by: FAMILY MEDICINE

## 2024-09-23 PROCEDURE — 90662 IIV NO PRSV INCREASED AG IM: CPT | Performed by: FAMILY MEDICINE

## 2024-09-23 PROCEDURE — G0008 ADMIN INFLUENZA VIRUS VAC: HCPCS | Performed by: FAMILY MEDICINE

## 2024-09-23 NOTE — ASSESSMENT & PLAN NOTE
Chronic asymptomatic fair control patient follow-up with the cardiology periodically she is already on aspirin and statin  Orders:    CBC and differential; Future    Comprehensive metabolic panel; Future

## 2024-09-23 NOTE — ASSESSMENT & PLAN NOTE
Advice and education were given regarding nutrition, aerobic exercises, weight-bearing exercises, cardiovascular risk reduction, fall risk reduction, and age-appropriate supplements.     The patient was counseled regarding instructions for management, risk factor reductions, prognosis, risks and benefits of treatment options, patient and family education, and importance of compliance with treatment.  Discussed with the patient proper immunization she will get flu shot today in the office to schedule an 2-week for COVID-vaccine discussed to take the Shingrix in the pharmacy secondary to coverage  Orders:    CBC and differential; Future    Comprehensive metabolic panel; Future

## 2024-09-23 NOTE — ASSESSMENT & PLAN NOTE
Orders:    CBC and differential; Future    Comprehensive metabolic panel; Future    Vitamin D 25 hydroxy; Future

## 2024-09-23 NOTE — ASSESSMENT & PLAN NOTE
New diagnosis on discussed the patient natural of the problem proper eye care review in case become painful or discharge to call the office  Orders:    CBC and differential; Future    Comprehensive metabolic panel; Future

## 2024-09-23 NOTE — PROGRESS NOTES
Ambulatory Visit  Name: Socorro Briceño      : 1960      MRN: [id number]  Encounter Provider: Ana Ashley MD  Encounter Date: 2024   Encounter department: [facility]  Medicare annual wellness visit, subsequent  Advice and education were given regarding nutrition, aerobic exercises, weight-bearing exercises, cardiovascular risk reduction, fall risk reduction, and age-appropriate supplements.     The patient was counseled regarding instructions for management, risk factor reductions, prognosis, risks and benefits of treatment options, patient and family education, and importance of compliance with treatment.  Discussed with the patient proper immunization she will get flu shot today in the office to schedule an 2-week for COVID-vaccine discussed to take the Shingrix in the pharmacy secondary to coverage  Orders:    CBC and differential; Future    Comprehensive metabolic panel; Future    Subconjunctival hemorrhage of left eye  New diagnosis on discussed the patient natural of the problem proper eye care review in case become painful or discharge to call the office  Orders:    CBC and differential; Future    Comprehensive metabolic panel; Future    Encounter for immunization  Benefit versus side effect reviewed with the patient  Orders:    Fluzone High-Dose 0.5 mL IM    CBC and differential; Future    Comprehensive metabolic panel; Future    Chronic coronary artery disease  Chronic asymptomatic fair control patient follow-up with the cardiology periodically she is already on aspirin and statin  Orders:    CBC and differential; Future    Comprehensive metabolic panel; Future    Overweight (BMI 25.0-29.9)  BMI today 25.3 discussed portion control low-carb low-fat diet increase physical activity       Need for influenza vaccination    Orders:    Fluzone High-Dose 0.5 mL IM    CBC and differential; Future    Comprehensive metabolic panel;  Future    Mixed hyperlipidemia    Orders:    CBC and differential; Future    Comprehensive metabolic panel; Future    Lipid Panel with Direct LDL reflex; Future    Vitamin D deficiency    Orders:    CBC and differential; Future    Comprehensive metabolic panel; Future    Vitamin D 25 hydroxy; Future    Acquired hypothyroidism    Orders:    CBC and differential; Future    Comprehensive metabolic panel; Future    TSH, 3rd generation with Free T4 reflex; Future       Preventive health issues were discussed with patient, and age appropriate screening tests were ordered as noted in patient's After Visit Summary. Personalized health advice and appropriate referrals for health education or preventive services given if needed, as noted in patient's After Visit Summary.    History of Present Illness     Patient here with her daughter for Medicare exam and  concerned about the red on the left eye she wake up with it she slept with her lenses denies any light sensitivity no tingling no pain with movement of the eyeball and no discharge no headache, patient history of coronary artery disease evaluated by cardiology recently note review and patient tolerated her medication well without side effect       Patient Care Team:  Ana Ashley MD as PCP - General (Family Medicine)  Ana Ashley MD as PCP - PCP-[facility] ([facility])  Ana Ashley MD as PCP - PCP-[place] ([facility])    Review of Systems   Constitutional:  Negative for chills and fever.   HENT:  Negative for ear pain and sore throat.    Eyes:  Negative for pain and visual disturbance.   Respiratory:  Negative for cough and shortness of breath.    Cardiovascular:  Negative for chest pain and palpitations.   Gastrointestinal:  Negative for abdominal pain, constipation, diarrhea and vomiting.   Genitourinary:  Negative for dysuria and hematuria.   Musculoskeletal:  Negative for arthralgias and back pain.   Skin:  Negative for color change and rash.   Neurological:  Negative  for seizures and syncope.   All other systems reviewed and are negative.    Medical History Reviewed by provider this encounter:  Tobacco  Allergies  Meds  Problems  Med Hx  Surg Hx  Fam Hx       Annual Wellness Visit Questionnaire   Socorro is here for her Subsequent Wellness visit. Last Medicare Wellness visit information reviewed, patient interviewed and updates made to the record today.      Health Risk Assessment:   Patient rates overall health as very good. Patient feels that their physical health rating is same. Patient is very satisfied with their life. Eyesight was rated as same. Hearing was rated as same. Patient feels that their emotional and mental health rating is same. Patients states they are never, rarely angry. Patient states they are sometimes unusually tired/fatigued. Pain experienced in the last 7 days has been none. Patient states that she has experienced no weight loss or gain in last 6 months.     Depression Screening:   PHQ-9 Score: 0      Fall Risk Screening:   In the past year, patient has experienced: no history of falling in past year      Urinary Incontinence Screening:   Patient has not leaked urine accidently in the last six months.     Home Safety:  Patient does not have trouble with stairs inside or outside of their home. Patient has working smoke alarms and has working carbon monoxide detector. Home safety hazards include: none.     Nutrition:   Current diet is Regular.     Medications:   Patient is currently taking over-the-counter supplements. OTC medications include: see medication list. Patient is able to manage medications.     Activities of Daily Living (ADLs)/Instrumental Activities of Daily Living (IADLs):   Walk and transfer into and out of bed and chair?: Yes  Dress and groom yourself?: Yes    Bathe or shower yourself?: Yes    Feed yourself? Yes  Do your laundry/housekeeping?: Yes  Manage your money, pay your bills and track your expenses?: Yes  Make your own  meals?: Yes    Do your own shopping?: Yes    Durable Medical Equipment Suppliers  none    Previous Hospitalizations:   Any hospitalizations or ED visits within the last 12 months?: No      Advance Care Planning:   Living will: No    Durable POA for healthcare: No    Advanced directive counseling given: No    ACP document given: Yes    Patient declined ACP directive: No    End of Life Decisions reviewed with patient: Yes    Provider agrees with end of life decisions: Yes      Cognitive Screening:   Provider or family/friend/caregiver concerned regarding cognition?: No    PREVENTIVE SCREENINGS      Cardiovascular Screening:    General: Screening Not Indicated and History Lipid Disorder      Diabetes Screening:     General: Screening Current      Colorectal Cancer Screening:     General: Screening Current      Breast Cancer Screening:     General: Screening Current      Cervical Cancer Screening:    General: Screening Not Indicated      Osteoporosis Screening:    General: Screening Current      Abdominal Aortic Aneurysm (AAA) Screening:        General: Screening Not Indicated      Lung Cancer Screening:     General: Screening Not Indicated      Hepatitis C Screening:    General: Screening Current    Screening, Brief Intervention, and Referral to Treatment (SBIRT)    Screening  Typical number of drinks in a day: 0  Typical number of drinks in a week: 0  Interpretation: Low risk drinking behavior.    AUDIT-C Screenin) How often did you have a drink containing alcohol in the past year? never  2) How many drinks did you have on a typical day when you were drinking in the past year? 0  3) How often did you have 6 or more drinks on one occasion in the past year? never    AUDIT-C Score: 0  Interpretation: Score 0-2 (female): Negative screen for alcohol misuse    Single Item Drug Screening:  How often have you used an illegal drug (including marijuana) or a prescription medication for non-medical reasons in the past year?  never    Single Item Drug Screen Score: 0  Interpretation: Negative screen for possible drug use disorder    Brief Intervention  Alcohol & drug use screenings were reviewed. No concerns regarding substance use disorder identified.     Social Determinants of Health     Financial Resource Strain: Low Risk  (9/20/2023)    Overall Financial Resource Strain (CARDIA)     Difficulty of Paying Living Expenses: Not hard at all   Food Insecurity: No Food Insecurity (9/23/2024)    Hunger Vital Sign     Worried About Running Out of Food in the Last Year: Never true     Ran Out of Food in the Last Year: Never true   Transportation Needs: No Transportation Needs (9/23/2024)    PRAPARE - Transportation     Lack of Transportation (Medical): No     Lack of Transportation (Non-Medical): No   Housing Stability: Low Risk  (9/23/2024)    Housing Stability Vital Sign     Unable to Pay for Housing in the Last Year: No     Number of Times Moved in the Last Year: 1     Homeless in the Last Year: No   Utilities: Not At Risk (9/23/2024)    [facility] Utilities     Threatened with loss of utilities: No     No results found.    Objective     /70 (BP Location: Left arm, Patient Position: Sitting)   Pulse 79   Temp 98.7 °F (37.1 °C) (Tympanic)   Ht 5' (1.524 m)   Wt 59 kg (130 lb)   LMP  (LMP Unknown)   SpO2 97%   Breastfeeding No   BMI 25.39 kg/m²     Physical Exam  Vitals and nursing note reviewed.   Constitutional:       General: She is not in acute distress.     Appearance: She is well-developed. She is not diaphoretic.   HENT:      Head: Normocephalic.      Right Ear: Tympanic membrane and external ear normal.      Left Ear: Tympanic membrane and external ear normal.      Nose: No rhinorrhea.      Mouth/Throat:      Pharynx: No posterior oropharyngeal erythema.   Eyes:      General:         Right eye: No discharge.         Left eye: No discharge.      Conjunctiva/sclera: Conjunctivae normal.   Neck:      Vascular: No JVD.    Cardiovascular:      Rate and Rhythm: Normal rate and regular rhythm.      Heart sounds: Murmur heard.      No gallop.   Pulmonary:      Effort: Pulmonary effort is normal. No respiratory distress.      Breath sounds: Normal breath sounds. No wheezing.   Abdominal:      General: There is no distension.      Palpations: Abdomen is soft.      Tenderness: There is no abdominal tenderness. There is no rebound.   Musculoskeletal:         General: No tenderness.      Cervical back: Normal range of motion and neck supple.   Lymphadenopathy:      Cervical: No cervical adenopathy.   Skin:     General: Skin is warm.      Findings: No rash.   Neurological:      Mental Status: She is alert and oriented to person, place, and time.

## 2024-09-23 NOTE — ASSESSMENT & PLAN NOTE
Benefit versus side effect reviewed with the patient  Orders:    Fluzone High-Dose 0.5 mL IM    CBC and differential; Future    Comprehensive metabolic panel; Future

## 2024-09-23 NOTE — ASSESSMENT & PLAN NOTE
Orders:    CBC and differential; Future    Comprehensive metabolic panel; Future    TSH, 3rd generation with Free T4 reflex; Future

## 2024-09-23 NOTE — PATIENT INSTRUCTIONS
Medicare Preventive Visit Patient Instructions  Thank you for completing your Welcome to Medicare Visit or Medicare Annual Wellness Visit today. Your next wellness visit will be due in one year (9/24/2025).  The screening/preventive services that you may require over the next 5-10 years are detailed below. Some tests may not apply to you based off risk factors and/or age. Screening tests ordered at today's visit but not completed yet may show as past due. Also, please note that scanned in results may not display below.  Preventive Screenings:  Service Recommendations Previous Testing/Comments   Colorectal Cancer Screening  * Colonoscopy    * Fecal Occult Blood Test (FOBT)/Fecal Immunochemical Test (FIT)  * Fecal DNA/Cologuard Test  * Flexible Sigmoidoscopy Age: 45-75 years old   Colonoscopy: every 10 years (may be performed more frequently if at higher risk)  OR  FOBT/FIT: every 1 year  OR  Cologuard: every 3 years  OR  Sigmoidoscopy: every 5 years  Screening may be recommended earlier than age 45 if at higher risk for colorectal cancer. Also, an individualized decision between you and your healthcare provider will decide whether screening between the ages of 76-85 would be appropriate. Colonoscopy: Not on file  FOBT/FIT: 08/19/2023  Cologuard: 03/30/2022  Sigmoidoscopy: Not on file    Screening Current     Breast Cancer Screening Age: 40+ years old  Frequency: every 1-2 years  Not required if history of left and right mastectomy Mammogram: 04/15/2024    Screening Current   Cervical Cancer Screening Between the ages of 21-29, pap smear recommended once every 3 years.   Between the ages of 30-65, can perform pap smear with HPV co-testing every 5 years.   Recommendations may differ for women with a history of total hysterectomy, cervical cancer, or abnormal pap smears in past. Pap Smear: Not on file    Screening Not Indicated   Hepatitis C Screening Once for adults born between 1945 and 1965  More frequently in  patients at high risk for Hepatitis C Hep C Antibody: 02/26/2019    Screening Current   Diabetes Screening 1-2 times per year if you're at risk for diabetes or have pre-diabetes Fasting glucose: 93 mg/dL (5/22/2023)  A1C: 6.0 % (8/3/2023)  Screening Current   Cholesterol Screening Once every 5 years if you don't have a lipid disorder. May order more often based on risk factors. Lipid panel: 08/03/2023    Screening Not Indicated  History Lipid Disorder     Other Preventive Screenings Covered by Medicare:  Abdominal Aortic Aneurysm (AAA) Screening: covered once if your at risk. You're considered to be at risk if you have a family history of AAA.  Lung Cancer Screening: covers low dose CT scan once per year if you meet all of the following conditions: (1) Age 55-77; (2) No signs or symptoms of lung cancer; (3) Current smoker or have quit smoking within the last 15 years; (4) You have a tobacco smoking history of at least 20 pack years (packs per day multiplied by number of years you smoked); (5) You get a written order from a healthcare provider.  Glaucoma Screening: covered annually if you're considered high risk: (1) You have diabetes OR (2) Family history of glaucoma OR (3)  aged 50 and older OR (4)  American aged 65 and older  Osteoporosis Screening: covered every 2 years if you meet one of the following conditions: (1) You're estrogen deficient and at risk for osteoporosis based off medical history and other findings; (2) Have a vertebral abnormality; (3) On glucocorticoid therapy for more than 3 months; (4) Have primary hyperparathyroidism; (5) On osteoporosis medications and need to assess response to drug therapy.   Last bone density test (DXA Scan): 04/18/2024.  HIV Screening: covered annually if you're between the age of 15-65. Also covered annually if you are younger than 15 and older than 65 with risk factors for HIV infection. For pregnant patients, it is covered up to 3 times per  pregnancy.    Immunizations:  Immunization Recommendations   Influenza Vaccine Annual influenza vaccination during flu season is recommended for all persons aged >= 6 months who do not have contraindications   Pneumococcal Vaccine   * Pneumococcal conjugate vaccine = PCV13 (Prevnar 13), PCV15 (Vaxneuvance), PCV20 (Prevnar 20)  * Pneumococcal polysaccharide vaccine = PPSV23 (Pneumovax) Adults 19-65 yo with certain risk factors or if 65+ yo  If never received any pneumonia vaccine: recommend Prevnar 20 (PCV20)  Give PCV20 if previously received 1 dose of PCV13 or PPSV23   Hepatitis B Vaccine 3 dose series if at intermediate or high risk (ex: diabetes, end stage renal disease, liver disease)   Respiratory syncytial virus (RSV) Vaccine - COVERED BY MEDICARE PART D  * RSVPreF3 (Arexvy) CDC recommends that adults 60 years of age and older may receive a single dose of RSV vaccine using shared clinical decision-making (SCDM)   Tetanus (Td) Vaccine - COST NOT COVERED BY MEDICARE PART B Following completion of primary series, a booster dose should be given every 10 years to maintain immunity against tetanus. Td may also be given as tetanus wound prophylaxis.   Tdap Vaccine - COST NOT COVERED BY MEDICARE PART B Recommended at least once for all adults. For pregnant patients, recommended with each pregnancy.   Shingles Vaccine (Shingrix) - COST NOT COVERED BY MEDICARE PART B  2 shot series recommended in those 19 years and older who have or will have weakened immune systems or those 50 years and older     Health Maintenance Due:      Topic Date Due   • Colorectal Cancer Screening  03/30/2025   • Breast Cancer Screening: Mammogram  04/15/2025   • Hepatitis C Screening  Completed     Immunizations Due:      Topic Date Due   • COVID-19 Vaccine (3 - 2023-24 season) 09/01/2024   • Influenza Vaccine (1) 09/01/2024     Advance Directives   What are advance directives?  Advance directives are legal documents that state your wishes and  plans for medical care. These plans are made ahead of time in case you lose your ability to make decisions for yourself. Advance directives can apply to any medical decision, such as the treatments you want, and if you want to donate organs.   What are the types of advance directives?  There are many types of advance directives, and each state has rules about how to use them. You may choose a combination of any of the following:  Living will:  This is a written record of the treatment you want. You can also choose which treatments you do not want, which to limit, and which to stop at a certain time. This includes surgery, medicine, IV fluid, and tube feedings.   Durable power of  for healthcare (DPAHC):  This is a written record that states who you want to make healthcare choices for you when you are unable to make them for yourself. This person, called a proxy, is usually a family member or a friend. You may choose more than 1 proxy.  Do not resuscitate (DNR) order:  A DNR order is used in case your heart stops beating or you stop breathing. It is a request not to have certain forms of treatment, such as CPR. A DNR order may be included in other types of advance directives.  Medical directive:  This covers the care that you want if you are in a coma, near death, or unable to make decisions for yourself. You can list the treatments you want for each condition. Treatment may include pain medicine, surgery, blood transfusions, dialysis, IV or tube feedings, and a ventilator (breathing machine).  Values history:  This document has questions about your views, beliefs, and how you feel and think about life. This information can help others choose the care that you would choose.  Why are advance directives important?  An advance directive helps you control your care. Although spoken wishes may be used, it is better to have your wishes written down. Spoken wishes can be misunderstood, or not followed. Treatments  may be given even if you do not want them. An advance directive may make it easier for your family to make difficult choices about your care.   Weight Management   Why it is important to manage your weight:  Being overweight increases your risk of health conditions such as heart disease, high blood pressure, type 2 diabetes, and certain types of cancer. It can also increase your risk for osteoarthritis, sleep apnea, and other respiratory problems. Aim for a slow, steady weight loss. Even a small amount of weight loss can lower your risk of health problems.  How to lose weight safely:  A safe and healthy way to lose weight is to eat fewer calories and get regular exercise. You can lose up about 1 pound a week by decreasing the number of calories you eat by 500 calories each day.   Healthy meal plan for weight management:  A healthy meal plan includes a variety of foods, contains fewer calories, and helps you stay healthy. A healthy meal plan includes the following:  Eat whole-grain foods more often.  A healthy meal plan should contain fiber. Fiber is the part of grains, fruits, and vegetables that is not broken down by your body. Whole-grain foods are healthy and provide extra fiber in your diet. Some examples of whole-grain foods are whole-wheat breads and pastas, oatmeal, brown rice, and bulgur.  Eat a variety of vegetables every day.  Include dark, leafy greens such as spinach, kale, blaine greens, and mustard greens. Eat yellow and orange vegetables such as carrots, sweet potatoes, and winter squash.   Eat a variety of fruits every day.  Choose fresh or canned fruit (canned in its own juice or light syrup) instead of juice. Fruit juice has very little or no fiber.  Eat low-fat dairy foods.  Drink fat-free (skim) milk or 1% milk. Eat fat-free yogurt and low-fat cottage cheese. Try low-fat cheeses such as mozzarella and other reduced-fat cheeses.  Choose meat and other protein foods that are low in fat.  Choose  beans or other legumes such as split peas or lentils. Choose fish, skinless poultry (chicken or turkey), or lean cuts of red meat (beef or pork). Before you cook meat or poultry, cut off any visible fat.   Use less fat and oil.  Try baking foods instead of frying them. Add less fat, such as margarine, sour cream, regular salad dressing and mayonnaise to foods. Eat fewer high-fat foods. Some examples of high-fat foods include french fries, doughnuts, ice cream, and cakes.  Eat fewer sweets.  Limit foods and drinks that are high in sugar. This includes candy, cookies, regular soda, and sweetened drinks.  Exercise:  Exercise at least 30 minutes per day on most days of the week. Some examples of exercise include walking, biking, dancing, and swimming. You can also fit in more physical activity by taking the stairs instead of the elevator or parking farther away from stores. Ask your healthcare provider about the best exercise plan for you.      © Copyright [employer] 2018 Information is for End User's use only and may not be sold, redistributed or otherwise used for commercial purposes. All illustrations and images included in CareNotes® are the copyrighted property of [employer] or [employer]

## 2024-10-02 DIAGNOSIS — E78.2 MIXED HYPERLIPIDEMIA: ICD-10-CM

## 2024-10-02 RX ORDER — ATORVASTATIN CALCIUM 40 MG/1
40 TABLET, FILM COATED ORAL
Qty: 30 TABLET | Refills: 0 | Status: SHIPPED | OUTPATIENT
Start: 2024-10-02

## 2024-10-07 ENCOUNTER — APPOINTMENT (OUTPATIENT)
Dept: LAB | Facility: CLINIC | Age: 64
End: 2024-10-07
Payer: MEDICARE

## 2024-10-07 DIAGNOSIS — E78.2 MIXED HYPERLIPIDEMIA: ICD-10-CM

## 2024-10-07 DIAGNOSIS — Z23 NEED FOR INFLUENZA VACCINATION: ICD-10-CM

## 2024-10-07 DIAGNOSIS — F01.53 VASCULAR DEMENTIA WITH MOOD DISTURBANCE, UNSPECIFIED DEMENTIA SEVERITY (HCC): ICD-10-CM

## 2024-10-07 DIAGNOSIS — Z23 ENCOUNTER FOR IMMUNIZATION: ICD-10-CM

## 2024-10-07 DIAGNOSIS — E55.9 VITAMIN D DEFICIENCY: ICD-10-CM

## 2024-10-07 DIAGNOSIS — H11.32 SUBCONJUNCTIVAL HEMORRHAGE OF LEFT EYE: ICD-10-CM

## 2024-10-07 DIAGNOSIS — Z00.00 MEDICARE ANNUAL WELLNESS VISIT, SUBSEQUENT: ICD-10-CM

## 2024-10-07 DIAGNOSIS — I25.10 CHRONIC CORONARY ARTERY DISEASE: ICD-10-CM

## 2024-10-07 DIAGNOSIS — E03.9 ACQUIRED HYPOTHYROIDISM: ICD-10-CM

## 2024-10-07 DIAGNOSIS — Z13.220 SCREENING, LIPID: ICD-10-CM

## 2024-10-07 LAB
25(OH)D3 SERPL-MCNC: 33.2 NG/ML (ref 30–100)
ALBUMIN SERPL BCG-MCNC: 4.6 G/DL (ref 3.5–5)
ALP SERPL-CCNC: 86 U/L (ref 34–104)
ALT SERPL W P-5'-P-CCNC: 25 U/L (ref 7–52)
ANION GAP SERPL CALCULATED.3IONS-SCNC: 8 MMOL/L (ref 4–13)
AST SERPL W P-5'-P-CCNC: 19 U/L (ref 13–39)
BASOPHILS # BLD AUTO: 0.03 THOUSANDS/ΜL (ref 0–0.1)
BASOPHILS NFR BLD AUTO: 1 % (ref 0–1)
BILIRUB SERPL-MCNC: 0.86 MG/DL (ref 0.2–1)
BUN SERPL-MCNC: 16 MG/DL (ref 5–25)
CALCIUM SERPL-MCNC: 10.2 MG/DL (ref 8.4–10.2)
CHLORIDE SERPL-SCNC: 107 MMOL/L (ref 96–108)
CHOLEST SERPL-MCNC: 142 MG/DL
CO2 SERPL-SCNC: 25 MMOL/L (ref 21–32)
CREAT SERPL-MCNC: 0.57 MG/DL (ref 0.6–1.3)
EOSINOPHIL # BLD AUTO: 0.09 THOUSAND/ΜL (ref 0–0.61)
EOSINOPHIL NFR BLD AUTO: 2 % (ref 0–6)
ERYTHROCYTE [DISTWIDTH] IN BLOOD BY AUTOMATED COUNT: 11.7 % (ref 11.6–15.1)
GFR SERPL CREATININE-BSD FRML MDRD: 96 ML/MIN/1.73SQ M
GLUCOSE P FAST SERPL-MCNC: 99 MG/DL (ref 65–99)
HCT VFR BLD AUTO: 40.8 % (ref 34.8–46.1)
HDLC SERPL-MCNC: 43 MG/DL
HGB BLD-MCNC: 13.9 G/DL (ref 11.5–15.4)
IMM GRANULOCYTES # BLD AUTO: 0.03 THOUSAND/UL (ref 0–0.2)
IMM GRANULOCYTES NFR BLD AUTO: 1 % (ref 0–2)
LDLC SERPL CALC-MCNC: 81 MG/DL (ref 0–100)
LYMPHOCYTES # BLD AUTO: 1.34 THOUSANDS/ΜL (ref 0.6–4.47)
LYMPHOCYTES NFR BLD AUTO: 23 % (ref 14–44)
MCH RBC QN AUTO: 31 PG (ref 26.8–34.3)
MCHC RBC AUTO-ENTMCNC: 34.1 G/DL (ref 31.4–37.4)
MCV RBC AUTO: 91 FL (ref 82–98)
MONOCYTES # BLD AUTO: 0.43 THOUSAND/ΜL (ref 0.17–1.22)
MONOCYTES NFR BLD AUTO: 7 % (ref 4–12)
NEUTROPHILS # BLD AUTO: 3.88 THOUSANDS/ΜL (ref 1.85–7.62)
NEUTS SEG NFR BLD AUTO: 66 % (ref 43–75)
NRBC BLD AUTO-RTO: 0 /100 WBCS
PLATELET # BLD AUTO: 247 THOUSANDS/UL (ref 149–390)
PMV BLD AUTO: 10.2 FL (ref 8.9–12.7)
POTASSIUM SERPL-SCNC: 4.4 MMOL/L (ref 3.5–5.3)
PROT SERPL-MCNC: 7.3 G/DL (ref 6.4–8.4)
RBC # BLD AUTO: 4.48 MILLION/UL (ref 3.81–5.12)
SODIUM SERPL-SCNC: 140 MMOL/L (ref 135–147)
T4 FREE SERPL-MCNC: 1.22 NG/DL (ref 0.61–1.12)
TRIGL SERPL-MCNC: 89 MG/DL
TSH SERPL DL<=0.05 MIU/L-ACNC: 0.03 UIU/ML (ref 0.45–4.5)
WBC # BLD AUTO: 5.8 THOUSAND/UL (ref 4.31–10.16)

## 2024-10-07 PROCEDURE — 80053 COMPREHEN METABOLIC PANEL: CPT

## 2024-10-07 PROCEDURE — 82306 VITAMIN D 25 HYDROXY: CPT

## 2024-10-07 PROCEDURE — 84443 ASSAY THYROID STIM HORMONE: CPT

## 2024-10-07 PROCEDURE — 80061 LIPID PANEL: CPT

## 2024-10-07 PROCEDURE — 36415 COLL VENOUS BLD VENIPUNCTURE: CPT

## 2024-10-07 PROCEDURE — 84439 ASSAY OF FREE THYROXINE: CPT

## 2024-10-07 PROCEDURE — 85025 COMPLETE CBC W/AUTO DIFF WBC: CPT

## 2024-10-09 ENCOUNTER — OFFICE VISIT (OUTPATIENT)
Dept: FAMILY MEDICINE CLINIC | Facility: CLINIC | Age: 64
End: 2024-10-09
Payer: MEDICARE

## 2024-10-09 VITALS
SYSTOLIC BLOOD PRESSURE: 110 MMHG | WEIGHT: 134 LBS | DIASTOLIC BLOOD PRESSURE: 80 MMHG | OXYGEN SATURATION: 97 % | HEIGHT: 60 IN | HEART RATE: 66 BPM | TEMPERATURE: 99.6 F | BODY MASS INDEX: 26.31 KG/M2

## 2024-10-09 DIAGNOSIS — E03.9 ACQUIRED HYPOTHYROIDISM: Primary | ICD-10-CM

## 2024-10-09 DIAGNOSIS — Z23 NEED FOR COVID-19 VACCINE: ICD-10-CM

## 2024-10-09 DIAGNOSIS — R73.01 IMPAIRED FASTING GLUCOSE: ICD-10-CM

## 2024-10-09 DIAGNOSIS — E78.2 MIXED HYPERLIPIDEMIA: ICD-10-CM

## 2024-10-09 PROCEDURE — 91320 SARSCV2 VAC 30MCG TRS-SUC IM: CPT | Performed by: FAMILY MEDICINE

## 2024-10-09 PROCEDURE — 99214 OFFICE O/P EST MOD 30 MIN: CPT | Performed by: FAMILY MEDICINE

## 2024-10-09 PROCEDURE — 90480 ADMN SARSCOV2 VAC 1/ONLY CMP: CPT | Performed by: FAMILY MEDICINE

## 2024-10-09 RX ORDER — LEVOTHYROXINE SODIUM 100 UG/1
100 TABLET ORAL
Qty: 100 TABLET | Refills: 0 | Status: SHIPPED | OUTPATIENT
Start: 2024-10-09

## 2024-10-11 NOTE — ASSESSMENT & PLAN NOTE
Chronic asymptomatic TSH not therapeutic recommend to decrease levothyroxine to 100 mcg once a day proper use review    Orders:    levothyroxine 100 mcg tablet; Take 1 tablet (100 mcg total) by mouth daily in the early morning    TSH, 3rd generation with Free T4 reflex; Future

## 2024-10-11 NOTE — PROGRESS NOTES
Ambulatory Visit  Name: Socorro Briceño      : 1960      MRN: [id number]  Encounter Provider: Ana Ashley MD  Encounter Date: 10/9/2024   Encounter department: [facility]      Assessment & Plan  Acquired hypothyroidism  Chronic asymptomatic TSH not therapeutic recommend to decrease levothyroxine to 100 mcg once a day proper use review    Orders:    levothyroxine 100 mcg tablet; Take 1 tablet (100 mcg total) by mouth daily in the early morning    TSH, 3rd generation with Free T4 reflex; Future    Need for COVID-19 vaccine  Benefit versus side effect of COVID-vaccine discussed with the patient  Orders:    COVID-19 Pfizer mRNA vaccine 12 yr and older (Comirnaty pre-filled syringe)    Impaired fasting glucose  Chronic asymptomatic fair control encourage patient to continue with a low-carb diet         Mixed hyperlipidemia  Chronic asymptomatic fair control continue atorvastatin 40 mg once a day Zetia 10 mg once a day              History of Present Illness     Patient here with her daughter follow-up with a chronic condition patient compliant with the medication tolerated well without side effect no new concern recent blood work discussed with the patient      Review of Systems   Constitutional:  Negative for chills and fever.   HENT:  Negative for ear pain and sore throat.    Eyes:  Negative for pain and visual disturbance.   Respiratory:  Negative for cough and shortness of breath.    Cardiovascular:  Negative for chest pain and palpitations.   Gastrointestinal:  Negative for abdominal pain, constipation and vomiting.   Genitourinary:  Negative for dysuria and hematuria.   Skin:  Negative for color change and rash.   Neurological:  Negative for seizures and syncope.   All other systems reviewed and are negative.    Objective     /80 (BP Location: Left arm, Patient Position: Sitting)   Pulse 66   Temp 99.6 °F (37.6 °C) (Tympanic)   Ht 5' (1.524 m)   Wt 60.8 kg  (134 lb)   LMP  (LMP Unknown)   SpO2 97%   Breastfeeding No   BMI 26.17 kg/m²     Physical Exam  Vitals and nursing note reviewed.   Constitutional:       General: She is not in acute distress.     Appearance: She is well-developed. She is not diaphoretic.   HENT:      Head: Normocephalic.      Right Ear: Tympanic membrane and external ear normal.      Left Ear: Tympanic membrane and external ear normal.      Nose: No rhinorrhea.      Mouth/Throat:      Pharynx: No posterior oropharyngeal erythema.   Eyes:      General:         Right eye: No discharge.         Left eye: No discharge.      Conjunctiva/sclera: Conjunctivae normal.   Neck:      Vascular: No JVD.   Cardiovascular:      Rate and Rhythm: Normal rate and regular rhythm.      Heart sounds: Murmur heard.      No gallop.   Pulmonary:      Effort: Pulmonary effort is normal. No respiratory distress.      Breath sounds: Normal breath sounds. No wheezing.   Abdominal:      General: There is no distension.      Palpations: Abdomen is soft.      Tenderness: There is no abdominal tenderness. There is no rebound.   Musculoskeletal:         General: No tenderness.      Cervical back: Normal range of motion and neck supple.   Lymphadenopathy:      Cervical: No cervical adenopathy.   Skin:     General: Skin is warm.      Findings: No rash.   Neurological:      Mental Status: She is alert and oriented to person, place, and time.         Ana Ashley MD

## 2024-10-11 NOTE — ASSESSMENT & PLAN NOTE
Chronic asymptomatic fair control continue atorvastatin 40 mg once a day Zetia 10 mg once a day

## 2024-10-16 DIAGNOSIS — F32.4 MDD (MAJOR DEPRESSIVE DISORDER), SINGLE EPISODE, IN PARTIAL REMISSION (HCC): ICD-10-CM

## 2024-10-16 DIAGNOSIS — F41.9 ANXIETY: ICD-10-CM

## 2024-10-16 DIAGNOSIS — E78.2 MIXED HYPERLIPIDEMIA: ICD-10-CM

## 2024-10-16 RX ORDER — EZETIMIBE 10 MG/1
10 TABLET ORAL DAILY
Qty: 90 TABLET | Refills: 1 | Status: SHIPPED | OUTPATIENT
Start: 2024-10-16

## 2024-10-16 RX ORDER — CITALOPRAM HYDROBROMIDE 10 MG/1
10 TABLET ORAL DAILY
Qty: 30 TABLET | Refills: 0 | Status: SHIPPED | OUTPATIENT
Start: 2024-10-16

## 2024-10-23 PROBLEM — Z00.00 MEDICARE ANNUAL WELLNESS VISIT, SUBSEQUENT: Status: RESOLVED | Noted: 2024-09-23 | Resolved: 2024-10-23

## 2024-10-30 DIAGNOSIS — E78.2 MIXED HYPERLIPIDEMIA: ICD-10-CM

## 2024-10-30 RX ORDER — ATORVASTATIN CALCIUM 40 MG/1
40 TABLET, FILM COATED ORAL
Qty: 90 TABLET | Refills: 1 | Status: SHIPPED | OUTPATIENT
Start: 2024-10-30

## 2024-11-15 DIAGNOSIS — F41.9 ANXIETY: ICD-10-CM

## 2024-11-15 DIAGNOSIS — F32.4 MDD (MAJOR DEPRESSIVE DISORDER), SINGLE EPISODE, IN PARTIAL REMISSION (HCC): ICD-10-CM

## 2024-11-17 RX ORDER — CITALOPRAM HYDROBROMIDE 10 MG/1
10 TABLET ORAL DAILY
Qty: 90 TABLET | Refills: 1 | Status: SHIPPED | OUTPATIENT
Start: 2024-11-17

## 2025-01-07 ENCOUNTER — TELEPHONE (OUTPATIENT)
Age: 65
End: 2025-01-07

## 2025-01-07 NOTE — TELEPHONE ENCOUNTER
Left message  w/patient regarding r/s 1 yr follow up appointment from 6/19 to 7/31 due to provider out of office.

## 2025-01-09 ENCOUNTER — TELEPHONE (OUTPATIENT)
Age: 65
End: 2025-01-09

## 2025-01-09 DIAGNOSIS — E03.9 ACQUIRED HYPOTHYROIDISM: ICD-10-CM

## 2025-01-10 RX ORDER — LEVOTHYROXINE SODIUM 100 UG/1
100 TABLET ORAL
Qty: 90 TABLET | Refills: 1 | Status: SHIPPED | OUTPATIENT
Start: 2025-01-10

## 2025-01-11 NOTE — TELEPHONE ENCOUNTER
Letter completed and tried faxing however fax is not going thru I did verify fax # however still busy original letter is available for  at     Patient discharged home with parent/guardian. Patient acting age appropriately, respirations regular and unlabored, cap refill less than two seconds. Skin pink, dry and warm. Lungs clear bilaterally. Patient has tolerated PO in the ED. No further complaints at this time. Parent/guardian verbalized understanding of discharge paperwork and has no further questions at this time.    Education provided about continuation of care, follow up care and medication administration. Parent/guardian able to provided teach back about discharge instructions.   Education provided on infection prevention and control including proper hand hygiene and isolating while sick.

## 2025-01-13 ENCOUNTER — APPOINTMENT (OUTPATIENT)
Dept: LAB | Facility: CLINIC | Age: 65
End: 2025-01-13
Payer: MEDICARE

## 2025-01-13 DIAGNOSIS — E03.9 ACQUIRED HYPOTHYROIDISM: ICD-10-CM

## 2025-01-13 LAB — TSH SERPL DL<=0.05 MIU/L-ACNC: 0.76 UIU/ML (ref 0.45–4.5)

## 2025-01-13 PROCEDURE — 84443 ASSAY THYROID STIM HORMONE: CPT

## 2025-01-13 PROCEDURE — 36415 COLL VENOUS BLD VENIPUNCTURE: CPT

## 2025-01-13 NOTE — TELEPHONE ENCOUNTER
Patient called requesting refill for levothyroxine 100 mcg tablet. Patient made aware medication was refilled on 1/10/2025 for 90 tablets with 1 refills to [facility]. Patient instructed to contact the pharmacy to obtain refills of medication. Patient verbalized understanding.      E-Prescribing Status: Receipt confirmed by pharmacy (1/10/2025  8:56 PM EST)

## 2025-01-15 ENCOUNTER — OFFICE VISIT (OUTPATIENT)
Dept: FAMILY MEDICINE CLINIC | Facility: CLINIC | Age: 65
End: 2025-01-15
Payer: MEDICARE

## 2025-01-15 VITALS
DIASTOLIC BLOOD PRESSURE: 80 MMHG | SYSTOLIC BLOOD PRESSURE: 130 MMHG | TEMPERATURE: 98 F | BODY MASS INDEX: 26.9 KG/M2 | WEIGHT: 137 LBS | HEART RATE: 64 BPM | OXYGEN SATURATION: 98 % | HEIGHT: 60 IN

## 2025-01-15 DIAGNOSIS — F33.42 MDD (MAJOR DEPRESSIVE DISORDER), RECURRENT, IN FULL REMISSION (HCC): ICD-10-CM

## 2025-01-15 DIAGNOSIS — F01.53 VASCULAR DEMENTIA WITH MOOD DISTURBANCE, UNSPECIFIED DEMENTIA SEVERITY (HCC): ICD-10-CM

## 2025-01-15 DIAGNOSIS — E03.9 ACQUIRED HYPOTHYROIDISM: Primary | ICD-10-CM

## 2025-01-15 DIAGNOSIS — E78.2 MIXED HYPERLIPIDEMIA: ICD-10-CM

## 2025-01-15 PROCEDURE — 99214 OFFICE O/P EST MOD 30 MIN: CPT | Performed by: FAMILY MEDICINE

## 2025-01-16 NOTE — ASSESSMENT & PLAN NOTE
Chronic asymptomatic fair control continue current management atorvastatin 40 mg and Zetia 10 mg low-fat diet discussed with patient  Orders:  •  CBC and differential; Future  •  Basic metabolic panel; Future  •  Lipid panel; Future  •  TSH, 3rd generation with Free T4 reflex; Future

## 2025-01-16 NOTE — PROGRESS NOTES
Name: Socorro Briceño      : 1960      MRN: [id number]  Encounter Provider: Ana Ashley MD  Encounter Date: 1/15/2025   Encounter department: [facility]  :  Assessment & Plan  Acquired hypothyroidism  Chronic asymptomatic TSH is normal recommend to continue current dose of levothyroxine 100 mcg once a day  Orders:  •  CBC and differential; Future  •  Basic metabolic panel; Future  •  Lipid panel; Future  •  TSH, 3rd generation with Free T4 reflex; Future    Vascular dementia with mood disturbance, unspecified dementia severity (HCC)  Chronic stable patient already evaluated by the Senior care team continue current management  Orders:  •  CBC and differential; Future  •  Basic metabolic panel; Future  •  Lipid panel; Future  •  TSH, 3rd generation with Free T4 reflex; Future    MDD (major depressive disorder), recurrent, in full remission (HCC)  Chronic fair control continue citalopram 10 mg once a day  Orders:  •  CBC and differential; Future  •  Basic metabolic panel; Future  •  Lipid panel; Future  •  TSH, 3rd generation with Free T4 reflex; Future    Mixed hyperlipidemia  Chronic asymptomatic fair control continue current management atorvastatin 40 mg and Zetia 10 mg low-fat diet discussed with patient  Orders:  •  CBC and differential; Future  •  Basic metabolic panel; Future  •  Lipid panel; Future  •  TSH, 3rd generation with Free T4 reflex; Future           History of Present Illness     Patient here follow-up with a chronic condition compliant with the medication tolerated well without side effect no new concern      Review of Systems   Constitutional:  Negative for chills and fever.   HENT:  Negative for ear pain and sore throat.    Eyes:  Negative for pain and visual disturbance.   Respiratory:  Negative for cough and shortness of breath.    Cardiovascular:  Negative for chest pain and palpitations.   Gastrointestinal:  Negative for abdominal pain,  constipation, diarrhea and vomiting.   Genitourinary:  Negative for dysuria and hematuria.   Musculoskeletal:  Negative for gait problem.   Skin:  Negative for color change and rash.   Neurological:  Negative for seizures and syncope.   All other systems reviewed and are negative.      Objective   /80 (BP Location: Left arm, Patient Position: Sitting)   Pulse 64   Temp 98 °F (36.7 °C) (Tympanic)   Ht 5' (1.524 m)   Wt 62.1 kg (137 lb)   LMP  (LMP Unknown)   SpO2 98%   Breastfeeding No   BMI 26.76 kg/m²      Physical Exam  Vitals and nursing note reviewed.   Constitutional:       General: She is not in acute distress.     Appearance: She is well-developed. She is not diaphoretic.   HENT:      Head: Normocephalic.      Right Ear: Tympanic membrane and external ear normal.      Left Ear: Tympanic membrane and external ear normal.      Nose: No rhinorrhea.      Mouth/Throat:      Pharynx: No posterior oropharyngeal erythema.   Eyes:      General:         Right eye: No discharge.         Left eye: No discharge.      Conjunctiva/sclera: Conjunctivae normal.   Neck:      Vascular: No JVD.   Cardiovascular:      Rate and Rhythm: Normal rate and regular rhythm.      Heart sounds: Normal heart sounds. No murmur heard.     No gallop.   Pulmonary:      Effort: Pulmonary effort is normal. No respiratory distress.      Breath sounds: Normal breath sounds. No wheezing.   Abdominal:      General: There is no distension.      Palpations: Abdomen is soft.      Tenderness: There is no abdominal tenderness. There is no rebound.   Musculoskeletal:         General: No tenderness.      Cervical back: Normal range of motion and neck supple.   Lymphadenopathy:      Cervical: No cervical adenopathy.   Skin:     General: Skin is warm.      Findings: No rash.   Neurological:      Mental Status: She is alert and oriented to person, place, and time.

## 2025-01-16 NOTE — ASSESSMENT & PLAN NOTE
Chronic fair control continue citalopram 10 mg once a day  Orders:  •  CBC and differential; Future  •  Basic metabolic panel; Future  •  Lipid panel; Future  •  TSH, 3rd generation with Free T4 reflex; Future

## 2025-01-16 NOTE — ASSESSMENT & PLAN NOTE
Chronic asymptomatic TSH is normal recommend to continue current dose of levothyroxine 100 mcg once a day  Orders:  •  CBC and differential; Future  •  Basic metabolic panel; Future  •  Lipid panel; Future  •  TSH, 3rd generation with Free T4 reflex; Future

## 2025-01-16 NOTE — ASSESSMENT & PLAN NOTE
Chronic stable patient already evaluated by the Senior care team continue current management  Orders:  •  CBC and differential; Future  •  Basic metabolic panel; Future  •  Lipid panel; Future  •  TSH, 3rd generation with Free T4 reflex; Future

## 2025-03-04 ENCOUNTER — OFFICE VISIT (OUTPATIENT)
Dept: CARDIOLOGY CLINIC | Facility: CLINIC | Age: 65
End: 2025-03-04
Payer: MEDICARE

## 2025-03-04 VITALS
WEIGHT: 139 LBS | SYSTOLIC BLOOD PRESSURE: 130 MMHG | HEART RATE: 68 BPM | BODY MASS INDEX: 27.15 KG/M2 | DIASTOLIC BLOOD PRESSURE: 80 MMHG

## 2025-03-04 DIAGNOSIS — E78.2 MIXED HYPERLIPIDEMIA: ICD-10-CM

## 2025-03-04 DIAGNOSIS — I10 ESSENTIAL HYPERTENSION: ICD-10-CM

## 2025-03-04 DIAGNOSIS — E78.5 DYSLIPIDEMIA: ICD-10-CM

## 2025-03-04 DIAGNOSIS — I25.10 CORONARY ARTERY DISEASE INVOLVING NATIVE CORONARY ARTERY OF NATIVE HEART WITHOUT ANGINA PECTORIS: Primary | ICD-10-CM

## 2025-03-04 DIAGNOSIS — R53.82 CHRONIC FATIGUE: ICD-10-CM

## 2025-03-04 DIAGNOSIS — Z86.73 HISTORY OF CVA (CEREBROVASCULAR ACCIDENT): ICD-10-CM

## 2025-03-04 PROCEDURE — 99214 OFFICE O/P EST MOD 30 MIN: CPT | Performed by: INTERNAL MEDICINE

## 2025-03-04 RX ORDER — ATORVASTATIN CALCIUM 80 MG/1
80 TABLET, FILM COATED ORAL
Qty: 90 TABLET | Refills: 3 | Status: SHIPPED | OUTPATIENT
Start: 2025-03-04 | End: 2025-06-02

## 2025-03-04 NOTE — PROGRESS NOTES
Cardiology Office Note  MD Javi Mitchell MD, [facility]  Grady Knutson DO, [facility]  MD Shobha Talamantes DO, [facility]  Giorgi Shore DO, [facility]  [place] [address]    Socorro Briceño 64 y.o. female MRN: [id number]  Unit/Bed#:  Encounter: [id number]      History of Present Illness:  It was a pleasure to see Socorro Briceño in the office today for follow-up CV evaluation. She has a past medical history of CAD status post CABG, dyslipidemia, LVH and hypothyroid.  She establish care with me in July 2024.  Previously she followed with Dr. Flower.  The patient experienced acute onset chest discomfort in 2001 with symptoms requiring her to go to the emergency department.  She was found to have acute myocardial infarction at the time.  She was sent for cardiac catheterization demonstrating multivessel coronary disease.  Subsequently, she was sent for bypass surgery with unknown vessels being bypassed.  Following the bypass, the patient had been doing well overall.  She was seen in July 2023 due to confusion.  MRI of the brain was performed showing old left cerebellar infarct with old lacunar infarcts.  Nothing acute at the time.  She was seen by primary care and due to her changes in 2023, was referred back to cardiology office for evaluation.  She admitted to significant decrease in her physical activity and effort tolerance. Due to the patient's history of prior CVA in July 2023, fatigue as well as her history of CAD, she was sent for echocardiogram, stress test, event recorder and carotid Doppler in July and August 2024.  Echocardiogram demonstrated normal left ventricular systolic function without significant valvular disease.  Stress test showed small area of basal inferolateral infarct without any evidence of ischemia.  Event recorder showed sinus rhythm to sinus bradycardia with  rare atrial and ventricular ectopy and only 8 runs of nonsustained PSVT.  Her triggered events correlated with sinus rhythm.  She is here today to review her lipid panel and discuss titration of statin medication.    Denies chest pain, pressure, tightness or squeezing.  Denies lightheadedness, dizziness or palpitations.  Denies lower extremity swelling, orthopnea or paroxysmal nocturnal dyspnea.    Review of Systems:  Review of Systems   Constitutional: Negative for decreased appetite, fever, malaise/fatigue, weight gain and weight loss.   HENT:  Negative for congestion and sore throat.    Eyes:  Negative for visual disturbance.   Cardiovascular:  Negative for chest pain, dyspnea on exertion, leg swelling, near-syncope and palpitations.   Respiratory:  Negative for cough and shortness of breath.    Hematologic/Lymphatic: Negative for bleeding problem.   Skin:  Negative for rash.   Musculoskeletal:  Negative for myalgias and neck pain.   Gastrointestinal:  Negative for abdominal pain and nausea.   Neurological:  Negative for light-headedness and weakness.   Psychiatric/Behavioral:  Negative for depression.        Past Medical History:   Diagnosis Date   • Acute MI ([facility]) 08/12/2012   • MARIANELA (acute kidney injury) ([facility]) 08/17/2023   • Anemia    • Anxiety 07/21/2023   • Bradycardia 08/18/2023   • CAD (coronary artery disease)    • Encounter for well adult exam with abnormal findings 02/12/2019   • Failure to thrive in adult 08/17/2023   • Hyperlipidemia    • Hypertension    • Mood disorder ([facility]) 01/04/2024   • Myocardial infarction ([facility]) 03/26/2002   • Severe protein-calorie malnutrition ([facility]) 08/03/2023   • Thyroid disease    • Ventricular fibrillation ([facility]) 08/12/2012       Past Surgical History:   Procedure Laterality Date   • COLONOSCOPY  2013   • CORONARY ARTERY BYPASS GRAFT  2003       Social History     Socioeconomic History   • Marital status:      Spouse name: Not on file   • Number of children: Not on  file   • Years of education: Not on file   • Highest education level: Not on file   Occupational History   • Occupation:  worker ;  to infant ages     Employer: OTHER     Comment: parttime    Tobacco Use   • Smoking status: Former     Current packs/day: 0.00     Average packs/day: 0.3 packs/day for 22.0 years (5.5 ttl pk-yrs)     Types: Cigarettes     Start date: 1971     Quit date: 1993     Years since quittin.1     Passive exposure: Never   • Smokeless tobacco: Never   • Tobacco comments:     no passive smoke exposure   Vaping Use   • Vaping status: Never Used   Substance and Sexual Activity   • Alcohol use: Not Currently   • Drug use: No   • Sexual activity: Not Currently   Other Topics Concern   • Not on file   Social History Narrative   • Not on file     Social Drivers of Health     Financial Resource Strain: Low Risk  (2023)    Overall Financial Resource Strain (CARDIA)    • Difficulty of Paying Living Expenses: Not hard at all   Food Insecurity: No Food Insecurity (2024)    Nursing - Inadequate Food Risk Classification    • Worried About Running Out of Food in the Last Year: Never true    • Ran Out of Food in the Last Year: Never true    • Ran Out of Food in the Last Year: Not on file   Transportation Needs: No Transportation Needs (2024)    PRAPARE - Transportation    • Lack of Transportation (Medical): No    • Lack of Transportation (Non-Medical): No   Physical Activity: Insufficiently Active (3/20/2023)    Exercise Vital Sign    • Days of Exercise per Week: 3 days    • Minutes of Exercise per Session: 30 min   Stress: No Stress Concern Present (3/18/2022)    [employer] - Occupational Stress Questionnaire    • Feeling of Stress : Not at all   Social Connections: Moderately Integrated (3/18/2022)    Social Connection and Isolation Panel [NHANES]    • Frequency of Communication with Friends and Family: More than three times a week    •  Frequency of Social Gatherings with Friends and Family: More than three times a week    • Attends Jain Services: More than 4 times per year    • Active Member of Clubs or Organizations: Yes    • Attends Club or Organization Meetings: More than 4 times per year    • Marital Status:    Intimate Partner Violence: Not At Risk (3/18/2022)    Humiliation, Afraid, Rape, and Kick questionnaire    • Fear of Current or Ex-Partner: No    • Emotionally Abused: No    • Physically Abused: No    • Sexually Abused: No   Housing Stability: Low Risk  (9/23/2024)    Housing Stability Vital Sign    • Unable to Pay for Housing in the Last Year: No    • Number of Times Moved in the Last Year: 1    • Homeless in the Last Year: No       Family History   Problem Relation Age of Onset   • Hypertension Mother    • Stroke Mother         3 strokes   • Heart attack Father    • No Known Problems Sister    • No Known Problems Daughter    • No Known Problems Maternal Grandmother    • No Known Problems Maternal Grandfather    • No Known Problems Paternal Grandmother    • No Known Problems Paternal Grandfather    • No Known Problems Maternal Aunt    • No Known Problems Maternal Aunt    • No Known Problems Maternal Aunt    • Breast cancer Paternal Aunt 52   • No Known Problems Paternal Aunt        Allergies   Allergen Reactions   • Cat Dander Sneezing         Current Outpatient Medications:   •  aspirin 81 mg chewable tablet, Chew 1 tablet (81 mg total) daily, Disp: 30 tablet, Rfl: 2  •  atorvastatin (LIPITOR) 80 mg tablet, Take 1 tablet (80 mg total) by mouth daily with dinner, Disp: 90 tablet, Rfl: 3  •  Blood Pressure KIT, Use in the morning, Disp: 1 kit, Rfl: 0  •  cholecalciferol (VITAMIN D3) 1,000 units tablet, Take 1 tablet (1,000 Units total) by mouth daily, Disp: 30 tablet, Rfl: 2  •  citalopram (CeleXA) 10 mg tablet, TAKE 1 TABLET BY MOUTH EVERY DAY, Disp: 90 tablet, Rfl: 1  •  ezetimibe (ZETIA) 10 mg tablet, TAKE 1 TABLET BY  MOUTH EVERY DAY, Disp: 90 tablet, Rfl: 1  •  levothyroxine 100 mcg tablet, TAKE 1 TABLET (100 MCG TOTAL) BY MOUTH DAILY IN THE EARLY MORNING, Disp: 90 tablet, Rfl: 1  •  vitamin B-12 (VITAMIN B-12) 1,000 mcg tablet, Take 1 tablet (1,000 mcg total) by mouth daily, Disp: 90 tablet, Rfl: 1  •  docusate sodium (COLACE) 100 mg capsule, TAKE 1 CAPSULE BY MOUTH TWICE A DAY (Patient not taking: Reported on 3/4/2025), Disp: 180 capsule, Rfl: 1    Vitals:    03/04/25 1430   BP: 130/80   BP Location: Right arm   Patient Position: Sitting   Cuff Size: Adult   Pulse: 68   Weight: 63 kg (139 lb)         Body mass index is 27.15 kg/m².    PHYSICAL EXAMINATION:  Gen: Awake, Alert, NAD   Head/eyes: AT/NC, pupils equal and round, Anicteric  ENT: mmm  Neck: Supple, No elevated JVP, trachea midline  Resp: CTA bilaterally no w/r/r  CV: RRR +S1, S2, No m/r/g  Abd: Soft, NT/ND + BS  Ext: no LE edema bilaterally  Neuro: Follows commands, moves all extermities  Psych: Appropriate affect, pleasant mood, pleasant attitude, non-combative  Skin: warm; no rash, erythema or venous stasis changes on exposed skin    --------------------------------------------------------------------------------  TREADMILL STRESS  No results found for this or any previous visit.     --------------------------------------------------------------------------------  NUCLEAR STRESS TEST: No results found for this or any previous visit.    No results found for this or any previous visit.      --------------------------------------------------------------------------------  CATH:  No results found for this or any previous visit.    --------------------------------------------------------------------------------  ECHO:   Results for orders placed during the hospital encounter of 09/04/18    Echo complete with contrast if indicated    Narrative  [address] [place] [address]  [phone]    Transthoracic  Echocardiogram  2D, M-mode, Doppler, and Color Doppler    Study date:  04-Sep-2018    Patient: RACHANA MARTE  MR number: [id number]  Account number: [id number]  : 1960  Age: 58 years  Gender: Female  Status: Outpatient  Location: [address] [place] [address]  Height: 60 in  Weight: 150 lb  BP: 120/ 68 mmHg    Indications: Known coronary artery disease.    Diagnoses: I25.83 - Coronary atherosclerosis due to lipid rich plaque    Sonographer:  FRANCESCA Cartwright  Primary Physician:  RASTA URBINA  Referring Physician:  RASTA URBINA  Group:  [facility]  Interpreting Physician:  Shobha Herrera DO    SUMMARY    LEFT VENTRICLE:  Systolic function was normal. Ejection fraction was estimated to be 55 %.  There were no regional wall motion abnormalities.  Wall thickness was mildly increased.  Doppler parameters were consistent with abnormal left ventricular relaxation (grade 1 diastolic dysfunction).    MITRAL VALVE:  There was mild annular calcification.    HISTORY: PRIOR HISTORY: hypothyroid. Risk factors: hypertension and medication-treated hypercholesterolemia. PRIOR PROCEDURES: Stent. Coronary bypass.    PROCEDURE: The study was performed in the [address] [place] [address]. This was a routine study. The transthoracic approach was used. The study included complete 2D imaging, M-mode, complete spectral Doppler, and color Doppler. The  heart rate was 53 bpm, at the start of the study. Image quality was adequate.    LEFT VENTRICLE: Size was normal. Systolic function was normal. Ejection fraction was estimated to be 55 %. There were no regional wall motion abnormalities. Wall thickness was mildly increased. DOPPLER: Doppler parameters were consistent  with abnormal left ventricular relaxation (grade 1 diastolic dysfunction).    RIGHT VENTRICLE: The size was normal. Systolic function was normal. Wall thickness was normal.    LEFT ATRIUM: Size was normal.    RIGHT ATRIUM: Size was  normal.    MITRAL VALVE: There was mild annular calcification. DOPPLER: There was no evidence for stenosis. There was no regurgitation.    AORTIC VALVE: The valve was trileaflet. Leaflets exhibited normal thickness and normal cuspal separation. DOPPLER: Transaortic velocity was within the normal range. There was no evidence for stenosis. There was no regurgitation.    TRICUSPID VALVE: The valve structure was normal. There was normal leaflet separation. DOPPLER: The transtricuspid velocity was within the normal range. There was no evidence for stenosis. There was no regurgitation.    PULMONIC VALVE: Leaflets exhibited normal thickness, no calcification, and normal cuspal separation. DOPPLER: The transpulmonic velocity was within the normal range. There was no regurgitation.    PERICARDIUM: There was no pericardial effusion. The pericardium was normal in appearance.    AORTA: The root exhibited normal size.    SYSTEMIC VEINS: IVC: The inferior vena cava was normal in size and course. Respirophasic changes were normal.    PULMONARY ARTERY: DOPPLER: The tricuspid jet envelope definition was inadequate for estimation of RV systolic pressure.    SYSTEM MEASUREMENT TABLES    2D  %FS: 29.8 %  Ao Diam: 3.4 cm  EDV(Teich): 73.7 ml  EF(Teich): 57.4 %  ESV(Teich): 31.4 ml  IVSd: 1.2 cm  LA Area: 12.9 cm2  LA Diam: 3.5 cm  LVEDV MOD A4C: 55 ml  LVEF MOD A4C: 63.6 %  LVESV MOD A4C: 20 ml  LVIDd: 4.1 cm  LVIDs: 2.9 cm  LVLd A4C: 7.2 cm  LVLs A4C: 6.4 cm  LVPWd: 1.2 cm  RA Area: 12.9 cm2  RVIDd: 2.5 cm  SV MOD A4C: 34.9 ml  SV(Teich): 42.3 ml    MM  TAPSE: 1.7 cm    PW  E': 0.1 m/s  E/E': 10.7  MV A [place]: 0.7 m/s  MV Dec [place]: 2.9 m/s2  MV DecT: 234.2 ms  MV E [place]: 0.7 m/s  MV E/A Ratio: 0.9  MV PHT: 67.9 ms  MVA By PHT: 3.2 cm2    [employer] Accredited Echocardiography Laboratory    Prepared and electronically signed by    Shobha Herrera DO  Signed 04-Sep-2018 17:02:35    LVEF 52%, moderate RAJANI, grade 1 diastolic  "dysfunction, mild MAC, mild MR/TR, May 2022    --------------------------------------------------------------------------------  HOLTER  No results found for this or any previous visit.    No results found for this or any previous visit.    --------------------------------------------------------------------------------  CAROTIDS  No results found for this or any previous visit.     --------------------------------------------------------------------------------  ECGs:  No results found for this visit on 03/04/25.       Lab Results   Component Value Date    WBC 5.80 10/07/2024    HGB 13.9 10/07/2024    HCT 40.8 10/07/2024    MCV 91 10/07/2024     10/07/2024      Lab Results   Component Value Date    SODIUM 140 10/07/2024    K 4.4 10/07/2024     10/07/2024    CO2 25 10/07/2024    BUN 16 10/07/2024    CREATININE 0.57 (L) 10/07/2024    GLUC 138 04/15/2024    CALCIUM 10.2 10/07/2024      Lab Results   Component Value Date    HGBA1C 6.0 (H) 08/03/2023      Lab Results   Component Value Date    CHOL 154 12/21/2015    CHOL 190 03/16/2015    CHOL 179 06/04/2014     Lab Results   Component Value Date    HDL 43 (L) 10/07/2024    HDL 41 (L) 08/03/2023    HDL 43 (L) 05/22/2023     Lab Results   Component Value Date    LDLCALC 81 10/07/2024    LDLCALC 63 08/03/2023    LDLCALC 170 (H) 05/22/2023     Lab Results   Component Value Date    TRIG 89 10/07/2024    TRIG 84 08/03/2023    TRIG 99 05/22/2023     No results found for: \"CHOLHDL\"   Lab Results   Component Value Date    INR 1.10 08/17/2023    PROTIME 14.2 08/17/2023        1. Coronary artery disease involving native coronary artery of native heart without angina pectoris  -     Lipid Panel with Direct LDL reflex; Future; Expected date: 08/27/2025  2. Essential hypertension  3. Chronic fatigue  4. Dyslipidemia  5. History of CVA (cerebrovascular accident)  6. Mixed hyperlipidemia  -     atorvastatin (LIPITOR) 80 mg tablet; Take 1 tablet (80 mg total) by mouth daily " with dinner  -     Lipid Panel with Direct LDL reflex; Future; Expected date: 08/27/2025      IMPRESSION:    CAD  s/p CABG, 2001  Abnormal pharmacologic nuclear stress test with small basal inferolateral infarct without myocardial ischemia, gated EF 63%, August 2024  Fatigue  Hypertension  LVEF 60%, mild RAJANI, grade 1 diastolic dysfunction, AV sclerosis, trace AR, MAC, mild MR, trace TR, July 2024  Dyslipidemia w/ LDL 81 mg/dL, HDL 43 mg/dL, TG 89 mg/dL, October 2024  LVH  Hypothyroid  History of CVA with old left cerebellar infarct by MRI, July 2023  Event recorder w/ SR avg HR 59 bpm, rare PVCs, rare atrial couplets/triplets, rare PVCs, rare ventricular couplets/triplets, nonsustained PSVT x 8 with longest run 17.4 seconds, fastest run 169 bpm, triggered events correlated with SR, August 2024  Carotid stenosis < 50% bilaterally, July 2024    PLAN:  It was a pleasure to see Karlie in the office today for follow-up CV evaluation.  She is here today for routine CV follow-up.  She recently underwent her lipid panel which shows an LDL of 81 mg/dL.  I shared with her this news.  She has no chest pain concerning for angina and no signs or symptoms of heart failure.  Clinically she examines to be euvolemic.  Blood pressure and heart rate are currently stable.  She is tolerating her current medications without any reported adverse effects.  The patient to perform greater than 4 METS on daily basis without significant exertional symptoms.  She is now using an Apple Watch.  Based on her clinical presentation, I have the following recommendations:    1.  Recommend increasing her atorvastatin to 80 mg daily to help reduce her atherosclerotic cardiovascular risk.  Goal LDL is less than 70 mg/dL.  Continue Zetia.  Repeat lipid panel in 6 months prior to her follow-up encounter.  2.  Encourage 30 minutes a day, 5 days a week of moderate intensity activity to build cardiovascular endurance.  3.  Recommend a heart healthy diet low in  "sodium and carbohydrate.  4.  Lifelong aspirin  5.  She is otherwise up-to-date with CV testing.  6.  Encouraged her to continue with Apple Watch used to detect any evidence of atrial arrhythmias.  Patient has been without palpitations and no changes in her medical history.  7.  We will follow-up with her in 6 months to review the results of her lipid panel.    As always, please do not hesitate to call with any questions.    Portions of the record may have been created with voice recognition software. Occasional wrong word or \"sound a like\" substitutions may have occurred due to the inherent limitations of voice recognition software. Read the chart carefully and recognize, using context, where substitutions have occurred.      Signed: Grady Knutson DO, FACC, FASE, FASNC, FACP  "

## 2025-04-21 ENCOUNTER — HOSPITAL ENCOUNTER (OUTPATIENT)
Dept: MAMMOGRAPHY | Facility: MEDICAL CENTER | Age: 65
Discharge: HOME | End: 2025-04-21
Payer: MEDICARE

## 2025-04-21 ENCOUNTER — RESULTS FOLLOW-UP (OUTPATIENT)
Dept: FAMILY MEDICINE CLINIC | Facility: CLINIC | Age: 65
End: 2025-04-21

## 2025-04-21 VITALS — WEIGHT: 139 LBS | HEIGHT: 60 IN | BODY MASS INDEX: 27.29 KG/M2

## 2025-04-21 DIAGNOSIS — Z12.31 ENCOUNTER FOR SCREENING MAMMOGRAM FOR MALIGNANT NEOPLASM OF BREAST: ICD-10-CM

## 2025-04-21 DIAGNOSIS — Z12.31 ENCOUNTER FOR SCREENING MAMMOGRAM FOR BREAST CANCER: Primary | ICD-10-CM

## 2025-04-21 PROCEDURE — 77067 SCR MAMMO BI INCL CAD: CPT

## 2025-04-21 PROCEDURE — 77063 BREAST TOMOSYNTHESIS BI: CPT

## 2025-04-22 NOTE — TELEPHONE ENCOUNTER
----- Message from Ana Ashley MD sent at 4/21/2025  4:28 PM EDT -----  Normal mammogram repeat in one year

## 2025-04-22 NOTE — TELEPHONE ENCOUNTER
Called and left message to advise results.    Patient is scheduled for 4/22/26 at 2:30 pm at [facility] in [place].

## 2025-04-25 DIAGNOSIS — E78.2 MIXED HYPERLIPIDEMIA: ICD-10-CM

## 2025-04-25 RX ORDER — EZETIMIBE 10 MG/1
10 TABLET ORAL DAILY
Qty: 90 TABLET | Refills: 1 | Status: SHIPPED | OUTPATIENT
Start: 2025-04-25

## 2025-05-12 ENCOUNTER — APPOINTMENT (OUTPATIENT)
Dept: LAB | Facility: CLINIC | Age: 65
End: 2025-05-12
Payer: MEDICARE

## 2025-05-12 DIAGNOSIS — E78.5 DYSLIPIDEMIA: ICD-10-CM

## 2025-05-12 DIAGNOSIS — I25.10 CORONARY ARTERY DISEASE INVOLVING NATIVE CORONARY ARTERY OF NATIVE HEART WITHOUT ANGINA PECTORIS: ICD-10-CM

## 2025-05-12 DIAGNOSIS — F33.42 MDD (MAJOR DEPRESSIVE DISORDER), RECURRENT, IN FULL REMISSION (HCC): ICD-10-CM

## 2025-05-12 DIAGNOSIS — E03.9 ACQUIRED HYPOTHYROIDISM: ICD-10-CM

## 2025-05-12 DIAGNOSIS — F01.53 VASCULAR DEMENTIA WITH MOOD DISTURBANCE, UNSPECIFIED DEMENTIA SEVERITY (HCC): ICD-10-CM

## 2025-05-12 DIAGNOSIS — E78.2 MIXED HYPERLIPIDEMIA: ICD-10-CM

## 2025-05-12 LAB
ANION GAP SERPL CALCULATED.3IONS-SCNC: 8 MMOL/L (ref 4–13)
BASOPHILS # BLD AUTO: 0.03 THOUSANDS/ÂΜL (ref 0–0.1)
BASOPHILS NFR BLD AUTO: 1 % (ref 0–1)
BUN SERPL-MCNC: 12 MG/DL (ref 5–25)
CALCIUM SERPL-MCNC: 9.8 MG/DL (ref 8.4–10.2)
CHLORIDE SERPL-SCNC: 109 MMOL/L (ref 96–108)
CHOLEST SERPL-MCNC: 136 MG/DL (ref ?–200)
CO2 SERPL-SCNC: 27 MMOL/L (ref 21–32)
CREAT SERPL-MCNC: 0.63 MG/DL (ref 0.6–1.3)
EOSINOPHIL # BLD AUTO: 0.12 THOUSAND/ÂΜL (ref 0–0.61)
EOSINOPHIL NFR BLD AUTO: 2 % (ref 0–6)
ERYTHROCYTE [DISTWIDTH] IN BLOOD BY AUTOMATED COUNT: 11.8 % (ref 11.6–15.1)
GFR SERPL CREATININE-BSD FRML MDRD: 93 ML/MIN/1.73SQ M
GLUCOSE P FAST SERPL-MCNC: 106 MG/DL (ref 65–99)
HCT VFR BLD AUTO: 39.6 % (ref 34.8–46.1)
HDLC SERPL-MCNC: 51 MG/DL
HGB BLD-MCNC: 13.6 G/DL (ref 11.5–15.4)
IMM GRANULOCYTES # BLD AUTO: 0.02 THOUSAND/UL (ref 0–0.2)
IMM GRANULOCYTES NFR BLD AUTO: 0 % (ref 0–2)
LDLC SERPL CALC-MCNC: 68 MG/DL (ref 0–100)
LYMPHOCYTES # BLD AUTO: 1.45 THOUSANDS/ÂΜL (ref 0.6–4.47)
LYMPHOCYTES NFR BLD AUTO: 28 % (ref 14–44)
MCH RBC QN AUTO: 31.5 PG (ref 26.8–34.3)
MCHC RBC AUTO-ENTMCNC: 34.3 G/DL (ref 31.4–37.4)
MCV RBC AUTO: 92 FL (ref 82–98)
MONOCYTES # BLD AUTO: 0.42 THOUSAND/ÂΜL (ref 0.17–1.22)
MONOCYTES NFR BLD AUTO: 8 % (ref 4–12)
NEUTROPHILS # BLD AUTO: 3.19 THOUSANDS/ÂΜL (ref 1.85–7.62)
NEUTS SEG NFR BLD AUTO: 61 % (ref 43–75)
NRBC BLD AUTO-RTO: 0 /100 WBCS
PLATELET # BLD AUTO: 250 THOUSANDS/UL (ref 149–390)
PMV BLD AUTO: 10 FL (ref 8.9–12.7)
POTASSIUM SERPL-SCNC: 4.5 MMOL/L (ref 3.5–5.3)
RBC # BLD AUTO: 4.32 MILLION/UL (ref 3.81–5.12)
SODIUM SERPL-SCNC: 144 MMOL/L (ref 135–147)
TRIGL SERPL-MCNC: 83 MG/DL (ref ?–150)
TSH SERPL DL<=0.05 MIU/L-ACNC: 2.73 UIU/ML (ref 0.45–4.5)
WBC # BLD AUTO: 5.23 THOUSAND/UL (ref 4.31–10.16)

## 2025-05-12 PROCEDURE — 84443 ASSAY THYROID STIM HORMONE: CPT

## 2025-05-12 PROCEDURE — 80061 LIPID PANEL: CPT

## 2025-05-12 PROCEDURE — 85025 COMPLETE CBC W/AUTO DIFF WBC: CPT

## 2025-05-12 PROCEDURE — 36415 COLL VENOUS BLD VENIPUNCTURE: CPT

## 2025-05-12 PROCEDURE — 80048 BASIC METABOLIC PNL TOTAL CA: CPT

## 2025-05-14 ENCOUNTER — OFFICE VISIT (OUTPATIENT)
Dept: FAMILY MEDICINE CLINIC | Facility: CLINIC | Age: 65
End: 2025-05-14
Payer: MEDICARE

## 2025-05-14 VITALS
BODY MASS INDEX: 27.88 KG/M2 | DIASTOLIC BLOOD PRESSURE: 80 MMHG | TEMPERATURE: 98.2 F | WEIGHT: 142 LBS | HEART RATE: 61 BPM | SYSTOLIC BLOOD PRESSURE: 120 MMHG | HEIGHT: 60 IN | OXYGEN SATURATION: 98 %

## 2025-05-14 DIAGNOSIS — R73.01 IMPAIRED FASTING GLUCOSE: ICD-10-CM

## 2025-05-14 DIAGNOSIS — E55.9 VITAMIN D DEFICIENCY: ICD-10-CM

## 2025-05-14 DIAGNOSIS — E78.2 MIXED HYPERLIPIDEMIA: ICD-10-CM

## 2025-05-14 DIAGNOSIS — E03.9 ACQUIRED HYPOTHYROIDISM: Primary | ICD-10-CM

## 2025-05-14 PROCEDURE — 99214 OFFICE O/P EST MOD 30 MIN: CPT | Performed by: FAMILY MEDICINE

## 2025-05-14 PROCEDURE — G2211 COMPLEX E/M VISIT ADD ON: HCPCS | Performed by: FAMILY MEDICINE

## 2025-05-15 NOTE — ASSESSMENT & PLAN NOTE
Chronic asymptomatic fair control continue vitamin D supplement vitamin D rich diet reviewed with the patient

## 2025-05-15 NOTE — PROGRESS NOTES
Name: Socorro Briceño      : 1960      MRN: [id number]  Encounter Provider: Ana Ashley MD  Encounter Date: 2025   Encounter department: [facility]  :  Assessment & Plan  Acquired hypothyroidism  Chronic asymptomatic TSH is normal recommend to continue current dose of levothyroxine 100 mcg once a day          Impaired fasting glucose  Chronic asymptomatic fair control encourage patient to continue with a low-carb diet            Mixed hyperlipidemia  Chronic asymptomatic fair control continue current management atorvastatin 40 mg and Zetia 10 mg low-fat diet discussed with patient          Vitamin D deficiency  Chronic asymptomatic fair control continue vitamin D supplement vitamin D rich diet reviewed with the patient            Assessment & Plan           History of Present Illness   Patient is here f/up with chronic condition ,complaint with med tolerated well no new concern recent blood work reviewed       Review of Systems   Constitutional:  Negative for chills and fever.   HENT:  Negative for ear pain and sore throat.    Eyes:  Negative for pain and visual disturbance.   Respiratory:  Negative for cough and shortness of breath.    Cardiovascular:  Negative for chest pain and palpitations.   Gastrointestinal:  Negative for abdominal pain, constipation, diarrhea and vomiting.   Genitourinary:  Negative for dysuria and hematuria.   Musculoskeletal:  Negative for gait problem.   Skin:  Negative for color change and rash.   Neurological:  Negative for seizures and syncope.   All other systems reviewed and are negative.      Objective   /80 (BP Location: Left arm, Patient Position: Sitting)   Pulse 61   Temp 98.2 °F (36.8 °C) (Tympanic)   Ht 5' (1.524 m)   Wt 64.4 kg (142 lb)   LMP  (LMP Unknown)   SpO2 98%   Breastfeeding No   BMI 27.73 kg/m²      Physical Exam  Vitals and nursing note reviewed.   Constitutional:       General: She is not in acute distress.      Appearance: She is well-developed. She is not diaphoretic.   HENT:      Head: Normocephalic.      Right Ear: Tympanic membrane and external ear normal.      Left Ear: Tympanic membrane and external ear normal.      Nose: No rhinorrhea.      Mouth/Throat:      Pharynx: No posterior oropharyngeal erythema.     Eyes:      General:         Right eye: No discharge.         Left eye: No discharge.      Conjunctiva/sclera: Conjunctivae normal.     Neck:      Thyroid: No thyromegaly.      Vascular: No JVD.     Cardiovascular:      Rate and Rhythm: Normal rate and regular rhythm.      Heart sounds: Normal heart sounds. No murmur heard.     No gallop.   Pulmonary:      Effort: Pulmonary effort is normal. No respiratory distress.      Breath sounds: Normal breath sounds. No wheezing or rales.   Chest:   Breasts:     Breasts are symmetrical.      Right: No inverted nipple, mass, nipple discharge, skin change or tenderness.      Left: No inverted nipple, mass, nipple discharge, skin change or tenderness.   Abdominal:      General: There is no distension.      Palpations: Abdomen is soft.      Tenderness: There is no abdominal tenderness. There is no guarding or rebound.      Hernia: There is no hernia in the left inguinal area.   Genitourinary:     Labia:         Right: No rash, tenderness or lesion.         Left: No rash, tenderness or lesion.       Vagina: No signs of injury and foreign body. No vaginal discharge, erythema or tenderness.      Cervix: No cervical motion tenderness or discharge.      Uterus: Not enlarged and not tender.       Adnexa:         Right: No mass or tenderness.          Left: No mass or tenderness.        Rectum: No mass or anal fissure.     Musculoskeletal:         General: No tenderness.      Cervical back: Normal range of motion and neck supple.   Lymphadenopathy:      Cervical: No cervical adenopathy.     Skin:     General: Skin is warm.      Findings: No rash.     Neurological:      Mental Status:  She is alert and oriented to person, place, and time.

## 2025-05-15 NOTE — ASSESSMENT & PLAN NOTE
Chronic asymptomatic TSH is normal recommend to continue current dose of levothyroxine 100 mcg once a day

## 2025-05-15 NOTE — ASSESSMENT & PLAN NOTE
Chronic asymptomatic fair control continue current management atorvastatin 40 mg and Zetia 10 mg low-fat diet discussed with patient

## 2025-05-22 ENCOUNTER — VBI (OUTPATIENT)
Dept: ADMINISTRATIVE | Facility: OTHER | Age: 65
End: 2025-05-22

## 2025-05-22 NOTE — TELEPHONE ENCOUNTER
05/22/25 9:35 AM    The patient was called and a message was left for patient to return a call to the [facility] Department at [facility]: Phone [phone] .    Thank you.  [facility] Rhoad[facility]  [facility]

## 2025-07-15 DIAGNOSIS — E78.2 MIXED HYPERLIPIDEMIA: ICD-10-CM

## 2025-07-15 DIAGNOSIS — F41.9 ANXIETY: ICD-10-CM

## 2025-07-15 DIAGNOSIS — K59.00 CONSTIPATION: ICD-10-CM

## 2025-07-15 DIAGNOSIS — F32.4 MDD (MAJOR DEPRESSIVE DISORDER), SINGLE EPISODE, IN PARTIAL REMISSION (HCC): ICD-10-CM

## 2025-07-15 RX ORDER — DOCUSATE SODIUM 100 MG/1
100 CAPSULE, LIQUID FILLED ORAL 2 TIMES DAILY
Qty: 180 CAPSULE | Refills: 0 | Status: SHIPPED | OUTPATIENT
Start: 2025-07-15

## 2025-07-15 RX ORDER — CITALOPRAM HYDROBROMIDE 10 MG/1
10 TABLET ORAL DAILY
Qty: 90 TABLET | Refills: 0 | Status: CANCELLED | OUTPATIENT
Start: 2025-07-15

## 2025-07-16 RX ORDER — ATORVASTATIN CALCIUM 80 MG/1
80 TABLET, FILM COATED ORAL
Qty: 90 TABLET | Refills: 3 | Status: SHIPPED | OUTPATIENT
Start: 2025-07-16 | End: 2025-10-14

## 2025-07-16 NOTE — TELEPHONE ENCOUNTER
Called Socorro ( [phone] ) and left  requesting a call back to review:    - she hasn't had an appointment since 4/24/2024 (over a year ago)   - she never called to reschedule the appointment she cancelled on 10/28/2024   - if she wants we can put her back on waitlist for medication management   - she will need to go to PCP for management of medications while she is waiting to be reestablished

## 2025-07-17 ENCOUNTER — TELEPHONE (OUTPATIENT)
Age: 65
End: 2025-07-17

## 2025-07-17 DIAGNOSIS — E03.9 ACQUIRED HYPOTHYROIDISM: ICD-10-CM

## 2025-07-18 RX ORDER — LEVOTHYROXINE SODIUM 100 UG/1
100 TABLET ORAL
Qty: 90 TABLET | Refills: 1 | Status: SHIPPED | OUTPATIENT
Start: 2025-07-18

## 2025-07-21 ENCOUNTER — TELEPHONE (OUTPATIENT)
Age: 65
End: 2025-07-21

## 2025-07-21 DIAGNOSIS — F41.9 ANXIETY: ICD-10-CM

## 2025-07-21 DIAGNOSIS — F32.4 MDD (MAJOR DEPRESSIVE DISORDER), SINGLE EPISODE, IN PARTIAL REMISSION (HCC): ICD-10-CM

## 2025-07-21 NOTE — TELEPHONE ENCOUNTER
Patients daughter called and stated she was notified by patients psychiatry office that they would not be able to refill patients CeleXA until she has an appointment. Unfortunately patients daughter stated they were unable to get patient in to be seen until October. Patients daughter was advised to contact pcp office to see if the medication could be refilled. Patients daughter is requesting the refill at this time. Patients daughter stated patient has an appointment scheduled with pcp on 09/23/2025 however if patient needs to have a visit to have the medication refilled please return her call to notify and she will schedule a sooner appointment. Please advise.

## 2025-07-21 NOTE — TELEPHONE ENCOUNTER
Patient's daughter called asking why we can't schedule the patient for an appointment. Pt had called requesting refills and per provider they cannot refill until patient is re-established. Writer explained to the daughter (Nai) we do not have any availability at this time and she may get refills with the PCP until we are able to offer an appointment. Pt is on the waitlist until an appointment becomes available.

## 2025-07-22 DIAGNOSIS — F41.9 ANXIETY: ICD-10-CM

## 2025-07-22 DIAGNOSIS — F32.4 MDD (MAJOR DEPRESSIVE DISORDER), SINGLE EPISODE, IN PARTIAL REMISSION (HCC): ICD-10-CM

## 2025-07-22 RX ORDER — CITALOPRAM HYDROBROMIDE 10 MG/1
10 TABLET ORAL DAILY
Qty: 90 TABLET | Refills: 1 | OUTPATIENT
Start: 2025-07-22

## 2025-07-23 DIAGNOSIS — F32.4 MDD (MAJOR DEPRESSIVE DISORDER), SINGLE EPISODE, IN PARTIAL REMISSION (HCC): ICD-10-CM

## 2025-07-23 DIAGNOSIS — F41.9 ANXIETY: ICD-10-CM

## 2025-07-23 RX ORDER — CITALOPRAM HYDROBROMIDE 10 MG/1
10 TABLET ORAL DAILY
Qty: 90 TABLET | Refills: 0 | Status: SHIPPED | OUTPATIENT
Start: 2025-07-23

## 2025-07-23 RX ORDER — CITALOPRAM HYDROBROMIDE 10 MG/1
10 TABLET ORAL DAILY
Qty: 90 TABLET | Refills: 1 | OUTPATIENT
Start: 2025-07-23